# Patient Record
Sex: MALE | Race: BLACK OR AFRICAN AMERICAN | NOT HISPANIC OR LATINO | Employment: FULL TIME | URBAN - METROPOLITAN AREA
[De-identification: names, ages, dates, MRNs, and addresses within clinical notes are randomized per-mention and may not be internally consistent; named-entity substitution may affect disease eponyms.]

---

## 2017-03-25 ENCOUNTER — HOSPITAL ENCOUNTER (EMERGENCY)
Facility: HOSPITAL | Age: 47
Discharge: HOME | End: 2017-03-25
Attending: EMERGENCY MEDICINE
Payer: MEDICAID

## 2017-03-25 VITALS
SYSTOLIC BLOOD PRESSURE: 130 MMHG | RESPIRATION RATE: 20 BRPM | WEIGHT: 150 LBS | HEART RATE: 78 BPM | HEIGHT: 72 IN | OXYGEN SATURATION: 98 % | TEMPERATURE: 98 F | BODY MASS INDEX: 20.32 KG/M2 | DIASTOLIC BLOOD PRESSURE: 70 MMHG

## 2017-03-25 DIAGNOSIS — T14.8XXA MUSCLE STRAIN: Primary | ICD-10-CM

## 2017-03-25 DIAGNOSIS — M79.621 AXILLARY PAIN, RIGHT: ICD-10-CM

## 2017-03-25 LAB — TROPONIN I SERPL DL<=0.01 NG/ML-MCNC: <0.006 NG/ML

## 2017-03-25 PROCEDURE — 96372 THER/PROPH/DIAG INJ SC/IM: CPT

## 2017-03-25 PROCEDURE — 99284 EMERGENCY DEPT VISIT MOD MDM: CPT | Mod: 25

## 2017-03-25 PROCEDURE — 93005 ELECTROCARDIOGRAM TRACING: CPT

## 2017-03-25 PROCEDURE — 84484 ASSAY OF TROPONIN QUANT: CPT

## 2017-03-25 PROCEDURE — 63600175 PHARM REV CODE 636 W HCPCS: Performed by: EMERGENCY MEDICINE

## 2017-03-25 PROCEDURE — 94640 AIRWAY INHALATION TREATMENT: CPT

## 2017-03-25 PROCEDURE — 25000242 PHARM REV CODE 250 ALT 637 W/ HCPCS: Performed by: EMERGENCY MEDICINE

## 2017-03-25 RX ORDER — KETOROLAC TROMETHAMINE 30 MG/ML
60 INJECTION, SOLUTION INTRAMUSCULAR; INTRAVENOUS
Status: COMPLETED | OUTPATIENT
Start: 2017-03-25 | End: 2017-03-25

## 2017-03-25 RX ORDER — PREDNISONE 20 MG/1
60 TABLET ORAL
Status: COMPLETED | OUTPATIENT
Start: 2017-03-25 | End: 2017-03-25

## 2017-03-25 RX ORDER — CYCLOBENZAPRINE HCL 10 MG
10 TABLET ORAL 3 TIMES DAILY PRN
Qty: 15 TABLET | Refills: 0 | Status: SHIPPED | OUTPATIENT
Start: 2017-03-25 | End: 2017-03-30

## 2017-03-25 RX ORDER — IPRATROPIUM BROMIDE AND ALBUTEROL SULFATE 2.5; .5 MG/3ML; MG/3ML
3 SOLUTION RESPIRATORY (INHALATION)
Status: COMPLETED | OUTPATIENT
Start: 2017-03-25 | End: 2017-03-25

## 2017-03-25 RX ORDER — PREDNISONE 10 MG/1
10 TABLET ORAL DAILY
COMMUNITY
End: 2023-04-24

## 2017-03-25 RX ORDER — KETOROLAC TROMETHAMINE 10 MG/1
10 TABLET, FILM COATED ORAL 2 TIMES DAILY PRN
Qty: 15 TABLET | Refills: 1 | Status: SHIPPED | OUTPATIENT
Start: 2017-03-25 | End: 2023-04-24

## 2017-03-25 RX ADMIN — PREDNISONE 60 MG: 20 TABLET ORAL at 07:03

## 2017-03-25 RX ADMIN — IPRATROPIUM BROMIDE AND ALBUTEROL SULFATE 3 ML: .5; 3 SOLUTION RESPIRATORY (INHALATION) at 07:03

## 2017-03-25 RX ADMIN — KETOROLAC TROMETHAMINE 60 MG: 30 INJECTION, SOLUTION INTRAMUSCULAR at 08:03

## 2017-03-25 NOTE — ED TRIAGE NOTES
chest pain under right arm, started last night, hurts when move and cough, states possibly from picking up 30# sack of crawfish, pt has wheezing noted to TENZIN in posterior, pt is a current everyday smoker

## 2017-03-25 NOTE — DISCHARGE INSTRUCTIONS
Self-Care for Strains and Sprains  Most minor strains and sprains can be treated with self-care. Recovering from a strain or sprain may take 6 to 8 weeks. Your self-care goal is to reduce pain and immobilize the injury to speed healing.     A sprain injures ligaments (tissue that connects bones to bones).        A strain injures muscles or tendons (tissue that connects muscles to bones).   Support the injured area  Wrapping the injured area provides support for short, necessary activities. Be careful not to wrap the area too tightly. This could cut off the blood supply.  · Support a wrist, elbow, or shoulder with a sling.  · Wrap an ankle or knee with an elastic bandage.  · Tape a finger or toe to the one next to it.  Use cold and heat  Cold reduces swelling. Both cold and heat reduce pain. Heat should not be used in the initial treatment of the injury. When using cold or heat, always place a towel between the pack and your skin.  · Apply ice or a cold pack 10 to 15 minutes every hour youre awake for the first 2 days.  · After the swelling goes down, use cold or heat to control pain. Dont use heat late in the day, since it can cause swelling when youre not active.  Rest and elevate  Rest and elevation help your injury heal faster.  · Raise the injured area above your heart level.  · Keep the injured area from moving.  · Limit the use of the joint or limb.  Use medicine  · Aspirin reduces pain and swelling. (Note: Dont give aspirin to a child 18 or younger unless prescribed by the doctor.)  · Aspirin substitutes, such as ibuprofen, can reduce pain. Some substitutes reduce swelling, too. Ask your pharmacist which substitutes you can use.  Call your doctor if:  · The injured joint wont move, or bones make a grating sound when they move.  · You cant put weight on the injured area, even after 24 hours.  · The injured body part is cold, blue, or numb.  · The joint or limb appears bent or crooked.  · Pain  increases or doesnt improve in 4 days.  · When pressing along the injured area, you notice a spot that is especially painful.   Date Last Reviewed: 9/29/2015 © 2000-2016 [employer]. [address] [place] [address], [place] [address]. All rights reserved. This information is not intended as a substitute for professional medical care. Always follow your healthcare professional's instructions.          RICE     Rest an injury, elevate it, and use ice and compression as directed.   RICE stands for rest, ice, compression, and elevation. These can limit pain and swelling after an injury. RICE may be recommended to help treat fractures, sprains, strains, and bruises or bumps.   Home care  The following explain the details of RICE:  · Rest. Limit the use of the injured body part. This helps prevent further damage to the body part and gives it time to heal. In some cases, you may need a sling, brace, splint, or cast to help keep the body part still until it has healed.  · Ice. Applying ice right after an injury helps relieve pain and swelling. Wrap a bag of ice in a thin towel. Then, place it over the injured area. Do this for 10 to 15 minutes every 3 to 4 hours. Continue for the next 1 to 3 days or until your symptoms improve. Never put ice directly on your skin or ice an area longer than 15 minutes at a time.  · Compression. Putting pressure on an injury helps reduce swelling and provides support. Wrap the injured area firmly with an elastic bandage/wrap. Make sure not to wrap the bandage too tightly or you will cut off blood flow to the injured area. If your bandage loosens, rewrap it.  · Elevation. Keeping an injury raised above the level of your heart reduces swelling, pain, and throbbing. For instance, if you have a broken leg, it may help to rest your leg on several pillows when sitting or lying down. Try to keep the injured area elevated for at least 2 to 3 hours per day.  Follow-up care  Follow up with your  healthcare provider, or as advised.  When to seek medical advice  Call your healthcare provider right away if any of these occur:  · Fever of 100.4°F (38°C) or higher, or as directed by your healthcare provider  · Increased pain or swelling in the injured body part  · Injured body part becomes cold, blue, numb, or tingly  · Signs of infection. These include warmth in the skin, redness, drainage, or bad smell coming from the injured body part.  Date Last Reviewed: 1/18/2016 © 2000-2016 [employer]. [address] [place] [address]. All rights reserved. This information is not intended as a substitute for professional medical care. Always follow your healthcare professional's instructions.          Treating Strains and Sprains  Strains and sprains happen when muscles or other soft tissues near your bones stretch or tear. These injuries can cause bruising, swelling, and pain. To ease your discomfort and speed the healing of your strain or sprain, follow the tips below. Remember, a strain or sprain can take 6 to 8 weeks to heal.     Important Note: Do not give aspirin to children or teens without discussing it with your healthcare provider first.        Ice first, heat later  · Use ice for the first 24 to 48 hours after injury. Ice helps prevent swelling and reduce pain. Ice the injury for no more than 20 minutes at a time and allow at least  20 minutes between icing sessions.  · Apply heat after the first 72 hours, once the swelling has gone down. Heat relaxes muscles and increases blood flow. Soak the injured area in warm water or use a heating pad set on low for no more than 15 minutes at a time.  Wrap and elevate  · Wrap an injured limb firmly with an elastic bandage. This provides support and helps prevent swelling. Dont wear an elastic bandage overnight. Watch for tingling, numbness, or increased pain, and remove the bandage immediately if any of these occurs.  · Elevate the injured area  to help reduce swelling and throbbing. Its best to raise an injured limb above the level of your heart.     Medicines  · Over-the-counter medicines such as acetaminophen or ibuprofen can help reduce pain. Some also help reduce swelling.  · Take medicine only as directed.  · Rest the area even if medicines are controlling the pain.  Rest  · Rest the injured area by not using it for 24 hours.  · When youre ready, return slowly to your normal activities. Rest the injured area often.  · Dont use or walk on an injured limb if it hurts.  Date Last Reviewed: 9/3/2015  © 9960-4092 [employer]. [address] [place] [address], [place] [address]. All rights reserved. This information is not intended as a substitute for professional medical care. Always follow your healthcare professional's instructions.

## 2017-03-25 NOTE — ED PROVIDER NOTES
Encounter Date: 3/25/2017       History     Chief Complaint   Patient presents with    Chest Pain     chest pain under right arm, started last night, hurts when move and cough, states possibly from picking up 30# sack of crawfish     Review of patient's allergies indicates:  No Known Allergies  HPI Comments: This is NOT chest pain as described in triage.  Patient is a 46 year old male who presents with pain to lateral chest wall under axilla on right that began yesterday after lifting up a heavy crawfish sack.  No dyspnea, nausea, vomiting.  No anterior chest or chest wall pain.  No diaphoresis.  Exactly reproduced with twisting to the left and coughing.  Pain is positional.  No fever, chills.  He does have wheezing.  No rash to skin to suggest shingles.  Gradual onset at around 9pm.    The history is provided by the patient.     Past Medical History:   Diagnosis Date    Bronchitis     Bronchitis      Past Surgical History:   Procedure Laterality Date    NECK SURGERY       History reviewed. No pertinent family history.  Social History   Substance Use Topics    Smoking status: Current Every Day Smoker    Smokeless tobacco: None    Alcohol use None     Review of Systems   Constitutional: Negative for fever.   HENT: Negative for sore throat.    Eyes: Negative for pain.   Respiratory: Negative for shortness of breath.    Cardiovascular: Negative for chest pain and palpitations.   Gastrointestinal: Negative for abdominal pain, nausea and vomiting.   Genitourinary: Negative for dysuria.   Musculoskeletal: Negative for back pain.   Skin: Negative for rash.   Neurological: Negative for headaches.   Psychiatric/Behavioral: Negative for confusion.       Physical Exam   Initial Vitals   BP Pulse Resp Temp SpO2   03/25/17 0653 03/25/17 0653 03/25/17 0653 03/25/17 0653 03/25/17 0653   128/78 76 20 97.9 °F (36.6 °C) 97 %     Physical Exam    Nursing note and vitals reviewed.  Constitutional: He appears well-developed and  well-nourished. No distress.   HENT:   Head: Normocephalic and atraumatic.   Eyes: Conjunctivae are normal.   Neck: Normal range of motion. Neck supple.   Cardiovascular: Normal rate and regular rhythm.   Pulmonary/Chest: Breath sounds normal. No respiratory distress. He has no wheezes.   Abdominal: Soft. He exhibits no distension.   Musculoskeletal: Normal range of motion.   Neurological: He is alert and oriented to person, place, and time. He has normal strength. No cranial nerve deficit or sensory deficit.   Skin: Skin is warm and dry. No rash noted. No erythema.   Psychiatric: He has a normal mood and affect.         ED Course   Procedures  Labs Reviewed - No data to display  EKG Readings: (Independently Interpreted)   Initial Reading: No STEMI. Rhythm: Normal Sinus Rhythm. Heart Rate: 70. Ectopy: No Ectopy. ST Segments: Normal ST Segments. T Waves: Normal. Axis: Normal.          Medical Decision Making:   Differential Diagnosis:   DDx:  Muscle strain, rib fracture, pneumothorax, pericarditis, ACS  Clinical Tests:   Lab Tests: Ordered and Reviewed  The following lab test(s) were unremarkable: Troponin  Radiological Study: Ordered and Reviewed  Medical Tests: Ordered and Reviewed  ED Management:  Pain likely musculoskeletal as it was temporally related to picking up heavy objects.   Low suspicion for cardiac, but patient concerned, so we investigated.  Patient has no acute findings on EKG and a negative troponin after greater than 12 hours of constant pain to right lateral chest beneath axilla that is worse with certain positions and cough.  Likely musculoskeletal.  Will discharge with pain medications.                     ED Course     Clinical Impression:   The primary encounter diagnosis was Muscle strain. A diagnosis of Axillary pain, right was also pertinent to this visit.          Ben Siddiqui MD  03/26/17 7917

## 2017-03-25 NOTE — ED AVS SNAPSHOT
[facility]  [address] [place] [address]               Aaron Pruitt   3/25/2017  6:56 AM   ED    Description:  Male : 1970   Department:  [facility]           Your Care was Coordinated By:     Provider Role From To    Ben Siddiqui MD Attending Provider 17 0700 --      Reason for Visit     Chest Pain           Diagnoses this Visit        Comments    Muscle strain    -  Primary     Axillary pain, right           ED Disposition     ED Disposition Condition Comment    Discharge             To Do List           Follow-up Information     Follow up with [facility].    Specialty:  Family Medicine    Contact information:    [address], [address]  [facility] [address] [phone]       These Medications        Disp Refills Start End    ketorolac (TORADOL) 10 mg tablet 15 tablet 1 3/25/2017     Take 1 tablet (10 mg total) by mouth 2 (two) times daily as needed for Pain. - Oral    cyclobenzaprine (FLEXERIL) 10 MG tablet 15 tablet 0 3/25/2017 3/30/2017    Take 1 tablet (10 mg total) by mouth 3 (three) times daily as needed for Muscle spasms. - Oral      [facility]     [facility] -  Assistance  Registered nurses in the [facility] provide clinical advisement, health education, appointment booking, and other advisory services.  Call for this free service at [phone].             Medications           Message regarding Medications     Verify the changes and/or additions to your medication regime listed below are the same as discussed with your clinician today.  If any of these changes or additions are incorrect, please notify your healthcare provider.        START taking these NEW medications        Refills    ketorolac (TORADOL) 10 mg tablet 1    Sig: Take 1 tablet (10 mg total) by mouth 2 (two) times daily as needed for Pain.    Class: Print    Route:  Oral    cyclobenzaprine (FLEXERIL) 10 MG tablet 0    Sig: Take 1 tablet (10 mg total) by mouth 3 (three) times daily as needed for Muscle spasms.    Class: Print    Route: Oral      These medications were administered today        Dose Freq    albuterol-ipratropium 2.5mg-0.5mg/3mL nebulizer solution 3 mL 3 mL ED 1 Time    Sig: Take 3 mLs by nebulization ED 1 Time.    Class: Normal    Route: Nebulization    predniSONE tablet 60 mg 60 mg ED 1 Time    Sig: Take 3 tablets (60 mg total) by mouth ED 1 Time.    Class: Normal    Route: Oral    ketorolac injection 60 mg 60 mg ED 1 Time    Sig: Inject 60 mg into the muscle ED 1 Time.    Class: Normal    Route: Intramuscular           Verify that the below list of medications is an accurate representation of the medications you are currently taking.  If none reported, the list may be blank. If incorrect, please contact your healthcare provider. Carry this list with you in case of emergency.           Current Medications     predniSONE (DELTASONE) 10 MG tablet Take 10 mg by mouth once daily.    albuterol 90 mcg/actuation inhaler Inhale 1-2 puffs into the lungs every 6 (six) hours as needed for Wheezing.    cyclobenzaprine (FLEXERIL) 10 MG tablet Take 1 tablet (10 mg total) by mouth 3 (three) times daily as needed for Muscle spasms.    ketorolac (TORADOL) 10 mg tablet Take 1 tablet (10 mg total) by mouth 2 (two) times daily as needed for Pain.           Clinical Reference Information           Your Vitals Were     BP Pulse Temp Resp Height Weight    128/78 (BP Location: Right arm, Patient Position: Sitting) 79 97.9 °F (36.6 °C) (Oral) 18 6' (1.829 m) 68 kg (150 lb)    SpO2 BMI             98% 20.34 kg/m2         Allergies as of 3/25/2017     No Known Allergies      Immunizations Administered on Date of Encounter - 3/25/2017     None      ED Micro, Lab, POCT     Start Ordered       Status Ordering Provider    03/25/17 0728 03/25/17 0727  Troponin I  STAT      Final result        ED Imaging Orders     Start Ordered       Status Ordering Provider    03/25/17 0728 03/25/17 0727  X-Ray Ribs 2 View Right  1 time imaging      Final result     03/25/17 0727 03/25/17 0727  X-Ray Chest 1 View  1 time imaging      Final result         Discharge Instructions           Self-Care for Strains and Sprains  Most minor strains and sprains can be treated with self-care. Recovering from a strain or sprain may take 6 to 8 weeks. Your self-care goal is to reduce pain and immobilize the injury to speed healing.     A sprain injures ligaments (tissue that connects bones to bones).        A strain injures muscles or tendons (tissue that connects muscles to bones).   Support the injured area  Wrapping the injured area provides support for short, necessary activities. Be careful not to wrap the area too tightly. This could cut off the blood supply.  · Support a wrist, elbow, or shoulder with a sling.  · Wrap an ankle or knee with an elastic bandage.  · Tape a finger or toe to the one next to it.  Use cold and heat  Cold reduces swelling. Both cold and heat reduce pain. Heat should not be used in the initial treatment of the injury. When using cold or heat, always place a towel between the pack and your skin.  · Apply ice or a cold pack 10 to 15 minutes every hour youre awake for the first 2 days.  · After the swelling goes down, use cold or heat to control pain. Dont use heat late in the day, since it can cause swelling when youre not active.  Rest and elevate  Rest and elevation help your injury heal faster.  · Raise the injured area above your heart level.  · Keep the injured area from moving.  · Limit the use of the joint or limb.  Use medicine  · Aspirin reduces pain and swelling. (Note: Dont give aspirin to a child 18 or younger unless prescribed by the doctor.)  · Aspirin substitutes, such as ibuprofen, can reduce pain. Some substitutes reduce swelling, too. Ask your pharmacist which substitutes you can  use.  Call your doctor if:  · The injured joint wont move, or bones make a grating sound when they move.  · You cant put weight on the injured area, even after 24 hours.  · The injured body part is cold, blue, or numb.  · The joint or limb appears bent or crooked.  · Pain increases or doesnt improve in 4 days.  · When pressing along the injured area, you notice a spot that is especially painful.   Date Last Reviewed: 9/29/2015 © 2000-2016 [email or web address]. [address] [place] [address]. All rights reserved. This information is not intended as a substitute for professional medical care. Always follow your healthcare professional's instructions.          RICE     Rest an injury, elevate it, and use ice and compression as directed.   RICE stands for rest, ice, compression, and elevation. These can limit pain and swelling after an injury. RICE may be recommended to help treat fractures, sprains, strains, and bruises or bumps.   Home care  The following explain the details of RICE:  · Rest. Limit the use of the injured body part. This helps prevent further damage to the body part and gives it time to heal. In some cases, you may need a sling, brace, splint, or cast to help keep the body part still until it has healed.  · Ice. Applying ice right after an injury helps relieve pain and swelling. Wrap a bag of ice in a thin towel. Then, place it over the injured area. Do this for 10 to 15 minutes every 3 to 4 hours. Continue for the next 1 to 3 days or until your symptoms improve. Never put ice directly on your skin or ice an area longer than 15 minutes at a time.  · Compression. Putting pressure on an injury helps reduce swelling and provides support. Wrap the injured area firmly with an elastic bandage/wrap. Make sure not to wrap the bandage too tightly or you will cut off blood flow to the injured area. If your bandage loosens, rewrap it.  · Elevation. Keeping an injury raised above the level of  your heart reduces swelling, pain, and throbbing. For instance, if you have a broken leg, it may help to rest your leg on several pillows when sitting or lying down. Try to keep the injured area elevated for at least 2 to 3 hours per day.  Follow-up care  Follow up with your healthcare provider, or as advised.  When to seek medical advice  Call your healthcare provider right away if any of these occur:  · Fever of 100.4°F (38°C) or higher, or as directed by your healthcare provider  · Increased pain or swelling in the injured body part  · Injured body part becomes cold, blue, numb, or tingly  · Signs of infection. These include warmth in the skin, redness, drainage, or bad smell coming from the injured body part.  Date Last Reviewed: 1/18/2016 © 2000-2016 [employer]. [address] [place] [address]. All rights reserved. This information is not intended as a substitute for professional medical care. Always follow your healthcare professional's instructions.          Treating Strains and Sprains  Strains and sprains happen when muscles or other soft tissues near your bones stretch or tear. These injuries can cause bruising, swelling, and pain. To ease your discomfort and speed the healing of your strain or sprain, follow the tips below. Remember, a strain or sprain can take 6 to 8 weeks to heal.     Important Note: Do not give aspirin to children or teens without discussing it with your healthcare provider first.        Ice first, heat later  · Use ice for the first 24 to 48 hours after injury. Ice helps prevent swelling and reduce pain. Ice the injury for no more than 20 minutes at a time and allow at least  20 minutes between icing sessions.  · Apply heat after the first 72 hours, once the swelling has gone down. Heat relaxes muscles and increases blood flow. Soak the injured area in warm water or use a heating pad set on low for no more than 15 minutes at a time.  Wrap and elevate  · Wrap  an injured limb firmly with an elastic bandage. This provides support and helps prevent swelling. Dont wear an elastic bandage overnight. Watch for tingling, numbness, or increased pain, and remove the bandage immediately if any of these occurs.  · Elevate the injured area to help reduce swelling and throbbing. Its best to raise an injured limb above the level of your heart.     Medicines  · Over-the-counter medicines such as acetaminophen or ibuprofen can help reduce pain. Some also help reduce swelling.  · Take medicine only as directed.  · Rest the area even if medicines are controlling the pain.  Rest  · Rest the injured area by not using it for 24 hours.  · When youre ready, return slowly to your normal activities. Rest the injured area often.  · Dont use or walk on an injured limb if it hurts.  Date Last Reviewed: 9/3/2015  © 0867-6139 [employer]. [address] [place] [address]. All rights reserved. This information is not intended as a substitute for professional medical care. Always follow your healthcare professional's instructions.            [facility] Sign-Up     Activating your [facility] account is as easy as 1-2-3!     1) Visit [email or web address], select Sign Up Now, enter this activation code and your date of birth, then select Next.  [email or web address]  Expires: 5/9/2017  8:31 AM      2) Create a username and password to use when you visit [facility] in the future and select a security question in case you lose your password and select Next.    3) Enter your e-mail address and click Sign Up!    Additional Information  If you have questions, please e-mail [email or web address] or call [phone] to talk to our [facility] staff. Remember, [facility] is NOT to be used for urgent needs. For medical emergencies, dial 911.         Smoking Cessation     If you would like to quit smoking:   You may be eligible for free services if you are a [place] resident and started smoking  cigarettes before September 1, 1988.  Call the Smoking Cessation Trust (SCT) toll free at (420) 254-4250 or (947) 063-0789.   Call 1-800-QUIT-NOW if you do not meet the above criteria.             [facility] complies with applicable Federal civil rights laws and does not discriminate on the basis of race, color, national origin, age, disability, or sex.        Language Assistance Services     ATTENTION: Language assistance services are available, free of charge. Please call [phone].      ATENCIÓN: Si habla español, tiene a roberson disposición servicios gratuitos de asistencia lingüística. Llame al [phone].     CHÚ Ý: N?u b?n nói Ti?ng Vi?t, có các d?ch v? h? tr? ngôn ng? mi?n phí dành cho b?n. G?i s? [phone].

## 2018-01-02 ENCOUNTER — HOSPITAL ENCOUNTER (EMERGENCY)
Facility: HOSPITAL | Age: 48
Discharge: HOME | End: 2018-01-02
Attending: EMERGENCY MEDICINE
Payer: MEDICAID

## 2018-01-02 VITALS
TEMPERATURE: 98 F | DIASTOLIC BLOOD PRESSURE: 84 MMHG | HEIGHT: 72 IN | OXYGEN SATURATION: 98 % | HEART RATE: 68 BPM | SYSTOLIC BLOOD PRESSURE: 138 MMHG | BODY MASS INDEX: 20.32 KG/M2 | WEIGHT: 150 LBS | RESPIRATION RATE: 18 BRPM

## 2018-01-02 DIAGNOSIS — R53.83 FATIGUE, UNSPECIFIED TYPE: Primary | ICD-10-CM

## 2018-01-02 DIAGNOSIS — B34.9 VIRAL SYNDROME: ICD-10-CM

## 2018-01-02 DIAGNOSIS — R11.0 NAUSEA: ICD-10-CM

## 2018-01-02 LAB
FLUAV AG SPEC QL IA: NEGATIVE
FLUBV AG SPEC QL IA: NEGATIVE
SPECIMEN SOURCE: NORMAL

## 2018-01-02 PROCEDURE — 87400 INFLUENZA A/B EACH AG IA: CPT

## 2018-01-02 PROCEDURE — 99284 EMERGENCY DEPT VISIT MOD MDM: CPT

## 2018-01-02 RX ORDER — ONDANSETRON 4 MG/1
4 TABLET, ORALLY DISINTEGRATING ORAL EVERY 8 HOURS PRN
Qty: 8 TABLET | Refills: 0 | Status: SHIPPED | OUTPATIENT
Start: 2018-01-02 | End: 2018-01-05

## 2018-01-02 NOTE — ED TRIAGE NOTES
congestion and cough dx with bronchitis)  since last week, currently on antibiotics, still not feeling well

## 2018-01-02 NOTE — ED PROVIDER NOTES
Encounter Date: 1/2/2018    SCRIBE #1 NOTE: I, Thiago Thurston, am scribing for, and in the presence of, Dr. Gómez.       History     Chief Complaint   Patient presents with    Nasal Congestion     congestion and cough since last week, currently on antibiotics, still not feeling well     Time patient was seen by the provider: 12:34 PM    The patient is a 47 y.o. male with hx of: bronchitis who presents to the ED with a complaint of fatigue since yesterday. Patient notes associated nausea and nasal congestion. Patient reports seeing his PCP about a week ago for his cough. He was given medication for his cough, which provided some relief. Denies vomiting, abdominal pain, appetite change, fever, diarrhea, constipation, urinary symptoms. Patient has been taking albuterol, with last dose yesterday morning. He currently smokes ~one pack cigarettes per day. Denies any substance abuse, but did drink alcohol two nights ago. Patient reports drinking 3-4 cups of coffee this morning, but has not eaten.      The history is provided by the patient.     Review of patient's allergies indicates:  No Known Allergies  Past Medical History:   Diagnosis Date    Bronchitis     Bronchitis      Past Surgical History:   Procedure Laterality Date    NECK SURGERY       History reviewed. No pertinent family history.  Social History   Substance Use Topics    Smoking status: Current Every Day Smoker    Smokeless tobacco: Not on file    Alcohol use Not on file     Review of Systems   Constitutional: Negative for appetite change and fever.   HENT: Positive for congestion. Negative for sore throat.    Respiratory: Negative for cough.    Cardiovascular: Negative for chest pain.   Gastrointestinal: Positive for nausea. Negative for abdominal pain, constipation, diarrhea and vomiting.   Genitourinary: Negative for frequency.   Musculoskeletal: Negative for back pain.   Skin: Negative for rash.   Neurological: Positive for weakness.   Hematological:  Does not bruise/bleed easily.       Physical Exam     Initial Vitals [01/02/18 1130]   BP Pulse Resp Temp SpO2   138/84 68 18 97.6 °F (36.4 °C) 98 %      MAP       102         Physical Exam    Nursing note and vitals reviewed.  Constitutional: He appears well-developed and well-nourished. No distress.   HENT:   Head: Normocephalic and atraumatic.   Mouth/Throat: Oropharynx is clear and moist.   TMs clear  No OP exudates   Eyes: EOM are normal. Pupils are equal, round, and reactive to light.   Neck: No tracheal deviation present.   Cardiovascular: Normal rate, regular rhythm, normal heart sounds and intact distal pulses.   Pulmonary/Chest: Breath sounds normal. No stridor. No respiratory distress. He has no wheezes.   Abdominal: Soft. He exhibits no distension and no mass. There is no tenderness.   Musculoskeletal: Normal range of motion.   Neurological: He is alert and oriented to person, place, and time. He has normal strength. No cranial nerve deficit or sensory deficit.   Skin: Skin is warm and dry. No rash noted.   Psychiatric: He has a normal mood and affect. His behavior is normal. Thought content normal.         ED Course   Procedures  Labs Reviewed   INFLUENZA A AND B ANTIGEN             Medical Decision Making:   History:   Old Medical Records: I decided to obtain old medical records.  Initial Assessment:   Presents with fatigue and weakness last night, with nasal congestion and cough. Saw PCP about a week ago and was given medication for his cough. Presents today to make sure he doesn't have the flu. Patient is afebrile, vitals unremarkable, exam benign. No evidence of infection on exam. Will obtain flu swab.  Differential Diagnosis:   Differential Diagnosis includes, but is not limited to:  Sepsis, meningitis, cavernous sinus thrombosis, nasal foreign body, otitis media/external, nasal polyp, bacterial sinusitis, allergic rhinitis, influenza, bacterial/viral pharyngitis, peritonsillar abscess,  retropharyngeal abscess, bacterial/viral pneumonia.    Clinical Tests:   Lab Tests: Reviewed and Ordered  ED Management:  Rapid flu negative.   Recommend symptomatic and supportive care for viral syndrome. Will prescribe zofran for symptomatic treatment of nausea and decreased appetite.   Pt to follow-up with PCP.     After complete evaluation, including thorough history and physical exam, the patient's symptoms are most likely due to viral upper respiratory infection. There are no concerning features on physical exam to suggest bacterial otitis media/externa, sinusitis, pharyngitis, or peritonsillar abscess. Vital signs do not suggest sepsis. Lung sounds are clear and not consistent with pneumonia. There is no neck pain or limited ROM to suggest retropharyngeal abscess or meningitis. The patient will be treated with supportive care. Will provide RX for zofran upon D/C.    Upon re-evaluation, the patient's status has improved.  After complete ED evaluation, clinical impression is most consistent with viral syndrome.  At this time, I feel there is no emergent condition requiring further evaluation or admission. I believe the patient is stable for discharge from the ED. The patient and any additional family present were updated with test results, overall clinical impression, and recommended further plan of care. All questions were answered. The patient expressed understanding and agreed with current plan for discharge with PCP follow-up within 1 week. Strict return precautions were provided, including fever, inability to tolerate PO, return/worsening of current symptoms or any other concerns.                      ED Course      Clinical Impression:   The primary encounter diagnosis was Fatigue, unspecified type. A diagnosis of Nausea was also pertinent to this visit.    Disposition:   Disposition: Discharged  Condition: Stable       I, Dr. Larry Gómez, personally performed the services described in this documentation.  All medical record entries made by the scribe were at my direction and in my presence.  I have reviewed the chart and agree that the record reflects my personal performance and is accurate and complete.     Larry Gómez MD.                 Larry Gómez MD  01/31/18 0504

## 2023-04-21 DIAGNOSIS — M25.562 PAIN IN BOTH KNEES, UNSPECIFIED CHRONICITY: Primary | ICD-10-CM

## 2023-04-21 DIAGNOSIS — M25.561 PAIN IN BOTH KNEES, UNSPECIFIED CHRONICITY: Primary | ICD-10-CM
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
## 2023-04-24 ENCOUNTER — OFFICE VISIT (OUTPATIENT)
Dept: ORTHOPEDICS | Facility: CLINIC | Age: 53
End: 2023-04-24
Payer: MEDICAID

## 2023-04-24 VITALS — HEIGHT: 73 IN | BODY MASS INDEX: 20.24 KG/M2 | WEIGHT: 152.69 LBS

## 2023-04-24 DIAGNOSIS — M25.562 ACUTE PAIN OF LEFT KNEE: Primary | ICD-10-CM
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
## 2023-04-24 DIAGNOSIS — M25.362 INSTABILITY OF KNEE JOINT, LEFT: ICD-10-CM

## 2023-04-24 PROCEDURE — 99999 PR PBB SHADOW E&M-EST. PATIENT-LVL IV: CPT | Mod: PBBFAC,,, | Performed by: PHYSICIAN ASSISTANT

## 2023-04-24 PROCEDURE — 99214 OFFICE O/P EST MOD 30 MIN: CPT | Mod: PBBFAC,PN | Performed by: PHYSICIAN ASSISTANT

## 2023-04-24 PROCEDURE — 99203 OFFICE O/P NEW LOW 30 MIN: CPT | Mod: S$PBB,,, | Performed by: PHYSICIAN ASSISTANT

## 2023-04-24 PROCEDURE — 99203 PR OFFICE/OUTPT VISIT, NEW, LEVL III, 30-44 MIN: ICD-10-PCS | Mod: S$PBB,,, | Performed by: PHYSICIAN ASSISTANT

## 2023-04-24 PROCEDURE — 99999 PR PBB SHADOW E&M-EST. PATIENT-LVL IV: ICD-10-PCS | Mod: PBBFAC,,, | Performed by: PHYSICIAN ASSISTANT

## 2023-04-24 RX ORDER — VARENICLINE TARTRATE 1 MG/1
TABLET, FILM COATED ORAL
Status: ON HOLD | COMMUNITY
Start: 2023-04-14 | End: 2024-01-25 | Stop reason: HOSPADM

## 2023-04-24 RX ORDER — IBUPROFEN 200 MG
1 TABLET ORAL
Status: ON HOLD | COMMUNITY
Start: 2023-03-07 | End: 2024-01-25 | Stop reason: HOSPADM

## 2023-04-24 RX ORDER — NAPROXEN 500 MG/1
TABLET ORAL
Status: ON HOLD | COMMUNITY
End: 2024-01-12

## 2023-04-24 RX ORDER — MEPOLIZUMAB 100 MG/ML
INJECTION, SOLUTION SUBCUTANEOUS
Status: ON HOLD | COMMUNITY
Start: 2023-03-23 | End: 2024-01-25 | Stop reason: HOSPADM

## 2023-04-24 NOTE — PATIENT INSTRUCTIONS
It was nice to meet you today! I am sorry that you are hurting so much.     You have some wear and tear in your knees (arthritis). I am worried that you may have an ACL tear in the left knee.      Wear the knee brace as needed for prolonged walking. This should give you added support and help with pain.     Continue on naproxen to help with pain/inflammation. Take as directed with food.     I want to get an MRI of your left knee to see if you have an ACL tear. I will message you with results and my further recommendations.     Please stay in touch and call me if you need anything. You can also send me a message in [facility].     Veronique   [phone]

## 2023-05-04 ENCOUNTER — TELEPHONE (OUTPATIENT)
Dept: ORTHOPEDICS | Facility: CLINIC | Age: 53
End: 2023-05-04
Payer: MEDICAID

## 2023-05-04 NOTE — TELEPHONE ENCOUNTER
Spoke to patient he was given the number to medicaid hotline also he was informed that he need a bigger brace and that he can come in anytime to pick one up. Pt replied. OK Thnak You

## 2023-05-04 NOTE — TELEPHONE ENCOUNTER
----- Message from Veronique Peraza PA-C sent at 5/4/2023 12:57 PM CDT -----  I called him about his MRI results.     He needs to see sports medicine. Please give him medicaid escalation line and other medicaid numbers.     I will also put in referral and Traci should call him.     Also, he needs a bigger knee brace. He can come in at any time to get this. He does not need to see me.     Thanks.

## 2024-01-01 ENCOUNTER — HOSPITAL ENCOUNTER (INPATIENT)
Facility: HOSPITAL | Age: 54
LOS: 10 days | Discharge: HOSPICE | DRG: 207 | End: 2024-01-11
Attending: EMERGENCY MEDICINE | Admitting: FAMILY MEDICINE
Payer: MEDICAID

## 2024-01-01 DIAGNOSIS — J96.01 ACUTE RESPIRATORY FAILURE WITH HYPOXIA AND HYPERCARBIA: ICD-10-CM

## 2024-01-01 DIAGNOSIS — G40.901 SEIZURE DISORDER, NONCONVULSIVE, WITH STATUS EPILEPTICUS: ICD-10-CM

## 2024-01-01 DIAGNOSIS — J96.02 ACUTE RESPIRATORY FAILURE WITH HYPOXIA AND HYPERCARBIA: ICD-10-CM

## 2024-01-01 DIAGNOSIS — I46.9 CARDIORESPIRATORY ARREST: Primary | ICD-10-CM

## 2024-01-01 DIAGNOSIS — R79.89 TROPONIN LEVEL ELEVATED: ICD-10-CM

## 2024-01-01 DIAGNOSIS — G93.40 ACUTE ENCEPHALOPATHY: ICD-10-CM

## 2024-01-01 DIAGNOSIS — J44.1 COPD WITH ACUTE EXACERBATION: ICD-10-CM

## 2024-01-01 DIAGNOSIS — R74.8 ELEVATED CK: ICD-10-CM

## 2024-01-01 DIAGNOSIS — I46.9 CARDIAC ARREST: ICD-10-CM

## 2024-01-01 LAB
ALBUMIN SERPL BCP-MCNC: 3.6 G/DL (ref 3.5–5.2)
ALBUMIN SERPL BCP-MCNC: 4.2 G/DL (ref 3.5–5.2)
ALLENS TEST: YES
ALLENS TEST: YES
ALP SERPL-CCNC: 66 U/L (ref 55–135)
ALP SERPL-CCNC: 77 U/L (ref 55–135)
ALT SERPL W/O P-5'-P-CCNC: 39 U/L (ref 10–44)
ALT SERPL W/O P-5'-P-CCNC: 82 U/L (ref 10–44)
AMPHET+METHAMPHET UR QL: NEGATIVE
ANION GAP SERPL CALC-SCNC: 16 MMOL/L (ref 8–16)
ANION GAP SERPL CALC-SCNC: 19 MMOL/L (ref 8–16)
APTT PPP: 25.8 SEC (ref 21–32)
AST SERPL-CCNC: 144 U/L (ref 10–40)
AST SERPL-CCNC: 38 U/L (ref 10–40)
BACTERIA #/AREA URNS HPF: ABNORMAL /HPF
BARBITURATES UR QL SCN>200 NG/ML: NEGATIVE
BASOPHILS # BLD AUTO: 0.02 K/UL (ref 0–0.2)
BASOPHILS NFR BLD: 0.3 % (ref 0–1.9)
BENZODIAZ UR QL SCN>200 NG/ML: NEGATIVE
BILIRUB SERPL-MCNC: 0.9 MG/DL (ref 0.1–1)
BILIRUB SERPL-MCNC: 0.9 MG/DL (ref 0.1–1)
BILIRUB UR QL STRIP: NEGATIVE
BILIRUB UR QL STRIP: NEGATIVE
BNP SERPL-MCNC: 45 PG/ML (ref 0–99)
BUN SERPL-MCNC: 15 MG/DL (ref 6–20)
BUN SERPL-MCNC: 17 MG/DL (ref 6–20)
BZE UR QL SCN: NEGATIVE
CALCIUM SERPL-MCNC: 8.4 MG/DL (ref 8.7–10.5)
CALCIUM SERPL-MCNC: 8.7 MG/DL (ref 8.7–10.5)
CANNABINOIDS UR QL SCN: ABNORMAL
CHLORIDE SERPL-SCNC: 104 MMOL/L (ref 95–110)
CHLORIDE SERPL-SCNC: 106 MMOL/L (ref 95–110)
CK SERPL-CCNC: 3294 U/L (ref 20–200)
CLARITY UR: ABNORMAL
CLARITY UR: CLEAR
CO2 SERPL-SCNC: 14 MMOL/L (ref 23–29)
CO2 SERPL-SCNC: 18 MMOL/L (ref 23–29)
COLOR UR: YELLOW
COLOR UR: YELLOW
CREAT SERPL-MCNC: 1.1 MG/DL (ref 0.5–1.4)
CREAT SERPL-MCNC: 1.3 MG/DL (ref 0.5–1.4)
CREAT UR-MCNC: 140.5 MG/DL (ref 23–375)
CTP QC/QA: YES
CTP QC/QA: YES
DIFFERENTIAL METHOD BLD: ABNORMAL
EOSINOPHIL # BLD AUTO: 0 K/UL (ref 0–0.5)
EOSINOPHIL NFR BLD: 0.4 % (ref 0–8)
ERYTHROCYTE [DISTWIDTH] IN BLOOD BY AUTOMATED COUNT: 12.3 % (ref 11.5–14.5)
EST. GFR  (NO RACE VARIABLE): >60 ML/MIN/1.73 M^2
EST. GFR  (NO RACE VARIABLE): >60 ML/MIN/1.73 M^2
FIO2: 100 %
FIO2: 100 %
FIO2: 85 %
GLUCOSE SERPL-MCNC: 138 MG/DL (ref 70–110)
GLUCOSE SERPL-MCNC: 141 MG/DL (ref 70–110)
GLUCOSE SERPL-MCNC: 270 MG/DL (ref 70–110)
GLUCOSE UR QL STRIP: NEGATIVE
GLUCOSE UR QL STRIP: NEGATIVE
HCT VFR BLD AUTO: 37 % (ref 40–54)
HGB BLD-MCNC: 11.9 G/DL (ref 14–18)
HGB UR QL STRIP: ABNORMAL
HGB UR QL STRIP: NEGATIVE
IMM GRANULOCYTES # BLD AUTO: 0.13 K/UL (ref 0–0.04)
IMM GRANULOCYTES NFR BLD AUTO: 1.6 % (ref 0–0.5)
KETONES UR QL STRIP: NEGATIVE
KETONES UR QL STRIP: NEGATIVE
LACTATE SERPL-SCNC: 3.4 MMOL/L (ref 0.5–2.2)
LEUKOCYTE ESTERASE UR QL STRIP: NEGATIVE
LEUKOCYTE ESTERASE UR QL STRIP: NEGATIVE
LYMPHOCYTES # BLD AUTO: 4.1 K/UL (ref 1–4.8)
LYMPHOCYTES NFR BLD: 51.9 % (ref 18–48)
MAGNESIUM SERPL-MCNC: 2.2 MG/DL (ref 1.6–2.6)
MCH RBC QN AUTO: 30.7 PG (ref 27–31)
MCHC RBC AUTO-ENTMCNC: 32.2 G/DL (ref 32–36)
MCV RBC AUTO: 96 FL (ref 82–98)
METHADONE UR QL SCN>300 NG/ML: NEGATIVE
MICROSCOPIC COMMENT: ABNORMAL
MONOCYTES # BLD AUTO: 0.5 K/UL (ref 0.3–1)
MONOCYTES NFR BLD: 6 % (ref 4–15)
NEUTROPHILS # BLD AUTO: 3.2 K/UL (ref 1.8–7.7)
NEUTROPHILS NFR BLD: 39.8 % (ref 38–73)
NITRITE UR QL STRIP: NEGATIVE
NITRITE UR QL STRIP: NEGATIVE
NRBC BLD-RTO: 0 /100 WBC
OPIATES UR QL SCN: NEGATIVE
PCO2 BLDA: 47 MMHG (ref 35–45)
PCO2 BLDA: 76.8 MMHG (ref 35–45)
PCP UR QL SCN>25 NG/ML: NEGATIVE
PEEP: 5
PH SMN: 6.93 [PH] (ref 7.35–7.45)
PH SMN: 7.27 [PH] (ref 7.35–7.45)
PH UR STRIP: 6 [PH] (ref 5–8)
PH UR STRIP: 7 [PH] (ref 5–8)
PHOSPHATE SERPL-MCNC: 4.4 MG/DL (ref 2.7–4.5)
PLATELET # BLD AUTO: 170 K/UL (ref 150–450)
PMV BLD AUTO: 9.1 FL (ref 9.2–12.9)
PO2 BLDA: 216 MMHG (ref 80–100)
PO2 BLDA: >488 MMHG (ref 80–100)
POC BASE DEFICIT: -17 MMOL/L (ref -2–2)
POC BASE DEFICIT: -5.4 MMOL/L (ref -2–2)
POC COHB: 2.1 % (ref 0–9)
POC HCO3: 16 MMOL/L (ref 24–28)
POC HCO3: 21.6 MMOL/L (ref 24–28)
POC METHB: 1 % (ref 0–3)
POC MOLECULAR INFLUENZA A AGN: NEGATIVE
POC MOLECULAR INFLUENZA B AGN: NEGATIVE
POC O2HB: 96.3 %
POC PERFORMED BY: ABNORMAL
POC PERFORMED BY: ABNORMAL
POC PERFORMED BY: NORMAL
POC SATURATED O2: 98.9 % (ref 95–100)
POC SATURATED O2: 99.4 % (ref 95–100)
POC SATURATED O2: >100 % (ref 95–100)
POC SET RR: 20
POCT GLUCOSE: 138 MG/DL (ref 70–110)
POCT GLUCOSE: 159 MG/DL (ref 70–110)
POCT GLUCOSE: 262 MG/DL (ref 70–110)
POTASSIUM SERPL-SCNC: 4 MMOL/L (ref 3.5–5.1)
POTASSIUM SERPL-SCNC: 5.7 MMOL/L (ref 3.5–5.1)
PROT SERPL-MCNC: 6.3 G/DL (ref 6–8.4)
PROT SERPL-MCNC: 7.5 G/DL (ref 6–8.4)
PROT UR QL STRIP: ABNORMAL
PROT UR QL STRIP: ABNORMAL
RBC # BLD AUTO: 3.87 M/UL (ref 4.6–6.2)
RBC #/AREA URNS HPF: 42 /HPF (ref 0–4)
SARS-COV-2 RDRP RESP QL NAA+PROBE: NEGATIVE
SODIUM SERPL-SCNC: 137 MMOL/L (ref 136–145)
SODIUM SERPL-SCNC: 140 MMOL/L (ref 136–145)
SP GR UR STRIP: 1.01 (ref 1–1.03)
SP GR UR STRIP: 1.02 (ref 1–1.03)
SPECIMEN SOURCE: ABNORMAL
SPECIMEN SOURCE: ABNORMAL
SPECIMEN SOURCE: NORMAL
SQUAMOUS #/AREA URNS HPF: 1 /HPF
TOXICOLOGY INFORMATION: ABNORMAL
TROPONIN I SERPL DL<=0.01 NG/ML-MCNC: 0.01 NG/ML (ref 0–0.03)
TROPONIN I SERPL DL<=0.01 NG/ML-MCNC: 0.08 NG/ML (ref 0–0.03)
URN SPEC COLLECT METH UR: ABNORMAL
URN SPEC COLLECT METH UR: ABNORMAL
UROBILINOGEN UR STRIP-ACNC: NEGATIVE EU/DL
UROBILINOGEN UR STRIP-ACNC: NEGATIVE EU/DL
VT: 420
WBC # BLD AUTO: 7.98 K/UL (ref 3.9–12.7)
WBC #/AREA URNS HPF: 2 /HPF (ref 0–5)

## 2024-01-01 PROCEDURE — 93005 ELECTROCARDIOGRAM TRACING: CPT

## 2024-01-01 PROCEDURE — 36600 WITHDRAWAL OF ARTERIAL BLOOD: CPT

## 2024-01-01 PROCEDURE — 96374 THER/PROPH/DIAG INJ IV PUSH: CPT

## 2024-01-01 PROCEDURE — 80307 DRUG TEST PRSMV CHEM ANLYZR: CPT | Performed by: EMERGENCY MEDICINE

## 2024-01-01 PROCEDURE — 84100 ASSAY OF PHOSPHORUS: CPT | Performed by: FAMILY MEDICINE

## 2024-01-01 PROCEDURE — 25000242 PHARM REV CODE 250 ALT 637 W/ HCPCS: Performed by: STUDENT IN AN ORGANIZED HEALTH CARE EDUCATION/TRAINING PROGRAM

## 2024-01-01 PROCEDURE — 81000 URINALYSIS NONAUTO W/SCOPE: CPT | Mod: 59 | Performed by: STUDENT IN AN ORGANIZED HEALTH CARE EDUCATION/TRAINING PROGRAM

## 2024-01-01 PROCEDURE — 80053 COMPREHEN METABOLIC PANEL: CPT | Mod: 91 | Performed by: FAMILY MEDICINE

## 2024-01-01 PROCEDURE — 99900035 HC TECH TIME PER 15 MIN (STAT)

## 2024-01-01 PROCEDURE — 84484 ASSAY OF TROPONIN QUANT: CPT | Performed by: EMERGENCY MEDICINE

## 2024-01-01 PROCEDURE — 83605 ASSAY OF LACTIC ACID: CPT | Performed by: FAMILY MEDICINE

## 2024-01-01 PROCEDURE — 83735 ASSAY OF MAGNESIUM: CPT | Performed by: FAMILY MEDICINE

## 2024-01-01 PROCEDURE — 5A12012 PERFORMANCE OF CARDIAC OUTPUT, SINGLE, MANUAL: ICD-10-PCS | Performed by: FAMILY MEDICINE

## 2024-01-01 PROCEDURE — 81003 URINALYSIS AUTO W/O SCOPE: CPT | Mod: 59 | Performed by: EMERGENCY MEDICINE

## 2024-01-01 PROCEDURE — 27000221 HC OXYGEN, UP TO 24 HOURS

## 2024-01-01 PROCEDURE — 99900026 HC AIRWAY MAINTENANCE (STAT)

## 2024-01-01 PROCEDURE — 82803 BLOOD GASES ANY COMBINATION: CPT

## 2024-01-01 PROCEDURE — 82962 GLUCOSE BLOOD TEST: CPT

## 2024-01-01 PROCEDURE — 82947 ASSAY GLUCOSE BLOOD QUANT: CPT | Performed by: STUDENT IN AN ORGANIZED HEALTH CARE EDUCATION/TRAINING PROGRAM

## 2024-01-01 PROCEDURE — 99291 CRITICAL CARE FIRST HOUR: CPT

## 2024-01-01 PROCEDURE — 25000003 PHARM REV CODE 250

## 2024-01-01 PROCEDURE — 84484 ASSAY OF TROPONIN QUANT: CPT | Mod: 91 | Performed by: FAMILY MEDICINE

## 2024-01-01 PROCEDURE — 94002 VENT MGMT INPAT INIT DAY: CPT

## 2024-01-01 PROCEDURE — 94761 N-INVAS EAR/PLS OXIMETRY MLT: CPT | Mod: XB

## 2024-01-01 PROCEDURE — 85730 THROMBOPLASTIN TIME PARTIAL: CPT | Performed by: STUDENT IN AN ORGANIZED HEALTH CARE EDUCATION/TRAINING PROGRAM

## 2024-01-01 PROCEDURE — 0BH17EZ INSERTION OF ENDOTRACHEAL AIRWAY INTO TRACHEA, VIA NATURAL OR ARTIFICIAL OPENING: ICD-10-PCS | Performed by: FAMILY MEDICINE

## 2024-01-01 PROCEDURE — 20000000 HC ICU ROOM

## 2024-01-01 PROCEDURE — 96375 TX/PRO/DX INJ NEW DRUG ADDON: CPT

## 2024-01-01 PROCEDURE — 25000242 PHARM REV CODE 250 ALT 637 W/ HCPCS: Performed by: EMERGENCY MEDICINE

## 2024-01-01 PROCEDURE — 87502 INFLUENZA DNA AMP PROBE: CPT

## 2024-01-01 PROCEDURE — 85025 COMPLETE CBC W/AUTO DIFF WBC: CPT | Performed by: EMERGENCY MEDICINE

## 2024-01-01 PROCEDURE — 87635 SARS-COV-2 COVID-19 AMP PRB: CPT | Performed by: EMERGENCY MEDICINE

## 2024-01-01 PROCEDURE — 80053 COMPREHEN METABOLIC PANEL: CPT | Performed by: EMERGENCY MEDICINE

## 2024-01-01 PROCEDURE — 63600175 PHARM REV CODE 636 W HCPCS

## 2024-01-01 PROCEDURE — 83880 ASSAY OF NATRIURETIC PEPTIDE: CPT | Performed by: EMERGENCY MEDICINE

## 2024-01-01 PROCEDURE — 63600175 PHARM REV CODE 636 W HCPCS: Performed by: STUDENT IN AN ORGANIZED HEALTH CARE EDUCATION/TRAINING PROGRAM

## 2024-01-01 PROCEDURE — 94640 AIRWAY INHALATION TREATMENT: CPT

## 2024-01-01 PROCEDURE — 5A1955Z RESPIRATORY VENTILATION, GREATER THAN 96 CONSECUTIVE HOURS: ICD-10-PCS | Performed by: FAMILY MEDICINE

## 2024-01-01 PROCEDURE — 25000242 PHARM REV CODE 250 ALT 637 W/ HCPCS

## 2024-01-01 PROCEDURE — 83520 IMMUNOASSAY QUANT NOS NONAB: CPT | Performed by: STUDENT IN AN ORGANIZED HEALTH CARE EDUCATION/TRAINING PROGRAM

## 2024-01-01 PROCEDURE — 82550 ASSAY OF CK (CPK): CPT | Performed by: FAMILY MEDICINE

## 2024-01-01 PROCEDURE — 93010 ELECTROCARDIOGRAM REPORT: CPT | Mod: ,,, | Performed by: INTERNAL MEDICINE

## 2024-01-01 RX ORDER — BUDESONIDE 0.5 MG/2ML
1 INHALANT ORAL EVERY 12 HOURS
Status: DISCONTINUED | OUTPATIENT
Start: 2024-01-01 | End: 2024-01-03

## 2024-01-01 RX ORDER — SODIUM CHLORIDE 0.9 % (FLUSH) 0.9 %
10 SYRINGE (ML) INJECTION EVERY 12 HOURS PRN
Status: DISCONTINUED | OUTPATIENT
Start: 2024-01-01 | End: 2024-01-12 | Stop reason: HOSPADM

## 2024-01-01 RX ORDER — IBUPROFEN 200 MG
24 TABLET ORAL
Status: DISCONTINUED | OUTPATIENT
Start: 2024-01-01 | End: 2024-01-12 | Stop reason: HOSPADM

## 2024-01-01 RX ORDER — MONTELUKAST SODIUM 10 MG/1
10 TABLET ORAL DAILY
Status: DISCONTINUED | OUTPATIENT
Start: 2024-01-01 | End: 2024-01-12 | Stop reason: HOSPADM

## 2024-01-01 RX ORDER — CISATRACURIUM BESYLATE 2 MG/ML
0.2 INJECTION, SOLUTION INTRAVENOUS ONCE
Status: DISCONTINUED | OUTPATIENT
Start: 2024-01-01 | End: 2024-01-02

## 2024-01-01 RX ORDER — HYDRALAZINE HYDROCHLORIDE 20 MG/ML
10 INJECTION INTRAMUSCULAR; INTRAVENOUS ONCE
Status: COMPLETED | OUTPATIENT
Start: 2024-01-01 | End: 2024-01-01

## 2024-01-01 RX ORDER — NICARDIPINE HYDROCHLORIDE 0.2 MG/ML
0-15 INJECTION INTRAVENOUS CONTINUOUS
Status: DISCONTINUED | OUTPATIENT
Start: 2024-01-01 | End: 2024-01-02

## 2024-01-01 RX ORDER — GLUCAGON 1 MG
1 KIT INJECTION
Status: DISCONTINUED | OUTPATIENT
Start: 2024-01-01 | End: 2024-01-03

## 2024-01-01 RX ORDER — ALBUTEROL SULFATE 2.5 MG/.5ML
10 SOLUTION RESPIRATORY (INHALATION) EVERY 6 HOURS
Status: DISCONTINUED | OUTPATIENT
Start: 2024-01-01 | End: 2024-01-01

## 2024-01-01 RX ORDER — IPRATROPIUM BROMIDE 0.5 MG/2.5ML
0.5 SOLUTION RESPIRATORY (INHALATION)
Status: COMPLETED | OUTPATIENT
Start: 2024-01-01 | End: 2024-01-01

## 2024-01-01 RX ORDER — PANTOPRAZOLE SODIUM 40 MG/10ML
40 INJECTION, POWDER, LYOPHILIZED, FOR SOLUTION INTRAVENOUS DAILY
Status: DISCONTINUED | OUTPATIENT
Start: 2024-01-02 | End: 2024-01-12 | Stop reason: HOSPADM

## 2024-01-01 RX ORDER — LORAZEPAM 2 MG/ML
2 INJECTION INTRAMUSCULAR
Status: DISPENSED | OUTPATIENT
Start: 2024-01-01 | End: 2024-01-01

## 2024-01-01 RX ORDER — MUPIROCIN 20 MG/G
OINTMENT TOPICAL 2 TIMES DAILY
Status: DISPENSED | OUTPATIENT
Start: 2024-01-01 | End: 2024-01-06

## 2024-01-01 RX ORDER — FENTANYL CITRATE-0.9 % NACL/PF 10 MCG/ML
0-250 PLASTIC BAG, INJECTION (ML) INTRAVENOUS CONTINUOUS
Status: DISCONTINUED | OUTPATIENT
Start: 2024-01-01 | End: 2024-01-03

## 2024-01-01 RX ORDER — IBUPROFEN 200 MG
16 TABLET ORAL
Status: DISCONTINUED | OUTPATIENT
Start: 2024-01-01 | End: 2024-01-12 | Stop reason: HOSPADM

## 2024-01-01 RX ORDER — IPRATROPIUM BROMIDE 0.5 MG/2.5ML
0.5 SOLUTION RESPIRATORY (INHALATION) EVERY 6 HOURS
Status: DISCONTINUED | OUTPATIENT
Start: 2024-01-01 | End: 2024-01-02

## 2024-01-01 RX ORDER — GLUCAGON 1 MG
1 KIT INJECTION
Status: DISCONTINUED | OUTPATIENT
Start: 2024-01-02 | End: 2024-01-12 | Stop reason: HOSPADM

## 2024-01-01 RX ORDER — PROPOFOL 10 MG/ML
0-60 INJECTION, EMULSION INTRAVENOUS CONTINUOUS
Status: DISCONTINUED | OUTPATIENT
Start: 2024-01-01 | End: 2024-01-03

## 2024-01-01 RX ORDER — FENTANYL CITRATE-0.9 % NACL/PF 10 MCG/ML
0-250 PLASTIC BAG, INJECTION (ML) INTRAVENOUS CONTINUOUS
Status: DISCONTINUED | OUTPATIENT
Start: 2024-01-01 | End: 2024-01-01

## 2024-01-01 RX ORDER — ACETAMINOPHEN 325 MG/1
650 TABLET ORAL EVERY 4 HOURS PRN
Status: DISCONTINUED | OUTPATIENT
Start: 2024-01-01 | End: 2024-01-03

## 2024-01-01 RX ORDER — ALBUTEROL SULFATE 2.5 MG/.5ML
5 SOLUTION RESPIRATORY (INHALATION) EVERY 4 HOURS PRN
Status: DISCONTINUED | OUTPATIENT
Start: 2024-01-01 | End: 2024-01-02

## 2024-01-01 RX ORDER — METHYLPREDNISOLONE SOD SUCC 125 MG
125 VIAL (EA) INJECTION
Status: COMPLETED | OUTPATIENT
Start: 2024-01-01 | End: 2024-01-01

## 2024-01-01 RX ORDER — INSULIN ASPART 100 [IU]/ML
0-5 INJECTION, SOLUTION INTRAVENOUS; SUBCUTANEOUS EVERY 6 HOURS PRN
Status: DISCONTINUED | OUTPATIENT
Start: 2024-01-02 | End: 2024-01-12 | Stop reason: HOSPADM

## 2024-01-01 RX ORDER — NALOXONE HCL 0.4 MG/ML
0.02 VIAL (ML) INJECTION
Status: DISCONTINUED | OUTPATIENT
Start: 2024-01-01 | End: 2024-01-06

## 2024-01-01 RX ORDER — ENOXAPARIN SODIUM 100 MG/ML
40 INJECTION SUBCUTANEOUS EVERY 24 HOURS
Status: DISCONTINUED | OUTPATIENT
Start: 2024-01-02 | End: 2024-01-12 | Stop reason: HOSPADM

## 2024-01-01 RX ORDER — GLUCAGON 1 MG
1 KIT INJECTION
Status: DISCONTINUED | OUTPATIENT
Start: 2024-01-01 | End: 2024-01-02

## 2024-01-01 RX ORDER — MAGNESIUM SULFATE HEPTAHYDRATE 40 MG/ML
2 INJECTION, SOLUTION INTRAVENOUS ONCE
Status: COMPLETED | OUTPATIENT
Start: 2024-01-01 | End: 2024-01-01

## 2024-01-01 RX ORDER — ALBUTEROL SULFATE 2.5 MG/.5ML
10 SOLUTION RESPIRATORY (INHALATION)
Status: COMPLETED | OUTPATIENT
Start: 2024-01-01 | End: 2024-01-01

## 2024-01-01 RX ORDER — PROPOFOL 10 MG/ML
INJECTION, EMULSION INTRAVENOUS
Status: COMPLETED
Start: 2024-01-01 | End: 2024-01-01

## 2024-01-01 RX ADMIN — IPRATROPIUM BROMIDE 0.5 MG: 0.5 SOLUTION RESPIRATORY (INHALATION) at 07:01

## 2024-01-01 RX ADMIN — METHYLPREDNISOLONE SODIUM SUCCINATE 125 MG: 125 INJECTION, POWDER, FOR SOLUTION INTRAMUSCULAR; INTRAVENOUS at 06:01

## 2024-01-01 RX ADMIN — HYDRALAZINE HYDROCHLORIDE 10 MG: 20 INJECTION, SOLUTION INTRAMUSCULAR; INTRAVENOUS at 07:01

## 2024-01-01 RX ADMIN — PROPOFOL 5 MCG/KG/MIN: 10 INJECTION, EMULSION INTRAVENOUS at 05:01

## 2024-01-01 RX ADMIN — BUDESONIDE 1 MG: 0.5 INHALANT RESPIRATORY (INHALATION) at 07:01

## 2024-01-01 RX ADMIN — LORAZEPAM 2 MG: 2 INJECTION INTRAMUSCULAR; INTRAVENOUS at 07:01

## 2024-01-01 RX ADMIN — Medication 25 MCG/HR: at 06:01

## 2024-01-01 RX ADMIN — IPRATROPIUM BROMIDE 0.5 MG: 0.5 SOLUTION RESPIRATORY (INHALATION) at 06:01

## 2024-01-01 RX ADMIN — ALBUTEROL SULFATE 10 MG: 2.5 SOLUTION RESPIRATORY (INHALATION) at 06:01

## 2024-01-01 RX ADMIN — ALBUTEROL SULFATE 10 MG: 2.5 SOLUTION RESPIRATORY (INHALATION) at 07:01

## 2024-01-01 RX ADMIN — MAGNESIUM SULFATE HEPTAHYDRATE 2 G: 40 INJECTION, SOLUTION INTRAVENOUS at 08:01

## 2024-01-01 NOTE — ED NOTES
"Family arrived to ED and at patient bedside. States patient with hx of Asthma, COPD, and heavy tobacco use. Patient belongings smell of marijuana. Unknown further drug or alcohol use. Denies patient being ill recently or around anyone known to be ill. States "he was shoveling dirt today and I think that is what brought it on." JARON. Updated on POC. Respiratory paged to bedside for nebulizing treatment. Care ongoing.   "

## 2024-01-01 NOTE — ED NOTES
Patient arrived via EMS post respiratory arrest. EMS informed that friend called due to SOB. EMS explain patient had non rebreather in place, not hooked to anything. EMS informed that they attempted to give patient Duo Neb when he coded. 2mg Narcan administered d/t pinpoint pupils without response. 3 epi administered en route. Pt defibrillated 1 x per EMS and regained pulse. Pulse noted upon arrival to ED. ETT secured and patient hooked to ventilator. IV inserted. Bloodwork obtained. EDP at bedside. Safety intact. Care ongoing.

## 2024-01-01 NOTE — ED PROVIDER NOTES
Encounter Date: 1/1/2024       History     Chief Complaint   Patient presents with    Respiratory Arrest     Hx of Asthma  Coded with EMS during Duo Neb - 3 epi administered, 2mg Narcan, 250 cc Normal Saline     The patient is a 53-year-old male who was brought in by EMS as a cardiorespiratory arrest.  911 was called for shortness of breath, the patient has history of asthma.  When EMS arrived, the patient was slumped over face forward with a friend holding a non-rebreather mask over his face that was not hooked up to oxygen.  He had a Medrol Dosepak and an albuterol inhaler by his side.  EMS started giving the patient a nebulizer treatment and he became pulseless and apneic.  He was intubated with an ET tube and CPR was initiated. The patient regained pulses after CPR by the MANDI and sadaferine X3, shocked X1.      Review of patient's allergies indicates:   Allergen Reactions    Sulfa (sulfonamide antibiotics)      Past Medical History:   Diagnosis Date    Bronchitis     Bronchitis      Past Surgical History:   Procedure Laterality Date    NECK SURGERY       History reviewed. No pertinent family history.  Social History     Tobacco Use    Smoking status: Every Day     Review of Systems   Unable to perform ROS: Acuity of condition       Physical Exam     Initial Vitals [01/01/24 1722]   BP Pulse Resp Temp SpO2   (!) 160/115 84 (!) 24 96.9 °F (36.1 °C) 98 %      MAP       --         Physical Exam    Constitutional: He appears well-developed and well-nourished.   unresponsive   Eyes:   Pinpoint pupils bilateral   Neck: Neck supple. JVD present.   Normal range of motion.  Cardiovascular:  Normal rate, regular rhythm and normal heart sounds.           Pulmonary/Chest: He has wheezes (Expiratory wheezing in bilateral lung fields).   Abdominal: Abdomen is soft. He exhibits no distension. There is no abdominal tenderness.   Musculoskeletal:         General: No edema. Normal range of motion.      Cervical back: Normal  range of motion and neck supple.     Neurological:   Unresponsive   Skin: Skin is warm and dry.         ED Course   Critical Care    Date/Time: 1/1/2024 6:11 PM    Performed by: Annette Parnell MD  Authorized by: Annette Parnell MD  Direct patient critical care time: 10 minutes  Additional history critical care time: 10 minutes  Ordering / reviewing critical care time: 10 minutes  Documentation critical care time: 15 minutes  Consulting other physicians critical care time: 5 minutes  Total critical care time (exclusive of procedural time) : 50 minutes  Critical care time was exclusive of separately billable procedures and treating other patients.  Critical care was necessary to treat or prevent imminent or life-threatening deterioration of the following conditions: circulatory failure and respiratory failure.  Critical care was time spent personally by me on the following activities: development of treatment plan with patient or surrogate, discussions with consultants, evaluation of patient's response to treatment, examination of patient, obtaining history from patient or surrogate, ordering and performing treatments and interventions, ordering and review of laboratory studies, ordering and review of radiographic studies, pulse oximetry, re-evaluation of patient's condition, review of old charts and ventilator management.        Labs Reviewed   CBC W/ AUTO DIFFERENTIAL - Abnormal; Notable for the following components:       Result Value    RBC 3.87 (*)     Hemoglobin 11.9 (*)     Hematocrit 37.0 (*)     MPV 9.1 (*)     Immature Granulocytes 1.6 (*)     Immature Grans (Abs) 0.13 (*)     Lymph % 51.9 (*)     All other components within normal limits   COMPREHENSIVE METABOLIC PANEL - Abnormal; Notable for the following components:    CO2 14 (*)     Glucose 270 (*)     Anion Gap 19 (*)     All other components within normal limits   DRUG SCREEN PANEL, URINE EMERGENCY - Abnormal; Notable for the following components:     THC Presumptive Positive (*)     All other components within normal limits    Narrative:     Specimen Source->Urine   URINALYSIS, REFLEX TO URINE CULTURE - Abnormal; Notable for the following components:    Protein, UA Trace (*)     All other components within normal limits    Narrative:     Specimen Source->Urine   POCT GLUCOSE - Abnormal; Notable for the following components:    POCT Glucose 262 (*)     All other components within normal limits   TROPONIN I   B-TYPE NATRIURETIC PEPTIDE   SARS-COV-2 RDRP GENE   POCT INFLUENZA A/B MOLECULAR          Imaging Results              X-Ray Chest 1 View (Final result)  Result time 01/01/24 17:52:29      Final result by Joshua Mcclellan MD (01/01/24 17:52:29)                   Impression:      Endotracheal tube tip 3.8 cm from the michael.    Satisfactory position of enteric tube tip below the left hemidiaphragm.    Otherwise, no acute intrathoracic process.      Electronically signed by: Joshua Mcclellan MD  Date:    01/01/2024  Time:    17:52               Narrative:    EXAMINATION:  XR CHEST 1 VIEW    CLINICAL HISTORY:  Encounter for fitting and adjustment of other gastrointestinal appliance and device    TECHNIQUE:  Single frontal view of the chest was performed.    COMPARISON:  None    FINDINGS:  The endotracheal tube tip is 3.8 cm from the michael.  The enteric tube extends below the left hemidiaphragm.  Monitoring EKG leads are present.  There is a pacer pad overlying the left hemithorax.  There is intraosseous cannula in the right humeral head.    The cardiomediastinal silhouette is within normal limits.  There is no evidence of free air beneath the hemidiaphragms.  There are no pleural effusions.  There is no evidence of a pneumothorax.  There is no evidence of pneumomediastinum.  No airspace opacity is present.  The osseous structures are unremarkable.                                       Medications   propofol (DIPRIVAN) 10 mg/mL infusion (25 mcg/kg/min × 59 kg  "Intravenous Rate/Dose Change 1/1/24 1817)   methylPREDNISolone sodium succinate injection 125 mg (has no administration in time range)   albuterol sulfate nebulizer solution 10 mg (10 mg Nebulization Given 1/1/24 1807)   ipratropium 0.02 % nebulizer solution 0.5 mg (0.5 mg Nebulization Given 1/1/24 1807)     Medical Decision Making  Differential Diagnosis includes, but is not limited to:  Primary cardiac arrest, MI/ACS, arrhythmia, aortic dissection, cardiogenic shock, respiratory failure, airway obstruction, anaphylaxis, PE, sepsis, trauma, head injury, hemorrhagic shock, CVA, hyperkalemia, hypoglycemia, hypothermia, metabolic derangement, drug overdose, drug intoxication.     MDM:  The patient is a 53-year-old male who continues to smoke cigarettes and has history of asthma and COPD.  He was apparently shoveling dirt today and was around a lot of dust and became short of breath.  A friend called 911 and by the time EMS arrived, the patient was in respiratory distress, close to respiratory arrest.  He did have respiratory arrest in the ambulance and CPR was initiated.  Pulses were returned.  The patient was brought to the emergency department with some spontaneous respirations, being bagged with spontaneous pulses.  He has been stable since his arrival in the emergency department and will be admitted to the ICU.    Amount and/or Complexity of Data Reviewed  Independent Historian: caregiver     Details: The patient's mother and friends state he was shoveling dirt earlier today and was in a lot of dust.  The patient does continue to smoke and was recently told he had "a touch of COPD"  Labs: ordered. Decision-making details documented in ED Course.  Radiology: ordered and independent interpretation performed. Decision-making details documented in ED Course.     Details: ET tube is in good placement  ECG/medicine tests: ordered and independent interpretation performed. Decision-making details documented in ED " Course.     Details: 1724:  Rate 85 beats per minute, normal sinus rhythm, right bundle-branch block  Discussion of management or test interpretation with external provider(s): Case discussed with the [facility] hospitalist.  We discussed the need for an ICU bed for vent management and ongoing nebulizer treatments and evaluation of the patient's neurological status    Risk  Prescription drug management.               ED Course as of 01/01/24 1823 Mon Jan 01, 2024 1811 Urinalysis, Reflex to Urine Culture Urine, Clean Catch(!) [ST]   1811 CBC Auto Differential(!) [ST]   1811 Drug screen panel, emergency(!) [ST]   1811 POCT COVID-19 Rapid Screening [ST]   1811 POCT Influenza A/B Molecular [ST]   1811 Comprehensive Metabolic Panel(!) [ST]   1811 POCT ARTERIAL BLOOD GAS(!!) [ST]   1811 POCT glucose(!) [ST]      ED Course User Index  [ST] Annette Parnell MD                           Clinical Impression:  Final diagnoses:  [I46.9] Cardiorespiratory arrest (Primary)  [J44.1] COPD with acute exacerbation          ED Disposition Condition    Admit Stable                Annette Parnell MD  01/01/24 1823

## 2024-01-02 PROBLEM — I46.9 CARDIAC ARREST: Status: ACTIVE | Noted: 2024-01-02

## 2024-01-02 PROBLEM — J96.02 ACUTE RESPIRATORY FAILURE WITH HYPOXIA AND HYPERCARBIA: Status: ACTIVE | Noted: 2024-01-02

## 2024-01-02 PROBLEM — M62.82 NON-TRAUMATIC RHABDOMYOLYSIS: Status: ACTIVE | Noted: 2024-01-02

## 2024-01-02 PROBLEM — J96.01 ACUTE RESPIRATORY FAILURE WITH HYPOXIA AND HYPERCARBIA: Status: ACTIVE | Noted: 2024-01-02

## 2024-01-02 PROBLEM — E87.5 HYPERKALEMIA: Status: ACTIVE | Noted: 2024-01-02

## 2024-01-02 LAB
ALBUMIN SERPL BCP-MCNC: 3.7 G/DL (ref 3.5–5.2)
ALLENS TEST: NO
ALP SERPL-CCNC: 52 U/L (ref 55–135)
ALT SERPL W/O P-5'-P-CCNC: 76 U/L (ref 10–44)
ANION GAP SERPL CALC-SCNC: 10 MMOL/L (ref 8–16)
ANION GAP SERPL CALC-SCNC: 11 MMOL/L (ref 8–16)
ANION GAP SERPL CALC-SCNC: 11 MMOL/L (ref 8–16)
ANION GAP SERPL CALC-SCNC: 13 MMOL/L (ref 8–16)
ANION GAP SERPL CALC-SCNC: 16 MMOL/L (ref 8–16)
ASCENDING AORTA: 3.24 CM
AST SERPL-CCNC: 144 U/L (ref 10–40)
AV INDEX (PROSTH): 0.88
AV MEAN GRADIENT: 6 MMHG
AV PEAK GRADIENT: 10 MMHG
AV VALVE AREA BY VELOCITY RATIO: 3.29 CM²
AV VALVE AREA: 4.04 CM²
AV VELOCITY RATIO: 0.72
BASOPHILS # BLD AUTO: 0.01 K/UL (ref 0–0.2)
BASOPHILS NFR BLD: 0.1 % (ref 0–1.9)
BILIRUB SERPL-MCNC: 0.8 MG/DL (ref 0.1–1)
BSA FOR ECHO PROCEDURE: 1.88 M2
BUN SERPL-MCNC: 15 MG/DL (ref 6–20)
BUN SERPL-MCNC: 16 MG/DL (ref 6–20)
BUN SERPL-MCNC: 17 MG/DL (ref 6–20)
BUN SERPL-MCNC: 19 MG/DL (ref 6–20)
BUN SERPL-MCNC: 20 MG/DL (ref 6–20)
CALCIUM SERPL-MCNC: 8.2 MG/DL (ref 8.7–10.5)
CALCIUM SERPL-MCNC: 8.3 MG/DL (ref 8.7–10.5)
CALCIUM SERPL-MCNC: 8.4 MG/DL (ref 8.7–10.5)
CALCIUM SERPL-MCNC: 8.4 MG/DL (ref 8.7–10.5)
CALCIUM SERPL-MCNC: 8.6 MG/DL (ref 8.7–10.5)
CHLORIDE SERPL-SCNC: 102 MMOL/L (ref 95–110)
CHLORIDE SERPL-SCNC: 102 MMOL/L (ref 95–110)
CHLORIDE SERPL-SCNC: 103 MMOL/L (ref 95–110)
CHLORIDE SERPL-SCNC: 106 MMOL/L (ref 95–110)
CHLORIDE SERPL-SCNC: 107 MMOL/L (ref 95–110)
CK SERPL-CCNC: 4867 U/L (ref 20–200)
CK SERPL-CCNC: 5321 U/L (ref 20–200)
CK SERPL-CCNC: 5908 U/L (ref 20–200)
CO2 SERPL-SCNC: 17 MMOL/L (ref 23–29)
CO2 SERPL-SCNC: 18 MMOL/L (ref 23–29)
CO2 SERPL-SCNC: 19 MMOL/L (ref 23–29)
CO2 SERPL-SCNC: 24 MMOL/L (ref 23–29)
CO2 SERPL-SCNC: 25 MMOL/L (ref 23–29)
CREAT SERPL-MCNC: 0.8 MG/DL (ref 0.5–1.4)
CREAT SERPL-MCNC: 0.9 MG/DL (ref 0.5–1.4)
CREAT SERPL-MCNC: 1 MG/DL (ref 0.5–1.4)
CV ECHO LV RWT: 0.47 CM
DIFFERENTIAL METHOD BLD: ABNORMAL
DOP CALC AO PEAK VEL: 1.62 M/S
DOP CALC AO VTI: 24.8 CM
DOP CALC LVOT AREA: 4.6 CM2
DOP CALC LVOT DIAMETER: 2.42 CM
DOP CALC LVOT PEAK VEL: 1.16 M/S
DOP CALC LVOT STROKE VOLUME: 100.22 CM3
DOP CALCLVOT PEAK VEL VTI: 21.8 CM
E WAVE DECELERATION TIME: 219.39 MSEC
E/A RATIO: 1
E/E' RATIO: 6.61 M/S
ECHO LV POSTERIOR WALL: 1.13 CM (ref 0.6–1.1)
EOSINOPHIL # BLD AUTO: 0 K/UL (ref 0–0.5)
EOSINOPHIL NFR BLD: 0 % (ref 0–8)
ERYTHROCYTE [DISTWIDTH] IN BLOOD BY AUTOMATED COUNT: 12.4 % (ref 11.5–14.5)
EST. GFR  (NO RACE VARIABLE): >60 ML/MIN/1.73 M^2
FIO2: 30 %
FRACTIONAL SHORTENING: 28 % (ref 28–44)
GLUCOSE SERPL-MCNC: 118 MG/DL (ref 70–110)
GLUCOSE SERPL-MCNC: 120 MG/DL (ref 70–110)
GLUCOSE SERPL-MCNC: 121 MG/DL (ref 70–110)
GLUCOSE SERPL-MCNC: 125 MG/DL (ref 70–110)
GLUCOSE SERPL-MCNC: 71 MG/DL (ref 70–110)
HCT VFR BLD AUTO: 36.9 % (ref 40–54)
HGB BLD-MCNC: 12.4 G/DL (ref 14–18)
IMM GRANULOCYTES # BLD AUTO: 0.12 K/UL (ref 0–0.04)
IMM GRANULOCYTES NFR BLD AUTO: 0.9 % (ref 0–0.5)
INR PPP: 1 (ref 0.8–1.2)
INTERVENTRICULAR SEPTUM: 1.02 CM (ref 0.6–1.1)
IVRT: 79.92 MSEC
LA MAJOR: 5.08 CM
LA MINOR: 5.63 CM
LACTATE SERPL-SCNC: 2.8 MMOL/L (ref 0.5–2.2)
LEFT ATRIUM SIZE: 3.32 CM
LEFT ATRIUM VOLUME INDEX MOD: 29.4 ML/M2
LEFT ATRIUM VOLUME MOD: 57 CM3
LEFT INTERNAL DIMENSION IN SYSTOLE: 3.47 CM (ref 2.1–4)
LEFT VENTRICLE DIASTOLIC VOLUME INDEX: 55.04 ML/M2
LEFT VENTRICLE DIASTOLIC VOLUME: 106.78 ML
LEFT VENTRICLE MASS INDEX: 97 G/M2
LEFT VENTRICLE SYSTOLIC VOLUME INDEX: 25.7 ML/M2
LEFT VENTRICLE SYSTOLIC VOLUME: 49.9 ML
LEFT VENTRICULAR INTERNAL DIMENSION IN DIASTOLE: 4.79 CM (ref 3.5–6)
LEFT VENTRICULAR MASS: 187.26 G
LV LATERAL E/E' RATIO: 6.33 M/S
LV SEPTAL E/E' RATIO: 6.91 M/S
LVOT MG: 3.77 MMHG
LVOT MV: 0.94 CM/S
LYMPHOCYTES # BLD AUTO: 0.3 K/UL (ref 1–4.8)
LYMPHOCYTES NFR BLD: 2.5 % (ref 18–48)
MAGNESIUM SERPL-MCNC: 2.6 MG/DL (ref 1.6–2.6)
MAGNESIUM SERPL-MCNC: 2.7 MG/DL (ref 1.6–2.6)
MAGNESIUM SERPL-MCNC: 2.7 MG/DL (ref 1.6–2.6)
MCH RBC QN AUTO: 30.6 PG (ref 27–31)
MCHC RBC AUTO-ENTMCNC: 33.6 G/DL (ref 32–36)
MCV RBC AUTO: 91 FL (ref 82–98)
MONOCYTES # BLD AUTO: 0.4 K/UL (ref 0.3–1)
MONOCYTES NFR BLD: 3.1 % (ref 4–15)
MV PEAK A VEL: 0.76 M/S
MV PEAK E VEL: 0.76 M/S
MV STENOSIS PRESSURE HALF TIME: 63.62 MS
MV VALVE AREA P 1/2 METHOD: 3.46 CM2
NEUTROPHILS # BLD AUTO: 12.3 K/UL (ref 1.8–7.7)
NEUTROPHILS NFR BLD: 93.4 % (ref 38–73)
NRBC BLD-RTO: 0 /100 WBC
OHS LV EJECTION FRACTION SIMPSONS BIPLANE MOD: 65 %
PCO2 BLDA: 56.1 MMHG (ref 35–45)
PEEP: 5
PH SMN: 7.27 [PH] (ref 7.35–7.45)
PHOSPHATE SERPL-MCNC: 3.3 MG/DL (ref 2.7–4.5)
PHOSPHATE SERPL-MCNC: 3.7 MG/DL (ref 2.7–4.5)
PHOSPHATE SERPL-MCNC: 3.9 MG/DL (ref 2.7–4.5)
PHOSPHATE SERPL-MCNC: 4.7 MG/DL (ref 2.7–4.5)
PISA TR MAX VEL: 3.41 M/S
PLATELET # BLD AUTO: 165 K/UL (ref 150–450)
PLATELET BLD QL SMEAR: ABNORMAL
PMV BLD AUTO: 9.6 FL (ref 9.2–12.9)
PO2 BLDA: 92.9 MMHG (ref 80–100)
POC BASE DEFICIT: -1.7 MMOL/L (ref -2–2)
POC HCO3: 25.9 MMOL/L (ref 24–28)
POC PERFORMED BY: ABNORMAL
POC SATURATED O2: 96.5 % (ref 95–100)
POC SET RR: 20
POCT GLUCOSE: 106 MG/DL (ref 70–110)
POCT GLUCOSE: 124 MG/DL (ref 70–110)
POCT GLUCOSE: 137 MG/DL (ref 70–110)
POCT GLUCOSE: 200 MG/DL (ref 70–110)
POCT GLUCOSE: 77 MG/DL (ref 70–110)
POCT GLUCOSE: 94 MG/DL (ref 70–110)
POTASSIUM SERPL-SCNC: 4.3 MMOL/L (ref 3.5–5.1)
POTASSIUM SERPL-SCNC: 4.9 MMOL/L (ref 3.5–5.1)
POTASSIUM SERPL-SCNC: 5.7 MMOL/L (ref 3.5–5.1)
POTASSIUM SERPL-SCNC: 5.7 MMOL/L (ref 3.5–5.1)
POTASSIUM SERPL-SCNC: 5.9 MMOL/L (ref 3.5–5.1)
PROT SERPL-MCNC: 6.8 G/DL (ref 6–8.4)
PROTHROMBIN TIME: 10.5 SEC (ref 9–12.5)
RA MAJOR: 3.8 CM
RA WIDTH: 3.38 CM
RBC # BLD AUTO: 4.05 M/UL (ref 4.6–6.2)
RIGHT VENTRICULAR END-DIASTOLIC DIMENSION: 3.05 CM
RV TISSUE DOPPLER FREE WALL SYSTOLIC VELOCITY 1 (APICAL 4 CHAMBER VIEW): 20.19 CM/S
SINUS: 3.22 CM
SODIUM SERPL-SCNC: 135 MMOL/L (ref 136–145)
SODIUM SERPL-SCNC: 136 MMOL/L (ref 136–145)
SODIUM SERPL-SCNC: 137 MMOL/L (ref 136–145)
SODIUM SERPL-SCNC: 138 MMOL/L (ref 136–145)
SODIUM SERPL-SCNC: 138 MMOL/L (ref 136–145)
SPECIMEN SOURCE: ABNORMAL
STJ: 2.93 CM
TDI LATERAL: 0.12 M/S
TDI SEPTAL: 0.11 M/S
TDI: 0.12 M/S
TR MAX PG: 47 MMHG
TRICUSPID ANNULAR PLANE SYSTOLIC EXCURSION: 2.27 CM
TROPONIN I SERPL DL<=0.01 NG/ML-MCNC: 0.38 NG/ML (ref 0–0.03)
TROPONIN I SERPL DL<=0.01 NG/ML-MCNC: 0.41 NG/ML (ref 0–0.03)
TROPONIN I SERPL DL<=0.01 NG/ML-MCNC: 0.45 NG/ML (ref 0–0.03)
VT: 20
WBC # BLD AUTO: 13.12 K/UL (ref 3.9–12.7)
Z-SCORE OF LEFT VENTRICULAR DIMENSION IN END DIASTOLE: -1.4
Z-SCORE OF LEFT VENTRICULAR DIMENSION IN END SYSTOLE: 0.18

## 2024-01-02 PROCEDURE — 85610 PROTHROMBIN TIME: CPT | Performed by: FAMILY MEDICINE

## 2024-01-02 PROCEDURE — 93010 ELECTROCARDIOGRAM REPORT: CPT | Mod: ,,, | Performed by: INTERNAL MEDICINE

## 2024-01-02 PROCEDURE — 82550 ASSAY OF CK (CPK): CPT | Mod: 91

## 2024-01-02 PROCEDURE — 20000000 HC ICU ROOM

## 2024-01-02 PROCEDURE — 25000003 PHARM REV CODE 250: Performed by: FAMILY MEDICINE

## 2024-01-02 PROCEDURE — 99900035 HC TECH TIME PER 15 MIN (STAT)

## 2024-01-02 PROCEDURE — 84484 ASSAY OF TROPONIN QUANT: CPT | Mod: 91 | Performed by: NURSE PRACTITIONER

## 2024-01-02 PROCEDURE — 36415 COLL VENOUS BLD VENIPUNCTURE: CPT | Mod: XB | Performed by: INTERNAL MEDICINE

## 2024-01-02 PROCEDURE — 84100 ASSAY OF PHOSPHORUS: CPT | Mod: 91 | Performed by: STUDENT IN AN ORGANIZED HEALTH CARE EDUCATION/TRAINING PROGRAM

## 2024-01-02 PROCEDURE — 94003 VENT MGMT INPAT SUBQ DAY: CPT

## 2024-01-02 PROCEDURE — 84100 ASSAY OF PHOSPHORUS: CPT

## 2024-01-02 PROCEDURE — 36415 COLL VENOUS BLD VENIPUNCTURE: CPT | Performed by: FAMILY MEDICINE

## 2024-01-02 PROCEDURE — C9113 INJ PANTOPRAZOLE SODIUM, VIA: HCPCS

## 2024-01-02 PROCEDURE — 63600175 PHARM REV CODE 636 W HCPCS

## 2024-01-02 PROCEDURE — 85025 COMPLETE CBC W/AUTO DIFF WBC: CPT | Performed by: STUDENT IN AN ORGANIZED HEALTH CARE EDUCATION/TRAINING PROGRAM

## 2024-01-02 PROCEDURE — 84484 ASSAY OF TROPONIN QUANT: CPT | Mod: 91

## 2024-01-02 PROCEDURE — 63600175 PHARM REV CODE 636 W HCPCS: Performed by: FAMILY MEDICINE

## 2024-01-02 PROCEDURE — 94640 AIRWAY INHALATION TREATMENT: CPT

## 2024-01-02 PROCEDURE — 63600175 PHARM REV CODE 636 W HCPCS: Performed by: INTERNAL MEDICINE

## 2024-01-02 PROCEDURE — 63600175 PHARM REV CODE 636 W HCPCS: Performed by: EMERGENCY MEDICINE

## 2024-01-02 PROCEDURE — 63600175 PHARM REV CODE 636 W HCPCS: Performed by: STUDENT IN AN ORGANIZED HEALTH CARE EDUCATION/TRAINING PROGRAM

## 2024-01-02 PROCEDURE — 83735 ASSAY OF MAGNESIUM: CPT | Performed by: STUDENT IN AN ORGANIZED HEALTH CARE EDUCATION/TRAINING PROGRAM

## 2024-01-02 PROCEDURE — 36415 COLL VENOUS BLD VENIPUNCTURE: CPT | Mod: XB | Performed by: STUDENT IN AN ORGANIZED HEALTH CARE EDUCATION/TRAINING PROGRAM

## 2024-01-02 PROCEDURE — 93005 ELECTROCARDIOGRAM TRACING: CPT

## 2024-01-02 PROCEDURE — 80048 BASIC METABOLIC PNL TOTAL CA: CPT | Mod: XB | Performed by: STUDENT IN AN ORGANIZED HEALTH CARE EDUCATION/TRAINING PROGRAM

## 2024-01-02 PROCEDURE — 82550 ASSAY OF CK (CPK): CPT | Performed by: INTERNAL MEDICINE

## 2024-01-02 PROCEDURE — 25000003 PHARM REV CODE 250: Performed by: INTERNAL MEDICINE

## 2024-01-02 PROCEDURE — 25000242 PHARM REV CODE 250 ALT 637 W/ HCPCS: Performed by: STUDENT IN AN ORGANIZED HEALTH CARE EDUCATION/TRAINING PROGRAM

## 2024-01-02 PROCEDURE — 99291 CRITICAL CARE FIRST HOUR: CPT | Mod: ,,, | Performed by: NURSE PRACTITIONER

## 2024-01-02 PROCEDURE — 94761 N-INVAS EAR/PLS OXIMETRY MLT: CPT | Mod: XB

## 2024-01-02 PROCEDURE — 25000242 PHARM REV CODE 250 ALT 637 W/ HCPCS

## 2024-01-02 PROCEDURE — 83605 ASSAY OF LACTIC ACID: CPT

## 2024-01-02 PROCEDURE — 82803 BLOOD GASES ANY COMBINATION: CPT

## 2024-01-02 PROCEDURE — 36600 WITHDRAWAL OF ARTERIAL BLOOD: CPT

## 2024-01-02 PROCEDURE — 83735 ASSAY OF MAGNESIUM: CPT | Mod: 91 | Performed by: STUDENT IN AN ORGANIZED HEALTH CARE EDUCATION/TRAINING PROGRAM

## 2024-01-02 PROCEDURE — 80048 BASIC METABOLIC PNL TOTAL CA: CPT | Mod: 91,XB | Performed by: STUDENT IN AN ORGANIZED HEALTH CARE EDUCATION/TRAINING PROGRAM

## 2024-01-02 PROCEDURE — 27200966 HC CLOSED SUCTION SYSTEM

## 2024-01-02 PROCEDURE — 82550 ASSAY OF CK (CPK): CPT | Mod: 91 | Performed by: NURSE PRACTITIONER

## 2024-01-02 PROCEDURE — 36415 COLL VENOUS BLD VENIPUNCTURE: CPT | Mod: XB

## 2024-01-02 PROCEDURE — 27100171 HC OXYGEN HIGH FLOW UP TO 24 HOURS

## 2024-01-02 PROCEDURE — 80053 COMPREHEN METABOLIC PANEL: CPT | Performed by: FAMILY MEDICINE

## 2024-01-02 PROCEDURE — 25000003 PHARM REV CODE 250: Performed by: STUDENT IN AN ORGANIZED HEALTH CARE EDUCATION/TRAINING PROGRAM

## 2024-01-02 PROCEDURE — 84484 ASSAY OF TROPONIN QUANT: CPT | Performed by: INTERNAL MEDICINE

## 2024-01-02 PROCEDURE — 80048 BASIC METABOLIC PNL TOTAL CA: CPT | Mod: 91,XB | Performed by: INTERNAL MEDICINE

## 2024-01-02 RX ORDER — ALBUTEROL SULFATE 2.5 MG/.5ML
5 SOLUTION RESPIRATORY (INHALATION) EVERY 4 HOURS
Status: DISCONTINUED | OUTPATIENT
Start: 2024-01-02 | End: 2024-01-02

## 2024-01-02 RX ORDER — SODIUM CHLORIDE 9 MG/ML
INJECTION, SOLUTION INTRAVENOUS CONTINUOUS
Status: DISCONTINUED | OUTPATIENT
Start: 2024-01-02 | End: 2024-01-03

## 2024-01-02 RX ORDER — ALBUTEROL SULFATE 2.5 MG/.5ML
5 SOLUTION RESPIRATORY (INHALATION) EVERY 4 HOURS PRN
Status: DISCONTINUED | OUTPATIENT
Start: 2024-01-02 | End: 2024-01-12 | Stop reason: HOSPADM

## 2024-01-02 RX ORDER — IPRATROPIUM BROMIDE AND ALBUTEROL SULFATE 2.5; .5 MG/3ML; MG/3ML
3 SOLUTION RESPIRATORY (INHALATION) EVERY 4 HOURS
Status: DISCONTINUED | OUTPATIENT
Start: 2024-01-02 | End: 2024-01-02

## 2024-01-02 RX ADMIN — MINERAL OIL, WHITE PETROLATUM: .03; .94 OINTMENT OPHTHALMIC at 09:01

## 2024-01-02 RX ADMIN — BUDESONIDE 1 MG: 0.5 INHALANT RESPIRATORY (INHALATION) at 08:01

## 2024-01-02 RX ADMIN — MINERAL OIL, WHITE PETROLATUM: .03; .94 OINTMENT OPHTHALMIC at 05:01

## 2024-01-02 RX ADMIN — DEXTROSE MONOHYDRATE 500 ML: 100 INJECTION, SOLUTION INTRAVENOUS at 02:01

## 2024-01-02 RX ADMIN — METHYLPREDNISOLONE SODIUM SUCCINATE 60 MG: 40 INJECTION, POWDER, FOR SOLUTION INTRAMUSCULAR; INTRAVENOUS at 06:01

## 2024-01-02 RX ADMIN — MINERAL OIL, WHITE PETROLATUM: .03; .94 OINTMENT OPHTHALMIC at 02:01

## 2024-01-02 RX ADMIN — MUPIROCIN: 20 OINTMENT TOPICAL at 08:01

## 2024-01-02 RX ADMIN — ALBUTEROL SULFATE 5 MG: 2.5 SOLUTION RESPIRATORY (INHALATION) at 08:01

## 2024-01-02 RX ADMIN — METHYLPREDNISOLONE SODIUM SUCCINATE 60 MG: 40 INJECTION, POWDER, FOR SOLUTION INTRAMUSCULAR; INTRAVENOUS at 05:01

## 2024-01-02 RX ADMIN — IPRATROPIUM BROMIDE 0.5 MG: 0.5 SOLUTION RESPIRATORY (INHALATION) at 07:01

## 2024-01-02 RX ADMIN — IPRATROPIUM BROMIDE 0.5 MG: 0.5 SOLUTION RESPIRATORY (INHALATION) at 12:01

## 2024-01-02 RX ADMIN — INSULIN HUMAN 10 UNITS: 100 INJECTION, SOLUTION PARENTERAL at 02:01

## 2024-01-02 RX ADMIN — SODIUM ZIRCONIUM CYCLOSILICATE 5 G: 5 POWDER, FOR SUSPENSION ORAL at 10:01

## 2024-01-02 RX ADMIN — SODIUM CHLORIDE: 9 INJECTION, SOLUTION INTRAVENOUS at 10:01

## 2024-01-02 RX ADMIN — PROPOFOL 45 MCG/KG/MIN: 10 INJECTION, EMULSION INTRAVENOUS at 03:01

## 2024-01-02 RX ADMIN — ENOXAPARIN SODIUM 40 MG: 40 INJECTION SUBCUTANEOUS at 04:01

## 2024-01-02 RX ADMIN — AZITHROMYCIN MONOHYDRATE 500 MG: 500 INJECTION, POWDER, LYOPHILIZED, FOR SOLUTION INTRAVENOUS at 12:01

## 2024-01-02 RX ADMIN — PROPOFOL 40 MCG/KG/MIN: 10 INJECTION, EMULSION INTRAVENOUS at 11:01

## 2024-01-02 RX ADMIN — PROPOFOL 45 MCG/KG/MIN: 10 INJECTION, EMULSION INTRAVENOUS at 08:01

## 2024-01-02 RX ADMIN — MUPIROCIN: 20 OINTMENT TOPICAL at 09:01

## 2024-01-02 RX ADMIN — SODIUM CHLORIDE: 9 INJECTION, SOLUTION INTRAVENOUS at 09:01

## 2024-01-02 RX ADMIN — PANTOPRAZOLE SODIUM 40 MG: 40 INJECTION, POWDER, FOR SOLUTION INTRAVENOUS at 08:01

## 2024-01-02 RX ADMIN — CEFTRIAXONE SODIUM 2 G: 2 INJECTION, POWDER, FOR SOLUTION INTRAMUSCULAR; INTRAVENOUS at 11:01

## 2024-01-02 RX ADMIN — PROPOFOL 40 MCG/KG/MIN: 10 INJECTION, EMULSION INTRAVENOUS at 04:01

## 2024-01-02 RX ADMIN — FENTANYL CITRATE 175 MCG/HR: 50 INJECTION, SOLUTION INTRAMUSCULAR; INTRAVENOUS at 08:01

## 2024-01-02 RX ADMIN — MONTELUKAST 10 MG: 10 TABLET, FILM COATED ORAL at 08:01

## 2024-01-02 NOTE — HPI
HPI retrieved from chart. Patient is intubated and sedated. 54 YO M with PMHx significant for  COPD/Asthma presented to the ER intubated on the field for acute respiratory failure post cardiac arrest s/p resuscitation and shock x1. Per chart review and   family at bedside reports he was shoveling some dirt in his yard when he got severely short of breath and suddenly had difficulty breathing and neighbors called EMS. When EMS arrived, reportedly still had a pulse but was struggling to breath, became pulseless. He was given narcan x2 without improvement. CPR and Epi x3. ROSC after 3rd round. Also reportedly get shocked x1. He was transferred to the ER and sedated. Labs in the ER with elevated lactic acid, elevated troponin, , elevated CPK 3294, elevated AST//82, drug screen with presumptive THC. CT head no acute intracranial abnormality, CT chest no acute pulmonary process.   Troponin up to 0.4. CPK ~ 5K.  EKG without acute ischemic changes  TTE pending  Patient on TTM protocol

## 2024-01-02 NOTE — HPI
52 YO M with PMHx significant for  COPD/Asthma presented to the ER intubated on the field for acute respiratory failure post cardiac arrest s/p resuscitation and shock x1. Per chart review and   family at bedside reports he was shoveling some dirt in his yard when he got severely short of breath and suddenly had difficulty breathing and neighbors called EMS. When EMS arrived, reportedly still had a pulse but was struggling to breath, became pulseless. He was given narcan x2 without improvement. CPR and Epi x3. ROSC after 3rd round. Also reportedly get shocked x1. He was transferred to the ER and sedated. Labs in the ER with elevated lactic acid, elevated troponin, , elevated CPK 3294, elevated AST//82, drug screen with presumptive THC. CT head no acute intracranial abnormality, CT chest no acute pulmonary process.

## 2024-01-02 NOTE — ASSESSMENT & PLAN NOTE
Resolved or labs hemolysed . This patient has hyperkalemia which is controlled. We will monitor for arrhythmias with EKG or continuous telemetry. We will treat the hyperkalemia with Potassium Binders and Nebulized albuterol sulfate. The likely etiology of the hyperkalemia is  unknown .  The patients latest potassium has been reviewed and the results are listed below  Recent Labs   Lab 01/02/24  0026   K 4.3

## 2024-01-02 NOTE — PLAN OF CARE
Problem: Adult Inpatient Plan of Care  Goal: Plan of Care Review  Outcome: Ongoing, Progressing     Problem: Hemodynamic Instability (Targeted Temperature Management)  Goal: Effective Tissue Perfusion  Outcome: Ongoing, Progressing     Problem: Restraint, Nonbehavioral (Nonviolent)  Goal: Absence of Harm or Injury  Outcome: Ongoing, Progressing     No acute changes this shift. Pt remains intubated on TTM with goal temp set at 36C. Plans to rewarm to 37C tonight at 2330. Pt sedated with fentanyl and propofol. Pupils reactive and reflexes present. Restraints in place; safety maintained. K+ shifted once. BMP monitored q8hrs. POC reviewed with mother at bedside.

## 2024-01-02 NOTE — SUBJECTIVE & OBJECTIVE
Past Medical History:   Diagnosis Date    Bronchitis     Bronchitis        Past Surgical History:   Procedure Laterality Date    NECK SURGERY         Review of patient's allergies indicates:   Allergen Reactions    Sulfa (sulfonamide antibiotics)        No current facility-administered medications on file prior to encounter.     Current Outpatient Medications on File Prior to Encounter   Medication Sig    albuterol 90 mcg/actuation inhaler Inhale 1-2 puffs into the lungs every 6 (six) hours as needed for Wheezing.    fluticasone (FLOVENT HFA) 220 mcg/actuation inhaler Inhale 1 puff into the lungs 2 (two) times daily. Controller    MEPOLIZUMAB 100 mg/mL autoinjector Inject into the skin.    mometasone-formoterol (DULERA) 200-5 mcg/actuation inhaler Dulera 200 mcg-5 mcg/actuation HFA aerosol inhaler   INHALE 2 PUFFS BY MOUTH TWICE DAILY. RINSE MOUTH AND THROAT AFTER USE    naproxen (NAPROSYN) 500 MG tablet naproxen 500 mg tablet   TAKE 1 TABLET BY MOUTH TWICE DAILY AS NEEDED    nicotine (NICODERM CQ) 14 mg/24 hr 1 patch.    varenicline (CHANTIX) 1 mg Tab Take by mouth.     Family History    None       Tobacco Use    Smoking status: Every Day    Smokeless tobacco: Not on file   Substance and Sexual Activity    Alcohol use: Not on file    Drug use: Not on file    Sexual activity: Not on file     Review of Systems   Unable to perform ROS: Acuity of condition     Objective:     Vital Signs (Most Recent):  Temp: 97 °F (36.1 °C) (01/01/24 2345)  Pulse: 76 (01/02/24 0024)  Resp: 20 (01/02/24 0024)  BP: 113/68 (01/01/24 2142)  SpO2: 100 % (01/02/24 0024) Vital Signs (24h Range):  Temp:  [96.9 °F (36.1 °C)-99.5 °F (37.5 °C)] 97 °F (36.1 °C)  Pulse:  [] 76  Resp:  [10-42] 20  SpO2:  [91 %-100 %] 100 %  BP: ()/() 113/68     Weight: 68.5 kg (151 lb 0.2 oz)  Body mass index is 19.92 kg/m².     Physical Exam  Vitals and nursing note reviewed.   Constitutional:       General: He is in acute distress.       "Appearance: He is toxic-appearing.   HENT:      Head: Normocephalic and atraumatic.      Mouth/Throat:      Mouth: Mucous membranes are moist.      Pharynx: No oropharyngeal exudate.   Eyes:      Pupils: Pupils are equal, round, and reactive to light.   Cardiovascular:      Rate and Rhythm: Normal rate.      Heart sounds: No murmur heard.     No friction rub. No gallop.   Pulmonary:      Comments: Intubated, on mechanical ventilation, sedated  Abdominal:      General: Bowel sounds are normal. There is no distension.      Palpations: Abdomen is soft.      Tenderness: There is no abdominal tenderness. There is no guarding or rebound.   Musculoskeletal:      Cervical back: No rigidity or tenderness.      Right lower leg: No edema.      Left lower leg: No edema.              CRANIAL NERVES     CN III, IV, VI   Pupils are equal, round, and reactive to light.       Significant Labs: All pertinent labs within the past 24 hours have been reviewed.  A1C: No results for input(s): "HGBA1C" in the last 4320 hours.  ABGs:   Recent Labs   Lab 01/01/24 1734 01/01/24 1736 01/01/24 2027   PH 6.928*  --  7.271*   PCO2 76.8*  --  47.0*   HCO3 16.0*  --  21.6*   POCSATURATED 98.9 99.4 >100.0*   COHB  --  2.1  --    METHB  --  1.0  --    O2HB  --  96.3  --    PO2 216*  --  >488*     Bilirubin:   Recent Labs   Lab 01/01/24 1742 01/01/24 2030   BILITOT 0.9 0.9     Blood Culture: No results for input(s): "LABBLOO" in the last 48 hours.  BMP:   Recent Labs   Lab 01/02/24  0026   *      K 4.3      CO2 19*   BUN 19   CREATININE 1.0   CALCIUM 8.3*   MG 2.7*     CBC:   Recent Labs   Lab 01/01/24 1742   WBC 7.98   HGB 11.9*   HCT 37.0*        CMP:   Recent Labs   Lab 01/01/24 1742 01/01/24 2030 01/02/24 0026    140 138   K 4.0 5.7* 4.3    106 103   CO2 14* 18* 19*   * 141*  138* 121*   BUN 15 17 19   CREATININE 1.3 1.1 1.0   CALCIUM 8.7 8.4* 8.3*   PROT 6.3 7.5  --    ALBUMIN 3.6 4.2  --  " "  BILITOT 0.9 0.9  --    ALKPHOS 66 77  --    AST 38 144*  --    ALT 39 82*  --    ANIONGAP 19* 16 16     Cardiac Markers:   Recent Labs   Lab 01/01/24 1742   BNP 45     Coagulation:   Recent Labs   Lab 01/01/24 2030   APTT 25.8     Lactic Acid:   Recent Labs   Lab 01/01/24 2030 01/02/24  0026   LACTATE 3.4* 2.8*     Lipase: No results for input(s): "LIPASE" in the last 48 hours.  Lipid Panel: No results for input(s): "CHOL", "HDL", "LDLCALC", "TRIG", "CHOLHDL" in the last 48 hours.  Magnesium:   Recent Labs   Lab 01/01/24 2030 01/02/24  0026   MG 2.2 2.7*     Respiratory Culture: No results for input(s): "GSRESP", "RESPIRATORYC" in the last 48 hours.  Troponin:   Recent Labs   Lab 01/01/24 1742 01/01/24 2030   TROPONINI 0.011 0.079*     TSH: No results for input(s): "TSH" in the last 4320 hours.  Urine Culture: No results for input(s): "LABURIN" in the last 48 hours.  Recent Lab Results  (Last 5 results in the past 24 hours)        01/02/24 0026   01/01/24 2233 01/01/24 2050 01/01/24 2030 01/01/24 2027        Base Deficit         -5.4  Comment: Value below reference range       Performed By:         [id number]       Specimen source         Arterial       Albumin       4.2         ALP       77         Allens Test         YES       ALT       82         Anion Gap 16       16         Appearance, UA     Hazy           aPTT       25.8  Comment: Refer to local heparin nomogram for intensity/dose specific   therapeutic   range.  LOT^050^APTT FSL^[id number]           AST       144  Comment: Specimen moderately hemolyzed         Bacteria, UA     Rare           Bilirubin (UA)     Negative           BILIRUBIN TOTAL       0.9  Comment: For infants and newborns, interpretation of results should be based  on gestational age, weight and in agreement with clinical  observations.    Premature Infant recommended reference ranges:  Up to 24 hours.............<8.0 mg/dL  Up to 48 hours............<12.0 mg/dL  3-5 " days..................<15.0 mg/dL  6-29 days.................<15.0 mg/dL           BUN 19       17         Calcium 8.3       8.4         Chloride 103       106         CO2 19       18         Color, UA     Yellow           CPK       3294         Creatinine 1.0       1.1         eGFR >60       >60         FiO2         85.0       Glucose 121       141                138         Glucose, UA     Negative           Ketones, UA     Negative           Lactate, Bobby 2.8  Comment: Falsely low lactic acid results can be found in samples   containing >=13.0 mg/dL total bilirubin and/or >=3.5 mg/dL   direct bilirubin.         3.4  Comment: Falsely low lactic acid results can be found in samples   containing >=13.0 mg/dL total bilirubin and/or >=3.5 mg/dL   direct bilirubin.           Leukocytes, UA     Negative           Magnesium  2.7       2.2  Comment: Specimen moderately hemolyzed         Microscopic Comment     SEE COMMENT  Comment: Other formed elements not mentioned in the report are not   present in the microscopic examination.              NITRITE UA     Negative           Occult Blood UA     3+           PEEP         5.0       pH, UA     6.0           Phosphorus Level 3.3       4.4         POC HCO3         21.6  Comment: Value below reference range       POC PCO2         47.0  Comment: Value above reference range       POC PH         7.271  Comment:  notified  at    read back  Value below critical limit         POC PO2         >488  Comment:  notified  at    read back  Value above reportable range > 488         POC SATURATED O2         >100.0  Comment:  notified  at    read back  Value above reportable range > 100.0         POC Set RR         20.0       POCT Glucose   138             Potassium 4.3       5.7  Comment: Specimen moderately hemolyzed         PROTEIN TOTAL       7.5  Comment: Specimen moderately hemolyzed         Protein, UA     Trace  Comment: Recommend a 24 hour urine protein or a urine    protein/creatinine ratio if globulin induced proteinuria is  clinically suspected.             RBC, UA     42           Sodium 138       140         Specific [place], UA     1.010           Specimen UA     Urine, Catheterized           Squam Epithel, UA     1           Troponin I       0.079  Comment: The reference interval for Troponin I represents the 99th percentile   cutoff   for our facility and is consistent with 3rd generation assay   performance.           UROBILINOGEN UA     Negative           Vt         420.00       WBC, UA     2                                  Significant Imaging: I have reviewed all pertinent imaging results/findings within the past 24 hours.

## 2024-01-02 NOTE — ASSESSMENT & PLAN NOTE
Appears to be respiratory in etiology   Troponin up to 0.4- likely demand given arrest, hypertensive emergency, rhabdo    TTM in progress  TTE pending   Trend troponin   Will await for rewarming and neuroprognostication before considering ischemic evaluation   Unable to calculate [facility]- required information unavailable   Continue supportive care

## 2024-01-02 NOTE — PROGRESS NOTES
54 yo male active smoker w/ no signifucant PMHx was found in his yard after some work there in respiratory failure leading to cardiac arrest by his neighbours calling for EMS.   S/p CPR and 1 shock, ROSC was attained.   Now in the ICU intubated and on mechanical ventilation w/ TTM in progress.     CXR:  Endotracheal tube tip 3.8 cm from the michael.   Satisfactory position of enteric tube tip below the left hemidiaphragm.   Otherwise, no acute intrathoracic process.    Pt is being managed by the resident team.   Glu 262 in ED    U tox negative  COVID and flu negative  LA and trop are low positive likely post arrest.     Pt is now sedated and paralysed.   Nebs optimized.   IV solumedrol q12  Sugar management suggested.     IVF as needed  F/up UOP  ECHO in am.     SBT and SAT in am after TTM is completed.   B/L UE soft wrist restraints order placed for 24 hrs as per bedside RN request.     Lovemox and Nash for prophylaxis.     Please call us for further assistance.

## 2024-01-02 NOTE — CONSULTS
Progress Note  [facility]    Attending: Tyler Valentine MD  Admit Date: 1/1/2024  Today's Date: 01/01/2024    HPI  Per ER:   53-year-old M w/pmhx: asthma, was brought in by EMS as a cardiorespiratory arrest.  911 was called for shortness of breath, as patient was working in garden. When EMS arrived, the patient was slumped over face forward with a friend holding a non-rebreather mask over his face that was not hooked up to oxygen.  He had a Medrol Dosepak and an albuterol inhaler by his side.  EMS started giving the patient a nebulizer treatment and he became pulseless and apneic.  He was intubated with an ET tube and CPR was initiated. The patient regained pulses after CPR by the MANDI and epinepherine X3, shocked X1, unclear rhythm.        SUBJECTIVE:   OVERNIGHT  No acute events. ABG improving. Still wheezing on exam, compliant to vent w/prop, fent.  Echo this AM shows 65% EF, normal noble function.    ROS  Unable to obtain  OBJECTIVE:     Vital Signs Trends/Hx Reviewed  Vitals:    01/01/24 1835 01/01/24 1838 01/01/24 1842 01/01/24 1848   BP: (!) 145/85 114/65 (!) 177/78 (!) 94/55   BP Location:       Patient Position:       Pulse: 103 102 (!) 113 102   Resp: (!) 32 (!) 35 (!) 38 (!) 31   Temp:       TempSrc:       SpO2: 100% 100% 100% 100%   Weight:       Height:           Oxygen Concentration (%):  [85] 85        Physical Exam  Constitutional:       Comments: RASS -5   HENT:      Head: Normocephalic and atraumatic.   Cardiovascular:      Rate and Rhythm: Normal rate and regular rhythm.      Pulses: Normal pulses.      Heart sounds: Normal heart sounds. No murmur heard.  Pulmonary:      Breath sounds: Wheezing present.      Comments: Intubated  Abdominal:      General: Abdomen is flat. There is no distension.      Palpations: Abdomen is soft.   Musculoskeletal:      Right lower leg: No edema.      Left lower leg: No edema.   Neurological:      Comments: RASS -5        Laboratory:  Recent Labs   Lab 01/01/24  1742   WBC 7.98   RBC 3.87*   HGB 11.9*   HCT 37.0*      MCV 96   MCH 30.7   MCHC 32.2     Recent Labs   Lab 01/01/24  1742      K 4.0      CO2 14*   BUN 15   CREATININE 1.3   CALCIUM 8.7     Recent Labs   Lab 01/01/24  1734 01/01/24  1736   PH 6.928*  --    PCO2 76.8*  --    PO2 216*  --    HCO3 16.0*  --    POCSATURATED 98.9 99.4         Chest Imaging:   Endotracheal tube tip 3.8 cm from the michael.  Satisfactory position of enteric tube tip below the left hemidiaphragm.  No acute intrathoracic process    ASSESSMENT & RECOMMENDATIONS     Neuro/Psych  Intubated  Sedated w/propofol, fentanyl  RASS -5  Goal compliant w/vent  No known issues prior to admission    PLAN:  Wean sedation when able     CV  #Post-arrest  Reportedly epi x3, shock x1 (unk rhythm)  CPK 4800  Trop 0.446  Tachycardic EKG w/depressions in inferior leads  TTM    PLAN:  Trend trop, ck, ekg    #Labile bp  Not on pressors, anti-htn  Required Cardene yesterday PM  POCUS good squeeze, no clear pathology  Formal echo shows EF 65% normal noble function    PLAN:  Monitor  Cards following    Pulm  #Respiratory Failure  #Asthma exacerbation  Vent settings as above  Most recent ABG 6.92/76/216/16 BD -17 last night...   this AM: 7.27/56/92/26 BD -1.7  CXR unremarkable  Still wheezing on exam    PLAN:  Wean vent settings as tolerated and SBT once appropriate  Budesonide neb 1mg bid, Ipratropium 0.5mg neb q6  Solumedrol 60mg bid  Montelukast 10mg qd  Azithro 500mg qd, Rocephin 2g qd    FEN/GI  F euvolemic  E Na+ 137, K+ 4.9, Mg++ 2.7, Phos 3.7  N tube feeds    #Hyperkalemia   5.9 insulin shifted    PLAN:  Recheck in PM     GI  Prophylaxis: protonix     RENAL  UOP: 300cc , In: 500cc, net 250cc in last 24 hrs... 1400 UOP in PM  Down 0.5L on admit    BUN/Cr: 16/0., baseline wnl  Continue to monitor renal status and urine output     Heme  H/H 12.4; stable  WBC 13  DVT prophylaxis: Lovenox    Endo  Glu  118, stable     PLAN:  SSI    ID  No known issues at this time.    Dispo  52 y/o M w/hx of asthma/COPD (no PFT on file) s/p cardiac arrest, ROSC after epi 3x, shock 1x, likely from hypercapneic respiratory failure. Initially CV unstable with labile bp and tachycardia. Now stable, not requiring pressor or anti-htn. Gases improving. Good liver function. Producing urine, no Cr elevation, but hyperkalemic. Shifted, monitoring. Attempt to wean vent as able. Continue ICU care    F NPO  A fent  S prop  T lovenox  H 30  U protonix  G 120  S attempt  B monitor  I ETT, OG, Alona, PIV  D n/a    Ariel Heller M.D.  [facility], PGY-2   01/01/2024 6:54 PM     Pt seen and examined with Pulmonary/Critical Care team and this note was reviewed and validated with the following additional comments: Because this pt's GCS was <10 after his resuscitation, he was started on TTM. He was deeply sedated to improve his tolerance with TTM and to improve his ventilator mechanics.  He still has significant airflow liitation and his intrinsic PEEP has been measured as high as 19, so we will not pause sedation until tomorrow after passive rewarming. We are unable to neuroprognosticate but he does move all of his extremities spontaneously. He is receiving IV steroids, bronchodilators, and inhaled steroids. Hemodynamics stable.    Critical Care time was spent validating the history and physical exam, reviewing the lab and imaging results, and discussing the care of the patient with the bedside nurse and the patient and/or surrogates. This critical care time did not overlap with that of any other provider or involve time for any procedures.  This patient has a high probability of sudden clinically significant deterioration which requires the highest level of physician preparedness to intervene urgently. I managed/supervised life or organ supporting interventions that required frequent physician assessments. I devoted my full  attention in the ICU to the direct care of this patient for this period of time. Organ systems which are failing and require intensive, critical care support are: respiratory, neurologic  Critical Care time: 40 minutes    Gopal Hall MD  Phone [phone]

## 2024-01-02 NOTE — ASSESSMENT & PLAN NOTE
Patient with Hypoxic Respiratory failure which is Acute.  he is not on home oxygen. Supplemental oxygen was provided and noted- Vent Mode: A/CMV-VC  Oxygen Concentration (%):  [40-85] 40  Resp Rate Total:  [20 br/min-41 br/min] 20 br/min  Vt Set:  [420 mL] 420 mL  PEEP/CPAP:  [5 cmH20] 5 cmH20  Mean Airway Pressure:  [5.2 svI11-61.7 cmH20] 9.9 cmH20    .   Signs/symptoms of respiratory failure include- tachypnea, increased work of breathing, and respiratory distress. Contributing diagnoses includes -  unkonwn  Labs and images were reviewed. Patient Has recent ABG, which has been reviewed. Will treat underlying causes and adjust management of respiratory failure as follows-

## 2024-01-02 NOTE — SUBJECTIVE & OBJECTIVE
Past Medical History:   Diagnosis Date    Bronchitis     Bronchitis        Past Surgical History:   Procedure Laterality Date    NECK SURGERY         Review of patient's allergies indicates:   Allergen Reactions    Sulfa (sulfonamide antibiotics)        No current facility-administered medications on file prior to encounter.     Current Outpatient Medications on File Prior to Encounter   Medication Sig    albuterol 90 mcg/actuation inhaler Inhale 1-2 puffs into the lungs every 6 (six) hours as needed for Wheezing.    fluticasone (FLOVENT HFA) 220 mcg/actuation inhaler Inhale 1 puff into the lungs 2 (two) times daily. Controller    MEPOLIZUMAB 100 mg/mL autoinjector Inject into the skin.    mometasone-formoterol (DULERA) 200-5 mcg/actuation inhaler Dulera 200 mcg-5 mcg/actuation HFA aerosol inhaler   INHALE 2 PUFFS BY MOUTH TWICE DAILY. RINSE MOUTH AND THROAT AFTER USE    naproxen (NAPROSYN) 500 MG tablet naproxen 500 mg tablet   TAKE 1 TABLET BY MOUTH TWICE DAILY AS NEEDED    nicotine (NICODERM CQ) 14 mg/24 hr 1 patch.    varenicline (CHANTIX) 1 mg Tab Take by mouth.     Family History    None       Tobacco Use    Smoking status: Every Day    Smokeless tobacco: Not on file   Substance and Sexual Activity    Alcohol use: Not on file    Drug use: Not on file    Sexual activity: Not on file     Review of Systems   Unable to perform ROS: Acuity of condition   Objective:     Vital Signs (Most Recent):  Temp: 97.5 °F (36.4 °C) (01/02/24 1200)  Pulse: 88 (01/02/24 1200)  Resp: (!) 24 (01/02/24 1200)  BP: 138/86 (01/02/24 1200)  SpO2: 99 % (01/02/24 1200) Vital Signs (24h Range):  Temp:  [96.1 °F (35.6 °C)-99.5 °F (37.5 °C)] 97.5 °F (36.4 °C)  Pulse:  [] 88  Resp:  [10-42] 24  SpO2:  [91 %-100 %] 99 %  BP: ()/() 138/86     Weight: 71 kg (156 lb 8.4 oz)  Body mass index is 20.65 kg/m².    SpO2: 99 %         Intake/Output Summary (Last 24 hours) at 1/2/2024 1217  Last data filed at 1/2/2024 1200  Gross per  24 hour   Intake 993.17 ml   Output 1425 ml   Net -431.83 ml       Lines/Drains/Airways       Drain  Duration                  NG/OG Tube 01/01/24 1722 [place] sump 16 Fr. Left nostril <1 day         Urethral Catheter 01/01/24 1830 Double-lumen;Temperature probe 14 Fr. <1 day              Airway  Duration                  Airway - Non-Surgical Endotracheal Tube -- days              Intraosseous Line  Duration                  Intraosseous Line -- days              Peripheral Intravenous Line  Duration                  Peripheral IV - Single Lumen 01/01/24 1721 18 G Anterior;Right Wrist <1 day         Peripheral IV - Single Lumen 01/01/24 1744 18 G Anterior;Left Forearm <1 day                     Physical Exam  HENT:      Head: Atraumatic.   Cardiovascular:      Rate and Rhythm: Normal rate and regular rhythm.   Pulmonary:      Breath sounds: Wheezing present. No rales.   Abdominal:      General: Bowel sounds are normal.      Palpations: Abdomen is soft.   Musculoskeletal:      Right lower leg: No edema.      Left lower leg: No edema.   Skin:     General: Skin is warm and dry.      Significant Labs: ABG:   Recent Labs   Lab 01/01/24 1734 01/01/24 1736 01/01/24 2027 01/02/24  0610   PH 6.928*  --  7.271* 7.274*   PCO2 76.8*  --  47.0* 56.1*   HCO3 16.0*  --  21.6* 25.9   POCSATURATED 98.9 99.4 >100.0* 96.5   , BMP:   Recent Labs   Lab 01/01/24 2030 01/02/24 0026 01/02/24 0416 01/02/24  0807   *  138* 121* 120* 125*    138 137 135*   K 5.7* 4.3 4.9 5.7*    103 107 106   CO2 18* 19* 17* 18*   BUN 17 19 20 17   CREATININE 1.1 1.0 0.9 0.8   CALCIUM 8.4* 8.3* 8.2* 8.6*   MG 2.2 2.7*  --  2.6   , CMP   Recent Labs   Lab 01/01/24 1742 01/01/24 2030 01/02/24 0026 01/02/24 0416 01/02/24  0807    140 138 137 135*   K 4.0 5.7* 4.3 4.9 5.7*    106 103 107 106   CO2 14* 18* 19* 17* 18*   * 141*  138* 121* 120* 125*   BUN 15 17 19 20 17   CREATININE 1.3 1.1 1.0 0.9 0.8   CALCIUM 8.7  "8.4* 8.3* 8.2* 8.6*   PROT 6.3 7.5  --  6.8  --    ALBUMIN 3.6 4.2  --  3.7  --    BILITOT 0.9 0.9  --  0.8  --    ALKPHOS 66 77  --  52*  --    AST 38 144*  --  144*  --    ALT 39 82*  --  76*  --    ANIONGAP 19* 16 16 13 11   , CBC   Recent Labs   Lab 01/01/24  1742 01/02/24  0416   WBC 7.98 13.12*   HGB 11.9* 12.4*   HCT 37.0* 36.9*    165   , INR   Recent Labs   Lab 01/02/24  0516   INR 1.0   , Lipid Panel No results for input(s): "CHOL", "HDL", "LDLCALC", "TRIG", "CHOLHDL" in the last 48 hours., Troponin   Recent Labs   Lab 01/01/24 1742 01/01/24  2030 01/02/24  0807   TROPONINI 0.011 0.079* 0.446*   , and All pertinent lab results from the last 24 hours have been reviewed.    Significant Imaging: Echocardiogram: Transthoracic echo (TTE) complete (Cupid Only):   Results for orders placed or performed during the hospital encounter of 01/01/24   Echo   Result Value Ref Range    BSA 1.88 m2    High's Biplane MOD Ejection Fraction 65 %    LVOT stroke volume 100.22 cm3    LVIDd 4.79 3.5 - 6.0 cm    LV Systolic Volume 49.90 mL    LV Systolic Volume Index 25.7 mL/m2    LVIDs 3.47 2.1 - 4.0 cm    LV Diastolic Volume 106.78 mL    LV Diastolic Volume Index 55.04 mL/m2    IVS 1.02 0.6 - 1.1 cm    LVOT diameter 2.42 cm    LVOT area 4.6 cm2    FS 28 28 - 44 %    Left Ventricle Relative Wall Thickness 0.47 cm    Posterior Wall 1.13 (A) 0.6 - 1.1 cm    LV mass 187.26 g    LV Mass Index 97 g/m2    MV Peak E Lewis 0.76 m/s    TDI LATERAL 0.12 m/s    TDI SEPTAL 0.11 m/s    E/E' ratio 6.61 m/s    MV Peak A Lewis 0.76 m/s    TR Max Lewis 3.41 m/s    E/A ratio 1.00     IVRT 79.92 msec    E wave deceleration time 219.39 msec    LV SEPTAL E/E' RATIO 6.91 m/s    LV LATERAL E/E' RATIO 6.33 m/s    LVOT peak lewis 1.16 m/s    Left Ventricular Outflow Tract Mean Velocity 0.94 cm/s    Left Ventricular Outflow Tract Mean Gradient 3.77 mmHg    RVDD 3.05 cm    RV S' 20.19 cm/s    TAPSE 2.27 cm    LA size 3.32 cm    Left Atrium Minor Axis " 5.63 cm    Left Atrium Major Axis 5.08 cm    LA volume (mod) 57.00 cm3    LA Volume Index (Mod) 29.4 mL/m2    RA Major Axis 3.80 cm    RA Width 3.38 cm    AV mean gradient 6 mmHg    AV peak gradient 10 mmHg    Ao peak anastacio 1.62 m/s    Ao VTI 24.80 cm    LVOT peak VTI 21.80 cm    AV valve area 4.04 cm²    AV Velocity Ratio 0.72     AV index (prosthetic) 0.88     KOLBY by Velocity Ratio 3.29 cm²    MV stenosis pressure 1/2 time 63.62 ms    MV valve area p 1/2 method 3.46 cm2    Triscuspid Valve Regurgitation Peak Gradient 47 mmHg    Sinus 3.22 cm    STJ 2.93 cm    Ascending aorta 3.24 cm    Mean e' 0.12 m/s    ZLVIDS 0.18     ZLVIDD -1.40

## 2024-01-02 NOTE — PLAN OF CARE
Critical Care Plan    Patient is a 53yoM with hx of COPD/Asthma per family at bedside reports he was shoveling some dirt in his yard when he got severely short of breath and asked for help from neighbors who called EMS. When EMS arrived, reportedly still had a pulse but was struggling a lot to breath. Lost pulses shortly after. Received some narcan x2 without improvement. CPR and Epi x3. ROSC after 3rd round. Also reportedly get shocked x1 although unclear what the rhythm was. He was intubated emergently and arrived to the ER off of sedation, but was making non-purposeful movements with his limbs and biting the ETT. Was started on propofol for sedation. He is a current cigarette and marijuana smoker.  CXR from admission negative. ABG with pH of 6.9 and PCO2 75. Significant wheezes on exam and high peak pressures on the Vent.     A&P    Asthma/COPD, ?Status asthmaticus  Post-Cardiac Arrest outside the hospital, unclear rhythm, suspect in the setting of hypoxia  Acute hypoxemic hypercarbic respiratory failure  Admit to ICU in light of MV status  - Given status asthmaticus, goal is for low RR, ideally <20 given concern for dynamic hyperinflation.   - Allow for permissive hypercapnia (pH 7.15-7.3)  - Goal Vt 6-8 mg/kg IBW  - Ordered IV Mg 2g; Methylpred 60mg q12h, Montelukast 10mg daily, Q2h albuterol 5 mg (higher dose in the setting of asthma exacerbation)  - Plan to paralyze to take over his ventilation (Cisatracurium gtt, TOF)  - Sedation with Fentanyl and Propofol  - Watch for hemodynamically instability, currently hypertensive, will start cardene drip with goal sBP 160-180  - TTM for post-arrest status with GCS<10. Goal normothermia at 36-37.5C  - Monitor labs  - Check Echo  - Repeat Abg after vent changes.  - Stress ulcer prophy with protonix iv daily  - VTE prophy with lovenox daily.     Discussed with family at bedside (parents and cousin) about severity of patient's current condition and introduced the concept  of neuroprognostication.     David Saldaña MD  [employer] [facility] Critical Care Medicine Fellow

## 2024-01-02 NOTE — ED NOTES
Messaged attending to give update.  Pt was biting et tube and bite block was placed, pt is now biting on tongue.  Bp is increasing.

## 2024-01-02 NOTE — H&P
[facility] Medicine  History & Physical    Patient Name: Aaron Pruitt  MRN: [id number]  Patient Class: IP- Inpatient  Admission Date: 1/1/2024  Attending Physician: Anderson Leiva MD   Primary Care Provider: Gogo Vivar NP         Patient information was obtained from ER records.     Subjective:     Principal Problem:Acute respiratory failure with hypoxia and hypercarbia    Chief Complaint:   Chief Complaint   Patient presents with    Respiratory Arrest     Hx of Asthma  Coded with EMS during Duo Neb - 3 epi administered, 2mg Narcan, 250 cc Normal Saline        HPI: 52 YO M with PMHx significant for  COPD/Asthma presented to the ER intubated on the field for acute respiratory failure post cardiac arrest s/p resuscitation and shock x1. Per chart review and   family at bedside reports he was shoveling some dirt in his yard when he got severely short of breath and suddenly had difficulty breathing and neighbors called EMS. When EMS arrived, reportedly still had a pulse but was struggling to breath, became pulseless. He was given narcan x2 without improvement. CPR and Epi x3. ROSC after 3rd round. Also reportedly get shocked x1. He was transferred to the ER and sedated. Labs in the ER with elevated lactic acid, elevated troponin, , elevated CPK 3294, elevated AST//82, drug screen with presumptive THC. CT head no acute intracranial abnormality, CT chest no acute pulmonary process.    Past Medical History:   Diagnosis Date    Bronchitis     Bronchitis        Past Surgical History:   Procedure Laterality Date    NECK SURGERY         Review of patient's allergies indicates:   Allergen Reactions    Sulfa (sulfonamide antibiotics)        No current facility-administered medications on file prior to encounter.     Current Outpatient Medications on File Prior to Encounter   Medication Sig    albuterol 90 mcg/actuation inhaler Inhale 1-2 puffs into the lungs every 6 (six) hours as  needed for Wheezing.    fluticasone (FLOVENT HFA) 220 mcg/actuation inhaler Inhale 1 puff into the lungs 2 (two) times daily. Controller    MEPOLIZUMAB 100 mg/mL autoinjector Inject into the skin.    mometasone-formoterol (DULERA) 200-5 mcg/actuation inhaler Dulera 200 mcg-5 mcg/actuation HFA aerosol inhaler   INHALE 2 PUFFS BY MOUTH TWICE DAILY. RINSE MOUTH AND THROAT AFTER USE    naproxen (NAPROSYN) 500 MG tablet naproxen 500 mg tablet   TAKE 1 TABLET BY MOUTH TWICE DAILY AS NEEDED    nicotine (NICODERM CQ) 14 mg/24 hr 1 patch.    varenicline (CHANTIX) 1 mg Tab Take by mouth.     Family History    None       Tobacco Use    Smoking status: Every Day    Smokeless tobacco: Not on file   Substance and Sexual Activity    Alcohol use: Not on file    Drug use: Not on file    Sexual activity: Not on file     Review of Systems   Unable to perform ROS: Acuity of condition     Objective:     Vital Signs (Most Recent):  Temp: 97 °F (36.1 °C) (01/01/24 2345)  Pulse: 76 (01/02/24 0024)  Resp: 20 (01/02/24 0024)  BP: 113/68 (01/01/24 2142)  SpO2: 100 % (01/02/24 0024) Vital Signs (24h Range):  Temp:  [96.9 °F (36.1 °C)-99.5 °F (37.5 °C)] 97 °F (36.1 °C)  Pulse:  [] 76  Resp:  [10-42] 20  SpO2:  [91 %-100 %] 100 %  BP: ()/() 113/68     Weight: 68.5 kg (151 lb 0.2 oz)  Body mass index is 19.92 kg/m².     Physical Exam  Vitals and nursing note reviewed.   Constitutional:       General: He is in acute distress.      Appearance: He is toxic-appearing.   HENT:      Head: Normocephalic and atraumatic.      Mouth/Throat:      Mouth: Mucous membranes are moist.      Pharynx: No oropharyngeal exudate.   Eyes:      Pupils: Pupils are equal, round, and reactive to light.   Cardiovascular:      Rate and Rhythm: Normal rate.      Heart sounds: No murmur heard.     No friction rub. No gallop.   Pulmonary:      Comments: Intubated, on mechanical ventilation, sedated  Abdominal:      General: Bowel sounds are normal.  "There is no distension.      Palpations: Abdomen is soft.      Tenderness: There is no abdominal tenderness. There is no guarding or rebound.   Musculoskeletal:      Cervical back: No rigidity or tenderness.      Right lower leg: No edema.      Left lower leg: No edema.              CRANIAL NERVES     CN III, IV, VI   Pupils are equal, round, and reactive to light.       Significant Labs: All pertinent labs within the past 24 hours have been reviewed.  A1C: No results for input(s): "HGBA1C" in the last 4320 hours.  ABGs:   Recent Labs   Lab 01/01/24 1734 01/01/24 1736 01/01/24 2027   PH 6.928*  --  7.271*   PCO2 76.8*  --  47.0*   HCO3 16.0*  --  21.6*   POCSATURATED 98.9 99.4 >100.0*   COHB  --  2.1  --    METHB  --  1.0  --    O2HB  --  96.3  --    PO2 216*  --  >488*     Bilirubin:   Recent Labs   Lab 01/01/24 1742 01/01/24 2030   BILITOT 0.9 0.9     Blood Culture: No results for input(s): "LABBLOO" in the last 48 hours.  BMP:   Recent Labs   Lab 01/02/24  0026   *      K 4.3      CO2 19*   BUN 19   CREATININE 1.0   CALCIUM 8.3*   MG 2.7*     CBC:   Recent Labs   Lab 01/01/24 1742   WBC 7.98   HGB 11.9*   HCT 37.0*        CMP:   Recent Labs   Lab 01/01/24 1742 01/01/24 2030 01/02/24  0026    140 138   K 4.0 5.7* 4.3    106 103   CO2 14* 18* 19*   * 141*  138* 121*   BUN 15 17 19   CREATININE 1.3 1.1 1.0   CALCIUM 8.7 8.4* 8.3*   PROT 6.3 7.5  --    ALBUMIN 3.6 4.2  --    BILITOT 0.9 0.9  --    ALKPHOS 66 77  --    AST 38 144*  --    ALT 39 82*  --    ANIONGAP 19* 16 16     Cardiac Markers:   Recent Labs   Lab 01/01/24 1742   BNP 45     Coagulation:   Recent Labs   Lab 01/01/24 2030   APTT 25.8     Lactic Acid:   Recent Labs   Lab 01/01/24 2030 01/02/24  0026   LACTATE 3.4* 2.8*     Lipase: No results for input(s): "LIPASE" in the last 48 hours.  Lipid Panel: No results for input(s): "CHOL", "HDL", "LDLCALC", "TRIG", "CHOLHDL" in the last 48 " "hours.  Magnesium:   Recent Labs   Lab 01/01/24 2030 01/02/24  0026   MG 2.2 2.7*     Respiratory Culture: No results for input(s): "GSRESP", "RESPIRATORYC" in the last 48 hours.  Troponin:   Recent Labs   Lab 01/01/24  1742 01/01/24 2030   TROPONINI 0.011 0.079*     TSH: No results for input(s): "TSH" in the last 4320 hours.  Urine Culture: No results for input(s): "LABURIN" in the last 48 hours.  Recent Lab Results  (Last 5 results in the past 24 hours)        01/02/24  0026   01/01/24 2233 01/01/24 2050 01/01/24 2030 01/01/24 2027        Base Deficit         -5.4  Comment: Value below reference range       Performed By:         [id number]       Specimen source         Arterial       Albumin       4.2         ALP       77         Allens Test         YES       ALT       82         Anion Gap 16       16         Appearance, UA     Hazy           aPTT       25.8  Comment: Refer to local heparin nomogram for intensity/dose specific   therapeutic   range.  LOT^050^APTT FSL^[id number]           AST       144  Comment: Specimen moderately hemolyzed         Bacteria, UA     Rare           Bilirubin (UA)     Negative           BILIRUBIN TOTAL       0.9  Comment: For infants and newborns, interpretation of results should be based  on gestational age, weight and in agreement with clinical  observations.    Premature Infant recommended reference ranges:  Up to 24 hours.............<8.0 mg/dL  Up to 48 hours............<12.0 mg/dL  3-5 days..................<15.0 mg/dL  6-29 days.................<15.0 mg/dL           BUN 19       17         Calcium 8.3       8.4         Chloride 103       106         CO2 19       18         Color, UA     Yellow           CPK       3294         Creatinine 1.0       1.1         eGFR >60       >60         FiO2         85.0       Glucose 121       141                138         Glucose, UA     Negative           Ketones, UA     Negative           Lactate, Bobby 2.8  Comment: Falsely low lactic " acid results can be found in samples   containing >=13.0 mg/dL total bilirubin and/or >=3.5 mg/dL   direct bilirubin.         3.4  Comment: Falsely low lactic acid results can be found in samples   containing >=13.0 mg/dL total bilirubin and/or >=3.5 mg/dL   direct bilirubin.           Leukocytes, UA     Negative           Magnesium  2.7       2.2  Comment: Specimen moderately hemolyzed         Microscopic Comment     SEE COMMENT  Comment: Other formed elements not mentioned in the report are not   present in the microscopic examination.              NITRITE UA     Negative           Occult Blood UA     3+           PEEP         5.0       pH, UA     6.0           Phosphorus Level 3.3       4.4         POC HCO3         21.6  Comment: Value below reference range       POC PCO2         47.0  Comment: Value above reference range       POC PH         7.271  Comment:  notified  at    read back  Value below critical limit         POC PO2         >488  Comment:  notified  at    read back  Value above reportable range > 488         POC SATURATED O2         >100.0  Comment:  notified  at    read back  Value above reportable range > 100.0         POC Set RR         20.0       POCT Glucose   138             Potassium 4.3       5.7  Comment: Specimen moderately hemolyzed         PROTEIN TOTAL       7.5  Comment: Specimen moderately hemolyzed         Protein, UA     Trace  Comment: Recommend a 24 hour urine protein or a urine   protein/creatinine ratio if globulin induced proteinuria is  clinically suspected.             RBC, UA     42           Sodium 138       140         Specific Jackson, UA     1.010           Specimen UA     Urine, Catheterized           Squam Epithel, UA     1           Troponin I       0.079  Comment: The reference interval for Troponin I represents the 99th percentile   cutoff   for our facility and is consistent with 3rd generation assay   performance.           UROBILINOGEN UA     Negative           Vt          420.00       WBC, UA     2                                  Significant Imaging: I have reviewed all pertinent imaging results/findings within the past 24 hours.  Assessment/Plan:     * Acute respiratory failure with hypoxia and hypercarbia  Patient with Hypoxic Respiratory failure which is Acute.  he is not on home oxygen. Supplemental oxygen was provided and noted- Vent Mode: A/CMV-VC  Oxygen Concentration (%):  [40-85] 40  Resp Rate Total:  [20 br/min-41 br/min] 20 br/min  Vt Set:  [420 mL] 420 mL  PEEP/CPAP:  [5 cmH20] 5 cmH20  Mean Airway Pressure:  [5.2 acA47-96.7 cmH20] 9.9 cmH20    .   Signs/symptoms of respiratory failure include- tachypnea, increased work of breathing, and respiratory distress. Contributing diagnoses includes -  unkonwn  Labs and images were reviewed. Patient Has recent ABG, which has been reviewed. Will treat underlying causes and adjust management of respiratory failure as follows-     Hyperkalemia  Resolved or labs hemolysed . This patient has hyperkalemia which is controlled. We will monitor for arrhythmias with EKG or continuous telemetry. We will treat the hyperkalemia with Potassium Binders and Nebulized albuterol sulfate. The likely etiology of the hyperkalemia is  unknown .  The patients latest potassium has been reviewed and the results are listed below  Recent Labs   Lab 01/02/24  0026   K 4.3             Non-traumatic rhabdomyolysis  Vs traumatic post cardiac resuscitation  Monitor CPK and cont hydration      Cardiac arrest  Post resuscitation   Will consult cardiology  Trending troponin, serial EKG on telemetry          VTE Risk Mitigation (From admission, onward)           Ordered     enoxaparin injection 40 mg  Every 24 hours         01/01/24 1855     Apply sequential compression device to lower extremities (if no contraindications). Medical venous thromboembolus prophylaxis is preferred.  Until discontinued         01/01/24 1928                  Critical care time  spent on the evaluation and treatment of severe organ dysfunction, review of pertinent labs and imaging studies, discussions with consulting providers and discussions with patient/family: >45 minutes.             54 yo male active smoker w/ no signifucant PMHx was found in his yard after some work there in respiratory failure leading to cardiac arrest by his neighbours calling for EMS.   S/p CPR and 1 shock, ROSC was attained.   Now in the ICU intubated and on mechanical ventilation w/ TTM in progress.     CXR:  Endotracheal tube tip 3.8 cm from the michael.   Satisfactory position of enteric tube tip below the left hemidiaphragm.   Otherwise, no acute intrathoracic process.    Pt is being managed by the resident team.   Glu 262 in ED    U tox negative  COVID and flu negative  LA and trop are low positive likely post arrest.     Pt is now sedated and paralysed.   Nebs optimized.   IV solumedrol q12  Sugar management suggested.     IVF as needed  F/up UOP  ECHO in am.     SBT and SAT in am after TTM is completed.   B/L UE soft wrist restraints order placed for 24 hrs as per bedside RN request.     Lovemox and Nash for prophylaxis.     Please call us for further assistance.     Anderson Leiva MD  Department of Hospital Medicine  [facility] - Intensive Care

## 2024-01-02 NOTE — PLAN OF CARE
[facility] - Intensive Care  Initial Discharge Assessment       Primary Care Provider: Gogo Vivar NP    Admission Diagnosis: Cardiac arrest [I46.9]  Cardiorespiratory arrest [I46.9]  COPD with acute exacerbation [J44.1]    Admission Date: 1/1/2024  Expected Discharge Date:     Consult: beba    Payor: MEDICAID / Plan: [place] [employer] / Product Type: Managed Medicaid /     Extended Emergency Contact Information  Primary Emergency Contact: Amanda Tobias   [place]  Mobile Phone: [phone]  Relation: Mother  Secondary Emergency Contact: Raya Tobias  Mobile Phone: [phone]  Relation: Sister    Discharge Plan A: (P) Home  Discharge Plan B: (P) [facility]      [id number] - [facility] [address] [facility] [address]  [address] [facility] [address]  Phone: [phone] Fax: [phone]      Initial Assessment (most recent)       Adult Discharge Assessment - 01/02/24 1105          Discharge Assessment    Assessment Type Discharge Planning Assessment (P)      Confirmed/corrected address, phone number and insurance Yes (P)      Source of Information family (P)    Amanda martinez ([phone])    Communicated MARQUISE with patient/caregiver Date not available/Unable to determine (P)      People in Home alone (P)      Do you expect to return to your current living situation? Yes (P)      Do you have help at home or someone to help you manage your care at home? Yes (P)      Prior to hospitilization cognitive status: Alert/Oriented (P)      Current cognitive status: Coma/Sedated/Intubated (P)      Equipment Currently Used at Home nebulizer (P)      Readmission within 30 days? No (P)      Patient currently being followed by outpatient case management? No (P)      Do you currently have service(s) that help you manage your care at home? No (P)      Do you take prescription medications? Yes (P)      Do you have prescription coverage? Yes  (P)      Do you have any problems affording any of your prescribed medications? No (P)      Is the patient taking medications as prescribed? yes (P)      How do you get to doctors appointments? car, drives self (P)      Are you on dialysis? No (P)      Do you take coumadin? No (P)      Discharge Plan A Home (P)      Discharge Plan B Home Health (P)      DME Needed Upon Discharge  other (see comments) (P)    tbd    Discharge Plan discussed with: Parent(s) (P)    motherAmanda ([phone])       Physical Activity    On average, how many days per week do you engage in moderate to strenuous exercise (like a brisk walk)? Patient unable to answer (P)      On average, how many minutes do you engage in exercise at this level? Patient unable to answer (P)         Financial Resource Strain    How hard is it for you to pay for the very basics like food, housing, medical care, and heating? Hard (P)         Housing Stability    In the last 12 months, was there a time when you were not able to pay the mortgage or rent on time? Patient unable to answer (P)      In the last 12 months, was there a time when you did not have a steady place to sleep or slept in a shelter (including now)? Patient unable to answer (P)         Transportation Needs    In the past 12 months, has lack of transportation kept you from medical appointments or from getting medications? No (P)      In the past 12 months, has lack of transportation kept you from meetings, work, or from getting things needed for daily living? No (P)         Food Insecurity    Within the past 12 months, you worried that your food would run out before you got the money to buy more. Patient unable to answer (P)      Within the past 12 months, the food you bought just didn't last and you didn't have money to get more. Patient unable to answer (P)         Stress    Do you feel stress - tense, restless, nervous, or anxious, or unable to sleep at night because your mind is  troubled all the time - these days? To some extent (P)         Social Connections    In a typical week, how many times do you talk on the phone with family, friends, or neighbors? More than three times a week (P)      How often do you get together with friends or relatives? More than three times a week (P)      How often do you attend Latter day or Cheondoism services? More than 4 times per year (P)      Do you belong to any clubs or organizations such as Latter day groups, unions, fraternal or athletic groups, or school groups? No (P)      How often do you attend meetings of the clubs or organizations you belong to? Never (P)      Are you , , , , never , or living with a partner?  (P)         Alcohol Use    Q1: How often do you have a drink containing alcohol? Patient unable to answer (P)      Q2: How many drinks containing alcohol do you have on a typical day when you are drinking? Patient unable to answer (P)      Q3: How often do you have six or more drinks on one occasion? Patient unable to answer (P)                       1105  Patient resting quietly in bed when CM rounded. No family present. Pt currently intubated & sedated. All discharge planning assessment information was obtained from the pt's mother, Oksana Jefferson ([phone]), via phone. Patient was admitted with acute respiratory failure following cardiac arrest & is being followed by [facility].    Patient lives alone, is independent of all ADLs, & denied the need for assistance with transportation at time of discharge.     CM updated patient's whiteboard with CM name & contact information.       Will continue to follow.

## 2024-01-02 NOTE — CONSULTS
[facility] - Intensive Care  Cardiology  Consult Note    Patient Name: Aaron Pruitt  MRN: [id number]  Admission Date: 1/1/2024  Hospital Length of Stay: 1 days  Code Status: Full Code   Attending Provider: Yamila Cazares MD   Consulting Provider: Hitesh Hernández NP  Primary Care Physician: Gogo Vivar NP  Principal Problem:Acute respiratory failure with hypoxia and hypercarbia    Patient information was obtained from ER records.     Inpatient consult to [facility]  Consult performed by: Hitesh Hernández NP  Consult ordered by: Anderson Leiva MD        Subjective:     Chief Complaint:  Cardiac arrest     HPI:   HPI retrieved from chart. Patient is intubated and sedated. 52 YO M with PMHx significant for  COPD/Asthma presented to the ER intubated on the field for acute respiratory failure post cardiac arrest s/p resuscitation and shock x1. Per chart review and   family at bedside reports he was shoveling some dirt in his yard when he got severely short of breath and suddenly had difficulty breathing and neighbors called EMS. When EMS arrived, reportedly still had a pulse but was struggling to breath, became pulseless. He was given narcan x2 without improvement. CPR and Epi x3. ROSC after 3rd round. Also reportedly get shocked x1. He was transferred to the ER and sedated. Labs in the ER with elevated lactic acid, elevated troponin, , elevated CPK 3294, elevated AST//82, drug screen with presumptive THC. CT head no acute intracranial abnormality, CT chest no acute pulmonary process.   Troponin up to 0.4. CPK ~ 5K.  EKG without acute ischemic changes  TTE pending  Patient on TTM protocol      Past Medical History:   Diagnosis Date    Bronchitis     Bronchitis        Past Surgical History:   Procedure Laterality Date    NECK SURGERY         Review of patient's allergies indicates:   Allergen Reactions    Sulfa (sulfonamide antibiotics)        No current facility-administered medications on  file prior to encounter.     Current Outpatient Medications on File Prior to Encounter   Medication Sig    albuterol 90 mcg/actuation inhaler Inhale 1-2 puffs into the lungs every 6 (six) hours as needed for Wheezing.    fluticasone (FLOVENT HFA) 220 mcg/actuation inhaler Inhale 1 puff into the lungs 2 (two) times daily. Controller    MEPOLIZUMAB 100 mg/mL autoinjector Inject into the skin.    mometasone-formoterol (DULERA) 200-5 mcg/actuation inhaler Dulera 200 mcg-5 mcg/actuation HFA aerosol inhaler   INHALE 2 PUFFS BY MOUTH TWICE DAILY. RINSE MOUTH AND THROAT AFTER USE    naproxen (NAPROSYN) 500 MG tablet naproxen 500 mg tablet   TAKE 1 TABLET BY MOUTH TWICE DAILY AS NEEDED    nicotine (NICODERM CQ) 14 mg/24 hr 1 patch.    varenicline (CHANTIX) 1 mg Tab Take by mouth.     Family History    None       Tobacco Use    Smoking status: Every Day    Smokeless tobacco: Not on file   Substance and Sexual Activity    Alcohol use: Not on file    Drug use: Not on file    Sexual activity: Not on file     Review of Systems   Unable to perform ROS: Acuity of condition   Objective:     Vital Signs (Most Recent):  Temp: 97.5 °F (36.4 °C) (01/02/24 1200)  Pulse: 88 (01/02/24 1200)  Resp: (!) 24 (01/02/24 1200)  BP: 138/86 (01/02/24 1200)  SpO2: 99 % (01/02/24 1200) Vital Signs (24h Range):  Temp:  [96.1 °F (35.6 °C)-99.5 °F (37.5 °C)] 97.5 °F (36.4 °C)  Pulse:  [] 88  Resp:  [10-42] 24  SpO2:  [91 %-100 %] 99 %  BP: ()/() 138/86     Weight: 71 kg (156 lb 8.4 oz)  Body mass index is 20.65 kg/m².    SpO2: 99 %         Intake/Output Summary (Last 24 hours) at 1/2/2024 1217  Last data filed at 1/2/2024 1200  Gross per 24 hour   Intake 993.17 ml   Output 1425 ml   Net -431.83 ml       Lines/Drains/Airways       Drain  Duration                  NG/OG Tube 01/01/24 1722 McKenney sump 16 Fr. Left nostril <1 day         Urethral Catheter 01/01/24 1830 Double-lumen;Temperature probe 14 Fr. <1 day              Airway   Duration                  Airway - Non-Surgical Endotracheal Tube -- days              Intraosseous Line  Duration                  Intraosseous Line -- days              Peripheral Intravenous Line  Duration                  Peripheral IV - Single Lumen 01/01/24 1721 18 G Anterior;Right Wrist <1 day         Peripheral IV - Single Lumen 01/01/24 1744 18 G Anterior;Left Forearm <1 day                     Physical Exam  HENT:      Head: Atraumatic.   Cardiovascular:      Rate and Rhythm: Normal rate and regular rhythm.   Pulmonary:      Breath sounds: Wheezing present. No rales.   Abdominal:      General: Bowel sounds are normal.      Palpations: Abdomen is soft.   Musculoskeletal:      Right lower leg: No edema.      Left lower leg: No edema.   Skin:     General: Skin is warm and dry.      Significant Labs: ABG:   Recent Labs   Lab 01/01/24 1734 01/01/24 1736 01/01/24 2027 01/02/24  0610   PH 6.928*  --  7.271* 7.274*   PCO2 76.8*  --  47.0* 56.1*   HCO3 16.0*  --  21.6* 25.9   POCSATURATED 98.9 99.4 >100.0* 96.5   , BMP:   Recent Labs   Lab 01/01/24 2030 01/02/24 0026 01/02/24 0416 01/02/24  0807   *  138* 121* 120* 125*    138 137 135*   K 5.7* 4.3 4.9 5.7*    103 107 106   CO2 18* 19* 17* 18*   BUN 17 19 20 17   CREATININE 1.1 1.0 0.9 0.8   CALCIUM 8.4* 8.3* 8.2* 8.6*   MG 2.2 2.7*  --  2.6   , CMP   Recent Labs   Lab 01/01/24 1742 01/01/24 2030 01/02/24  0026 01/02/24 0416 01/02/24  0807    140 138 137 135*   K 4.0 5.7* 4.3 4.9 5.7*    106 103 107 106   CO2 14* 18* 19* 17* 18*   * 141*  138* 121* 120* 125*   BUN 15 17 19 20 17   CREATININE 1.3 1.1 1.0 0.9 0.8   CALCIUM 8.7 8.4* 8.3* 8.2* 8.6*   PROT 6.3 7.5  --  6.8  --    ALBUMIN 3.6 4.2  --  3.7  --    BILITOT 0.9 0.9  --  0.8  --    ALKPHOS 66 77  --  52*  --    AST 38 144*  --  144*  --    ALT 39 82*  --  76*  --    ANIONGAP 19* 16 16 13 11   , CBC   Recent Labs   Lab 01/01/24  1742 01/02/24  0416   WBC 7.98  "13.12*   HGB 11.9* 12.4*   HCT 37.0* 36.9*    165   , INR   Recent Labs   Lab 01/02/24  0516   INR 1.0   , Lipid Panel No results for input(s): "CHOL", "HDL", "LDLCALC", "TRIG", "CHOLHDL" in the last 48 hours., Troponin   Recent Labs   Lab 01/01/24  1742 01/01/24  2030 01/02/24  0807   TROPONINI 0.011 0.079* 0.446*   , and All pertinent lab results from the last 24 hours have been reviewed.    Significant Imaging: Echocardiogram: Transthoracic echo (TTE) complete (Cupid Only):   Results for orders placed or performed during the hospital encounter of 01/01/24   Echo   Result Value Ref Range    BSA 1.88 m2    High's Biplane MOD Ejection Fraction 65 %    LVOT stroke volume 100.22 cm3    LVIDd 4.79 3.5 - 6.0 cm    LV Systolic Volume 49.90 mL    LV Systolic Volume Index 25.7 mL/m2    LVIDs 3.47 2.1 - 4.0 cm    LV Diastolic Volume 106.78 mL    LV Diastolic Volume Index 55.04 mL/m2    IVS 1.02 0.6 - 1.1 cm    LVOT diameter 2.42 cm    LVOT area 4.6 cm2    FS 28 28 - 44 %    Left Ventricle Relative Wall Thickness 0.47 cm    Posterior Wall 1.13 (A) 0.6 - 1.1 cm    LV mass 187.26 g    LV Mass Index 97 g/m2    MV Peak E Lewis 0.76 m/s    TDI LATERAL 0.12 m/s    TDI SEPTAL 0.11 m/s    E/E' ratio 6.61 m/s    MV Peak A Lewis 0.76 m/s    TR Max Lewis 3.41 m/s    E/A ratio 1.00     IVRT 79.92 msec    E wave deceleration time 219.39 msec    LV SEPTAL E/E' RATIO 6.91 m/s    LV LATERAL E/E' RATIO 6.33 m/s    LVOT peak lewis 1.16 m/s    Left Ventricular Outflow Tract Mean Velocity 0.94 cm/s    Left Ventricular Outflow Tract Mean Gradient 3.77 mmHg    RVDD 3.05 cm    RV S' 20.19 cm/s    TAPSE 2.27 cm    LA size 3.32 cm    Left Atrium Minor Axis 5.63 cm    Left Atrium Major Axis 5.08 cm    LA volume (mod) 57.00 cm3    LA Volume Index (Mod) 29.4 mL/m2    RA Major Axis 3.80 cm    RA Width 3.38 cm    AV mean gradient 6 mmHg    AV peak gradient 10 mmHg    Ao peak lewis 1.62 m/s    Ao VTI 24.80 cm    LVOT peak VTI 21.80 cm    AV valve area " 4.04 cm²    AV Velocity Ratio 0.72     AV index (prosthetic) 0.88     KOLBY by Velocity Ratio 3.29 cm²    MV stenosis pressure 1/2 time 63.62 ms    MV valve area p 1/2 method 3.46 cm2    Triscuspid Valve Regurgitation Peak Gradient 47 mmHg    Sinus 3.22 cm    STJ 2.93 cm    Ascending aorta 3.24 cm    Mean e' 0.12 m/s    ZLVIDS 0.18     ZLVIDD -1.40      Assessment and Plan:     Hyperkalemia  K+ 5.7- management per primary      Non-traumatic rhabdomyolysis  CPK ~5K and up trending - continue to trend  IVF     Cardiac arrest  Appears to be respiratory in etiology   Troponin up to 0.4- likely demand given arrest, hypertensive emergency, rhabdo    TTM in progress  TTE pending   Trend troponin   Will await for rewarming and neuroprognostication before considering ischemic evaluation   Unable to calculate CAHP- required information unavailable   Continue supportive care           VTE Risk Mitigation (From admission, onward)           Ordered     enoxaparin injection 40 mg  Every 24 hours         01/01/24 1855     Apply sequential compression device to lower extremities (if no contraindications). Medical venous thromboembolus prophylaxis is preferred.  Until discontinued         01/01/24 1928                    Thank you for your consult. I will follow-up with patient. Please contact us if you have any additional questions.    Hitesh Hernández NP  Cardiology   [facility] - Intensive Care

## 2024-01-03 LAB
ALBUMIN SERPL BCP-MCNC: 3.1 G/DL (ref 3.5–5.2)
ALLENS TEST: ABNORMAL
ALP SERPL-CCNC: 45 U/L (ref 55–135)
ALT SERPL W/O P-5'-P-CCNC: 65 U/L (ref 10–44)
ANION GAP SERPL CALC-SCNC: 11 MMOL/L (ref 8–16)
ANION GAP SERPL CALC-SCNC: 11 MMOL/L (ref 8–16)
ANION GAP SERPL CALC-SCNC: 8 MMOL/L (ref 8–16)
ANION GAP SERPL CALC-SCNC: 9 MMOL/L (ref 8–16)
AST SERPL-CCNC: 130 U/L (ref 10–40)
BASOPHILS # BLD AUTO: 0.02 K/UL (ref 0–0.2)
BASOPHILS NFR BLD: 0.1 % (ref 0–1.9)
BILIRUB SERPL-MCNC: 1.1 MG/DL (ref 0.1–1)
BUN SERPL-MCNC: 18 MG/DL (ref 6–20)
BUN SERPL-MCNC: 19 MG/DL (ref 6–20)
BUN SERPL-MCNC: 21 MG/DL (ref 6–20)
BUN SERPL-MCNC: 21 MG/DL (ref 6–20)
CALCIUM SERPL-MCNC: 8.2 MG/DL (ref 8.7–10.5)
CALCIUM SERPL-MCNC: 8.3 MG/DL (ref 8.7–10.5)
CALCIUM SERPL-MCNC: 8.7 MG/DL (ref 8.7–10.5)
CALCIUM SERPL-MCNC: 9 MG/DL (ref 8.7–10.5)
CHLORIDE SERPL-SCNC: 100 MMOL/L (ref 95–110)
CHLORIDE SERPL-SCNC: 102 MMOL/L (ref 95–110)
CHLORIDE SERPL-SCNC: 103 MMOL/L (ref 95–110)
CHLORIDE SERPL-SCNC: 103 MMOL/L (ref 95–110)
CO2 SERPL-SCNC: 23 MMOL/L (ref 23–29)
CO2 SERPL-SCNC: 24 MMOL/L (ref 23–29)
CO2 SERPL-SCNC: 25 MMOL/L (ref 23–29)
CO2 SERPL-SCNC: 26 MMOL/L (ref 23–29)
CREAT SERPL-MCNC: 0.7 MG/DL (ref 0.5–1.4)
CREAT SERPL-MCNC: 0.7 MG/DL (ref 0.5–1.4)
CREAT SERPL-MCNC: 0.8 MG/DL (ref 0.5–1.4)
CREAT SERPL-MCNC: 0.8 MG/DL (ref 0.5–1.4)
DIFFERENTIAL METHOD BLD: ABNORMAL
EOSINOPHIL # BLD AUTO: 0 K/UL (ref 0–0.5)
EOSINOPHIL NFR BLD: 0 % (ref 0–8)
ERYTHROCYTE [DISTWIDTH] IN BLOOD BY AUTOMATED COUNT: 12.6 % (ref 11.5–14.5)
EST. GFR  (NO RACE VARIABLE): >60 ML/MIN/1.73 M^2
FIO2: 25 %
GLUCOSE SERPL-MCNC: 100 MG/DL (ref 70–110)
GLUCOSE SERPL-MCNC: 112 MG/DL (ref 70–110)
GLUCOSE SERPL-MCNC: 91 MG/DL (ref 70–110)
GLUCOSE SERPL-MCNC: 94 MG/DL (ref 70–110)
HCT VFR BLD AUTO: 32.9 % (ref 40–54)
HGB BLD-MCNC: 11.4 G/DL (ref 14–18)
IMM GRANULOCYTES # BLD AUTO: 0.18 K/UL (ref 0–0.04)
IMM GRANULOCYTES NFR BLD AUTO: 1.2 % (ref 0–0.5)
INR PPP: 1 (ref 0.8–1.2)
LYMPHOCYTES # BLD AUTO: 0.6 K/UL (ref 1–4.8)
LYMPHOCYTES NFR BLD: 3.8 % (ref 18–48)
MAGNESIUM SERPL-MCNC: 2.2 MG/DL (ref 1.6–2.6)
MAGNESIUM SERPL-MCNC: 2.3 MG/DL (ref 1.6–2.6)
MAGNESIUM SERPL-MCNC: 2.4 MG/DL (ref 1.6–2.6)
MCH RBC QN AUTO: 31.8 PG (ref 27–31)
MCHC RBC AUTO-ENTMCNC: 34.7 G/DL (ref 32–36)
MCV RBC AUTO: 92 FL (ref 82–98)
MONOCYTES # BLD AUTO: 0.8 K/UL (ref 0.3–1)
MONOCYTES NFR BLD: 5.1 % (ref 4–15)
NEUTROPHILS # BLD AUTO: 13.9 K/UL (ref 1.8–7.7)
NEUTROPHILS NFR BLD: 89.8 % (ref 38–73)
NRBC BLD-RTO: 0 /100 WBC
NSE SERPL-MCNC: NORMAL UG/L
PCO2 BLDA: 45 MMHG (ref 35–45)
PEEP: 5
PH SMN: 7.38 [PH] (ref 7.35–7.45)
PHOSPHATE SERPL-MCNC: 2.6 MG/DL (ref 2.7–4.5)
PHOSPHATE SERPL-MCNC: 3.1 MG/DL (ref 2.7–4.5)
PHOSPHATE SERPL-MCNC: 3.2 MG/DL (ref 2.7–4.5)
PLATELET # BLD AUTO: 173 K/UL (ref 150–450)
PMV BLD AUTO: 9.8 FL (ref 9.2–12.9)
PO2 BLDA: 67.2 MMHG (ref 80–100)
POC BASE DEFICIT: 1 MMOL/L (ref -2–2)
POC HCO3: 26.5 MMOL/L (ref 24–28)
POC PERFORMED BY: ABNORMAL
POC SATURATED O2: 94.1 % (ref 95–100)
POC SET RR: 26
POCT GLUCOSE: 107 MG/DL (ref 70–110)
POCT GLUCOSE: 113 MG/DL (ref 70–110)
POCT GLUCOSE: 125 MG/DL (ref 70–110)
POCT GLUCOSE: 97 MG/DL (ref 70–110)
POTASSIUM SERPL-SCNC: 4.5 MMOL/L (ref 3.5–5.1)
POTASSIUM SERPL-SCNC: 4.8 MMOL/L (ref 3.5–5.1)
POTASSIUM SERPL-SCNC: 4.9 MMOL/L (ref 3.5–5.1)
POTASSIUM SERPL-SCNC: 4.9 MMOL/L (ref 3.5–5.1)
PROT SERPL-MCNC: 5.9 G/DL (ref 6–8.4)
PROTHROMBIN TIME: 10.5 SEC (ref 9–12.5)
RBC # BLD AUTO: 3.59 M/UL (ref 4.6–6.2)
SODIUM SERPL-SCNC: 135 MMOL/L (ref 136–145)
SODIUM SERPL-SCNC: 136 MMOL/L (ref 136–145)
SODIUM SERPL-SCNC: 137 MMOL/L (ref 136–145)
SODIUM SERPL-SCNC: 137 MMOL/L (ref 136–145)
SPECIMEN SOURCE: ABNORMAL
VT: 420
WBC # BLD AUTO: 15.43 K/UL (ref 3.9–12.7)

## 2024-01-03 PROCEDURE — 25000003 PHARM REV CODE 250: Performed by: FAMILY MEDICINE

## 2024-01-03 PROCEDURE — 84100 ASSAY OF PHOSPHORUS: CPT | Mod: 91 | Performed by: STUDENT IN AN ORGANIZED HEALTH CARE EDUCATION/TRAINING PROGRAM

## 2024-01-03 PROCEDURE — 63600175 PHARM REV CODE 636 W HCPCS

## 2024-01-03 PROCEDURE — 63600175 PHARM REV CODE 636 W HCPCS: Performed by: INTERNAL MEDICINE

## 2024-01-03 PROCEDURE — 63600175 PHARM REV CODE 636 W HCPCS: Performed by: STUDENT IN AN ORGANIZED HEALTH CARE EDUCATION/TRAINING PROGRAM

## 2024-01-03 PROCEDURE — 94003 VENT MGMT INPAT SUBQ DAY: CPT

## 2024-01-03 PROCEDURE — 94761 N-INVAS EAR/PLS OXIMETRY MLT: CPT | Mod: XB

## 2024-01-03 PROCEDURE — 02HV33Z INSERTION OF INFUSION DEVICE INTO SUPERIOR VENA CAVA, PERCUTANEOUS APPROACH: ICD-10-PCS | Performed by: FAMILY MEDICINE

## 2024-01-03 PROCEDURE — 63600175 PHARM REV CODE 636 W HCPCS: Performed by: EMERGENCY MEDICINE

## 2024-01-03 PROCEDURE — C1751 CATH, INF, PER/CENT/MIDLINE: HCPCS

## 2024-01-03 PROCEDURE — 36415 COLL VENOUS BLD VENIPUNCTURE: CPT | Mod: XB | Performed by: STUDENT IN AN ORGANIZED HEALTH CARE EDUCATION/TRAINING PROGRAM

## 2024-01-03 PROCEDURE — 99900035 HC TECH TIME PER 15 MIN (STAT)

## 2024-01-03 PROCEDURE — 36415 COLL VENOUS BLD VENIPUNCTURE: CPT | Mod: XB

## 2024-01-03 PROCEDURE — 87070 CULTURE OTHR SPECIMN AEROBIC: CPT | Performed by: STUDENT IN AN ORGANIZED HEALTH CARE EDUCATION/TRAINING PROGRAM

## 2024-01-03 PROCEDURE — 20000000 HC ICU ROOM

## 2024-01-03 PROCEDURE — 87205 SMEAR GRAM STAIN: CPT | Performed by: STUDENT IN AN ORGANIZED HEALTH CARE EDUCATION/TRAINING PROGRAM

## 2024-01-03 PROCEDURE — 83735 ASSAY OF MAGNESIUM: CPT | Performed by: STUDENT IN AN ORGANIZED HEALTH CARE EDUCATION/TRAINING PROGRAM

## 2024-01-03 PROCEDURE — 25000003 PHARM REV CODE 250

## 2024-01-03 PROCEDURE — 27100171 HC OXYGEN HIGH FLOW UP TO 24 HOURS

## 2024-01-03 PROCEDURE — 25000242 PHARM REV CODE 250 ALT 637 W/ HCPCS: Performed by: STUDENT IN AN ORGANIZED HEALTH CARE EDUCATION/TRAINING PROGRAM

## 2024-01-03 PROCEDURE — C9113 INJ PANTOPRAZOLE SODIUM, VIA: HCPCS

## 2024-01-03 PROCEDURE — 87040 BLOOD CULTURE FOR BACTERIA: CPT | Mod: 59

## 2024-01-03 PROCEDURE — 82803 BLOOD GASES ANY COMBINATION: CPT

## 2024-01-03 PROCEDURE — 36569 INSJ PICC 5 YR+ W/O IMAGING: CPT

## 2024-01-03 PROCEDURE — 80048 BASIC METABOLIC PNL TOTAL CA: CPT | Mod: XB | Performed by: STUDENT IN AN ORGANIZED HEALTH CARE EDUCATION/TRAINING PROGRAM

## 2024-01-03 PROCEDURE — 80053 COMPREHEN METABOLIC PANEL: CPT | Performed by: FAMILY MEDICINE

## 2024-01-03 PROCEDURE — 36600 WITHDRAWAL OF ARTERIAL BLOOD: CPT

## 2024-01-03 PROCEDURE — 63600175 PHARM REV CODE 636 W HCPCS: Performed by: FAMILY MEDICINE

## 2024-01-03 PROCEDURE — 25000003 PHARM REV CODE 250: Performed by: INTERNAL MEDICINE

## 2024-01-03 PROCEDURE — 25000003 PHARM REV CODE 250: Performed by: STUDENT IN AN ORGANIZED HEALTH CARE EDUCATION/TRAINING PROGRAM

## 2024-01-03 PROCEDURE — 85025 COMPLETE CBC W/AUTO DIFF WBC: CPT

## 2024-01-03 PROCEDURE — 94640 AIRWAY INHALATION TREATMENT: CPT

## 2024-01-03 PROCEDURE — 85610 PROTHROMBIN TIME: CPT | Performed by: STUDENT IN AN ORGANIZED HEALTH CARE EDUCATION/TRAINING PROGRAM

## 2024-01-03 PROCEDURE — 87106 FUNGI IDENTIFICATION YEAST: CPT | Performed by: STUDENT IN AN ORGANIZED HEALTH CARE EDUCATION/TRAINING PROGRAM

## 2024-01-03 RX ORDER — HYDRALAZINE HYDROCHLORIDE 20 MG/ML
10 INJECTION INTRAMUSCULAR; INTRAVENOUS EVERY 6 HOURS PRN
Status: DISCONTINUED | OUTPATIENT
Start: 2024-01-03 | End: 2024-01-12 | Stop reason: HOSPADM

## 2024-01-03 RX ORDER — SODIUM CHLORIDE, SODIUM LACTATE, POTASSIUM CHLORIDE, CALCIUM CHLORIDE 600; 310; 30; 20 MG/100ML; MG/100ML; MG/100ML; MG/100ML
INJECTION, SOLUTION INTRAVENOUS CONTINUOUS
Status: DISCONTINUED | OUTPATIENT
Start: 2024-01-03 | End: 2024-01-05

## 2024-01-03 RX ORDER — IPRATROPIUM BROMIDE AND ALBUTEROL SULFATE 2.5; .5 MG/3ML; MG/3ML
3 SOLUTION RESPIRATORY (INHALATION) EVERY 6 HOURS
Status: DISCONTINUED | OUTPATIENT
Start: 2024-01-03 | End: 2024-01-12 | Stop reason: HOSPADM

## 2024-01-03 RX ORDER — SODIUM CHLORIDE 0.9 % (FLUSH) 0.9 %
10 SYRINGE (ML) INJECTION
Status: DISCONTINUED | OUTPATIENT
Start: 2024-01-03 | End: 2024-01-12 | Stop reason: HOSPADM

## 2024-01-03 RX ORDER — SODIUM CHLORIDE 0.9 % (FLUSH) 0.9 %
10 SYRINGE (ML) INJECTION EVERY 6 HOURS
Status: DISCONTINUED | OUTPATIENT
Start: 2024-01-04 | End: 2024-01-12 | Stop reason: HOSPADM

## 2024-01-03 RX ORDER — FENTANYL CITRATE 50 UG/ML
50 INJECTION, SOLUTION INTRAMUSCULAR; INTRAVENOUS ONCE
Status: COMPLETED | OUTPATIENT
Start: 2024-01-03 | End: 2024-01-03

## 2024-01-03 RX ORDER — FENTANYL CITRATE 50 UG/ML
50 INJECTION, SOLUTION INTRAMUSCULAR; INTRAVENOUS
Status: DISCONTINUED | OUTPATIENT
Start: 2024-01-03 | End: 2024-01-03

## 2024-01-03 RX ORDER — FENTANYL CITRATE-0.9 % NACL/PF 10 MCG/ML
0-250 PLASTIC BAG, INJECTION (ML) INTRAVENOUS CONTINUOUS
Status: DISCONTINUED | OUTPATIENT
Start: 2024-01-03 | End: 2024-01-03

## 2024-01-03 RX ORDER — FENTANYL CITRATE 50 UG/ML
50 INJECTION, SOLUTION INTRAMUSCULAR; INTRAVENOUS
Status: ACTIVE | OUTPATIENT
Start: 2024-01-03 | End: 2024-01-04

## 2024-01-03 RX ORDER — PROPOFOL 10 MG/ML
0-80 INJECTION, EMULSION INTRAVENOUS CONTINUOUS
Status: DISCONTINUED | OUTPATIENT
Start: 2024-01-03 | End: 2024-01-04

## 2024-01-03 RX ORDER — ACETAMINOPHEN 500 MG
1000 TABLET ORAL EVERY 6 HOURS
Status: DISCONTINUED | OUTPATIENT
Start: 2024-01-03 | End: 2024-01-05

## 2024-01-03 RX ORDER — FENTANYL CITRATE-0.9 % NACL/PF 10 MCG/ML
0-250 PLASTIC BAG, INJECTION (ML) INTRAVENOUS CONTINUOUS
Status: DISCONTINUED | OUTPATIENT
Start: 2024-01-03 | End: 2024-01-04

## 2024-01-03 RX ORDER — LABETALOL HYDROCHLORIDE 5 MG/ML
5 INJECTION, SOLUTION INTRAVENOUS EVERY 5 MIN PRN
Status: DISCONTINUED | OUTPATIENT
Start: 2024-01-03 | End: 2024-01-12 | Stop reason: HOSPADM

## 2024-01-03 RX ADMIN — ACETAMINOPHEN 1000 MG: 500 TABLET ORAL at 03:01

## 2024-01-03 RX ADMIN — SODIUM CHLORIDE, POTASSIUM CHLORIDE, SODIUM LACTATE AND CALCIUM CHLORIDE: 600; 310; 30; 20 INJECTION, SOLUTION INTRAVENOUS at 11:01

## 2024-01-03 RX ADMIN — VANCOMYCIN HYDROCHLORIDE 1750 MG: 10 INJECTION, POWDER, LYOPHILIZED, FOR SOLUTION INTRAVENOUS at 03:01

## 2024-01-03 RX ADMIN — IPRATROPIUM BROMIDE AND ALBUTEROL SULFATE 3 ML: 2.5; .5 SOLUTION RESPIRATORY (INHALATION) at 08:01

## 2024-01-03 RX ADMIN — FENTANYL CITRATE 50 MCG: 50 INJECTION INTRAMUSCULAR; INTRAVENOUS at 07:01

## 2024-01-03 RX ADMIN — AZITHROMYCIN MONOHYDRATE 500 MG: 500 INJECTION, POWDER, LYOPHILIZED, FOR SOLUTION INTRAVENOUS at 12:01

## 2024-01-03 RX ADMIN — ALBUTEROL SULFATE 5 MG: 2.5 SOLUTION RESPIRATORY (INHALATION) at 07:01

## 2024-01-03 RX ADMIN — MUPIROCIN: 20 OINTMENT TOPICAL at 08:01

## 2024-01-03 RX ADMIN — MINERAL OIL, WHITE PETROLATUM: .03; .94 OINTMENT OPHTHALMIC at 01:01

## 2024-01-03 RX ADMIN — METHYLPREDNISOLONE SODIUM SUCCINATE 60 MG: 40 INJECTION, POWDER, FOR SOLUTION INTRAMUSCULAR; INTRAVENOUS at 05:01

## 2024-01-03 RX ADMIN — ENOXAPARIN SODIUM 40 MG: 40 INJECTION SUBCUTANEOUS at 05:01

## 2024-01-03 RX ADMIN — BUDESONIDE 1 MG: 0.5 INHALANT RESPIRATORY (INHALATION) at 07:01

## 2024-01-03 RX ADMIN — PROPOFOL 55 MCG/KG/MIN: 10 INJECTION, EMULSION INTRAVENOUS at 07:01

## 2024-01-03 RX ADMIN — FENTANYL CITRATE 50 MCG: 50 INJECTION INTRAMUSCULAR; INTRAVENOUS at 05:01

## 2024-01-03 RX ADMIN — FENTANYL CITRATE 150 MCG/HR: 50 INJECTION, SOLUTION INTRAMUSCULAR; INTRAVENOUS at 12:01

## 2024-01-03 RX ADMIN — FENTANYL CITRATE 50 MCG: 50 INJECTION INTRAMUSCULAR; INTRAVENOUS at 06:01

## 2024-01-03 RX ADMIN — FENTANYL CITRATE 50 MCG: 50 INJECTION INTRAMUSCULAR; INTRAVENOUS at 03:01

## 2024-01-03 RX ADMIN — PANTOPRAZOLE SODIUM 40 MG: 40 INJECTION, POWDER, FOR SOLUTION INTRAVENOUS at 08:01

## 2024-01-03 RX ADMIN — PIPERACILLIN AND TAZOBACTAM 4.5 G: 4; .5 INJECTION, POWDER, LYOPHILIZED, FOR SOLUTION INTRAVENOUS; PARENTERAL at 05:01

## 2024-01-03 RX ADMIN — PROPOFOL 40 MCG/KG/MIN: 10 INJECTION, EMULSION INTRAVENOUS at 04:01

## 2024-01-03 RX ADMIN — CEFTRIAXONE SODIUM 2 G: 2 INJECTION, POWDER, FOR SOLUTION INTRAMUSCULAR; INTRAVENOUS at 11:01

## 2024-01-03 RX ADMIN — SODIUM CHLORIDE: 9 INJECTION, SOLUTION INTRAVENOUS at 07:01

## 2024-01-03 RX ADMIN — MINERAL OIL, WHITE PETROLATUM: .03; .94 OINTMENT OPHTHALMIC at 05:01

## 2024-01-03 RX ADMIN — MINERAL OIL, WHITE PETROLATUM: .03; .94 OINTMENT OPHTHALMIC at 09:01

## 2024-01-03 RX ADMIN — ACETAMINOPHEN 1000 MG: 500 TABLET ORAL at 11:01

## 2024-01-03 RX ADMIN — IPRATROPIUM BROMIDE AND ALBUTEROL SULFATE 3 ML: 2.5; .5 SOLUTION RESPIRATORY (INHALATION) at 01:01

## 2024-01-03 RX ADMIN — MONTELUKAST 10 MG: 10 TABLET, FILM COATED ORAL at 08:01

## 2024-01-03 RX ADMIN — PROPOFOL 50 MCG/KG/MIN: 10 INJECTION, EMULSION INTRAVENOUS at 02:01

## 2024-01-03 RX ADMIN — SODIUM CHLORIDE, PRESERVATIVE FREE 10 ML: 5 INJECTION INTRAVENOUS at 11:01

## 2024-01-03 NOTE — PLAN OF CARE
Patient on ventilator with documented settings.  Alarms are set and functioning with adequate volumes.  AMBU bag and mask at bedside. The proper method of use, as well as anticipated side effects, of this aerosol treatment are discussed and demonstrated to the patient.

## 2024-01-03 NOTE — PROGRESS NOTES
[facility] Medicine  Progress Note    Patient Name: Aaron Pruitt  MRN: [id number]  Patient Class: IP- Inpatient   Admission Date: 1/1/2024  Length of Stay: 1 days  Attending Physician: Yamila Cazares MD  Primary Care Provider: Gogo Vivar NP        Subjective:     Principal Problem:Acute respiratory failure with hypoxia and hypercarbia        HPI:  52 YO M with PMHx significant for  COPD/Asthma presented to the ER intubated on the field for acute respiratory failure post cardiac arrest s/p resuscitation and shock x1. Per chart review and   family at bedside reports he was shoveling some dirt in his yard when he got severely short of breath and suddenly had difficulty breathing and neighbors called EMS. When EMS arrived, reportedly still had a pulse but was struggling to breath, became pulseless. He was given narcan x2 without improvement. CPR and Epi x3. ROSC after 3rd round. Also reportedly get shocked x1. He was transferred to the ER and sedated. Labs in the ER with elevated lactic acid, elevated troponin, , elevated CPK 3294, elevated AST//82, drug screen with presumptive THC. CT head no acute intracranial abnormality, CT chest no acute pulmonary process.    Overview/Hospital Course:  1/2/2024 on TTM on fentanyl and propofol. K elevated to 5.7-->5.9, Lokelma 5g x 1, insulin/dextrose given    Interval History: Intubated and sedated    Review of Systems   Unable to perform ROS: Acuity of condition     Objective:     Vital Signs (Most Recent):  Temp: 97.3 °F (36.3 °C) (01/02/24 1815)  Pulse: 101 (01/02/24 1815)  Resp: (!) 25 (01/02/24 1815)  BP: 133/63 (01/02/24 1815)  SpO2: 95 % (01/02/24 1815) Vital Signs (24h Range):  Temp:  [96.1 °F (35.6 °C)-99.5 °F (37.5 °C)] 97.3 °F (36.3 °C)  Pulse:  [] 101  Resp:  [10-42] 25  SpO2:  [93 %-100 %] 95 %  BP: ()/() 133/63     Weight: 71 kg (156 lb 8.4 oz)  Body mass index is 20.65 kg/m².    Intake/Output Summary (Last  24 hours) at 1/2/2024 1824  Last data filed at 1/2/2024 1800  Gross per 24 hour   Intake 2499.81 ml   Output 2595 ml   Net -95.19 ml         Physical Exam  Vitals and nursing note reviewed.   Constitutional:       General: He is not in acute distress.     Appearance: He is not toxic-appearing.      Comments: Intubated and sedated   HENT:      Head: Normocephalic and atraumatic.      Mouth/Throat:      Mouth: Mucous membranes are moist.      Pharynx: No oropharyngeal exudate.   Eyes:      Pupils: Pupils are equal, round, and reactive to light.   Cardiovascular:      Rate and Rhythm: Normal rate.      Heart sounds: No murmur heard.     No friction rub. No gallop.   Pulmonary:      Comments: Intubated, on mechanical ventilation, sedated  Abdominal:      General: Bowel sounds are normal. There is no distension.      Palpations: Abdomen is soft.      Tenderness: There is no abdominal tenderness. There is no guarding or rebound.   Musculoskeletal:      Cervical back: No rigidity or tenderness.      Right lower leg: No edema.      Left lower leg: No edema.             Significant Labs: All pertinent labs within the past 24 hours have been reviewed.  CBC:   Recent Labs   Lab 01/01/24  1742 01/02/24  0416   WBC 7.98 13.12*   HGB 11.9* 12.4*   HCT 37.0* 36.9*    165     CMP:   Recent Labs   Lab 01/01/24  1742 01/01/24  2030 01/02/24  0026 01/02/24  0416 01/02/24  0807 01/02/24  1245 01/02/24  1600    140   < > 137 135* 136 138   K 4.0 5.7*   < > 4.9 5.7* 5.9* 5.7*    106   < > 107 106 102 102   CO2 14* 18*   < > 17* 18* 24 25   * 141*  138*   < > 120* 125* 118* 71   BUN 15 17   < > 20 17 16 15   CREATININE 1.3 1.1   < > 0.9 0.8 0.8 0.8   CALCIUM 8.7 8.4*   < > 8.2* 8.6* 8.4* 8.4*   PROT 6.3 7.5  --  6.8  --   --   --    ALBUMIN 3.6 4.2  --  3.7  --   --   --    BILITOT 0.9 0.9  --  0.8  --   --   --    ALKPHOS 66 77  --  52*  --   --   --    AST 38 144*  --  144*  --   --   --    ALT 39 82*  --  76*   --   --   --    ANIONGAP 19* 16   < > 13 11 10 11    < > = values in this interval not displayed.       Significant Imaging: I have reviewed all pertinent imaging results/findings within the past 24 hours.    Assessment/Plan:      * Acute respiratory failure with hypoxia and hypercarbia  Patient with Hypoxic Respiratory failure which is Acute.  he is not on home oxygen. Supplemental oxygen was provided and noted- Vent Mode: A/C  Oxygen Concentration (%):  [21-85] 25  Resp Rate Total:  [20 br/min-41 br/min] 27 br/min  Vt Set:  [420 mL] 420 mL  PEEP/CPAP:  [5 cmH20] 5 cmH20  Mean Airway Pressure:  [5.2 tuX91-99.7 cmH20] 13.4 cmH20    .   Signs/symptoms of respiratory failure include- tachypnea, increased work of breathing, and respiratory distress. Contributing diagnoses includes -  unkonwn  Labs and images were reviewed. Patient Has recent ABG, which has been reviewed. Will treat underlying causes and adjust management of respiratory failure as follows-     H/o severe asthma and COPD  Steroids, nebs, added ceftriaxone and azithromycin (CXR clear)    Hyperkalemia  Resolved or labs hemolysed . This patient has hyperkalemia which is controlled. We will monitor for arrhythmias with EKG or continuous telemetry. We will treat the hyperkalemia with Potassium Binders and Nebulized albuterol sulfate. The likely etiology of the hyperkalemia is  unknown .  The patients latest potassium has been reviewed and the results are listed below  Recent Labs   Lab 01/02/24  1600   K 5.7*       K elevated to 5.7-->5.9, Lokelma 5g x 1, insulin/dextrose given, repeat in evening        Non-traumatic rhabdomyolysis  Vs traumatic post cardiac resuscitation  Monitor CPK and cont hydration with NS at 100/hr      Cardiac arrest  S/p CPR with ROSC now on TTM  Will consult cardiology  Trending troponin, serial EKG on telemetry          VTE Risk Mitigation (From admission, onward)           Ordered     enoxaparin injection 40 mg  Every 24 hours          01/01/24 1855     Apply sequential compression device to lower extremities (if no contraindications). Medical venous thromboembolus prophylaxis is preferred.  Until discontinued         01/01/24 1928                    Discharge Planning   MARQUISE:      Code Status: Full Code   Is the patient medically ready for discharge?:     Reason for patient still in hospital (select all that apply): Patient unstable and Patient trending condition  Discharge Plan A: Home            Critical care time spent on the evaluation and treatment of severe organ dysfunction, review of pertinent labs and imaging studies, discussions with consulting providers and discussions with patient/family: 35 minutes.      Yamila Cazares MD  Department of Hospital Medicine   [facility] - Intensive Care

## 2024-01-03 NOTE — EICU
Intervention Initiated From:  Bedside    Adriane intervened regarding:  Other    Nurse Notified:  Yes    Doctor Notified:  Yes    Comments: Bedside nurse called requesting order for wrist restraints be renewed. Dr Miranda messaged

## 2024-01-03 NOTE — PLAN OF CARE
Recommendation:  1. Increase TF of Impact Peptide as tolerated to goal rate of 50ml/hr which would provide 1980 kcal, 124 g protein, & 1016ml free water.     2. Monitor weight/labs.     3. RD to continue to follow to monitor nutrition status    Goals:  TF to meet at least 85% EEN/EPN by RD follow up  Nutrition Goal Status: new

## 2024-01-03 NOTE — CARE UPDATE
Pt. Received on vent with settings on the flow sheet. Pt. Sleeping in no apparent distress.  Vent check ok, alarms working and are audible. Vent volumes ok. Pt on oxygen in no apparent distress.  Breathing tx. Given with ok pt. Effort.  Will cont. To monitor.

## 2024-01-03 NOTE — EICU
Intervention Initiated From:  Bedside    Adriane intervened regarding:  Other    Nurse Notified:  Yes    Doctor Notified:  Yes    Comments: bedside nurse called to report the rewarming process has begun for TTM. However, she garcía snot have the order for rewarming pt. She would like EICU md to order. Informed .

## 2024-01-03 NOTE — PLAN OF CARE
Pt remained in bed, plan of care reviewed with mother and other family, verbalized understanding. NSR on telemetry, BP WDL. On ventilator, settings as charted by RT. Sedated with Propofol and Fentanyl. NS maintained at 100ml/hr. Trickle feeds maintained at 10ml/hr. Turned in bed Q2

## 2024-01-03 NOTE — ASSESSMENT & PLAN NOTE
S/p CPR with ROSC now on TTM  Will consult cardiology  Trending troponin, serial EKG on telemetry

## 2024-01-03 NOTE — HOSPITAL COURSE
1/3/2024 on TTM on fentanyl and propofol. K elevated to 5.7-->5.9, Lokelma 5g x 1, insulin/dextrose given  1/4/2024 Rewarming initiated. Sedation held, myoclonus noted. Restarted on propofol. EEG shows possible NCSE activity, started on AEDs  1/5/2024 Unchanged, Vimpat added, on keppra, vimpat and propofol.   1/6/2024 No clinical improvement, loaded with depacon  1/7/24 No improvement EEG result: The patient is a 53-year-old male with a history of anoxic brain injury who is currently maintained on intravenous infusions of propofol and on Keppra and Vimpat.  This is an abnormal EEG during comatose state.  The overall degree of disorganization and slowing for given age is suggestive of a severe encephalopathy. The presence of epileptiform discharges is suggestive of cortical irritability with increased risk of focal seizures from either hemisphere.  There was a decrease in the overall burden of epileptiform discharges in this study compared to the prior study.  No seizures recorded during this study.   1/8/24 EEG without seizures since yesterday.   MRI brain The diffusion sequence is normal without evidence of acute ischemia or infarct.  There is abnormal increased signal of the caudate heads and basal ganglia bilaterally.   remaining hemispheres are unremarkable.  Posterior fossa structures are normal.  Flow voids are present where expected.  Pituitary, craniocervical junction and midline structures are unremarkable.

## 2024-01-03 NOTE — PROGRESS NOTES
Pharmacokinetic Initial Assessment: IV Vancomycin    Assessment/Plan:    Initiate intravenous vancomycin with loading dose of 1750 mg once followed by a maintenance dose of vancomycin 1250 mg IV every 12 hours  Desired empiric serum trough concentration is 15 to 20 mcg/mL  Draw vancomycin trough level 60 min prior to third dose on 1/4/24 at approximately 1500  Pharmacy will continue to follow and monitor vancomycin.      Please contact pharmacy at extension [phone] with any questions regarding this assessment.     Thank you for the consult,   Marcela Melvin       Patient brief summary:  Aaron Pruitt is a 53 y.o. male initiated on antimicrobial therapy with IV Vancomycin for treatment of suspected  fever    Drug Allergies:   Review of patient's allergies indicates:   Allergen Reactions    Sulfa (sulfonamide antibiotics)        Actual Body Weight:   72 kg     Renal Function:   Estimated Creatinine Clearance: 111.3 mL/min (based on SCr of 0.8 mg/dL).,     CBC (last 72 hours):  Recent Labs   Lab Result Units 01/01/24 1742 01/02/24 0416 01/03/24  0419   WBC K/uL 7.98 13.12* 15.43*   Hemoglobin g/dL 11.9* 12.4* 11.4*   Hematocrit % 37.0* 36.9* 32.9*   Platelets K/uL 170 165 173   Gran % % 39.8 93.4* 89.8*   Lymph % % 51.9* 2.5* 3.8*   Mono % % 6.0 3.1* 5.1   Eosinophil % % 0.4 0.0 0.0   Basophil % % 0.3 0.1 0.1   Differential Method  Automated Automated Automated       Metabolic Panel (last 72 hours):  Recent Labs   Lab Result Units 01/01/24  1742 01/01/24  1743 01/01/24  1744 01/01/24  2030 01/01/24  2050 01/02/24  0026 01/02/24  0416 01/02/24  0807 01/02/24  1245 01/02/24  1600 01/02/24  2358 01/03/24  0419 01/03/24  0749   Sodium mmol/L 137  --   --  140  --  138 137 135* 136 138 136 135* 137   Potassium mmol/L 4.0  --   --  5.7*  --  4.3 4.9 5.7* 5.9* 5.7* 4.8 4.5 4.9   Chloride mmol/L 104  --   --  106  --  103 107 106 102 102 102 103 103   CO2 mmol/L 14*  --   --  18*  --  19* 17* 18* 24 25 23 24 25   Glucose mg/dL  "270*  --   --  141*  138*  --  121* 120* 125* 118* 71 94 100 112*   Glucose, UA   --   --  Negative  --  Negative  --   --   --   --   --   --   --   --    BUN mg/dL 15  --   --  17  --  19 20 17 16 15 19 18 21*   Creatinine mg/dL 1.3  --   --  1.1  --  1.0 0.9 0.8 0.8 0.8 0.7 0.7 0.8   Creatinine, Urine mg/dL  --  140.5  --   --   --   --   --   --   --   --   --   --   --    Albumin g/dL 3.6  --   --  4.2  --   --  3.7  --   --   --   --  3.1*  --    Total Bilirubin mg/dL 0.9  --   --  0.9  --   --  0.8  --   --   --   --  1.1*  --    Alkaline Phosphatase U/L 66  --   --  77  --   --  52*  --   --   --   --  45*  --    AST U/L 38  --   --  144*  --   --  144*  --   --   --   --  130*  --    ALT U/L 39  --   --  82*  --   --  76*  --   --   --   --  65*  --    Magnesium mg/dL  --   --   --  2.2  --  2.7*  --  2.6  --  2.7* 2.4  --  2.3   Phosphorus mg/dL  --   --   --  4.4  --  3.3 3.7 3.9  --  4.7* 3.1  --  3.2       Drug levels (last 3 results):  No results for input(s): "VANCOMYCINRA", "VANCORANDOM", "VANCOMYCINPE", "VANCOPEAK", "VANCOMYCINTR", "VANCOTROUGH" in the last 72 hours.    Microbiologic Results:  Microbiology Results (last 7 days)       Procedure Component Value Units Date/Time    Blood culture [[id number]]     Order Status: Sent Specimen: Blood     Blood culture [[id number]]     Order Status: Sent Specimen: Blood             "

## 2024-01-03 NOTE — NURSING
1/2/24 Asked for rewarming orders from eICU. Proceed with current standard of 0.3C/hr. Pt Currently 35.4    Pt not at goal temp of 37C with the slow rewarming from 35.4. Currently 36.7C.  Artic Sun automatic conversion to maintenance mode.   Spoke with DENISE Miranda, want to keep goal of 37C. Will reprogram to warm to goal of 37C.     0430 Pt's temp reached 36.9 before Artic Sun converted to maintenance mode again. Followed up with DENISE Miranda, ok to call rewarming complete.

## 2024-01-03 NOTE — SUBJECTIVE & OBJECTIVE
Interval History: Intubated and sedated    Review of Systems   Unable to perform ROS: Acuity of condition     Objective:     Vital Signs (Most Recent):  Temp: 97.3 °F (36.3 °C) (01/02/24 1815)  Pulse: 101 (01/02/24 1815)  Resp: (!) 25 (01/02/24 1815)  BP: 133/63 (01/02/24 1815)  SpO2: 95 % (01/02/24 1815) Vital Signs (24h Range):  Temp:  [96.1 °F (35.6 °C)-99.5 °F (37.5 °C)] 97.3 °F (36.3 °C)  Pulse:  [] 101  Resp:  [10-42] 25  SpO2:  [93 %-100 %] 95 %  BP: ()/() 133/63     Weight: 71 kg (156 lb 8.4 oz)  Body mass index is 20.65 kg/m².    Intake/Output Summary (Last 24 hours) at 1/2/2024 1824  Last data filed at 1/2/2024 1800  Gross per 24 hour   Intake 2499.81 ml   Output 2595 ml   Net -95.19 ml         Physical Exam  Vitals and nursing note reviewed.   Constitutional:       General: He is not in acute distress.     Appearance: He is not toxic-appearing.      Comments: Intubated and sedated   HENT:      Head: Normocephalic and atraumatic.      Mouth/Throat:      Mouth: Mucous membranes are moist.      Pharynx: No oropharyngeal exudate.   Eyes:      Pupils: Pupils are equal, round, and reactive to light.   Cardiovascular:      Rate and Rhythm: Normal rate.      Heart sounds: No murmur heard.     No friction rub. No gallop.   Pulmonary:      Comments: Intubated, on mechanical ventilation, sedated  Abdominal:      General: Bowel sounds are normal. There is no distension.      Palpations: Abdomen is soft.      Tenderness: There is no abdominal tenderness. There is no guarding or rebound.   Musculoskeletal:      Cervical back: No rigidity or tenderness.      Right lower leg: No edema.      Left lower leg: No edema.             Significant Labs: All pertinent labs within the past 24 hours have been reviewed.  CBC:   Recent Labs   Lab 01/01/24  1742 01/02/24  0416   WBC 7.98 13.12*   HGB 11.9* 12.4*   HCT 37.0* 36.9*    165     CMP:   Recent Labs   Lab 01/01/24  1742 01/01/24  2030 01/02/24  0026  01/02/24  0416 01/02/24  0807 01/02/24  1245 01/02/24  1600    140   < > 137 135* 136 138   K 4.0 5.7*   < > 4.9 5.7* 5.9* 5.7*    106   < > 107 106 102 102   CO2 14* 18*   < > 17* 18* 24 25   * 141*  138*   < > 120* 125* 118* 71   BUN 15 17   < > 20 17 16 15   CREATININE 1.3 1.1   < > 0.9 0.8 0.8 0.8   CALCIUM 8.7 8.4*   < > 8.2* 8.6* 8.4* 8.4*   PROT 6.3 7.5  --  6.8  --   --   --    ALBUMIN 3.6 4.2  --  3.7  --   --   --    BILITOT 0.9 0.9  --  0.8  --   --   --    ALKPHOS 66 77  --  52*  --   --   --    AST 38 144*  --  144*  --   --   --    ALT 39 82*  --  76*  --   --   --    ANIONGAP 19* 16   < > 13 11 10 11    < > = values in this interval not displayed.       Significant Imaging: I have reviewed all pertinent imaging results/findings within the past 24 hours.   No

## 2024-01-03 NOTE — ASSESSMENT & PLAN NOTE
Resolved or labs hemolysed . This patient has hyperkalemia which is controlled. We will monitor for arrhythmias with EKG or continuous telemetry. We will treat the hyperkalemia with Potassium Binders and Nebulized albuterol sulfate. The likely etiology of the hyperkalemia is  unknown .  The patients latest potassium has been reviewed and the results are listed below  Recent Labs   Lab 01/02/24  1600   K 5.7*       K elevated to 5.7-->5.9, Lokelma 5g x 1, insulin/dextrose given, repeat in evening

## 2024-01-03 NOTE — ASSESSMENT & PLAN NOTE
Patient with Hypoxic Respiratory failure which is Acute.  he is not on home oxygen. Supplemental oxygen was provided and noted- Vent Mode: A/C  Oxygen Concentration (%):  [21-85] 25  Resp Rate Total:  [20 br/min-41 br/min] 27 br/min  Vt Set:  [420 mL] 420 mL  PEEP/CPAP:  [5 cmH20] 5 cmH20  Mean Airway Pressure:  [5.2 auO90-86.7 cmH20] 13.4 cmH20    .   Signs/symptoms of respiratory failure include- tachypnea, increased work of breathing, and respiratory distress. Contributing diagnoses includes -  unkonwn  Labs and images were reviewed. Patient Has recent ABG, which has been reviewed. Will treat underlying causes and adjust management of respiratory failure as follows-     H/o severe asthma and COPD  Steroids, nebs, added ceftriaxone and azithromycin (CXR clear)

## 2024-01-03 NOTE — PLAN OF CARE
Problem: Adult Inpatient Plan of Care  Goal: Plan of Care Review  Outcome: Ongoing, Progressing  Goal: Patient-Specific Goal (Individualized)  Outcome: Ongoing, Progressing  Goal: Absence of Hospital-Acquired Illness or Injury  Outcome: Ongoing, Progressing  Goal: Optimal Comfort and Wellbeing  Outcome: Ongoing, Progressing  Goal: Readiness for Transition of Care  Outcome: Ongoing, Progressing     Problem: Adjustment to Illness (Targeted Temperature Management)  Goal: Optimal Response to Life-Threatening Event  Outcome: Ongoing, Progressing     Problem: Body Temperature Regulation (Targeted Temperature Management)  Goal: Target Body Temperature Maintained  Outcome: Ongoing, Progressing     Problem: Dysrhythmia (Targeted Temperature Management)  Goal: Stable Cardiac Rate and Rhythm  Outcome: Ongoing, Progressing     Problem: Hemodynamic Instability (Targeted Temperature Management)  Goal: Effective Tissue Perfusion  Outcome: Ongoing, Progressing     Problem: Infection (Targeted Temperature Management)  Goal: Absence of Infection Signs and Symptoms  Outcome: Ongoing, Progressing

## 2024-01-03 NOTE — EICU
Alerted by bedside regarding need to renew restraints for the patient. At risk of pulling out lines. Renewed

## 2024-01-03 NOTE — NURSING
Pt's aunt, Deisy, called requesting update, does not have password. Pt's mother at bedside stated ok to give update but not give out information about pt being on ventilator. Caller had hung up before able to give and update.   Discussed with pt's mother, over night will only update Deisy with information oked or callers with passcodes.

## 2024-01-03 NOTE — PROGRESS NOTES
Progress Note  [facility]    Attending: Any Shore MD   Admit Date: 1/1/2024  Today's Date: 01/03/2024    HPI  Per ER:   53-year-old M w/pmhx: asthma, was brought in by EMS as a cardiorespiratory arrest.  911 was called for shortness of breath, as patient was working in garden. When EMS arrived, the patient was slumped over face forward with a friend holding a non-rebreather mask over his face that was not hooked up to oxygen.  He had a Medrol Dosepak and an albuterol inhaler by his side.  EMS started giving the patient a nebulizer treatment and he became pulseless and apneic.  He was intubated with an ET tube and CPR was initiated. The patient regained pulses after CPR by the MANDI and parviz X3, shocked X1, unclear rhythm.    SUBJECTIVE:   OVERNIGHT    Patient was seen at bedside this morning. His ABG continued to improve. He was placed on an awaking trial this morning. He became agitated with blood pressures in the 230's/100s with HR in the 150's. He developed what appeared to be myoclonic jerking per nursing staff and other members of the pulmonary team. He was given a push of fentanyl and placed back on propofol drip.     Review of Systems   Unable to perform ROS: Intubated     OBJECTIVE:     Vital Signs Trends/Hx Reviewed  Vitals:    01/03/24 1245 01/03/24 1300 01/03/24 1307 01/03/24 1315   BP: 139/82  (!) 178/82 (!) 143/82   BP Location:       Patient Position:       Pulse: 105 (!) 114 (!) 111 108   Resp: 15 17 (!) 24 14   Temp: 99.7 °F (37.6 °C) 100 °F (37.8 °C) 100 °F (37.8 °C) 100 °F (37.8 °C)   TempSrc:  Core Bladder     SpO2: 98% 97% 98% 97%   Weight:       Height:           Vent Mode: A/CMV-PC  Oxygen Concentration (%):  [21-25] 21  Resp Rate Total:  [23 br/min-35 br/min] 24 br/min  Vt Set:  [420 mL] 420 mL  PEEP/CPAP:  [5 cmH20] 5 cmH20  Mean Airway Pressure:  [6 cmH20-15 cmH20] 6.9 cmH20        Physical Exam  Constitutional:       Comments: RASS -5   HENT:       Head: Normocephalic and atraumatic.   Cardiovascular:      Rate and Rhythm: Normal rate and regular rhythm.      Pulses: Normal pulses.      Heart sounds: Normal heart sounds. No murmur heard.  Pulmonary:      Breath sounds: Wheezing present.      Comments: Intubated  Abdominal:      General: Abdomen is flat. There is no distension.      Palpations: Abdomen is soft.   Musculoskeletal:      Right lower leg: No edema.      Left lower leg: No edema.   Neurological:      Comments: RASS -5         Laboratory:  Recent Labs   Lab 01/01/24  1742 01/01/24  2030 01/02/24  0026 01/02/24  0416 01/02/24  0807 01/02/24  2358 01/03/24  0419 01/03/24  0749   WBC 7.98  --   --  13.12*  --   --  15.43*  --    HGB 11.9*  --   --  12.4*  --   --  11.4*  --    HCT 37.0*  --   --  36.9*  --   --  32.9*  --      --   --  165  --   --  173  --     140   < > 137   < > 136 135* 137   K 4.0 5.7*   < > 4.9   < > 4.8 4.5 4.9    106   < > 107   < > 102 103 103   CREATININE 1.3 1.1   < > 0.9   < > 0.7 0.7 0.8   BUN 15 17   < > 20   < > 19 18 21*   CO2 14* 18*   < > 17*   < > 23 24 25   ALT 39 82*  --  76*  --   --  65*  --    AST 38 144*  --  144*  --   --  130*  --     < > = values in this interval not displayed.       Recent Labs   Lab 01/03/24  0452   PH 7.378   PCO2 45.0*   PO2 67.2*   HCO3 26.5   POCSATURATED 94.1*     Chest Imaging:   Endotracheal tube tip 3.8 cm from the michael.  Satisfactory position of enteric tube tip below the left hemidiaphragm.  No acute intrathoracic process    ASSESSMENT & RECOMMENDATIONS     Neuro/Psych  Intubated  Sedated w/propofol. Off of fentanyl for now.   RASS -5  Goal compliant w/vent  No known issues prior to admission    PLAN:  Wean sedation when able     CV  #Post-arrest  Reportedly epi x3, shock x1 (unk rhythm)  CPK 4800  Trop 0.446  Tachycardic EKG w/depressions in inferior leads  TTM    PLAN:  Trend trop, ck, ekg    #Labile bp  Not on pressors, anti-htn  Required Cardene yesterday  PM  POCUS good squeeze, no clear pathology  Formal echo shows EF 65% normal noble function    PLAN:  Monitor  Cards following  - PRN Hydralazine placed for SBP > 180 mmHg.     Pulm  #Respiratory Failure  #Asthma exacerbation  Vent settings as above  Most recent ABG 6.92/76/216/16 BD -17 last night...   this AM: 7.27/56/92/26 BD -1.7  CXR unremarkable  Still wheezing on exam    PLAN:  Wean vent settings as tolerated and SBT once appropriate  Budesonide neb 1mg bid, Ipratropium 0.5mg neb q6  Solumedrol 60mg bid  Montelukast 10mg qd  Azithro 500mg qd, Rocephin 2g qd    FEN/GI  F euvolemic  E Na+ 137, K+ 4.9, Mg++ 2.7, Phos 3.7  N tube feeds    #Hyperkalemia   Orginally 5.9, shifted with Insulin  4.5 today.    PLAN:  CTM    GI  Prophylaxis: protonix     RENAL  UOP: 1,135mL , In: 1620 cc, net +485cc in last 24 hrs  Up 738.7 since admit    BUN/Cr: 16/0., baseline wnl  Continue to monitor renal status and urine output     Heme  H/H 12.4; stable  WBC 13  DVT prophylaxis: Lovenox    Endo  Glu 112, stable     PLAN:  SSI    ID  Patient had a fever 1/3/24 and was placed on Vancomycin and Zosyn.  Blood cultures were ordered.    Dispo  54 y/o M w/hx of asthma/COPD (no PFT on file) s/p cardiac arrest, ROSC after epi 3x, shock 1x, likely from hypercapneic respiratory failure. Initially CV unstable with labile bp and tachycardia. Now stable, not requiring pressor or anti-htn. Gases improving. Good liver function. Producing urine, no Cr elevation, but hyperkalemic. Shifted, monitoring. Attempt to wean vent as able. Continue ICU care    F NPO  A fent  S prop  T lovenox  H 30  U protonix  G 120  S attempt  B monitor  I ETT, OG, Sage, PIV  D Started on vancomycin and zosyn 1/3/24    Rolo Segovia M.D.  [facility] Internal Medicine, PGY-1   01/03/2024.

## 2024-01-03 NOTE — PROGRESS NOTES
"[facility]  Intensive Care  Adult Nutrition  Progress Note    SUMMARY       Recommendations    Recommendation:  1. Increase TF of Impact Peptide as tolerated to goal rate of 50ml/hr which would provide 1980 kcal, 124 g protein, & 1016ml free water.     2. Monitor weight/labs.     3. RD to continue to follow to monitor nutrition status    Goals:  TF to meet at least 85% EEN/EPN by RD follow up  Nutrition Goal Status: new  Communication of RD Recs: reviewed with RN (Halina)    Assessment and Plan  Nutrition Problem  Inadequate energy intake    Related to (etiology):   intubation    Signs and Symptoms (as evidenced by):   NPO, trickle TF     Interventions:  Collaboration with other providers  Modify rate of enteral nutrition    Nutrition Diagnosis Status:   New      Malnutrition Assessment  Unable to assess NFPE at this time     Reason for Assessment  Reason For Assessment: new tube feeding  Diagnosis:  (acute resp failure)  Relevant Medical History: bronchitis, neck surgery  General Information Comments: Pt intubated on vent. NG tube. Receiving TF of Impact Peptide 1.5 at 10ml/hr. Isai 11-skin intact. NO recent weight loss noted. Unable to assess NFPE at this time.   Nutrition Discharge Planning: d/c needs to be determined    Nutrition Risk Screen  Nutrition Risk Screen: tube feeding or parenteral nutrition    Nutrition/Diet History  Food Preferences: unable to assess  Factors Affecting Nutritional Intake: NPO, on mechanical ventilation    Anthropometrics  Temp: 99.7 °F (37.6 °C)  Height Method: Estimated  Height: 6' 1" (185.4 cm)  Height (inches): 73 in  Weight Method: Bed Scale  Weight: 72 kg (158 lb 11.7 oz)  Weight (lb): 158.73 lb  Ideal Body Weight (IBW), Male: 184 lb  % Ideal Body Weight, Male (lb): 86.27 %  BMI (Calculated): 20.9  BMI Grade: 18.5-24.9 - normal  Usual Body Weight (UBW), k.3 kg ()  % Usual Body Weight: 104.11  % Weight Change From Usual Weight: 3.9 %     Lab/Procedures/Meds  Pertinent " Labs Reviewed: reviewed  Pertinent Labs Comments: Na 135L, Ca 8.2L, Alb 3.1L  Pertinent Medications Reviewed: reviewed  Pertinent Medications Comments: azithromycin, rocephin, pantoprazole, fentanyl    Estimated/Assessed Needs  Weight Used For Calorie Calculations: 72 kg (158 lb 11.7 oz)  Energy Calorie Requirements (kcal): 1955  Energy Need Method: [facility]  Protein Requirements: 108g (1.5g/kg)  Weight Used For Protein Calculations: 72 kg (158 lb 11.7 oz)  Estimated Fluid Requirement Method: RDA Method  RDA Method (mL): 1955     Nutrition Prescription Ordered  Current Diet Order: NPO  Current Nutrition Support Formula Ordered: Impact Peptide 1.5  Current Nutrition Support Rate Ordered: 10 (ml)  Current Nutrition Support Frequency Ordered: ml/hr    Evaluation of Received Nutrient/Fluid Intake  Enteral Calories (kcal): 360  Enteral Protein (gm): 22  Enteral (Free Water) Fluid (mL): 184  % Kcal Needs: 18  % Protein Needs: 20  I/O: 3868/3767  Energy Calories Required: not meeting needs  Protein Required: not meeting needs  Fluid Required: not meeting needs  Comments: LBM 1/1  % Intake of Estimated Energy Needs: 0 - 25 %  % Meal Intake: NPO    Nutrition Risk  Level of Risk/Frequency of Follow-up:  (2xweekly)     Monitor and Evaluation  Food and Nutrient Intake: energy intake  Food and Nutrient Adminstration: diet order, enteral and parenteral nutrition administration  Physical Activity and Function: nutrition-related ADLs and IADLs  Anthropometric Measurements: weight  Biochemical Data, Medical Tests and Procedures: electrolyte and renal panel  Nutrition-Focused Physical Findings: overall appearance     Nutrition Follow-Up  RD Follow-up?: Yes

## 2024-01-03 NOTE — NURSING
Sedation paused for SAT. BP and HR elevated patient thrashing. Critical care MD at the bedside sedation restarted per MD order.

## 2024-01-04 PROBLEM — G40.901 SEIZURE DISORDER, NONCONVULSIVE, WITH STATUS EPILEPTICUS: Status: ACTIVE | Noted: 2024-01-04

## 2024-01-04 LAB
ALBUMIN SERPL BCP-MCNC: 3.1 G/DL (ref 3.5–5.2)
ALLENS TEST: ABNORMAL
ALP SERPL-CCNC: 52 U/L (ref 55–135)
ALT SERPL W/O P-5'-P-CCNC: 75 U/L (ref 10–44)
ANION GAP SERPL CALC-SCNC: 11 MMOL/L (ref 8–16)
AST SERPL-CCNC: 146 U/L (ref 10–40)
BASOPHILS # BLD AUTO: 0.02 K/UL (ref 0–0.2)
BASOPHILS NFR BLD: 0.1 % (ref 0–1.9)
BILIRUB SERPL-MCNC: 1.3 MG/DL (ref 0.1–1)
BUN SERPL-MCNC: 24 MG/DL (ref 6–20)
CALCIUM SERPL-MCNC: 8.8 MG/DL (ref 8.7–10.5)
CHLORIDE SERPL-SCNC: 101 MMOL/L (ref 95–110)
CK SERPL-CCNC: 2899 U/L (ref 20–200)
CO2 SERPL-SCNC: 25 MMOL/L (ref 23–29)
CREAT SERPL-MCNC: 0.9 MG/DL (ref 0.5–1.4)
DELSYS: ABNORMAL
DIFFERENTIAL METHOD BLD: ABNORMAL
EOSINOPHIL # BLD AUTO: 0 K/UL (ref 0–0.5)
EOSINOPHIL NFR BLD: 0 % (ref 0–8)
ERYTHROCYTE [DISTWIDTH] IN BLOOD BY AUTOMATED COUNT: 12.5 % (ref 11.5–14.5)
ERYTHROCYTE [SEDIMENTATION RATE] IN BLOOD BY WESTERGREN METHOD: 18 MM/H
EST. GFR  (NO RACE VARIABLE): >60 ML/MIN/1.73 M^2
FIO2: 21
GLUCOSE SERPL-MCNC: 118 MG/DL (ref 70–110)
HCO3 UR-SCNC: 27.1 MMOL/L (ref 24–28)
HCT VFR BLD AUTO: 33.4 % (ref 40–54)
HGB BLD-MCNC: 11.4 G/DL (ref 14–18)
IMM GRANULOCYTES # BLD AUTO: 0.14 K/UL (ref 0–0.04)
IMM GRANULOCYTES NFR BLD AUTO: 0.7 % (ref 0–0.5)
IP: 5
IT: 1
LYMPHOCYTES # BLD AUTO: 0.5 K/UL (ref 1–4.8)
LYMPHOCYTES NFR BLD: 2.8 % (ref 18–48)
MAGNESIUM SERPL-MCNC: 2.3 MG/DL (ref 1.6–2.6)
MCH RBC QN AUTO: 30.4 PG (ref 27–31)
MCHC RBC AUTO-ENTMCNC: 34.1 G/DL (ref 32–36)
MCV RBC AUTO: 89 FL (ref 82–98)
MODE: ABNORMAL
MONOCYTES # BLD AUTO: 1 K/UL (ref 0.3–1)
MONOCYTES NFR BLD: 5.1 % (ref 4–15)
NEUTROPHILS # BLD AUTO: 17.6 K/UL (ref 1.8–7.7)
NEUTROPHILS NFR BLD: 91.3 % (ref 38–73)
NRBC BLD-RTO: 0 /100 WBC
PCO2 BLDA: 39.3 MMHG (ref 35–45)
PEEP: 5
PH SMN: 7.45 [PH] (ref 7.35–7.45)
PHOSPHATE SERPL-MCNC: 3.5 MG/DL (ref 2.7–4.5)
PLATELET # BLD AUTO: 181 K/UL (ref 150–450)
PMV BLD AUTO: 9.1 FL (ref 9.2–12.9)
PO2 BLDA: 53 MMHG (ref 80–100)
POC BE: 3 MMOL/L
POC SATURATED O2: 89 % (ref 95–100)
POC TCO2: 28 MMOL/L (ref 23–27)
POCT GLUCOSE: 116 MG/DL (ref 70–110)
POCT GLUCOSE: 116 MG/DL (ref 70–110)
POCT GLUCOSE: 226 MG/DL (ref 70–110)
POTASSIUM SERPL-SCNC: 4.6 MMOL/L (ref 3.5–5.1)
PROT SERPL-MCNC: 6.1 G/DL (ref 6–8.4)
RBC # BLD AUTO: 3.75 M/UL (ref 4.6–6.2)
SAMPLE: ABNORMAL
SITE: ABNORMAL
SODIUM SERPL-SCNC: 137 MMOL/L (ref 136–145)
WBC # BLD AUTO: 19.22 K/UL (ref 3.9–12.7)

## 2024-01-04 PROCEDURE — 63600175 PHARM REV CODE 636 W HCPCS

## 2024-01-04 PROCEDURE — 27200966 HC CLOSED SUCTION SYSTEM

## 2024-01-04 PROCEDURE — 63600175 PHARM REV CODE 636 W HCPCS: Performed by: STUDENT IN AN ORGANIZED HEALTH CARE EDUCATION/TRAINING PROGRAM

## 2024-01-04 PROCEDURE — 94003 VENT MGMT INPAT SUBQ DAY: CPT

## 2024-01-04 PROCEDURE — 82550 ASSAY OF CK (CPK): CPT | Performed by: STUDENT IN AN ORGANIZED HEALTH CARE EDUCATION/TRAINING PROGRAM

## 2024-01-04 PROCEDURE — 85025 COMPLETE CBC W/AUTO DIFF WBC: CPT

## 2024-01-04 PROCEDURE — 25000003 PHARM REV CODE 250: Performed by: STUDENT IN AN ORGANIZED HEALTH CARE EDUCATION/TRAINING PROGRAM

## 2024-01-04 PROCEDURE — 95700 EEG CONT REC W/VID EEG TECH: CPT

## 2024-01-04 PROCEDURE — 83735 ASSAY OF MAGNESIUM: CPT | Performed by: STUDENT IN AN ORGANIZED HEALTH CARE EDUCATION/TRAINING PROGRAM

## 2024-01-04 PROCEDURE — A4216 STERILE WATER/SALINE, 10 ML: HCPCS | Performed by: INTERNAL MEDICINE

## 2024-01-04 PROCEDURE — 25000003 PHARM REV CODE 250

## 2024-01-04 PROCEDURE — 63600175 PHARM REV CODE 636 W HCPCS: Performed by: INTERNAL MEDICINE

## 2024-01-04 PROCEDURE — 95720 EEG PHY/QHP EA INCR W/VEEG: CPT | Mod: ,,, | Performed by: STUDENT IN AN ORGANIZED HEALTH CARE EDUCATION/TRAINING PROGRAM

## 2024-01-04 PROCEDURE — 27100171 HC OXYGEN HIGH FLOW UP TO 24 HOURS

## 2024-01-04 PROCEDURE — 84100 ASSAY OF PHOSPHORUS: CPT | Performed by: STUDENT IN AN ORGANIZED HEALTH CARE EDUCATION/TRAINING PROGRAM

## 2024-01-04 PROCEDURE — 99900035 HC TECH TIME PER 15 MIN (STAT)

## 2024-01-04 PROCEDURE — 80053 COMPREHEN METABOLIC PANEL: CPT | Performed by: FAMILY MEDICINE

## 2024-01-04 PROCEDURE — 82803 BLOOD GASES ANY COMBINATION: CPT

## 2024-01-04 PROCEDURE — 36600 WITHDRAWAL OF ARTERIAL BLOOD: CPT

## 2024-01-04 PROCEDURE — C9113 INJ PANTOPRAZOLE SODIUM, VIA: HCPCS

## 2024-01-04 PROCEDURE — 95714 VEEG EA 12-26 HR UNMNTR: CPT

## 2024-01-04 PROCEDURE — 94761 N-INVAS EAR/PLS OXIMETRY MLT: CPT | Mod: XB

## 2024-01-04 PROCEDURE — 20000000 HC ICU ROOM

## 2024-01-04 PROCEDURE — 25000242 PHARM REV CODE 250 ALT 637 W/ HCPCS: Performed by: STUDENT IN AN ORGANIZED HEALTH CARE EDUCATION/TRAINING PROGRAM

## 2024-01-04 PROCEDURE — 94640 AIRWAY INHALATION TREATMENT: CPT

## 2024-01-04 PROCEDURE — 95720 EEG PHY/QHP EA INCR W/VEEG: CPT | Mod: ,,, | Performed by: PSYCHIATRY & NEUROLOGY

## 2024-01-04 PROCEDURE — 25000003 PHARM REV CODE 250: Performed by: INTERNAL MEDICINE

## 2024-01-04 RX ORDER — LORAZEPAM 2 MG/ML
4 INJECTION INTRAMUSCULAR ONCE
Status: DISCONTINUED | OUTPATIENT
Start: 2024-01-04 | End: 2024-01-04

## 2024-01-04 RX ORDER — LEVETIRACETAM 500 MG/5ML
1500 INJECTION, SOLUTION, CONCENTRATE INTRAVENOUS EVERY 12 HOURS
Status: DISCONTINUED | OUTPATIENT
Start: 2024-01-05 | End: 2024-01-12 | Stop reason: HOSPADM

## 2024-01-04 RX ORDER — DEXMEDETOMIDINE HYDROCHLORIDE 4 UG/ML
0-1.4 INJECTION, SOLUTION INTRAVENOUS CONTINUOUS
Status: DISCONTINUED | OUTPATIENT
Start: 2024-01-04 | End: 2024-01-04

## 2024-01-04 RX ORDER — LORAZEPAM 2 MG/ML
2 INJECTION INTRAMUSCULAR
Status: DISCONTINUED | OUTPATIENT
Start: 2024-01-04 | End: 2024-01-05

## 2024-01-04 RX ORDER — PREDNISONE 20 MG/1
40 TABLET ORAL DAILY
Status: DISCONTINUED | OUTPATIENT
Start: 2024-01-05 | End: 2024-01-06

## 2024-01-04 RX ORDER — LORAZEPAM 2 MG/ML
INJECTION INTRAMUSCULAR
Status: COMPLETED
Start: 2024-01-04 | End: 2024-01-04

## 2024-01-04 RX ORDER — PROPOFOL 10 MG/ML
0-80 INJECTION, EMULSION INTRAVENOUS CONTINUOUS
Status: DISCONTINUED | OUTPATIENT
Start: 2024-01-04 | End: 2024-01-05

## 2024-01-04 RX ORDER — FUROSEMIDE 10 MG/ML
40 INJECTION INTRAMUSCULAR; INTRAVENOUS ONCE
Status: DISCONTINUED | OUTPATIENT
Start: 2024-01-04 | End: 2024-01-04

## 2024-01-04 RX ORDER — LEVETIRACETAM 500 MG/5ML
2000 INJECTION, SOLUTION, CONCENTRATE INTRAVENOUS EVERY 12 HOURS
Status: DISCONTINUED | OUTPATIENT
Start: 2024-01-05 | End: 2024-01-04

## 2024-01-04 RX ADMIN — INSULIN ASPART 2 UNITS: 100 INJECTION, SOLUTION INTRAVENOUS; SUBCUTANEOUS at 07:01

## 2024-01-04 RX ADMIN — LORAZEPAM 2 MG: 2 INJECTION INTRAMUSCULAR; INTRAVENOUS at 04:01

## 2024-01-04 RX ADMIN — MINERAL OIL, WHITE PETROLATUM: .03; .94 OINTMENT OPHTHALMIC at 09:01

## 2024-01-04 RX ADMIN — SODIUM CHLORIDE, PRESERVATIVE FREE 10 ML: 5 INJECTION INTRAVENOUS at 05:01

## 2024-01-04 RX ADMIN — SODIUM CHLORIDE, POTASSIUM CHLORIDE, SODIUM LACTATE AND CALCIUM CHLORIDE: 600; 310; 30; 20 INJECTION, SOLUTION INTRAVENOUS at 08:01

## 2024-01-04 RX ADMIN — LABETALOL HYDROCHLORIDE 5 MG: 5 INJECTION INTRAVENOUS at 12:01

## 2024-01-04 RX ADMIN — PIPERACILLIN AND TAZOBACTAM 4.5 G: 4; .5 INJECTION, POWDER, LYOPHILIZED, FOR SOLUTION INTRAVENOUS; PARENTERAL at 01:01

## 2024-01-04 RX ADMIN — HYDRALAZINE HYDROCHLORIDE 10 MG: 20 INJECTION, SOLUTION INTRAMUSCULAR; INTRAVENOUS at 02:01

## 2024-01-04 RX ADMIN — METHYLPREDNISOLONE SODIUM SUCCINATE 60 MG: 40 INJECTION, POWDER, FOR SOLUTION INTRAMUSCULAR; INTRAVENOUS at 06:01

## 2024-01-04 RX ADMIN — PROPOFOL 60 MCG/KG/MIN: 10 INJECTION, EMULSION INTRAVENOUS at 09:01

## 2024-01-04 RX ADMIN — ACETAMINOPHEN 1000 MG: 500 TABLET ORAL at 05:01

## 2024-01-04 RX ADMIN — SODIUM CHLORIDE, POTASSIUM CHLORIDE, SODIUM LACTATE AND CALCIUM CHLORIDE: 600; 310; 30; 20 INJECTION, SOLUTION INTRAVENOUS at 06:01

## 2024-01-04 RX ADMIN — LORAZEPAM 2 MG: 2 INJECTION INTRAMUSCULAR; INTRAVENOUS at 08:01

## 2024-01-04 RX ADMIN — VANCOMYCIN HYDROCHLORIDE 1250 MG: 1.25 INJECTION, POWDER, LYOPHILIZED, FOR SOLUTION INTRAVENOUS at 05:01

## 2024-01-04 RX ADMIN — MINERAL OIL, WHITE PETROLATUM: .03; .94 OINTMENT OPHTHALMIC at 02:01

## 2024-01-04 RX ADMIN — FENTANYL CITRATE 25 MCG/HR: 50 INJECTION, SOLUTION INTRAMUSCULAR; INTRAVENOUS at 03:01

## 2024-01-04 RX ADMIN — IPRATROPIUM BROMIDE AND ALBUTEROL SULFATE 3 ML: 2.5; .5 SOLUTION RESPIRATORY (INHALATION) at 01:01

## 2024-01-04 RX ADMIN — ENOXAPARIN SODIUM 40 MG: 40 INJECTION SUBCUTANEOUS at 04:01

## 2024-01-04 RX ADMIN — IPRATROPIUM BROMIDE AND ALBUTEROL SULFATE 3 ML: 2.5; .5 SOLUTION RESPIRATORY (INHALATION) at 07:01

## 2024-01-04 RX ADMIN — PIPERACILLIN AND TAZOBACTAM 4.5 G: 4; .5 INJECTION, POWDER, LYOPHILIZED, FOR SOLUTION INTRAVENOUS; PARENTERAL at 05:01

## 2024-01-04 RX ADMIN — LEVETIRACETAM 4000 MG: 500 INJECTION, SOLUTION INTRAVENOUS at 06:01

## 2024-01-04 RX ADMIN — PROPOFOL 60 MCG/KG/MIN: 10 INJECTION, EMULSION INTRAVENOUS at 12:01

## 2024-01-04 RX ADMIN — DEXMEDETOMIDINE HYDROCHLORIDE 0.5 MCG/KG/HR: 4 INJECTION, SOLUTION INTRAVENOUS at 12:01

## 2024-01-04 RX ADMIN — MUPIROCIN: 20 OINTMENT TOPICAL at 09:01

## 2024-01-04 RX ADMIN — MONTELUKAST 10 MG: 10 TABLET, FILM COATED ORAL at 08:01

## 2024-01-04 RX ADMIN — PIPERACILLIN AND TAZOBACTAM 4.5 G: 4; .5 INJECTION, POWDER, LYOPHILIZED, FOR SOLUTION INTRAVENOUS; PARENTERAL at 08:01

## 2024-01-04 RX ADMIN — SODIUM CHLORIDE, PRESERVATIVE FREE 10 ML: 5 INJECTION INTRAVENOUS at 11:01

## 2024-01-04 RX ADMIN — MUPIROCIN: 20 OINTMENT TOPICAL at 08:01

## 2024-01-04 RX ADMIN — PANTOPRAZOLE SODIUM 40 MG: 40 INJECTION, POWDER, FOR SOLUTION INTRAVENOUS at 08:01

## 2024-01-04 RX ADMIN — PROPOFOL 60 MCG/KG/MIN: 10 INJECTION, EMULSION INTRAVENOUS at 04:01

## 2024-01-04 RX ADMIN — PROPOFOL 75 MCG/KG/MIN: 10 INJECTION, EMULSION INTRAVENOUS at 09:01

## 2024-01-04 RX ADMIN — MINERAL OIL, WHITE PETROLATUM: .03; .94 OINTMENT OPHTHALMIC at 05:01

## 2024-01-04 RX ADMIN — ACETAMINOPHEN 1000 MG: 500 TABLET ORAL at 11:01

## 2024-01-04 NOTE — PROGRESS NOTES
Progress Note  [facility]    Attending: Any Shore MD   Admit Date: 1/1/2024  Today's Date: 01/04/2024    HPI  Per ER:   53-year-old M w/pmhx: asthma, was brought in by EMS as a cardiorespiratory arrest.  911 was called for shortness of breath, as patient was working in [employer]. When EMS arrived, the patient was slumped over face forward with a friend holding a non-rebreather mask over his face that was not hooked up to oxygen.  He had a Medrol Dosepak and an albuterol inhaler by his side.  EMS started giving the patient a nebulizer treatment and he became pulseless and apneic.  He was intubated with an ET tube and CPR was initiated. The patient regained pulses after CPR by the MANDI and parviz X3, shocked X1, unclear rhythm.    SUBJECTIVE:   OVERNIGHT    Patient was seen at bedside this morning. His ABG continued to improve. He was off of the fentanyl overnight until around 3:00 when he had another event of agitation with jerking with blood pressure and heart rate. He was placed back on fentanyl and his agitation improved.      Review of Systems   Unable to perform ROS: Intubated     OBJECTIVE:     Vital Signs Trends/Hx Reviewed  Vitals:    01/04/24 1245 01/04/24 1300 01/04/24 1304 01/04/24 1321   BP: (!) 190/101 (!) 208/98 (!) 167/93 (!) 168/94   BP Location:       Patient Position:       Pulse: 109 102 91 96   Resp: (!) 28 (!) 25 19 (!) 30   Temp: 99.7 °F (37.6 °C) 99.7 °F (37.6 °C) 99.7 °F (37.6 °C) 99.5 °F (37.5 °C)   TempSrc:  Core Bladder     SpO2: 98% 95% 98% 100%   Weight:       Height:           Vent Mode: SPONT-PS  Oxygen Concentration (%):  [] 21  Resp Rate Total:  [17 br/min-33 br/min] 33 br/min  PEEP/CPAP:  [4.7 cmH20-7.5 cmH20] 5 cmH20  Pressure Support:  [5 cmH20] 5 cmH20  Mean Airway Pressure:  [6.1 cmH20-8 cmH20] 8 cmH20      Physical Exam  Constitutional:       Comments: RASS -5   HENT:      Head: Normocephalic and atraumatic.   Cardiovascular:       Rate and Rhythm: Normal rate and regular rhythm.      Pulses: Normal pulses.      Heart sounds: Normal heart sounds. No murmur heard.  Pulmonary:      Comments: Intubated. Clear mechanical breath sounds.  Abdominal:      General: Abdomen is flat. There is no distension.      Palpations: Abdomen is soft.   Musculoskeletal:      Right lower leg: No edema.      Left lower leg: No edema.   Neurological:      Comments: RASS -5         Laboratory:  Recent Labs   Lab 01/02/24  0416 01/02/24  0807 01/03/24  0419 01/03/24  0749 01/03/24  1729 01/04/24  0329   WBC 13.12*  --  15.43*  --   --  19.22*   HGB 12.4*  --  11.4*  --   --  11.4*   HCT 36.9*  --  32.9*  --   --  33.4*     --  173  --   --  181      < > 135* 137 137 137   K 4.9   < > 4.5 4.9 4.9 4.6      < > 103 103 100 101   CREATININE 0.9   < > 0.7 0.8 0.8 0.9   BUN 20   < > 18 21* 21* 24*   CO2 17*   < > 24 25 26 25   ALT 76*  --  65*  --   --  75*   *  --  130*  --   --  146*    < > = values in this interval not displayed.       Recent Labs   Lab 01/04/24  0446   PH 7.446   PCO2 39.3   PO2 53*   HCO3 27.1   POCSATURATED 89   BE 3*     Chest Imaging:   Endotracheal tube tip 3.8 cm from the michael.  Satisfactory position of enteric tube tip below the left hemidiaphragm.  No acute intrathoracic process    ASSESSMENT & RECOMMENDATIONS     Neuro/Psych  Intubated  Sedated w/Fentanyl and placed with Precedex.   RASS -5.  Goal compliant w/vent.  No known issues prior to admission.  Continues to get agitated requiring re-initiation of fentanyl.   PLAN:  Wean sedation when able  Tomorrow is 72 hours of TTM and will have MRI likely.  EEG ordered    CV  #Post-arrest  Reportedly epi x3, shock x1 (unk rhythm)  CPK 4800  Trop 0.446  Tachycardic EKG w/depressions in inferior leads  TTM    PLAN:  Trend trop, ck, ekg    #Labile bp  Not on pressors  Required Cardene yesterday PM  POCUS good squeeze, no clear pathology  Formal echo shows EF 65% normal noble  function  Had one dose of labetalol this afternoon due to SBP of 190.     PLAN:  Monitor  Cards following  - PRN Hydralazine placed for SBP > 180 mmHg.   - PRN Labetalol placed for SBP > 180 mmHg.    Pulm  #Respiratory Failure  #Asthma exacerbation  Vent settings as above  Most recent ABG 6.92/76/216/16 BD -17 last night...   this AM: 7.27/56/92/26 BD -1.7  CXR unremarkable  Still wheezing on exam    PLAN:  Wean vent settings as tolerated and SBT once appropriate   Duo-neb q6  Prednisone 50 mg  Montelukast 10mg qd    FEN/GI  F euvolemic  E Na+ 137, K+ 4.6, Mg++ 2.7, Phos 3.5  N tube feeds    #Hyperkalemia   Orginally 5.9, shifted with Insulin  4.6 today.    PLAN:  CTM    GI  Prophylaxis: protonix     RENAL  UOP: 2415mL , In: 879 cc, net -1535 cc in last 24 hrs  Down -95 ml since admission.    #rhabdomyolysis  Down trending CK with most recent CK of 2800, will continue 100 ml/hr LR until CK under 1000.    BUN/Cr: 24/0.9., baseline wnl  Continue to monitor renal status and urine output     Heme  H/H 11.4; stable  WBC 19.2, on steroids and being treated with broad spectrum antibiotics a this time.  DVT prophylaxis: Lovenox    Endo  Glu 118, stable     PLAN:  SSI    ID  Patient had a fever 1/3/24 and was placed on Vancomycin and Zosyn.  Tylenol 1000 mg Q6H for fevers  Blood cultures were ordered.    Dispo  52 y/o M w/hx of asthma/COPD (no PFT on file) s/p cardiac arrest, ROSC after epi 3x, shock 1x, likely from hypercapneic respiratory failure. Initially CV unstable with labile bp and tachycardia. Now stable, not requiring pressor or anti-htn. Gases improving. Good liver function. Producing urine, no Cr elevation, but hyperkalemic. Shifted, monitoring. Attempt to wean vent as able. Neuroprognostication tomorrow with MRI, EEG today. Patient continues to be agitated. Continue ICU care    F Tube Feeds  A Fent  S precedex  T lovenox  H 30  U protonix  G 120  S attempt  B monitor  I ETT, OG, Sage, PIV - Sgae out tomorrow  with q6 bladder scans and condom cath  D Started on vancomycin and zosyn 1/3/24    Rolo Segovia M.D.  [facility] Internal Medicine, PGY-1   01/04/2024.

## 2024-01-04 NOTE — ASSESSMENT & PLAN NOTE
Patient with Hypoxic Respiratory failure which is Acute.  he is not on home oxygen. Supplemental oxygen was provided and noted- Vent Mode: A/CMV-PC  Oxygen Concentration (%):  [21-25] 21  Resp Rate Total:  [20 br/min-35 br/min] 24 br/min  Vt Set:  [420 mL] 420 mL  PEEP/CPAP:  [4.7 cmH20-5 cmH20] 5 cmH20  Mean Airway Pressure:  [6 cmH20-15 cmH20] 6.1 cmH20    .   Signs/symptoms of respiratory failure include- tachypnea, increased work of breathing, and respiratory distress. Contributing diagnoses includes -  unkonwn  Labs and images were reviewed. Patient Has recent ABG, which has been reviewed. Will treat underlying causes and adjust management of respiratory failure as follows-     H/o severe asthma and COPD  Steroids, nebs, added ceftriaxone and azithromycin (CXR clear)

## 2024-01-04 NOTE — PLAN OF CARE
Pt remained in bed, plan of care reviewed with mother and other family, verbalized understanding. NSR/ST on telemetry, BP WDL. On ventilator, settings as charted by RT. Sedated with Propofol. LRmaintained at 100ml/hr. Trickle feeds maintained at 10ml/hr. Turned in bed Q2 . Temperature maintained at 37C.

## 2024-01-04 NOTE — PROCEDURES
EEG prelim  16:31 p.m.-16:55 p.m.    Background:  Continuous rhythmic spike wave discharges at approximately 2.5-3 hz.     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  Initial findings and recommendations including lorazepam and levetiracetam discussed with the primary team.    Please hit the EEG button with any clinical activity concerning for seizures and describe what you see.    Full report after the completion of the study.    Shante Trejo MD PhD [facility]  Neurology-Epilepsy  [facility].

## 2024-01-04 NOTE — NURSING
Confirm with DENISE Amaya X-ray wanted to NG tube confirmation. Asked to change order. Modified per Rad Tech.

## 2024-01-04 NOTE — ASSESSMENT & PLAN NOTE
S/p CPR with ROSC now on TTM, now rewarming  Plan for repeat imaging on Friday for neuroprognostication

## 2024-01-04 NOTE — SUBJECTIVE & OBJECTIVE
Interval History: Rewarming, still not purposeful movement. EEG performed  This pattern meets criteria for nonconvulsive status epilepticus.  Initial findings and recommendations including lorazepam and levetiracetam discussed with the primary team.     Review of Systems   Unable to perform ROS: Acuity of condition     Objective:     Vital Signs (Most Recent):  Temp: 99.1 °F (37.3 °C) (01/04/24 1745)  Pulse: 86 (01/04/24 1745)  Resp: (!) 21 (01/04/24 1745)  BP: 116/67 (01/04/24 1745)  SpO2: 99 % (01/04/24 1745) Vital Signs (24h Range):  Temp:  [97.7 °F (36.5 °C)-100.2 °F (37.9 °C)] 99.1 °F (37.3 °C)  Pulse:  [] 86  Resp:  [11-45] 21  SpO2:  [93 %-100 %] 99 %  BP: (103-208)/() 116/67     Weight: 72.1 kg (158 lb 15.2 oz)  Body mass index is 20.97 kg/m².    Intake/Output Summary (Last 24 hours) at 1/4/2024 1757  Last data filed at 1/4/2024 1700  Gross per 24 hour   Intake 3660.18 ml   Output 4390 ml   Net -729.82 ml         Physical Exam  Vitals and nursing note reviewed.   Constitutional:       General: He is not in acute distress.     Appearance: He is not toxic-appearing.      Comments: Intubated and sedated   HENT:      Head: Normocephalic and atraumatic.      Mouth/Throat:      Mouth: Mucous membranes are moist.      Pharynx: No oropharyngeal exudate.   Eyes:      Pupils: Pupils are equal, round, and reactive to light.   Cardiovascular:      Rate and Rhythm: Normal rate.      Heart sounds: No murmur heard.     No friction rub. No gallop.   Pulmonary:      Comments: Intubated, on mechanical ventilation, sedated  Abdominal:      General: Bowel sounds are normal. There is no distension.      Palpations: Abdomen is soft.      Tenderness: There is no abdominal tenderness. There is no guarding or rebound.   Musculoskeletal:      Cervical back: No rigidity or tenderness.      Right lower leg: No edema.      Left lower leg: No edema.             Significant Labs: All pertinent labs within the past 24 hours  have been reviewed.  BMP:   Recent Labs   Lab 01/04/24  0329   *      K 4.6      CO2 25   BUN 24*   CREATININE 0.9   CALCIUM 8.8   MG 2.3     CBC:   Recent Labs   Lab 01/03/24  0419 01/04/24  0329   WBC 15.43* 19.22*   HGB 11.4* 11.4*   HCT 32.9* 33.4*    181       Significant Imaging: I have reviewed all pertinent imaging results/findings within the past 24 hours.

## 2024-01-04 NOTE — EICU
eICU Intevention    Received request for restraint renewal, confirmation of PICC line placement as well as review of feeding tube placement as this is reportedly coiled as per head CT report. RN reports of being able to aspirate gastric contents and previous CXR shows tip on feeding tube below the diaphragm.    Patient seen on camera and is at risk of self-harm due to pulling at lines and tubes while intubated.  Patient remains critical ill. Restraints renewed. Bedside team to reassess when restraints can be safely removed.  Tip pf PICC line seen in the SVC and confirmed OK to use  Ordered abdominal xray to verify feeding tube placement  Discussed with bedside RN

## 2024-01-04 NOTE — EICU
eICU Intevention    Notified of changes in neuro exam  Head CT done approximately 7 hours prior without significant changes  On propofol 60 due to shivering    Will continue to monitor for now given recent imaging without concerning findings  Titrate down propofol as tolerated  Discussed with bedside RN

## 2024-01-04 NOTE — EICU
eICU Intevention    ABG 7.45/39/53  On pressure AC iP 5, rate of 18, FiO2 21% and 5 PEEP    Increased FiO2 to 35%, discussed with RT  CXR ordered for the morning

## 2024-01-04 NOTE — SUBJECTIVE & OBJECTIVE
Interval History: Niece at bedside. Sedation was held, no purposeful movement noted.    Review of Systems   Unable to perform ROS: Acuity of condition     Objective:     Vital Signs (Most Recent):  Temp: 98.6 °F (37 °C) (01/03/24 2130)  Pulse: 100 (01/03/24 2130)  Resp: 14 (01/03/24 2130)  BP: (!) 107/57 (01/03/24 2130)  SpO2: 97 % (01/03/24 2130) Vital Signs (24h Range):  Temp:  [95.5 °F (35.3 °C)-100.4 °F (38 °C)] 98.6 °F (37 °C)  Pulse:  [] 100  Resp:  [11-61] 14  SpO2:  [93 %-100 %] 97 %  BP: ()/() 107/57     Weight: 72 kg (158 lb 11.7 oz)  Body mass index is 20.94 kg/m².    Intake/Output Summary (Last 24 hours) at 1/3/2024 2144  Last data filed at 1/3/2024 2100  Gross per 24 hour   Intake 4291.24 ml   Output 3457 ml   Net 834.24 ml         Physical Exam  Vitals and nursing note reviewed.   Constitutional:       General: He is not in acute distress.     Appearance: He is not toxic-appearing.      Comments: Intubated and sedated   HENT:      Head: Normocephalic and atraumatic.      Mouth/Throat:      Mouth: Mucous membranes are moist.      Pharynx: No oropharyngeal exudate.   Eyes:      Pupils: Pupils are equal, round, and reactive to light.   Cardiovascular:      Rate and Rhythm: Normal rate.      Heart sounds: No murmur heard.     No friction rub. No gallop.   Pulmonary:      Comments: Intubated, on mechanical ventilation, sedated  Abdominal:      General: Bowel sounds are normal. There is no distension.      Palpations: Abdomen is soft.      Tenderness: There is no abdominal tenderness. There is no guarding or rebound.   Musculoskeletal:      Cervical back: No rigidity or tenderness.      Right lower leg: No edema.      Left lower leg: No edema.             Significant Labs: All pertinent labs within the past 24 hours have been reviewed.  CBC:   Recent Labs   Lab 01/02/24  0416 01/03/24  0419   WBC 13.12* 15.43*   HGB 12.4* 11.4*   HCT 36.9* 32.9*    173     CMP:   Recent Labs   Lab  01/02/24  0416 01/02/24  0807 01/03/24  0419 01/03/24  0749 01/03/24  1729      < > 135* 137 137   K 4.9   < > 4.5 4.9 4.9      < > 103 103 100   CO2 17*   < > 24 25 26   *   < > 100 112* 91   BUN 20   < > 18 21* 21*   CREATININE 0.9   < > 0.7 0.8 0.8   CALCIUM 8.2*   < > 8.2* 8.7 9.0   PROT 6.8  --  5.9*  --   --    ALBUMIN 3.7  --  3.1*  --   --    BILITOT 0.8  --  1.1*  --   --    ALKPHOS 52*  --  45*  --   --    *  --  130*  --   --    ALT 76*  --  65*  --   --    ANIONGAP 13   < > 8 9 11    < > = values in this interval not displayed.       Significant Imaging: I have reviewed all pertinent imaging results/findings within the past 24 hours.

## 2024-01-04 NOTE — PROGRESS NOTES
[facility] Medicine  Progress Note    Patient Name: Aaron Pruitt  MRN: [id number]  Patient Class: IP- Inpatient   Admission Date: 1/1/2024  Length of Stay: 2 days  Attending Physician: Yamila Cazares MD  Primary Care Provider: Gogo Vivar NP        Subjective:     Principal Problem:Acute respiratory failure with hypoxia and hypercarbia        HPI:  52 YO M with PMHx significant for  COPD/Asthma presented to the ER intubated on the field for acute respiratory failure post cardiac arrest s/p resuscitation and shock x1. Per chart review and   family at bedside reports he was shoveling some dirt in his yard when he got severely short of breath and suddenly had difficulty breathing and neighbors called EMS. When EMS arrived, reportedly still had a pulse but was struggling to breath, became pulseless. He was given narcan x2 without improvement. CPR and Epi x3. ROSC after 3rd round. Also reportedly get shocked x1. He was transferred to the ER and sedated. Labs in the ER with elevated lactic acid, elevated troponin, , elevated CPK 3294, elevated AST//82, drug screen with presumptive THC. CT head no acute intracranial abnormality, CT chest no acute pulmonary process.    Overview/Hospital Course:  1/3/2024 on TTM on fentanyl and propofol. K elevated to 5.7-->5.9, Lokelma 5g x 1, insulin/dextrose given  1/4/2024 Rewarming initiated. Sedation held, myoclonus noted. Restarted on propofol.       Interval History: Niece at bedside. Sedation was held, no purposeful movement noted.    Review of Systems   Unable to perform ROS: Acuity of condition     Objective:     Vital Signs (Most Recent):  Temp: 98.6 °F (37 °C) (01/03/24 2130)  Pulse: 100 (01/03/24 2130)  Resp: 14 (01/03/24 2130)  BP: (!) 107/57 (01/03/24 2130)  SpO2: 97 % (01/03/24 2130) Vital Signs (24h Range):  Temp:  [95.5 °F (35.3 °C)-100.4 °F (38 °C)] 98.6 °F (37 °C)  Pulse:  [] 100  Resp:  [11-61] 14  SpO2:  [93 %-100 %] 97  %  BP: ()/() 107/57     Weight: 72 kg (158 lb 11.7 oz)  Body mass index is 20.94 kg/m².    Intake/Output Summary (Last 24 hours) at 1/3/2024 2144  Last data filed at 1/3/2024 2100  Gross per 24 hour   Intake 4291.24 ml   Output 3457 ml   Net 834.24 ml         Physical Exam  Vitals and nursing note reviewed.   Constitutional:       General: He is not in acute distress.     Appearance: He is not toxic-appearing.      Comments: Intubated and sedated   HENT:      Head: Normocephalic and atraumatic.      Mouth/Throat:      Mouth: Mucous membranes are moist.      Pharynx: No oropharyngeal exudate.   Eyes:      Pupils: Pupils are equal, round, and reactive to light.   Cardiovascular:      Rate and Rhythm: Normal rate.      Heart sounds: No murmur heard.     No friction rub. No gallop.   Pulmonary:      Comments: Intubated, on mechanical ventilation, sedated  Abdominal:      General: Bowel sounds are normal. There is no distension.      Palpations: Abdomen is soft.      Tenderness: There is no abdominal tenderness. There is no guarding or rebound.   Musculoskeletal:      Cervical back: No rigidity or tenderness.      Right lower leg: No edema.      Left lower leg: No edema.             Significant Labs: All pertinent labs within the past 24 hours have been reviewed.  CBC:   Recent Labs   Lab 01/02/24  0416 01/03/24  0419   WBC 13.12* 15.43*   HGB 12.4* 11.4*   HCT 36.9* 32.9*    173     CMP:   Recent Labs   Lab 01/02/24  0416 01/02/24  0807 01/03/24  0419 01/03/24  0749 01/03/24  1729      < > 135* 137 137   K 4.9   < > 4.5 4.9 4.9      < > 103 103 100   CO2 17*   < > 24 25 26   *   < > 100 112* 91   BUN 20   < > 18 21* 21*   CREATININE 0.9   < > 0.7 0.8 0.8   CALCIUM 8.2*   < > 8.2* 8.7 9.0   PROT 6.8  --  5.9*  --   --    ALBUMIN 3.7  --  3.1*  --   --    BILITOT 0.8  --  1.1*  --   --    ALKPHOS 52*  --  45*  --   --    *  --  130*  --   --    ALT 76*  --  65*  --   --     ANIONGAP 13   < > 8 9 11    < > = values in this interval not displayed.       Significant Imaging: I have reviewed all pertinent imaging results/findings within the past 24 hours.    Assessment/Plan:      * Acute respiratory failure with hypoxia and hypercarbia  Patient with Hypoxic Respiratory failure which is Acute.  he is not on home oxygen. Supplemental oxygen was provided and noted- Vent Mode: A/CMV-PC  Oxygen Concentration (%):  [21-25] 21  Resp Rate Total:  [20 br/min-35 br/min] 24 br/min  Vt Set:  [420 mL] 420 mL  PEEP/CPAP:  [4.7 cmH20-5 cmH20] 5 cmH20  Mean Airway Pressure:  [6 cmH20-15 cmH20] 6.1 cmH20    .   Signs/symptoms of respiratory failure include- tachypnea, increased work of breathing, and respiratory distress. Contributing diagnoses includes -  unkonwn  Labs and images were reviewed. Patient Has recent ABG, which has been reviewed. Will treat underlying causes and adjust management of respiratory failure as follows-     H/o severe asthma and COPD  Steroids, nebs, added ceftriaxone and azithromycin (CXR clear)    Hyperkalemia  Resolved or labs hemolysed . This patient has hyperkalemia which is controlled. We will monitor for arrhythmias with EKG or continuous telemetry. We will treat the hyperkalemia with Potassium Binders and Nebulized albuterol sulfate. The likely etiology of the hyperkalemia is  unknown .  The patients latest potassium has been reviewed and the results are listed below  Recent Labs   Lab 01/02/24  1600   K 5.7*       K elevated to 5.7-->5.9, Lokelma 5g x 1, insulin/dextrose given, repeat in evening        Non-traumatic rhabdomyolysis  Vs traumatic post cardiac resuscitation  Monitor CPK and cont hydration with NS at 100/hr      Cardiac arrest  S/p CPR with ROSC now on TTM, now rewarming  Plan for repeat imaging on Friday for neuroprognostication          VTE Risk Mitigation (From admission, onward)           Ordered     enoxaparin injection 40 mg  Every 24 hours          01/01/24 1855     Apply sequential compression device to lower extremities (if no contraindications). Medical venous thromboembolus prophylaxis is preferred.  Until discontinued         01/01/24 1928                    Discharge Planning   MARQUISE:      Code Status: Full Code   Is the patient medically ready for discharge?:     Reason for patient still in hospital (select all that apply): Patient trending condition, Laboratory test, and Treatment  Discharge Plan A: Home            Critical care time spent on the evaluation and treatment of severe organ dysfunction, review of pertinent labs and imaging studies, discussions with consulting providers and discussions with patient/family: 32 minutes.      Yamila Cazares MD  Department of Hospital Medicine   [place] - Intensive Care

## 2024-01-04 NOTE — PROCEDURES
"Aaron Pruitt is a 53 y.o. male patient.    Temp: 99.1 °F (37.3 °C) (01/03/24 1830)  Pulse: 103 (01/03/24 1830)  Resp: 11 (01/03/24 1830)  BP: (!) 157/85 (01/03/24 1830)  SpO2: (!) 93 % (01/03/24 1830)  Weight: 72 kg (158 lb 11.7 oz) (01/03/24 0915)  Height: 6' 1" (185.4 cm) (01/03/24 0915)    PICC  Date/Time: 1/3/2024 6:45 PM  Performed by: Guanako Carrasquillo RN  Consent Done: Yes  Time out: Immediately prior to procedure a time out was called to verify the correct patient, procedure, equipment, support staff and site/side marked as required  Indications: med administration  Anesthesia: local infiltration  Local anesthetic: lidocaine 1% without epinephrine  Anesthetic Total (mL): 1  Preparation: skin prepped with ChloraPrep  Skin prep agent dried: skin prep agent completely dried prior to procedure  Sterile barriers: all five maximum sterile barriers used - cap, mask, sterile gown, sterile gloves, and large sterile sheet  Hand hygiene: hand hygiene performed prior to central venous catheter insertion  Location details: right basilic  Catheter type: triple lumen  Catheter size: 5 Fr  Catheter Length: 36cm    Ultrasound guidance: yes  Vessel Caliber: large and patent, compressibility normal  Needle advanced into vessel with real time Ultrasound guidance.  Guidewire confirmed in vessel.  Sterile sheath used.  Number of attempts: 1  Post-procedure: blood return through all ports, chlorhexidine patch and sterile dressing applied  Estimated blood loss (mL): 0            Name Guanako Carrasquillo   1/3/2024    "

## 2024-01-04 NOTE — NURSING
Pt with change in pupillometry at 0300. Started to shake and temperature rise. eICU contacted. See note from DENISE Amaya.

## 2024-01-04 NOTE — EICU
Intervention Initiated From:  Bedside    Adriane intervened regarding:  Time-Out    Comments:   Time out performed for R UA PICC placed by Guanako Carrasquillo PICC RN. Sterility maintained throughout procedure and pt ruby well w/ no adverse events noted. Chest xray ordered post procedure for placement verification.

## 2024-01-05 LAB
ALBUMIN SERPL BCP-MCNC: 2.7 G/DL (ref 3.5–5.2)
ALLENS TEST: ABNORMAL
ALP SERPL-CCNC: 44 U/L (ref 55–135)
ALT SERPL W/O P-5'-P-CCNC: 60 U/L (ref 10–44)
ANION GAP SERPL CALC-SCNC: 6 MMOL/L (ref 8–16)
AST SERPL-CCNC: 96 U/L (ref 10–40)
BASOPHILS # BLD AUTO: 0.01 K/UL (ref 0–0.2)
BASOPHILS NFR BLD: 0.1 % (ref 0–1.9)
BILIRUB SERPL-MCNC: 1 MG/DL (ref 0.1–1)
BUN SERPL-MCNC: 22 MG/DL (ref 6–20)
CALCIUM SERPL-MCNC: 8.5 MG/DL (ref 8.7–10.5)
CHLORIDE SERPL-SCNC: 106 MMOL/L (ref 95–110)
CK SERPL-CCNC: 1430 U/L (ref 20–200)
CO2 SERPL-SCNC: 28 MMOL/L (ref 23–29)
CREAT SERPL-MCNC: 0.9 MG/DL (ref 0.5–1.4)
DIFFERENTIAL METHOD BLD: ABNORMAL
EOSINOPHIL # BLD AUTO: 0 K/UL (ref 0–0.5)
EOSINOPHIL NFR BLD: 0 % (ref 0–8)
ERYTHROCYTE [DISTWIDTH] IN BLOOD BY AUTOMATED COUNT: 12.7 % (ref 11.5–14.5)
EST. GFR  (NO RACE VARIABLE): >60 ML/MIN/1.73 M^2
FIO2: 21 %
GLUCOSE SERPL-MCNC: 102 MG/DL (ref 70–110)
HCT VFR BLD AUTO: 29.9 % (ref 40–54)
HGB BLD-MCNC: 10.3 G/DL (ref 14–18)
IMM GRANULOCYTES # BLD AUTO: 0.04 K/UL (ref 0–0.04)
IMM GRANULOCYTES NFR BLD AUTO: 0.3 % (ref 0–0.5)
LYMPHOCYTES # BLD AUTO: 1.2 K/UL (ref 1–4.8)
LYMPHOCYTES NFR BLD: 10.2 % (ref 18–48)
MAGNESIUM SERPL-MCNC: 2.1 MG/DL (ref 1.6–2.6)
MCH RBC QN AUTO: 30.5 PG (ref 27–31)
MCHC RBC AUTO-ENTMCNC: 34.4 G/DL (ref 32–36)
MCV RBC AUTO: 89 FL (ref 82–98)
MONOCYTES # BLD AUTO: 0.8 K/UL (ref 0.3–1)
MONOCYTES NFR BLD: 7 % (ref 4–15)
NEUTROPHILS # BLD AUTO: 9.6 K/UL (ref 1.8–7.7)
NEUTROPHILS NFR BLD: 82.4 % (ref 38–73)
NRBC BLD-RTO: 0 /100 WBC
PCO2 BLDA: 41.3 MMHG (ref 35–45)
PH SMN: 7.46 [PH] (ref 7.35–7.45)
PLATELET # BLD AUTO: 158 K/UL (ref 150–450)
PMV BLD AUTO: 9.1 FL (ref 9.2–12.9)
PO2 BLDA: 69.5 MMHG (ref 80–100)
POC BASE DEFICIT: 5.2 MMOL/L (ref -2–2)
POC HCO3: 29.5 MMOL/L (ref 24–28)
POC PERFORMED BY: ABNORMAL
POC SATURATED O2: 94.7 % (ref 95–100)
POCT GLUCOSE: 108 MG/DL (ref 70–110)
POCT GLUCOSE: 98 MG/DL (ref 70–110)
POTASSIUM SERPL-SCNC: 3.6 MMOL/L (ref 3.5–5.1)
PROT SERPL-MCNC: 5.5 G/DL (ref 6–8.4)
RBC # BLD AUTO: 3.38 M/UL (ref 4.6–6.2)
SODIUM SERPL-SCNC: 140 MMOL/L (ref 136–145)
SPECIMEN SOURCE: ABNORMAL
TRIGL SERPL-MCNC: 168 MG/DL (ref 30–150)
VANCOMYCIN TROUGH SERPL-MCNC: 12.9 UG/ML (ref 10–22)
WBC # BLD AUTO: 11.6 K/UL (ref 3.9–12.7)

## 2024-01-05 PROCEDURE — 36600 WITHDRAWAL OF ARTERIAL BLOOD: CPT

## 2024-01-05 PROCEDURE — 25000003 PHARM REV CODE 250

## 2024-01-05 PROCEDURE — 80053 COMPREHEN METABOLIC PANEL: CPT | Performed by: INTERNAL MEDICINE

## 2024-01-05 PROCEDURE — 95720 EEG PHY/QHP EA INCR W/VEEG: CPT | Mod: ,,, | Performed by: PSYCHIATRY & NEUROLOGY

## 2024-01-05 PROCEDURE — 84478 ASSAY OF TRIGLYCERIDES: CPT

## 2024-01-05 PROCEDURE — 85025 COMPLETE CBC W/AUTO DIFF WBC: CPT

## 2024-01-05 PROCEDURE — 95714 VEEG EA 12-26 HR UNMNTR: CPT

## 2024-01-05 PROCEDURE — 63600175 PHARM REV CODE 636 W HCPCS: Performed by: STUDENT IN AN ORGANIZED HEALTH CARE EDUCATION/TRAINING PROGRAM

## 2024-01-05 PROCEDURE — C9113 INJ PANTOPRAZOLE SODIUM, VIA: HCPCS

## 2024-01-05 PROCEDURE — 25000003 PHARM REV CODE 250: Performed by: STUDENT IN AN ORGANIZED HEALTH CARE EDUCATION/TRAINING PROGRAM

## 2024-01-05 PROCEDURE — 63600175 PHARM REV CODE 636 W HCPCS

## 2024-01-05 PROCEDURE — 94003 VENT MGMT INPAT SUBQ DAY: CPT

## 2024-01-05 PROCEDURE — C9254 INJECTION, LACOSAMIDE: HCPCS

## 2024-01-05 PROCEDURE — 83735 ASSAY OF MAGNESIUM: CPT | Performed by: INTERNAL MEDICINE

## 2024-01-05 PROCEDURE — 25000242 PHARM REV CODE 250 ALT 637 W/ HCPCS: Performed by: STUDENT IN AN ORGANIZED HEALTH CARE EDUCATION/TRAINING PROGRAM

## 2024-01-05 PROCEDURE — 94640 AIRWAY INHALATION TREATMENT: CPT

## 2024-01-05 PROCEDURE — 82550 ASSAY OF CK (CPK): CPT

## 2024-01-05 PROCEDURE — 94761 N-INVAS EAR/PLS OXIMETRY MLT: CPT

## 2024-01-05 PROCEDURE — 82803 BLOOD GASES ANY COMBINATION: CPT

## 2024-01-05 PROCEDURE — 27100171 HC OXYGEN HIGH FLOW UP TO 24 HOURS

## 2024-01-05 PROCEDURE — 27200966 HC CLOSED SUCTION SYSTEM

## 2024-01-05 PROCEDURE — 80202 ASSAY OF VANCOMYCIN: CPT | Performed by: INTERNAL MEDICINE

## 2024-01-05 PROCEDURE — 25000003 PHARM REV CODE 250: Performed by: FAMILY MEDICINE

## 2024-01-05 PROCEDURE — 25000003 PHARM REV CODE 250: Performed by: INTERNAL MEDICINE

## 2024-01-05 PROCEDURE — 99900035 HC TECH TIME PER 15 MIN (STAT)

## 2024-01-05 PROCEDURE — 63600175 PHARM REV CODE 636 W HCPCS: Performed by: INTERNAL MEDICINE

## 2024-01-05 PROCEDURE — 20000000 HC ICU ROOM

## 2024-01-05 PROCEDURE — A4216 STERILE WATER/SALINE, 10 ML: HCPCS | Performed by: INTERNAL MEDICINE

## 2024-01-05 RX ORDER — ACETAMINOPHEN 500 MG
1000 TABLET ORAL EVERY 6 HOURS
Status: DISCONTINUED | OUTPATIENT
Start: 2024-01-05 | End: 2024-01-06

## 2024-01-05 RX ORDER — POTASSIUM CHLORIDE 20 MEQ/1
20 TABLET, EXTENDED RELEASE ORAL
Status: DISCONTINUED | OUTPATIENT
Start: 2024-01-05 | End: 2024-01-05

## 2024-01-05 RX ORDER — PROPOFOL 10 MG/ML
0-80 INJECTION, EMULSION INTRAVENOUS CONTINUOUS
Status: DISCONTINUED | OUTPATIENT
Start: 2024-01-05 | End: 2024-01-05

## 2024-01-05 RX ORDER — LORAZEPAM 2 MG/ML
INJECTION INTRAMUSCULAR
Status: COMPLETED
Start: 2024-01-05 | End: 2024-01-05

## 2024-01-05 RX ORDER — LORAZEPAM 2 MG/ML
4 INJECTION INTRAMUSCULAR
Status: DISCONTINUED | OUTPATIENT
Start: 2024-01-05 | End: 2024-01-06

## 2024-01-05 RX ADMIN — SODIUM CHLORIDE, POTASSIUM CHLORIDE, SODIUM LACTATE AND CALCIUM CHLORIDE: 600; 310; 30; 20 INJECTION, SOLUTION INTRAVENOUS at 04:01

## 2024-01-05 RX ADMIN — LORAZEPAM 2 MG: 2 INJECTION INTRAMUSCULAR; INTRAVENOUS at 08:01

## 2024-01-05 RX ADMIN — LEVETIRACETAM 1500 MG: 500 INJECTION, SOLUTION INTRAVENOUS at 08:01

## 2024-01-05 RX ADMIN — VANCOMYCIN HYDROCHLORIDE 1250 MG: 1.25 INJECTION, POWDER, LYOPHILIZED, FOR SOLUTION INTRAVENOUS at 05:01

## 2024-01-05 RX ADMIN — ACETAMINOPHEN 1000 MG: 500 TABLET ORAL at 05:01

## 2024-01-05 RX ADMIN — LORAZEPAM 2 MG: 2 INJECTION INTRAMUSCULAR at 08:01

## 2024-01-05 RX ADMIN — PIPERACILLIN AND TAZOBACTAM 4.5 G: 4; .5 INJECTION, POWDER, LYOPHILIZED, FOR SOLUTION INTRAVENOUS; PARENTERAL at 05:01

## 2024-01-05 RX ADMIN — PROPOFOL 100 MCG/KG/MIN: 10 INJECTION, EMULSION INTRAVENOUS at 10:01

## 2024-01-05 RX ADMIN — MINERAL OIL, WHITE PETROLATUM: .03; .94 OINTMENT OPHTHALMIC at 05:01

## 2024-01-05 RX ADMIN — VANCOMYCIN HYDROCHLORIDE 1250 MG: 1.25 INJECTION, POWDER, LYOPHILIZED, FOR SOLUTION INTRAVENOUS at 04:01

## 2024-01-05 RX ADMIN — LACOSAMIDE 800 MG: 10 INJECTION, SOLUTION INTRAVENOUS at 10:01

## 2024-01-05 RX ADMIN — LORAZEPAM 4 MG: 2 INJECTION INTRAMUSCULAR at 09:01

## 2024-01-05 RX ADMIN — POTASSIUM BICARBONATE 20 MEQ: 391 TABLET, EFFERVESCENT ORAL at 02:01

## 2024-01-05 RX ADMIN — IPRATROPIUM BROMIDE AND ALBUTEROL SULFATE 3 ML: 2.5; .5 SOLUTION RESPIRATORY (INHALATION) at 07:01

## 2024-01-05 RX ADMIN — MUPIROCIN: 20 OINTMENT TOPICAL at 09:01

## 2024-01-05 RX ADMIN — PANTOPRAZOLE SODIUM 40 MG: 40 INJECTION, POWDER, FOR SOLUTION INTRAVENOUS at 08:01

## 2024-01-05 RX ADMIN — POTASSIUM BICARBONATE 20 MEQ: 391 TABLET, EFFERVESCENT ORAL at 11:01

## 2024-01-05 RX ADMIN — LORAZEPAM 4 MG: 2 INJECTION INTRAMUSCULAR; INTRAVENOUS at 08:01

## 2024-01-05 RX ADMIN — PROPOFOL 75 MCG/KG/MIN: 10 INJECTION, EMULSION INTRAVENOUS at 08:01

## 2024-01-05 RX ADMIN — MONTELUKAST 10 MG: 10 TABLET, FILM COATED ORAL at 08:01

## 2024-01-05 RX ADMIN — MUPIROCIN: 20 OINTMENT TOPICAL at 08:01

## 2024-01-05 RX ADMIN — PROPOFOL 100 MCG/KG/MIN: 10 INJECTION, EMULSION INTRAVENOUS at 08:01

## 2024-01-05 RX ADMIN — PREDNISONE 40 MG: 20 TABLET ORAL at 08:01

## 2024-01-05 RX ADMIN — MINERAL OIL, WHITE PETROLATUM: .03; .94 OINTMENT OPHTHALMIC at 09:01

## 2024-01-05 RX ADMIN — IPRATROPIUM BROMIDE AND ALBUTEROL SULFATE 3 ML: 2.5; .5 SOLUTION RESPIRATORY (INHALATION) at 12:01

## 2024-01-05 RX ADMIN — PROPOFOL 100 MCG/KG/MIN: 10 INJECTION, EMULSION INTRAVENOUS at 11:01

## 2024-01-05 RX ADMIN — ENOXAPARIN SODIUM 40 MG: 40 INJECTION SUBCUTANEOUS at 05:01

## 2024-01-05 RX ADMIN — LACOSAMIDE 200 MG: 10 INJECTION, SOLUTION INTRAVENOUS at 10:01

## 2024-01-05 RX ADMIN — PIPERACILLIN AND TAZOBACTAM 4.5 G: 4; .5 INJECTION, POWDER, LYOPHILIZED, FOR SOLUTION INTRAVENOUS; PARENTERAL at 08:01

## 2024-01-05 RX ADMIN — SODIUM CHLORIDE, PRESERVATIVE FREE 10 ML: 5 INJECTION INTRAVENOUS at 06:01

## 2024-01-05 RX ADMIN — PROPOFOL 75 MCG/KG/MIN: 10 INJECTION, EMULSION INTRAVENOUS at 01:01

## 2024-01-05 RX ADMIN — PROPOFOL 75 MCG/KG/MIN: 10 INJECTION, EMULSION INTRAVENOUS at 04:01

## 2024-01-05 RX ADMIN — ACETAMINOPHEN 1000 MG: 500 TABLET ORAL at 02:01

## 2024-01-05 RX ADMIN — LORAZEPAM 4 MG: 2 INJECTION INTRAMUSCULAR at 08:01

## 2024-01-05 RX ADMIN — LEVETIRACETAM 1500 MG: 500 INJECTION, SOLUTION INTRAVENOUS at 09:01

## 2024-01-05 RX ADMIN — PIPERACILLIN AND TAZOBACTAM 4.5 G: 4; .5 INJECTION, POWDER, LYOPHILIZED, FOR SOLUTION INTRAVENOUS; PARENTERAL at 12:01

## 2024-01-05 RX ADMIN — SODIUM CHLORIDE, PRESERVATIVE FREE 10 ML: 5 INJECTION INTRAVENOUS at 12:01

## 2024-01-05 RX ADMIN — PROPOFOL 100 MCG/KG/MIN: 10 INJECTION, EMULSION INTRAVENOUS at 02:01

## 2024-01-05 RX ADMIN — PROPOFOL 100 MCG/KG/MIN: 10 INJECTION, EMULSION INTRAVENOUS at 05:01

## 2024-01-05 NOTE — NURSING
Notified Dr. Field that patient has been putting out 10 ml/hr of urine the past few hrs. No new orders at this time. Wants to review morning labs.

## 2024-01-05 NOTE — PROGRESS NOTES
EEG results discussed with critical care team. Verbal orders given for Ativan 2mg IVP and to restart propofol at 50mcg/kg/min.

## 2024-01-05 NOTE — NURSING
MD at bedside. Verbal order to titrate propofol up to 100mcg/kg/min. Titration made on pump. With continue to monitor.

## 2024-01-05 NOTE — PROGRESS NOTES
[facility] Medicine  Progress Note    Patient Name: Aaron Pruitt  MRN: [id number]  Patient Class: IP- Inpatient   Admission Date: 1/1/2024  Length of Stay: 3 days  Attending Physician: Yamila Cazares MD  Primary Care Provider: Gogo Vivar NP        Subjective:     Principal Problem:Acute respiratory failure with hypoxia and hypercarbia        HPI:  54 YO M with PMHx significant for  COPD/Asthma presented to the ER intubated on the field for acute respiratory failure post cardiac arrest s/p resuscitation and shock x1. Per chart review and   family at bedside reports he was shoveling some dirt in his yard when he got severely short of breath and suddenly had difficulty breathing and neighbors called EMS. When EMS arrived, reportedly still had a pulse but was struggling to breath, became pulseless. He was given narcan x2 without improvement. CPR and Epi x3. ROSC after 3rd round. Also reportedly get shocked x1. He was transferred to the ER and sedated. Labs in the ER with elevated lactic acid, elevated troponin, , elevated CPK 3294, elevated AST//82, drug screen with presumptive THC. CT head no acute intracranial abnormality, CT chest no acute pulmonary process.    Overview/Hospital Course:  1/3/2024 on TTM on fentanyl and propofol. K elevated to 5.7-->5.9, Lokelma 5g x 1, insulin/dextrose given  1/4/2024 Rewarming initiated. Sedation held, myoclonus noted. Restarted on propofol. EEG shows possible NCSE activity, started on AEDs      Interval History: Rewarming, still not purposeful movement. EEG performed  This pattern meets criteria for nonconvulsive status epilepticus.  Initial findings and recommendations including lorazepam and levetiracetam discussed with the primary team.     Review of Systems   Unable to perform ROS: Acuity of condition     Objective:     Vital Signs (Most Recent):  Temp: 99.1 °F (37.3 °C) (01/04/24 1745)  Pulse: 86 (01/04/24 1745)  Resp: (!) 21 (01/04/24  1745)  BP: 116/67 (01/04/24 1745)  SpO2: 99 % (01/04/24 1745) Vital Signs (24h Range):  Temp:  [97.7 °F (36.5 °C)-100.2 °F (37.9 °C)] 99.1 °F (37.3 °C)  Pulse:  [] 86  Resp:  [11-45] 21  SpO2:  [93 %-100 %] 99 %  BP: (103-208)/() 116/67     Weight: 72.1 kg (158 lb 15.2 oz)  Body mass index is 20.97 kg/m².    Intake/Output Summary (Last 24 hours) at 1/4/2024 1757  Last data filed at 1/4/2024 1700  Gross per 24 hour   Intake 3660.18 ml   Output 4390 ml   Net -729.82 ml         Physical Exam  Vitals and nursing note reviewed.   Constitutional:       General: He is not in acute distress.     Appearance: He is not toxic-appearing.      Comments: Intubated and sedated   HENT:      Head: Normocephalic and atraumatic.      Mouth/Throat:      Mouth: Mucous membranes are moist.      Pharynx: No oropharyngeal exudate.   Eyes:      Pupils: Pupils are equal, round, and reactive to light.   Cardiovascular:      Rate and Rhythm: Normal rate.      Heart sounds: No murmur heard.     No friction rub. No gallop.   Pulmonary:      Comments: Intubated, on mechanical ventilation, sedated  Abdominal:      General: Bowel sounds are normal. There is no distension.      Palpations: Abdomen is soft.      Tenderness: There is no abdominal tenderness. There is no guarding or rebound.   Musculoskeletal:      Cervical back: No rigidity or tenderness.      Right lower leg: No edema.      Left lower leg: No edema.             Significant Labs: All pertinent labs within the past 24 hours have been reviewed.  BMP:   Recent Labs   Lab 01/04/24  0329   *      K 4.6      CO2 25   BUN 24*   CREATININE 0.9   CALCIUM 8.8   MG 2.3     CBC:   Recent Labs   Lab 01/03/24  0419 01/04/24  0329   WBC 15.43* 19.22*   HGB 11.4* 11.4*   HCT 32.9* 33.4*    181       Significant Imaging: I have reviewed all pertinent imaging results/findings within the past 24 hours.    Assessment/Plan:      * Acute respiratory failure with  hypoxia and hypercarbia  Patient with Hypoxic Respiratory failure which is Acute.  he is not on home oxygen. Supplemental oxygen was provided and noted- Vent Mode: SPONT-PS  Oxygen Concentration (%):  [] 21  Resp Rate Total:  [14 br/min-33 br/min] 21 br/min  PEEP/CPAP:  [4.7 cmH20-7.5 cmH20] 5 cmH20  Pressure Support:  [5 cmH20] 5 cmH20  Mean Airway Pressure:  [8 cmH20] 8 cmH20    .   Signs/symptoms of respiratory failure include- tachypnea, increased work of breathing, and respiratory distress. Contributing diagnoses includes -  unkonwn  Labs and images were reviewed. Patient Has recent ABG, which has been reviewed. Will treat underlying causes and adjust management of respiratory failure as follows-     H/o severe asthma and COPD  Steroids, nebs, added ceftriaxone and azithromycin (CXR clear)    Seizure disorder, nonconvulsive, with status epilepticus  EEG  This pattern meets criteria for nonconvulsive status epilepticus.  Initial findings and recommendations including lorazepam and levetiracetam discussed with the primary team.     Follow up Neurology recommendations    Hyperkalemia  Resolved or labs hemolysed . This patient has hyperkalemia which is controlled. We will monitor for arrhythmias with EKG or continuous telemetry. We will treat the hyperkalemia with Potassium Binders and Nebulized albuterol sulfate. The likely etiology of the hyperkalemia is  unknown .  The patients latest potassium has been reviewed and the results are listed below  Recent Labs   Lab 01/02/24  1600   K 5.7*       K elevated to 5.7-->5.9, Lokelma 5g x 1, insulin/dextrose given, repeat in evening        Non-traumatic rhabdomyolysis  Vs traumatic post cardiac resuscitation  Monitor CPK and cont hydration with NS at 100/hr      Cardiac arrest  S/p CPR with ROSC now on TTM, now rewarming  Plan for repeat imaging on Friday for neuroprognostication          VTE Risk Mitigation (From admission, onward)           Ordered     enoxaparin  injection 40 mg  Every 24 hours         01/01/24 1855     Apply sequential compression device to lower extremities (if no contraindications). Medical venous thromboembolus prophylaxis is preferred.  Until discontinued         01/01/24 1928                    Discharge Planning   MARQUISE:      Code Status: Full Code   Is the patient medically ready for discharge?:     Reason for patient still in hospital (select all that apply): Patient unstable, Patient new problem, Patient trending condition, and Consult recommendations  Discharge Plan A: Home            Critical care time spent on the evaluation and treatment of severe organ dysfunction, review of pertinent labs and imaging studies, discussions with consulting providers and discussions with patient/family: 31 minutes.      Yamila Cazares MD  Department of Hospital Medicine   [place] - Intensive Care

## 2024-01-05 NOTE — ASSESSMENT & PLAN NOTE
EEG  This pattern meets criteria for nonconvulsive status epilepticus.  Initial findings and recommendations including lorazepam and levetiracetam discussed with the primary team.     Follow up Neurology recommendations

## 2024-01-05 NOTE — ASSESSMENT & PLAN NOTE
Patient with Hypoxic Respiratory failure which is Acute.  he is not on home oxygen. Supplemental oxygen was provided and noted- Vent Mode: SPONT-PS  Oxygen Concentration (%):  [] 21  Resp Rate Total:  [14 br/min-33 br/min] 21 br/min  PEEP/CPAP:  [4.7 cmH20-7.5 cmH20] 5 cmH20  Pressure Support:  [5 cmH20] 5 cmH20  Mean Airway Pressure:  [8 cmH20] 8 cmH20    .   Signs/symptoms of respiratory failure include- tachypnea, increased work of breathing, and respiratory distress. Contributing diagnoses includes -  unkonwn  Labs and images were reviewed. Patient Has recent ABG, which has been reviewed. Will treat underlying causes and adjust management of respiratory failure as follows-     H/o severe asthma and COPD  Steroids, nebs, added ceftriaxone and azithromycin (CXR clear)

## 2024-01-05 NOTE — PROGRESS NOTES
Progress Note  [facility]    Attending: Any Shore MD   Admit Date: 1/1/2024  Today's Date: 01/05/2024    HPI  Per ER:   53-year-old M w/pmhx: asthma, was brought in by EMS as a cardiorespiratory arrest.  911 was called for shortness of breath, as patient was working in garden. When EMS arrived, the patient was slumped over face forward with a friend holding a non-rebreather mask over his face that was not hooked up to oxygen.  He had a Medrol Dosepak and an albuterol inhaler by his side.  EMS started giving the patient a nebulizer treatment and he became pulseless and apneic.  He was intubated with an ET tube and CPR was initiated. The patient regained pulses after CPR by the MANDI and parviz X3, shocked X1, unclear rhythm.    SUBJECTIVE:   OVERNIGHT    Patient was seen at bedside this morning. His ABG continued to improve. He was off of the fentanyl overnight until around 3:00 when he had another event of agitation with jerking with blood pressure and heart rate. He was placed back on fentanyl and his agitation improved.      Patient seen st bedside this morning. EEG ordered showing concerns for status epilepticus keppra loaded with 4000 mg with 4 mg of ativan with increase of profolol to 80 mg/kg/hr yesterday. He received a total of 10 mg of atvian. Loaded with 800 mg of vimpat and 200 mg BID after. Increased of propofolol to 100 mg/kg/hr today.    Review of Systems   Unable to perform ROS: Intubated     OBJECTIVE:     Vital Signs Trends/Hx Reviewed  Vitals:    01/05/24 1200 01/05/24 1215 01/05/24 1230 01/05/24 1242   BP: (!) 104/59 (!) 104/57     Pulse: 82 82 79 79   Resp: 20 19 18 18   Temp: 99 °F (37.2 °C) 99 °F (37.2 °C) 99 °F (37.2 °C) 99 °F (37.2 °C)   TempSrc:       SpO2: 100% 100% 100% 100%   Weight:       Height:           Vent Mode: A/CMV-VC  Oxygen Concentration (%):  [] 25  Resp Rate Total:  [14 br/min-34 br/min] 19 br/min  Vt Set:  [420 mL] 420  mL  PEEP/CPAP:  [5 cmH20-6.6 cmH20] 6.6 cmH20  Pressure Support:  [5 cmH20] 5 cmH20  Mean Airway Pressure:  [9 cmH20] 9 cmH20      Physical Exam  Constitutional:       Comments: RASS -5   HENT:      Head: Normocephalic and atraumatic.   Cardiovascular:      Rate and Rhythm: Normal rate and regular rhythm.      Pulses: Normal pulses.      Heart sounds: Normal heart sounds. No murmur heard.  Pulmonary:      Comments: Intubated. Clear mechanical breath sounds.  Abdominal:      General: Abdomen is flat. There is no distension.      Palpations: Abdomen is soft.   Musculoskeletal:      Right lower leg: No edema.      Left lower leg: No edema.   Neurological:      Comments: RASS -5  Patient has slight dyssymmetry of pupils, however reactive. He does have a cough, gag, corneal. No withdrawal to pain.          Laboratory:  Recent Labs   Lab 01/03/24  0419 01/03/24  0749 01/03/24  1729 01/04/24  0329 01/05/24  0429   WBC 15.43*  --   --  19.22* 11.60   HGB 11.4*  --   --  11.4* 10.3*   HCT 32.9*  --   --  33.4* 29.9*     --   --  181 158   *   < > 137 137 140   K 4.5   < > 4.9 4.6 3.6      < > 100 101 106   CREATININE 0.7   < > 0.8 0.9 0.9   BUN 18   < > 21* 24* 22*   CO2 24   < > 26 25 28   ALT 65*  --   --  75* 60*   *  --   --  146* 96*    < > = values in this interval not displayed.       Recent Labs   Lab 01/05/24  1235   PH 7.462*   PCO2 41.3   PO2 69.5*   HCO3 29.5*   POCSATURATED 94.7*       Chest Imaging:   Endotracheal tube tip 3.8 cm from the michael.  Satisfactory position of enteric tube tip below the left hemidiaphragm.  No acute intrathoracic process    ASSESSMENT & RECOMMENDATIONS     Neuro/Psych  #Non-Conclusive Status Epilepticus   Intubated  Sedated w/ non-titratable propofol 100 mcg/kg/hr   Keppra loaded 1/4/24 with 4,000 mg. Keppra 1500 mg BID  Vimpat 800 mg Loaded 1/5/24. Vimpat 200 mg BID  RASS -5.  Goal compliant w/vent.  No known issues prior to admission.     PLAN:  Continue  sedation in the setting of Non-conclusive status epilepticus to obtain burst-suppression.  1/5/24 is 72 hours of TTM.   Due to status epilepticus and will have MRI likely Monday.  EEG concerning for status epilepticus.     CV  #Post-arrest  Reportedly epi x3, shock x1 (unk rhythm)  CPK 4800 - down trended to 1400's 1/5/24. Stopping continuous LR.    Trop 0.446  Tachycardic EKG w/depressions in inferior leads.    #Labile bp  Not on pressors  Required Cardene shortly after admission.   POCUS good squeeze, no clear pathology  Formal echo shows EF 65% normal noble function    PLAN:  Monitor  Cards following  - PRN Hydralazine placed for SBP > 180 mmHg.   - PRN Labetalol placed for SBP > 180 mmHg.    Pulm  #Respiratory Failure  #Asthma exacerbation  Vent settings as above  Most recent ABG 6.92/76/216/16 BD -17 last night...   this AM: 7.462/41.3/69.5/29.5/94.7  CXR unremarkable    PLAN:  Continue current vent settings.  Duo-neb q6  Prednisone 50 mg  Montelukast 10mg qd    FEN/GI  F: euvolemic  E: Na+ 140, K+ 3.6, Mg++ 2.1  N: tube feeds    #Hyperkalemia   Orginally 5.9, shifted with Insulin  3.6 today, repleted with 40 meq Potassium.    PLAN:  CTM    GI  Prophylaxis: protonix     RENAL  UOP: 4975 cc , In: 3717 cc, net -1257 cc in last 24 hrs  Down 901 ml since admission.    #rhabdomyolysis  Down trending CK with most recent CK of 1400, stopping 100 ml/hr LR until CK.    BUN/Cr: 22/0.9, baseline wnl  Continue to monitor renal status and urine output     Heme  H/H 11.4; stable  WBC 19.2, on steroids and being treated with broad spectrum antibiotics a this time.  DVT prophylaxis: Lovenox    Endo  Glu 102, stable     PLAN:  SSI    ID  Patient had a fever 1/3/24 and was placed on Vancomycin and Zosyn, if no growth tomorrow will be stopping antibiotics..  Tylenol 1000 mg Q6H for fevers - likely neurogenic fever.    Blood cultures negative on day two of growth.    Dispo  54 y/o M w/hx of asthma/COPD (no PFT on file) s/p cardiac  arrest, ROSC after epi 3x, shock 1x, likely from hypercapneic respiratory failure. Initially CV unstable with labile bp and tachycardia. Now stable, not requiring pressor or anti-htn. Gases improving. Good liver function. Producing urine, no Cr elevation, but hyperkalemic. Shifted, monitoring. On 1/4/24: EEG showed that patient was in status epilepticus, currently on 100 100 mcg/kg/hr profolol, Keppra loaded with 4g, Vimpat loaded with 800 mg. Continuing ICU care for airway protection and status epilepticus.    F Tube Feeds  A N/a  S precedex, propofol   T lovenox  H 30  U protonix  G 120  S attempt  B monitor  I ETT, OG, Sage, PIV - Sage out today with q6 bladder scans and condom cath  D Started on vancomycin and zosyn 1/3/24    Rolo Segovia M.D.  \[facility]\"" Internal Medicine, PGY-1   01/05/2024.

## 2024-01-05 NOTE — NURSING
Patient started getting agitated @1934. Opening eyes, biting ETT, and asynchronous with vent. Temp increasing to 37.4 and U/O increasing to 1.1L over past hour. Increased propofol to 75 mcg/kg/min. PRN ativan given.     2050: patient starting to appear more calm.

## 2024-01-05 NOTE — PLAN OF CARE
Problem: Adult Inpatient Plan of Care  Goal: Plan of Care Review  Outcome: Ongoing, Progressing  Goal: Patient-Specific Goal (Individualized)  Outcome: Ongoing, Progressing  Goal: Absence of Hospital-Acquired Illness or Injury  Outcome: Ongoing, Progressing  Goal: Optimal Comfort and Wellbeing  Outcome: Ongoing, Progressing  Goal: Readiness for Transition of Care  Outcome: Ongoing, Progressing     Problem: Adjustment to Illness (Targeted Temperature Management)  Goal: Optimal Response to Life-Threatening Event  Outcome: Ongoing, Progressing     Problem: Infection (Targeted Temperature Management)  Goal: Absence of Infection Signs and Symptoms  Outcome: Ongoing, Progressing     Problem: Fall Injury Risk  Goal: Absence of Fall and Fall-Related Injury  Outcome: Ongoing, Progressing

## 2024-01-05 NOTE — PROGRESS NOTES
"[facility]  Intensive Care  Adult Nutrition  Progress Note    SUMMARY       Recommendations    Recommendation:  1. While on propofol, consider changing TF formula to Peptamen Intense VHP. Initiate At 10ml/hr and advance as tolerated to goal rate of 40ml/hr which would provide 960 kcal, 88g protein, & 806ml free water.   2. Monitor weight/labs.   3. RD to continue to follow to monitor TF tolerance    Goals:  TF to meet at least 85% EEN/EPN by RD follow up  Nutrition Goal Status: progressing towards goal  Communication of RD Recs: reviewed with RN (Halina)    Assessment and Plan  Nutrition Problem  Inadequate energy intake     Related to (etiology):   intubation     Signs and Symptoms (as evidenced by):   NPO, trickle TF      Interventions:  Collaboration with other providers  Modify rate of enteral nutrition     Nutrition Diagnosis Status:   Continues     Malnutrition Assessment  Unable to assess NFPE at this time      Reason for Assessment  Reason For Assessment: RD follow-up  Diagnosis:  (acute resp failure)  Relevant Medical History: bronchitis, neck surgery  General Information Comments: Pt remains intubated on vent. NG tube. Receiving TF of Impact Peptide 1.5 at 10ml/hr. PICC line. On propofol, Isai 10-skin intact. No recent weight loss noted. Unable to assess NFPE at this time  Nutrition Discharge Planning: d/c needs to be determined    Nutrition Risk Screen  Nutrition Risk Screen: tube feeding or parenteral nutrition    Nutrition/Diet History  Food Preferences: unable to assess  Factors Affecting Nutritional Intake: NPO, on mechanical ventilation    Anthropometrics  Temp: 98.8 °F (37.1 °C)  Height Method: Estimated  Height: 6' 1" (185.4 cm)  Height (inches): 73 in  Weight Method: Bed Scale  Weight: 72.1 kg (158 lb 15.2 oz)  Weight (lb): 158.95 lb  Ideal Body Weight (IBW), Male: 184 lb  % Ideal Body Weight, Male (lb): 86.39 %  BMI (Calculated): 21  BMI Grade: 18.5-24.9 - normal  Usual Body Weight (UBW), kg: " 69.3 kg (4/24)  % Usual Body Weight: 104.26  % Weight Change From Usual Weight: 4.04 %     Lab/Procedures/Meds  Pertinent Labs Reviewed: reviewed  Pertinent Labs Comments: BUN 22H, Ca 8.5L, Alb 2.7L, Trig 168H  Pertinent Medications Reviewed: reviewed  Pertinent Medications Comments: tylenol, pantoprazole, prednisone, vancomycin, propofol    Estimated/Assessed Needs  Weight Used For Calorie Calculations: 72.1 kg (158 lb 15.2 oz)  Energy Calorie Requirements (kcal): 1894  Energy Need Method: [facility]  Protein Requirements: 108g (1.5g/kg)  Weight Used For Protein Calculations: 72 kg (158 lb 11.7 oz)  Estimated Fluid Requirement Method: RDA Method  RDA Method (mL): 1894     Nutrition Prescription Ordered  Current Diet Order: NPO  Current Nutrition Support Formula Ordered: Impact Peptide 1.5  Current Nutrition Support Rate Ordered: 10 (ml)  Current Nutrition Support Frequency Ordered: ml/hr    Evaluation of Received Nutrient/Fluid Intake  Enteral Calories (kcal): 360  Enteral Protein (gm): 22  Enteral (Free Water) Fluid (mL): 184  Other Calories (kcal): 934 (propofol)  Total Calories (kcal): 1294  % Kcal Needs: 68  % Protein Needs: 20  I/O: 3727/4975  Energy Calories Required: not meeting needs  Protein Required: not meeting needs  Fluid Required: not meeting needs  Comments: LBM 1/3  % Intake of Estimated Energy Needs: 50 - 75 %  % Meal Intake: NPO    Nutrition Risk  Level of Risk/Frequency of Follow-up:  (2xweekly)     Monitor and Evaluation  Food and Nutrient Intake: energy intake  Food and Nutrient Adminstration: diet order, enteral and parenteral nutrition administration  Physical Activity and Function: nutrition-related ADLs and IADLs  Anthropometric Measurements: weight  Biochemical Data, Medical Tests and Procedures: electrolyte and renal panel  Nutrition-Focused Physical Findings: overall appearance     Nutrition Follow-Up  RD Follow-up?: Yes

## 2024-01-05 NOTE — PLAN OF CARE
Problem: Adult Inpatient Plan of Care  Goal: Plan of Care Review  Outcome: Ongoing, Progressing   Patient remains intubated and sedated. Propofol titrated to maintain RASS of -1 and for seizure control. Vital signs stable. Tube feeds continued at goal rate. Repositioned frequently. Skin protection interventions in place. Safety maintained. MRI planned for today. Plan of care reviewed with patient and family.

## 2024-01-05 NOTE — PROGRESS NOTES
Pharmacokinetic Assessment Follow Up: IV Vancomycin    Vancomycin serum concentration assessment(s):    The trough level was drawn correctly and can be used to guide therapy at this time. The measurement is within the desired definitive target range of 10 to 20 mcg/mL.    Vancomycin Regimen Plan:    Continue regimen to Vancomycin 1250 mg IV every 12 hours with next serum trough concentration measured at 1/6 prior to 4 dose on 1600    Drug levels (last 3 results):  Recent Labs   Lab Result Units 01/05/24  0429   Vancomycin-Trough ug/mL 12.9       Pharmacy will continue to follow and monitor vancomycin.    Please contact pharmacy at extension [phone] for questions regarding this assessment.    Thank you for the consult,   Ray Mackey       Patient brief summary:  Aaron Pruitt is a 53 y.o. male initiated on antimicrobial therapy with IV Vancomycin for treatment of  fever    The patient's current regimen is vanco 1250mg q12    Drug Allergies:   Review of patient's allergies indicates:   Allergen Reactions    Sulfa (sulfonamide antibiotics)        Actual Body Weight:   72kg    Renal Function:   Estimated Creatinine Clearance: 99 mL/min (based on SCr of 0.9 mg/dL).,     Dialysis Method (if applicable):  N/A    CBC (last 72 hours):  Recent Labs   Lab Result Units 01/03/24  0419 01/04/24  0329 01/05/24  0429   WBC K/uL 15.43* 19.22* 11.60   Hemoglobin g/dL 11.4* 11.4* 10.3*   Hematocrit % 32.9* 33.4* 29.9*   Platelets K/uL 173 181 158   Gran % % 89.8* 91.3* 82.4*   Lymph % % 3.8* 2.8* 10.2*   Mono % % 5.1 5.1 7.0   Eosinophil % % 0.0 0.0 0.0   Basophil % % 0.1 0.1 0.1   Differential Method  Automated Automated Automated       Metabolic Panel (last 72 hours):  Recent Labs   Lab Result Units 01/02/24  0807 01/02/24  1245 01/02/24  1600 01/02/24  2358 01/03/24  0419 01/03/24  0749 01/03/24  1729 01/04/24  0329 01/05/24  0429   Sodium mmol/L 135* 136 138 136 135* 137 137 137 140   Potassium mmol/L 5.7* 5.9* 5.7* 4.8 4.5 4.9 4.9  4.6 3.6   Chloride mmol/L 106 102 102 102 103 103 100 101 106   CO2 mmol/L 18* 24 25 23 24 25 26 25 28   Glucose mg/dL 125* 118* 71 94 100 112* 91 118* 102   BUN mg/dL 17 16 15 19 18 21* 21* 24* 22*   Creatinine mg/dL 0.8 0.8 0.8 0.7 0.7 0.8 0.8 0.9 0.9   Albumin g/dL  --   --   --   --  3.1*  --   --  3.1* 2.7*   Total Bilirubin mg/dL  --   --   --   --  1.1*  --   --  1.3* 1.0   Alkaline Phosphatase U/L  --   --   --   --  45*  --   --  52* 44*   AST U/L  --   --   --   --  130*  --   --  146* 96*   ALT U/L  --   --   --   --  65*  --   --  75* 60*   Magnesium mg/dL 2.6  --  2.7* 2.4  --  2.3 2.2 2.3  --    Phosphorus mg/dL 3.9  --  4.7* 3.1  --  3.2 2.6* 3.5  --        Vancomycin Administrations:  vancomycin given in the last 96 hours                     vancomycin 1,250 mg in dextrose 5 % (D5W) 250 mL IVPB (Vial-Mate) ()  Restarted 01/05/24 0427     1,250 mg New Bag  0424     1,250 mg New Bag 01/04/24 1715     1,250 mg New Bag  0508    vancomycin (VANCOCIN) 1,750 mg in dextrose 5 % (D5W) 500 mL IVPB (mg) 1,750 mg New Bag 01/03/24 1507                    Microbiologic Results:  Microbiology Results (last 7 days)       Procedure Component Value Units Date/Time    Blood culture [[id number]] Collected: 01/03/24 1729    Order Status: Completed Specimen: Blood Updated: 01/04/24 2312     Blood Culture, Routine No Growth to date      No Growth to date    Blood culture [[id number]] Collected: 01/03/24 1729    Order Status: Completed Specimen: Blood Updated: 01/04/24 2312     Blood Culture, Routine No Growth to date      No Growth to date    Culture, Respiratory with Gram Stain [[id number]] Collected: 01/03/24 5033    Order Status: Completed Specimen: Endotracheal Aspirate Updated: 01/04/24 0307     Gram Stain (Respiratory) Rare WBC's     Gram Stain (Respiratory) Rare Gram positive cocci     Gram Stain (Respiratory) Few yeast

## 2024-01-05 NOTE — PLAN OF CARE
Recommendation:  1. While on propofol, consider changing TF formula to Peptamen Intense VHP. Initiate At 10ml/hr and advance as tolerated to goal rate of 40ml/hr which would provide 960 kcal, 88g protein, & 806ml free water.   2. Monitor weight/labs.   3. RD to continue to follow to monitor TF tolerance    Goals:  TF to meet at least 85% EEN/EPN by RD follow up  Nutrition Goal Status: progressing towards goal

## 2024-01-06 PROBLEM — G93.40 ACUTE ENCEPHALOPATHY: Status: ACTIVE | Noted: 2024-01-06

## 2024-01-06 LAB
ALBUMIN SERPL BCP-MCNC: 2.7 G/DL (ref 3.5–5.2)
ALLENS TEST: YES
ALP SERPL-CCNC: 38 U/L (ref 55–135)
ALT SERPL W/O P-5'-P-CCNC: 51 U/L (ref 10–44)
ANION GAP SERPL CALC-SCNC: 9 MMOL/L (ref 8–16)
AST SERPL-CCNC: 70 U/L (ref 10–40)
BACTERIA SPEC AEROBE CULT: ABNORMAL
BACTERIA SPEC AEROBE CULT: ABNORMAL
BASOPHILS # BLD AUTO: 0.01 K/UL (ref 0–0.2)
BASOPHILS NFR BLD: 0.1 % (ref 0–1.9)
BILIRUB SERPL-MCNC: 0.7 MG/DL (ref 0.1–1)
BUN SERPL-MCNC: 19 MG/DL (ref 6–20)
CALCIUM SERPL-MCNC: 8.7 MG/DL (ref 8.7–10.5)
CHLORIDE SERPL-SCNC: 105 MMOL/L (ref 95–110)
CK SERPL-CCNC: 754 U/L (ref 20–200)
CO2 SERPL-SCNC: 26 MMOL/L (ref 23–29)
CREAT SERPL-MCNC: 0.8 MG/DL (ref 0.5–1.4)
DIFFERENTIAL METHOD BLD: ABNORMAL
EOSINOPHIL # BLD AUTO: 0 K/UL (ref 0–0.5)
EOSINOPHIL NFR BLD: 0.2 % (ref 0–8)
ERYTHROCYTE [DISTWIDTH] IN BLOOD BY AUTOMATED COUNT: 12.8 % (ref 11.5–14.5)
EST. GFR  (NO RACE VARIABLE): >60 ML/MIN/1.73 M^2
FIO2: 25 %
GLUCOSE SERPL-MCNC: 99 MG/DL (ref 70–110)
GRAM STN SPEC: ABNORMAL
HCT VFR BLD AUTO: 29.7 % (ref 40–54)
HGB BLD-MCNC: 10.4 G/DL (ref 14–18)
IMM GRANULOCYTES # BLD AUTO: 0.04 K/UL (ref 0–0.04)
IMM GRANULOCYTES NFR BLD AUTO: 0.5 % (ref 0–0.5)
LYMPHOCYTES # BLD AUTO: 1.5 K/UL (ref 1–4.8)
LYMPHOCYTES NFR BLD: 18 % (ref 18–48)
MAGNESIUM SERPL-MCNC: 2.1 MG/DL (ref 1.6–2.6)
MCH RBC QN AUTO: 31 PG (ref 27–31)
MCHC RBC AUTO-ENTMCNC: 35 G/DL (ref 32–36)
MCV RBC AUTO: 89 FL (ref 82–98)
MONOCYTES # BLD AUTO: 0.6 K/UL (ref 0.3–1)
MONOCYTES NFR BLD: 7.5 % (ref 4–15)
NEUTROPHILS # BLD AUTO: 6.3 K/UL (ref 1.8–7.7)
NEUTROPHILS NFR BLD: 73.7 % (ref 38–73)
NRBC BLD-RTO: 0 /100 WBC
PCO2 BLDA: 45.3 MMHG (ref 35–45)
PEEP: 5
PH SMN: 7.45 [PH] (ref 7.35–7.45)
PHOSPHATE SERPL-MCNC: 3.5 MG/DL (ref 2.7–4.5)
PLATELET # BLD AUTO: 151 K/UL (ref 150–450)
PMV BLD AUTO: 8.8 FL (ref 9.2–12.9)
PO2 BLDA: 66.7 MMHG (ref 80–100)
POC BASE DEFICIT: 6.3 MMOL/L (ref -2–2)
POC HCO3: 31.2 MMOL/L (ref 24–28)
POC PERFORMED BY: ABNORMAL
POC SATURATED O2: 93.6 % (ref 95–100)
POC SET RR: 16
POCT GLUCOSE: 100 MG/DL (ref 70–110)
POCT GLUCOSE: 142 MG/DL (ref 70–110)
POCT GLUCOSE: 83 MG/DL (ref 70–110)
POCT GLUCOSE: 86 MG/DL (ref 70–110)
POTASSIUM SERPL-SCNC: 3.7 MMOL/L (ref 3.5–5.1)
PROT SERPL-MCNC: 5.7 G/DL (ref 6–8.4)
RBC # BLD AUTO: 3.35 M/UL (ref 4.6–6.2)
SODIUM SERPL-SCNC: 140 MMOL/L (ref 136–145)
SPECIMEN SOURCE: ABNORMAL
VT: 420
WBC # BLD AUTO: 8.57 K/UL (ref 3.9–12.7)

## 2024-01-06 PROCEDURE — 63600175 PHARM REV CODE 636 W HCPCS

## 2024-01-06 PROCEDURE — 27200966 HC CLOSED SUCTION SYSTEM

## 2024-01-06 PROCEDURE — 20000000 HC ICU ROOM

## 2024-01-06 PROCEDURE — 63600175 PHARM REV CODE 636 W HCPCS: Performed by: STUDENT IN AN ORGANIZED HEALTH CARE EDUCATION/TRAINING PROGRAM

## 2024-01-06 PROCEDURE — 80053 COMPREHEN METABOLIC PANEL: CPT | Performed by: INTERNAL MEDICINE

## 2024-01-06 PROCEDURE — 25000003 PHARM REV CODE 250: Performed by: STUDENT IN AN ORGANIZED HEALTH CARE EDUCATION/TRAINING PROGRAM

## 2024-01-06 PROCEDURE — 25000003 PHARM REV CODE 250

## 2024-01-06 PROCEDURE — 25000003 PHARM REV CODE 250: Performed by: INTERNAL MEDICINE

## 2024-01-06 PROCEDURE — C9113 INJ PANTOPRAZOLE SODIUM, VIA: HCPCS

## 2024-01-06 PROCEDURE — 83735 ASSAY OF MAGNESIUM: CPT | Performed by: STUDENT IN AN ORGANIZED HEALTH CARE EDUCATION/TRAINING PROGRAM

## 2024-01-06 PROCEDURE — 27100171 HC OXYGEN HIGH FLOW UP TO 24 HOURS

## 2024-01-06 PROCEDURE — 63600175 PHARM REV CODE 636 W HCPCS: Performed by: INTERNAL MEDICINE

## 2024-01-06 PROCEDURE — 85025 COMPLETE CBC W/AUTO DIFF WBC: CPT

## 2024-01-06 PROCEDURE — A4216 STERILE WATER/SALINE, 10 ML: HCPCS | Performed by: INTERNAL MEDICINE

## 2024-01-06 PROCEDURE — 99900035 HC TECH TIME PER 15 MIN (STAT)

## 2024-01-06 PROCEDURE — 94761 N-INVAS EAR/PLS OXIMETRY MLT: CPT

## 2024-01-06 PROCEDURE — 82550 ASSAY OF CK (CPK): CPT | Performed by: INTERNAL MEDICINE

## 2024-01-06 PROCEDURE — 25000242 PHARM REV CODE 250 ALT 637 W/ HCPCS: Performed by: STUDENT IN AN ORGANIZED HEALTH CARE EDUCATION/TRAINING PROGRAM

## 2024-01-06 PROCEDURE — 36600 WITHDRAWAL OF ARTERIAL BLOOD: CPT

## 2024-01-06 PROCEDURE — 94003 VENT MGMT INPAT SUBQ DAY: CPT

## 2024-01-06 PROCEDURE — 84100 ASSAY OF PHOSPHORUS: CPT | Performed by: STUDENT IN AN ORGANIZED HEALTH CARE EDUCATION/TRAINING PROGRAM

## 2024-01-06 PROCEDURE — C9254 INJECTION, LACOSAMIDE: HCPCS

## 2024-01-06 PROCEDURE — 95714 VEEG EA 12-26 HR UNMNTR: CPT

## 2024-01-06 PROCEDURE — 94640 AIRWAY INHALATION TREATMENT: CPT

## 2024-01-06 PROCEDURE — 51798 US URINE CAPACITY MEASURE: CPT

## 2024-01-06 PROCEDURE — 95720 EEG PHY/QHP EA INCR W/VEEG: CPT | Mod: ,,, | Performed by: PSYCHIATRY & NEUROLOGY

## 2024-01-06 PROCEDURE — 82803 BLOOD GASES ANY COMBINATION: CPT

## 2024-01-06 RX ORDER — BUDESONIDE 0.5 MG/2ML
0.25 INHALANT ORAL EVERY 12 HOURS
Status: DISCONTINUED | OUTPATIENT
Start: 2024-01-07 | End: 2024-01-12 | Stop reason: HOSPADM

## 2024-01-06 RX ORDER — ACETAMINOPHEN 325 MG/1
650 TABLET ORAL EVERY 6 HOURS PRN
Status: DISCONTINUED | OUTPATIENT
Start: 2024-01-06 | End: 2024-01-06

## 2024-01-06 RX ORDER — ACETAMINOPHEN 500 MG
1000 TABLET ORAL EVERY 6 HOURS
Status: DISCONTINUED | OUTPATIENT
Start: 2024-01-06 | End: 2024-01-09

## 2024-01-06 RX ADMIN — PROPOFOL 100 MCG/KG/MIN: 10 INJECTION, EMULSION INTRAVENOUS at 11:01

## 2024-01-06 RX ADMIN — PROPOFOL 100 MCG/KG/MIN: 10 INJECTION, EMULSION INTRAVENOUS at 03:01

## 2024-01-06 RX ADMIN — POTASSIUM BICARBONATE 50 MEQ: 978 TABLET, EFFERVESCENT ORAL at 11:01

## 2024-01-06 RX ADMIN — MINERAL OIL, WHITE PETROLATUM: .03; .94 OINTMENT OPHTHALMIC at 01:01

## 2024-01-06 RX ADMIN — LEVETIRACETAM 1500 MG: 500 INJECTION, SOLUTION INTRAVENOUS at 08:01

## 2024-01-06 RX ADMIN — DEXTROSE MONOHYDRATE 2880 MG: 50 INJECTION, SOLUTION INTRAVENOUS at 12:01

## 2024-01-06 RX ADMIN — MINERAL OIL, WHITE PETROLATUM: .03; .94 OINTMENT OPHTHALMIC at 05:01

## 2024-01-06 RX ADMIN — PROPOFOL 100 MCG/KG/MIN: 10 INJECTION, EMULSION INTRAVENOUS at 08:01

## 2024-01-06 RX ADMIN — PIPERACILLIN AND TAZOBACTAM 4.5 G: 4; .5 INJECTION, POWDER, LYOPHILIZED, FOR SOLUTION INTRAVENOUS; PARENTERAL at 01:01

## 2024-01-06 RX ADMIN — IPRATROPIUM BROMIDE AND ALBUTEROL SULFATE 3 ML: 2.5; .5 SOLUTION RESPIRATORY (INHALATION) at 07:01

## 2024-01-06 RX ADMIN — PROPOFOL 100 MCG/KG/MIN: 10 INJECTION, EMULSION INTRAVENOUS at 06:01

## 2024-01-06 RX ADMIN — ACETAMINOPHEN 1000 MG: 500 TABLET ORAL at 06:01

## 2024-01-06 RX ADMIN — IPRATROPIUM BROMIDE AND ALBUTEROL SULFATE 3 ML: 2.5; .5 SOLUTION RESPIRATORY (INHALATION) at 01:01

## 2024-01-06 RX ADMIN — MONTELUKAST 10 MG: 10 TABLET, FILM COATED ORAL at 08:01

## 2024-01-06 RX ADMIN — SODIUM CHLORIDE, PRESERVATIVE FREE 10 ML: 5 INJECTION INTRAVENOUS at 06:01

## 2024-01-06 RX ADMIN — LACOSAMIDE 200 MG: 10 INJECTION, SOLUTION INTRAVENOUS at 09:01

## 2024-01-06 RX ADMIN — PIPERACILLIN AND TAZOBACTAM 4.5 G: 4; .5 INJECTION, POWDER, LYOPHILIZED, FOR SOLUTION INTRAVENOUS; PARENTERAL at 09:01

## 2024-01-06 RX ADMIN — PROPOFOL 100 MCG/KG/MIN: 10 INJECTION, EMULSION INTRAVENOUS at 12:01

## 2024-01-06 RX ADMIN — PROPOFOL 100 MCG/KG/MIN: 10 INJECTION, EMULSION INTRAVENOUS at 01:01

## 2024-01-06 RX ADMIN — MUPIROCIN: 20 OINTMENT TOPICAL at 08:01

## 2024-01-06 RX ADMIN — IPRATROPIUM BROMIDE AND ALBUTEROL SULFATE 3 ML: 2.5; .5 SOLUTION RESPIRATORY (INHALATION) at 08:01

## 2024-01-06 RX ADMIN — ACETAMINOPHEN 650 MG: 325 TABLET ORAL at 01:01

## 2024-01-06 RX ADMIN — DEXTROSE MONOHYDRATE 360 MG: 50 INJECTION, SOLUTION INTRAVENOUS at 08:01

## 2024-01-06 RX ADMIN — ENOXAPARIN SODIUM 40 MG: 40 INJECTION SUBCUTANEOUS at 06:01

## 2024-01-06 RX ADMIN — VANCOMYCIN HYDROCHLORIDE 1250 MG: 1.25 INJECTION, POWDER, LYOPHILIZED, FOR SOLUTION INTRAVENOUS at 05:01

## 2024-01-06 RX ADMIN — PANTOPRAZOLE SODIUM 40 MG: 40 INJECTION, POWDER, FOR SOLUTION INTRAVENOUS at 08:01

## 2024-01-06 RX ADMIN — LACOSAMIDE 200 MG: 10 INJECTION, SOLUTION INTRAVENOUS at 10:01

## 2024-01-06 RX ADMIN — SODIUM CHLORIDE, PRESERVATIVE FREE 10 ML: 5 INJECTION INTRAVENOUS at 12:01

## 2024-01-06 RX ADMIN — ACETAMINOPHEN 1000 MG: 500 TABLET ORAL at 12:01

## 2024-01-06 RX ADMIN — MINERAL OIL, WHITE PETROLATUM: .03; .94 OINTMENT OPHTHALMIC at 10:01

## 2024-01-06 RX ADMIN — PREDNISONE 40 MG: 20 TABLET ORAL at 08:01

## 2024-01-06 NOTE — PROGRESS NOTES
Progress Note  [facility]    Attending: Any Shore MD   Admit Date: 1/1/2024  Today's Date: 01/06/2024    HPI  Per ER:   53-year-old M w/pmhx: asthma, was brought in by EMS as a cardiorespiratory arrest.  911 was called for shortness of breath, as patient was working in [place]. When EMS arrived, the patient was slumped over face forward with a friend holding a non-rebreather mask over his face that was not hooked up to oxygen.  He had a Medrol Dosepak and an albuterol inhaler by his side.  EMS started giving the patient a nebulizer treatment and he became pulseless and apneic.  He was intubated with an ET tube and CPR was initiated. The patient regained pulses after CPR by the MANDI and parviz X3, shocked X1, unclear rhythm.    SUBJECTIVE:   OVERNIGHT    Patient was seen at bedside this morning. Patient is no longer in burst suppression. He was started on Depakote this morning. He is still being sedated with 100 mcg/kg/hr of propofol.     Review of Systems   Unable to perform ROS: Intubated     OBJECTIVE:     Vital Signs Trends/Hx Reviewed  Vitals:    01/06/24 1400 01/06/24 1415 01/06/24 1430 01/06/24 1445   BP: 133/83 (!) 150/82 (!) 170/65 (!) 159/82   Pulse: 103 99 103 99   Resp: (!) 34 (!) 29 (!) 35 (!) 31   Temp: 100 °F (37.8 °C) 100 °F (37.8 °C) 100 °F (37.8 °C) 100 °F (37.8 °C)   TempSrc:       SpO2: 100% 100% 100% 100%   Weight:       Height:           Vent Mode: A/CMV-VC  Oxygen Concentration (%):  [21-25] 21  Resp Rate Total:  [16 br/min-44 br/min] 34 br/min  Vt Set:  [420 mL] 420 mL  PEEP/CPAP:  [4.8 cmH20-5.1 cmH20] 5 cmH20  Mean Airway Pressure:  [7.7 peN04-14.8 cmH20] 10.3 cmH20      Physical Exam  Constitutional:       Appearance: He is diaphoretic.      Comments: RASS -5  Tachypnea  Neural breathing    HENT:      Head: Normocephalic and atraumatic.   Cardiovascular:      Rate and Rhythm: Normal rate and regular rhythm.      Pulses: Normal pulses.      Heart  sounds: Normal heart sounds. No murmur heard.  Pulmonary:      Breath sounds: No wheezing.      Comments: Intubated. Clear mechanical breath sounds. Tachypnea to the 40s.  Abdominal:      General: Abdomen is flat. There is no distension.      Palpations: Abdomen is soft.   Musculoskeletal:      Right lower leg: No edema.      Left lower leg: No edema.   Neurological:      Comments: RASS -5  Patient has slight dyssymmetry of pupils, however reactive. He does have a cough, gag, corneal. No withdrawal to pain.          Laboratory:  Recent Labs   Lab 01/04/24  0329 01/05/24  0429 01/06/24  0539   WBC 19.22* 11.60 8.57   HGB 11.4* 10.3* 10.4*   HCT 33.4* 29.9* 29.7*    158 151    140 140   K 4.6 3.6 3.7    106 105   CREATININE 0.9 0.9 0.8   BUN 24* 22* 19   CO2 25 28 26   ALT 75* 60* 51*   * 96* 70*       Recent Labs   Lab 01/06/24  0929   PH 7.447   PCO2 45.3*   PO2 66.7*   HCO3 31.2*   POCSATURATED 93.6*       Chest Imaging:   Endotracheal tube tip 3.8 cm from the michael.  Satisfactory position of enteric tube tip below the left hemidiaphragm.  No acute intrathoracic process    ASSESSMENT & RECOMMENDATIONS     Neuro/Psych  #Non-Conclusive Status Epilepticus   Intubated  Sedated w/ non-titratable propofol 100 mcg/kg/hr   Keppra loaded 1/4/24 with 4,000 mg. Keppra 1500 mg BID  Vimpat 800 mg Loaded 1/5/24. Vimpat 200 mg BID  Depakote 3g loaded 1/6/2024. Depakote 360 mg TID  RASS -5.  Goal compliant w/vent.  No known issues prior to admission.     PLAN:  Continue sedation in the setting of Non-conclusive status epilepticus to obtain burst-suppression.  1/5/24 is 72 hours of TTM.   Due to status epilepticus and will have MRI likely Monday.  EEG concerning for status epilepticus.     CV  #Post-arrest  Reportedly epi x3, shock x1 (unk rhythm)  CPK 4800 - down trended to 1400's 1/5/24. Stopping continuous LR.    Trop 0.446  Tachycardic EKG w/depressions in inferior leads.    #Labile bp  Not on  pressors  Required Cardene shortly after admission.   POCUS good squeeze, no clear pathology  Formal echo shows EF 65% normal noble function    PLAN:  Monitor  Cards following  - PRN Hydralazine placed for SBP > 180 mmHg.   - PRN Labetalol placed for SBP > 180 mmHg.    Pulm  #Respiratory Failure  #Asthma exacerbation  Vent settings as above  7.45/45/66 on 420/16/5/21%     PLAN:  RR set to 15 bpm  Duo-neb q6  Stop Prednisone 50 mg today, 5/5 days  Start budesonide nebs tomorrow  Montelukast 10mg qd    FEN/GI  F: euvolemic  E: Na+ 140, K+ 3.7, Mg++ 2.1  N: tube feeds    #Hyperkalemia   Orginally 5.9, shifted with Insulin  3.7 today, repleted with 50 meq Potassium.    PLAN:  CTM    GI  Prophylaxis: protonix     RENAL  UOP: 4975 cc , In: 3717 cc, net -1257 cc in last 24 hrs  Down 901 ml since admission.    #rhabdomyolysis - resolving  Down trending CK with most recent CK of 1400, stopping 100 ml/hr LR until CK.    BUN/Cr: 19/0.9, baseline wnl  Continue to monitor renal status and urine output - holding diuretics, re-evaluate tomorrow     Heme  H/H 10.4; stable  WBC 8.57  DVT prophylaxis: Lovenox    Endo  Glu 99, stable     PLAN:  SSI    ID  Stopping Antibiotics  Tylenol 1000 mg Q6H for fevers - likely neurogenic fever.    Blood cultures negative on day 3 of growth.    Dispo  52 y/o M w/hx of asthma/COPD (no PFT on file) s/p cardiac arrest, ROSC after epi 3x, shock 1x, likely from hypercapneic respiratory failure. Initially CV unstable with labile bp and tachycardia. Now stable, not requiring pressor or anti-htn. Gases improving. Good liver function. Producing urine, no Cr elevation, but hyperkalemic. Shifted, monitoring. On 1/4/24: EEG showed that patient was in status epilepticus, currently on 100 100 mcg/kg/hr profolol, Keppra loaded with 4g, Vimpat loaded with 800 mg. Continuing ICU care for airway protection and status epilepticus.     F Tube Feeds  A N/a  S propofol   T lovenox  H 30  U protonix  G 120  S holding off  due  B monitor  I ETT, OG, PIV   D Stopped vancomycin and zosyn 1/3/24 - 1/7/24    Rolo Segovia M.D.  [facility] Internal Medicine, PGY-1   01/06/2024.

## 2024-01-06 NOTE — ASSESSMENT & PLAN NOTE
Patient with Hypoxic Respiratory failure which is Acute.  he is not on home oxygen. Supplemental oxygen was provided and noted- Vent Mode: A/CMV-VC  Oxygen Concentration (%):  [] 25  Resp Rate Total:  [18 br/min-41 br/min] 40 br/min  Vt Set:  [420 mL] 420 mL  PEEP/CPAP:  [4.9 cmH20-6.6 cmH20] 5 cmH20  Mean Airway Pressure:  [9 jwO56-17.1 cmH20] 10.3 cmH20    .   Signs/symptoms of respiratory failure include- tachypnea, increased work of breathing, and respiratory distress. Contributing diagnoses includes -  unkonwn  Labs and images were reviewed. Patient Has recent ABG, which has been reviewed. Will treat underlying causes and adjust management of respiratory failure as follows-     H/o severe asthma and COPD  Steroids, nebs, added ceftriaxone and azithromycin (CXR clear)

## 2024-01-06 NOTE — ASSESSMENT & PLAN NOTE
S/p CPR with ROSC now on TTM, now rewarming  Per Pulm/Crit  Cardiac arrest: Due to hypoxemia. TTM completed. Maintaining normothermia. Reflexes intact. MRI brain on Monday.

## 2024-01-06 NOTE — PROGRESS NOTES
[facility] Medicine  Progress Note    Patient Name: Aaron Pruitt  MRN: [id number]  Patient Class: IP- Inpatient   Admission Date: 1/1/2024  Length of Stay: 4 days  Attending Physician: Yamila Cazares MD  Primary Care Provider: Gogo Vivar NP        Subjective:     Principal Problem:Acute respiratory failure with hypoxia and hypercarbia        HPI:  52 YO M with PMHx significant for  COPD/Asthma presented to the ER intubated on the field for acute respiratory failure post cardiac arrest s/p resuscitation and shock x1. Per chart review and   family at bedside reports he was shoveling some dirt in his yard when he got severely short of breath and suddenly had difficulty breathing and neighbors called EMS. When EMS arrived, reportedly still had a pulse but was struggling to breath, became pulseless. He was given narcan x2 without improvement. CPR and Epi x3. ROSC after 3rd round. Also reportedly get shocked x1. He was transferred to the ER and sedated. Labs in the ER with elevated lactic acid, elevated troponin, , elevated CPK 3294, elevated AST//82, drug screen with presumptive THC. CT head no acute intracranial abnormality, CT chest no acute pulmonary process.    Overview/Hospital Course:  1/3/2024 on TTM on fentanyl and propofol. K elevated to 5.7-->5.9, Lokelma 5g x 1, insulin/dextrose given  1/4/2024 Rewarming initiated. Sedation held, myoclonus noted. Restarted on propofol. EEG shows possible NCSE activity, started on AEDs  1/5/2024 Unchanged, Vimpat added, on keppra, vimpat and propofol.     Interval History: No changes on AEDs, reflexes intact, per discussion with Pulm/Crit team NCSE indicates a poor prognosis    Review of Systems   Unable to perform ROS: Acuity of condition     Objective:     Vital Signs (Most Recent):  Temp: 99.5 °F (37.5 °C) (01/05/24 2145)  Pulse: 99 (01/05/24 2145)  Resp: (!) 40 (01/05/24 2145)  BP: (!) 149/82 (01/05/24 2145)  SpO2: 100 % (01/05/24  2145) Vital Signs (24h Range):  Temp:  [97.7 °F (36.5 °C)-99.5 °F (37.5 °C)] 99.5 °F (37.5 °C)  Pulse:  [75-99] 99  Resp:  [18-41] 40  SpO2:  [98 %-100 %] 100 %  BP: ()/(57-90) 149/82     Weight: 72.1 kg (158 lb 15.2 oz)  Body mass index is 20.97 kg/m².    Intake/Output Summary (Last 24 hours) at 1/5/2024 2215  Last data filed at 1/5/2024 1826  Gross per 24 hour   Intake 4151.12 ml   Output 1555 ml   Net 2596.12 ml         Physical Exam  Vitals and nursing note reviewed.   Constitutional:       General: He is not in acute distress.     Appearance: He is not toxic-appearing.      Comments: Intubated and sedated   HENT:      Head: Normocephalic and atraumatic.      Mouth/Throat:      Mouth: Mucous membranes are moist.      Pharynx: No oropharyngeal exudate.   Eyes:      Pupils: Pupils are equal, round, and reactive to light.   Cardiovascular:      Rate and Rhythm: Normal rate.      Heart sounds: No murmur heard.     No friction rub. No gallop.   Pulmonary:      Comments: Intubated, on mechanical ventilation, sedated  Abdominal:      General: Bowel sounds are normal. There is no distension.      Palpations: Abdomen is soft.      Tenderness: There is no abdominal tenderness. There is no guarding or rebound.   Musculoskeletal:      Cervical back: No rigidity or tenderness.      Right lower leg: No edema.      Left lower leg: No edema.             Significant Labs: All pertinent labs within the past 24 hours have been reviewed.  CBC:   Recent Labs   Lab 01/04/24 0329 01/05/24 0429   WBC 19.22* 11.60   HGB 11.4* 10.3*   HCT 33.4* 29.9*    158     CMP:   Recent Labs   Lab 01/04/24 0329 01/05/24 0429    140   K 4.6 3.6    106   CO2 25 28   * 102   BUN 24* 22*   CREATININE 0.9 0.9   CALCIUM 8.8 8.5*   PROT 6.1 5.5*   ALBUMIN 3.1* 2.7*   BILITOT 1.3* 1.0   ALKPHOS 52* 44*   * 96*   ALT 75* 60*   ANIONGAP 11 6*       Significant Imaging: I have reviewed all pertinent imaging  results/findings within the past 24 hours.    Assessment/Plan:      * Acute respiratory failure with hypoxia and hypercarbia  Patient with Hypoxic Respiratory failure which is Acute.  he is not on home oxygen. Supplemental oxygen was provided and noted- Vent Mode: A/CMV-VC  Oxygen Concentration (%):  [] 25  Resp Rate Total:  [18 br/min-41 br/min] 40 br/min  Vt Set:  [420 mL] 420 mL  PEEP/CPAP:  [4.9 cmH20-6.6 cmH20] 5 cmH20  Mean Airway Pressure:  [9 orQ46-29.1 cmH20] 10.3 cmH20    .   Signs/symptoms of respiratory failure include- tachypnea, increased work of breathing, and respiratory distress. Contributing diagnoses includes -  unkonwn  Labs and images were reviewed. Patient Has recent ABG, which has been reviewed. Will treat underlying causes and adjust management of respiratory failure as follows-     H/o severe asthma and COPD  Steroids, nebs, added ceftriaxone and azithromycin (CXR clear)    Seizure disorder, nonconvulsive, with status epilepticus  EEG  This pattern meets criteria for nonconvulsive status epilepticus.  Initial findings and recommendations including lorazepam and levetiracetam discussed with the primary team.     Follow up Neurology recommendations    Hyperkalemia  Resolved or labs hemolysed . This patient has hyperkalemia which is controlled. We will monitor for arrhythmias with EKG or continuous telemetry. We will treat the hyperkalemia with Potassium Binders and Nebulized albuterol sulfate. The likely etiology of the hyperkalemia is  unknown .  The patients latest potassium has been reviewed and the results are listed below  Recent Labs   Lab 01/05/24  0429   K 3.6       Resolved        Non-traumatic rhabdomyolysis  Vs traumatic post cardiac resuscitation  Monitor CPK - improved  Now off IVFs      Cardiac arrest  S/p CPR with ROSC now on TTM, now rewarming  Per Pulm/Crit  Cardiac arrest: Due to hypoxemia. TTM completed. Maintaining normothermia. Reflexes intact. MRI brain on Monday.              VTE Risk Mitigation (From admission, onward)           Ordered     enoxaparin injection 40 mg  Every 24 hours         01/01/24 1855     Apply sequential compression device to lower extremities (if no contraindications). Medical venous thromboembolus prophylaxis is preferred.  Until discontinued         01/01/24 1928                    Discharge Planning   MARQUISE:      Code Status: Full Code   Is the patient medically ready for discharge?:     Reason for patient still in hospital (select all that apply): Patient unstable, Patient trending condition, Imaging, and Consult recommendations  Discharge Plan A: Home            Critical care time spent on the evaluation and treatment of severe organ dysfunction, review of pertinent labs and imaging studies, discussions with consulting providers and discussions with patient/family: 31 minutes.      Yamila Cazares MD  Department of [facility] Medicine   [place] - Intensive Care

## 2024-01-06 NOTE — PROCEDURES
DATE: 1/5/24    EEG NUMBER: OK -2    REFERRING PHYSICIAN:  Dr. Cazares     This EEG was performed to assess for subclinical seizures      ELECTROENCEPHALOGRAM REPORT     METHODOLOGY:  Electroencephalographic (EEG) recording is with electrodes placed according to the International 10-20 placement system.  Thirty two (32) channels of digital signal are simultaneously recorded from the scalp and may include EKG, EMG, and/or eye monitors.   Recording band pass was 0.1 to 512 hz.  Digital video recording of the patient is simultaneously recorded with the EEG.  The nursing staff report clinical symptoms and may press an event button when the patient has symptoms of clinical interest to the treating physicians.  EEG and video recording is stored and archived in digital format.  The entire recording is visually reviewed, and the times identified by computer analysis as being spikes or seizures are reviewed again.  Activation procedures which include photic stimulation, hyperventilation and instructing patients to perform simple task are done in selected patients.   Compresses spectral analysis (CSA) is also performed on the activity recorded from each individual channel.  This is displayed as a power display of frequencies from 0 to 30 Hz over time.   The CSA analysis is done and displayed continuously.  This is reviewed for asymmetries in power between homologous areas of the scalp and for presence of changes in power which can be seen when seizures occur.  Sections of suspected abnormalities on the CSA is then compared with the original EEG recording.                [employer] software was also utilized in the review of this study.  This software suite analyzes the EEG recording in multiple domains.  Coherence and rhythmicity is computed to identify EEG sections which may contain organized seizures.  Each channel undergoes analysis to detect presence of spike and sharp waves which have special and morphological  characteristic of epileptic activity.  The routine EEG recording is converted from spacial into frequency domain.  This is then displayed comparing homologous areas to identify areas of significant asymmetry.  Algorithm to identify non-cortically generated artifact is used to separate eye movement, EMG and other artifact from the EEG.     Recording times   Start on January 5, 2024 at hours 7 minute 0 seconds 48   End on January 5, 2024 at hours 7 minute 0 seconds 6   The total time of EEG recording for the study was 23 hours and 59 minutes    EEG FINDINGS:  The recording was obtained with a number of standard bipolar and referential montages during comatose state.  Diffuse disorganized low amplitude mixed delta and occasional theta range slowing was noted which was symmetric.  The background was punctuated by low-amplitude pseudo periodic generalized as well as independent left and right lateralized epileptiform discharges.  The burden of discharges was variable with up to a maximum of 2 hertz.  The background was intermixed with symmetric spindles.   Variability and reactivity were noted.  During this study 2 brief electrographic seizures characterized by buildup evolving rhythmical alpha activity in the right frontotemporal and frontal central region were noted at hour 8 minute for an hour 8 minute 9.    The EKG channel revealed a sinus rhythm.     IMPRESSION:  This is an abnormal EEG during comatose state.  Diffuse disorganized low-amplitude slowing of the background was noted.  Generalized as well as independent left and right lateralized epileptiform discharges were noted which were pseudo periodic at < 2 Hz.  Two brief electrographic seizures emanating from the right frontal region were noted    CLINICAL CORRELATION:  The patient is a 53-year-old male with a history of anoxic brain injury who is currently maintained on intravenous infusions of propofol and on Keppra and Vimpat.  This is an abnormal EEG during  comatose state.  The overall degree of disorganization and slowing for given age is suggestive of a severe encephalopathy.  To refill electrographic seizures emanating from the right frontal region were noted.  The presence of pseudo periodic epileptiform discharges is suggestive of cortical irritability with increased risk of focal seizures from either hemisphere.  This study is improved compared to the prior study suggesting adequate management of nonconvulsive status epilepticus.

## 2024-01-06 NOTE — ASSESSMENT & PLAN NOTE
Resolved or labs hemolysed . This patient has hyperkalemia which is controlled. We will monitor for arrhythmias with EKG or continuous telemetry. We will treat the hyperkalemia with Potassium Binders and Nebulized albuterol sulfate. The likely etiology of the hyperkalemia is  unknown .  The patients latest potassium has been reviewed and the results are listed below  Recent Labs   Lab 01/05/24  0429   K 3.6       Resolved

## 2024-01-06 NOTE — NURSING
Unable to restart tube feeds due to tube feeds not being stocked; will pass on to day shift RN to start feeds when dietary can restock.

## 2024-01-06 NOTE — NURSING
Patient starting to have muscle and eye twitching, yawning, RR increased to 40 BPM, and temp spiking to 37.7 celsius starting around 2100/2115. Giving 4 mg ativan and the ordered 1,500 mg of keppra, and vimpat to be given. Propofol at 100 mcg/kg/min. Patient event recorded in EEG monitor.    2345: Patient appears more comfortable and no longer twitching; RR down to 19 BPM. Temp trending back down.

## 2024-01-06 NOTE — SUBJECTIVE & OBJECTIVE
Interval History: No changes on AEDs, reflexes intact, per discussion with Pulm/Crit team NCSE indicates a poor prognosis    Review of Systems   Unable to perform ROS: Acuity of condition     Objective:     Vital Signs (Most Recent):  Temp: 99.5 °F (37.5 °C) (01/05/24 2145)  Pulse: 99 (01/05/24 2145)  Resp: (!) 40 (01/05/24 2145)  BP: (!) 149/82 (01/05/24 2145)  SpO2: 100 % (01/05/24 2145) Vital Signs (24h Range):  Temp:  [97.7 °F (36.5 °C)-99.5 °F (37.5 °C)] 99.5 °F (37.5 °C)  Pulse:  [75-99] 99  Resp:  [18-41] 40  SpO2:  [98 %-100 %] 100 %  BP: ()/(57-90) 149/82     Weight: 72.1 kg (158 lb 15.2 oz)  Body mass index is 20.97 kg/m².    Intake/Output Summary (Last 24 hours) at 1/5/2024 2215  Last data filed at 1/5/2024 1826  Gross per 24 hour   Intake 4151.12 ml   Output 1555 ml   Net 2596.12 ml         Physical Exam  Vitals and nursing note reviewed.   Constitutional:       General: He is not in acute distress.     Appearance: He is not toxic-appearing.      Comments: Intubated and sedated   HENT:      Head: Normocephalic and atraumatic.      Mouth/Throat:      Mouth: Mucous membranes are moist.      Pharynx: No oropharyngeal exudate.   Eyes:      Pupils: Pupils are equal, round, and reactive to light.   Cardiovascular:      Rate and Rhythm: Normal rate.      Heart sounds: No murmur heard.     No friction rub. No gallop.   Pulmonary:      Comments: Intubated, on mechanical ventilation, sedated  Abdominal:      General: Bowel sounds are normal. There is no distension.      Palpations: Abdomen is soft.      Tenderness: There is no abdominal tenderness. There is no guarding or rebound.   Musculoskeletal:      Cervical back: No rigidity or tenderness.      Right lower leg: No edema.      Left lower leg: No edema.             Significant Labs: All pertinent labs within the past 24 hours have been reviewed.  CBC:   Recent Labs   Lab 01/04/24  0329 01/05/24  0429   WBC 19.22* 11.60   HGB 11.4* 10.3*   HCT 33.4* 29.9*     158     CMP:   Recent Labs   Lab 01/04/24  0329 01/05/24  0429    140   K 4.6 3.6    106   CO2 25 28   * 102   BUN 24* 22*   CREATININE 0.9 0.9   CALCIUM 8.8 8.5*   PROT 6.1 5.5*   ALBUMIN 3.1* 2.7*   BILITOT 1.3* 1.0   ALKPHOS 52* 44*   * 96*   ALT 75* 60*   ANIONGAP 11 6*       Significant Imaging: I have reviewed all pertinent imaging results/findings within the past 24 hours.

## 2024-01-06 NOTE — ACP (ADVANCE CARE PLANNING)
[facility] [place]    Interval 1/5/24:  Discussion about prognosis was held with the family. Patient's family was informed that patient was found to be in status epilepticus after initiation of EEG due after sustaining cardiopulmonary arrest following asthma exacerbation on 1/2/24. Patient's family informed that Mr. Pruitt's continued seizure activity on EEG despite high dose propofol, Vimpat, and Keppra likely means that his brain suffered significant damage due to initial hypoxemia leading to cardiopulmonary arrest.     Interval 1/6/24:  Discussion was had with patient's parents that likelihood of possibility of recovery if patient had another episode of cardiac arrest is minuscule. Patient's family decided that if his heart stopped again that Mr. Pruitt would not want to be connected to machine for the rest of his life and that further resuscitation would not be what he would have wanted. DNR orders were placed.      Rolo Segovia M.D.  [facility] PGY-1

## 2024-01-06 NOTE — PLAN OF CARE
Patient on vent with documented settings.  Alarms are set and functioning with adequate volumes.  AMBU bag and mask at bedside. Plan of care on going,

## 2024-01-07 PROBLEM — E87.5 HYPERKALEMIA: Status: RESOLVED | Noted: 2024-01-02 | Resolved: 2024-01-07

## 2024-01-07 LAB
ALBUMIN SERPL BCP-MCNC: 3 G/DL (ref 3.5–5.2)
ALP SERPL-CCNC: 58 U/L (ref 55–135)
ALT SERPL W/O P-5'-P-CCNC: 54 U/L (ref 10–44)
ANION GAP SERPL CALC-SCNC: 14 MMOL/L (ref 8–16)
AST SERPL-CCNC: 67 U/L (ref 10–40)
BASOPHILS # BLD AUTO: 0.01 K/UL (ref 0–0.2)
BASOPHILS NFR BLD: 0.1 % (ref 0–1.9)
BILIRUB SERPL-MCNC: 0.7 MG/DL (ref 0.1–1)
BILIRUB UR QL STRIP: NEGATIVE
BUN SERPL-MCNC: 24 MG/DL (ref 6–20)
CALCIUM SERPL-MCNC: 9.5 MG/DL (ref 8.7–10.5)
CHLORIDE SERPL-SCNC: 105 MMOL/L (ref 95–110)
CLARITY UR: CLEAR
CO2 SERPL-SCNC: 24 MMOL/L (ref 23–29)
COLOR UR: COLORLESS
CREAT SERPL-MCNC: 0.8 MG/DL (ref 0.5–1.4)
DIFFERENTIAL METHOD BLD: ABNORMAL
EOSINOPHIL # BLD AUTO: 0 K/UL (ref 0–0.5)
EOSINOPHIL NFR BLD: 0.2 % (ref 0–8)
ERYTHROCYTE [DISTWIDTH] IN BLOOD BY AUTOMATED COUNT: 12.7 % (ref 11.5–14.5)
EST. GFR  (NO RACE VARIABLE): >60 ML/MIN/1.73 M^2
GLUCOSE SERPL-MCNC: 100 MG/DL (ref 70–110)
GLUCOSE UR QL STRIP: NEGATIVE
HCT VFR BLD AUTO: 36.4 % (ref 40–54)
HGB BLD-MCNC: 12.6 G/DL (ref 14–18)
HGB UR QL STRIP: NEGATIVE
IMM GRANULOCYTES # BLD AUTO: 0.07 K/UL (ref 0–0.04)
IMM GRANULOCYTES NFR BLD AUTO: 0.5 % (ref 0–0.5)
KETONES UR QL STRIP: ABNORMAL
LEUKOCYTE ESTERASE UR QL STRIP: NEGATIVE
LYMPHOCYTES # BLD AUTO: 1.9 K/UL (ref 1–4.8)
LYMPHOCYTES NFR BLD: 15.1 % (ref 18–48)
MCH RBC QN AUTO: 31 PG (ref 27–31)
MCHC RBC AUTO-ENTMCNC: 34.6 G/DL (ref 32–36)
MCV RBC AUTO: 90 FL (ref 82–98)
MONOCYTES # BLD AUTO: 1.1 K/UL (ref 0.3–1)
MONOCYTES NFR BLD: 8.3 % (ref 4–15)
NEUTROPHILS # BLD AUTO: 9.7 K/UL (ref 1.8–7.7)
NEUTROPHILS NFR BLD: 75.8 % (ref 38–73)
NITRITE UR QL STRIP: NEGATIVE
NRBC BLD-RTO: 0 /100 WBC
PH UR STRIP: 7 [PH] (ref 5–8)
PLATELET # BLD AUTO: 183 K/UL (ref 150–450)
PMV BLD AUTO: 9.1 FL (ref 9.2–12.9)
POCT GLUCOSE: 90 MG/DL (ref 70–110)
POCT GLUCOSE: 97 MG/DL (ref 70–110)
POTASSIUM SERPL-SCNC: 4.4 MMOL/L (ref 3.5–5.1)
PROT SERPL-MCNC: 6.9 G/DL (ref 6–8.4)
PROT UR QL STRIP: NEGATIVE
RBC # BLD AUTO: 4.06 M/UL (ref 4.6–6.2)
SODIUM SERPL-SCNC: 143 MMOL/L (ref 136–145)
SP GR UR STRIP: 1.02 (ref 1–1.03)
URN SPEC COLLECT METH UR: ABNORMAL
UROBILINOGEN UR STRIP-ACNC: NEGATIVE EU/DL
WBC # BLD AUTO: 12.85 K/UL (ref 3.9–12.7)

## 2024-01-07 PROCEDURE — 27200966 HC CLOSED SUCTION SYSTEM

## 2024-01-07 PROCEDURE — 94003 VENT MGMT INPAT SUBQ DAY: CPT

## 2024-01-07 PROCEDURE — 63600175 PHARM REV CODE 636 W HCPCS

## 2024-01-07 PROCEDURE — 25000242 PHARM REV CODE 250 ALT 637 W/ HCPCS: Performed by: STUDENT IN AN ORGANIZED HEALTH CARE EDUCATION/TRAINING PROGRAM

## 2024-01-07 PROCEDURE — 94640 AIRWAY INHALATION TREATMENT: CPT

## 2024-01-07 PROCEDURE — 27100171 HC OXYGEN HIGH FLOW UP TO 24 HOURS

## 2024-01-07 PROCEDURE — 25000003 PHARM REV CODE 250: Performed by: STUDENT IN AN ORGANIZED HEALTH CARE EDUCATION/TRAINING PROGRAM

## 2024-01-07 PROCEDURE — 63600175 PHARM REV CODE 636 W HCPCS: Performed by: STUDENT IN AN ORGANIZED HEALTH CARE EDUCATION/TRAINING PROGRAM

## 2024-01-07 PROCEDURE — 87070 CULTURE OTHR SPECIMN AEROBIC: CPT

## 2024-01-07 PROCEDURE — 87106 FUNGI IDENTIFICATION YEAST: CPT

## 2024-01-07 PROCEDURE — 95714 VEEG EA 12-26 HR UNMNTR: CPT

## 2024-01-07 PROCEDURE — 20000000 HC ICU ROOM

## 2024-01-07 PROCEDURE — 80053 COMPREHEN METABOLIC PANEL: CPT | Performed by: INTERNAL MEDICINE

## 2024-01-07 PROCEDURE — 95720 EEG PHY/QHP EA INCR W/VEEG: CPT | Mod: ,,, | Performed by: PSYCHIATRY & NEUROLOGY

## 2024-01-07 PROCEDURE — 99900026 HC AIRWAY MAINTENANCE (STAT)

## 2024-01-07 PROCEDURE — 94761 N-INVAS EAR/PLS OXIMETRY MLT: CPT

## 2024-01-07 PROCEDURE — A4216 STERILE WATER/SALINE, 10 ML: HCPCS | Performed by: INTERNAL MEDICINE

## 2024-01-07 PROCEDURE — 87205 SMEAR GRAM STAIN: CPT

## 2024-01-07 PROCEDURE — 99900035 HC TECH TIME PER 15 MIN (STAT)

## 2024-01-07 PROCEDURE — C9113 INJ PANTOPRAZOLE SODIUM, VIA: HCPCS

## 2024-01-07 PROCEDURE — C9254 INJECTION, LACOSAMIDE: HCPCS

## 2024-01-07 PROCEDURE — 87040 BLOOD CULTURE FOR BACTERIA: CPT

## 2024-01-07 PROCEDURE — 25000003 PHARM REV CODE 250: Performed by: INTERNAL MEDICINE

## 2024-01-07 PROCEDURE — 25000003 PHARM REV CODE 250

## 2024-01-07 PROCEDURE — 81003 URINALYSIS AUTO W/O SCOPE: CPT

## 2024-01-07 PROCEDURE — 85025 COMPLETE CBC W/AUTO DIFF WBC: CPT

## 2024-01-07 RX ORDER — SODIUM CHLORIDE FOR INHALATION 3 %
4 VIAL, NEBULIZER (ML) INHALATION
Status: DISCONTINUED | OUTPATIENT
Start: 2024-01-07 | End: 2024-01-12 | Stop reason: HOSPADM

## 2024-01-07 RX ADMIN — PROPOFOL 100 MCG/KG/MIN: 10 INJECTION, EMULSION INTRAVENOUS at 03:01

## 2024-01-07 RX ADMIN — DEXTROSE MONOHYDRATE 360 MG: 50 INJECTION, SOLUTION INTRAVENOUS at 04:01

## 2024-01-07 RX ADMIN — IPRATROPIUM BROMIDE AND ALBUTEROL SULFATE 3 ML: 2.5; .5 SOLUTION RESPIRATORY (INHALATION) at 06:01

## 2024-01-07 RX ADMIN — MINERAL OIL, WHITE PETROLATUM: .03; .94 OINTMENT OPHTHALMIC at 06:01

## 2024-01-07 RX ADMIN — MINERAL OIL, WHITE PETROLATUM: .03; .94 OINTMENT OPHTHALMIC at 10:01

## 2024-01-07 RX ADMIN — BUDESONIDE 0.25 MG: 0.5 INHALANT RESPIRATORY (INHALATION) at 08:01

## 2024-01-07 RX ADMIN — SODIUM CHLORIDE, PRESERVATIVE FREE 10 ML: 5 INJECTION INTRAVENOUS at 11:01

## 2024-01-07 RX ADMIN — ACETAMINOPHEN 1000 MG: 500 TABLET ORAL at 06:01

## 2024-01-07 RX ADMIN — ACETAMINOPHEN 1000 MG: 500 TABLET ORAL at 11:01

## 2024-01-07 RX ADMIN — PANTOPRAZOLE SODIUM 40 MG: 40 INJECTION, POWDER, FOR SOLUTION INTRAVENOUS at 08:01

## 2024-01-07 RX ADMIN — ACETAMINOPHEN 1000 MG: 500 TABLET ORAL at 05:01

## 2024-01-07 RX ADMIN — PROPOFOL 100 MCG/KG/MIN: 10 INJECTION, EMULSION INTRAVENOUS at 11:01

## 2024-01-07 RX ADMIN — PROPOFOL 100 MCG/KG/MIN: 10 INJECTION, EMULSION INTRAVENOUS at 08:01

## 2024-01-07 RX ADMIN — LEVETIRACETAM 1500 MG: 500 INJECTION, SOLUTION INTRAVENOUS at 08:01

## 2024-01-07 RX ADMIN — DEXTROSE MONOHYDRATE 360 MG: 50 INJECTION, SOLUTION INTRAVENOUS at 07:01

## 2024-01-07 RX ADMIN — MINERAL OIL, WHITE PETROLATUM: .03; .94 OINTMENT OPHTHALMIC at 02:01

## 2024-01-07 RX ADMIN — SODIUM CHLORIDE, PRESERVATIVE FREE 10 ML: 5 INJECTION INTRAVENOUS at 12:01

## 2024-01-07 RX ADMIN — BUDESONIDE 0.25 MG: 0.5 INHALANT RESPIRATORY (INHALATION) at 06:01

## 2024-01-07 RX ADMIN — DEXTROSE MONOHYDRATE 360 MG: 50 INJECTION, SOLUTION INTRAVENOUS at 11:01

## 2024-01-07 RX ADMIN — MONTELUKAST 10 MG: 10 TABLET, FILM COATED ORAL at 08:01

## 2024-01-07 RX ADMIN — IPRATROPIUM BROMIDE AND ALBUTEROL SULFATE 3 ML: 2.5; .5 SOLUTION RESPIRATORY (INHALATION) at 12:01

## 2024-01-07 RX ADMIN — SODIUM CHLORIDE, PRESERVATIVE FREE 10 ML: 5 INJECTION INTRAVENOUS at 06:01

## 2024-01-07 RX ADMIN — LEVETIRACETAM 1500 MG: 500 INJECTION, SOLUTION INTRAVENOUS at 09:01

## 2024-01-07 RX ADMIN — PROPOFOL 100 MCG/KG/MIN: 10 INJECTION, EMULSION INTRAVENOUS at 01:01

## 2024-01-07 RX ADMIN — PROPOFOL 100 MCG/KG/MIN: 10 INJECTION, EMULSION INTRAVENOUS at 06:01

## 2024-01-07 RX ADMIN — LACOSAMIDE 200 MG: 10 INJECTION, SOLUTION INTRAVENOUS at 10:01

## 2024-01-07 RX ADMIN — HYDRALAZINE HYDROCHLORIDE 10 MG: 20 INJECTION, SOLUTION INTRAMUSCULAR; INTRAVENOUS at 06:01

## 2024-01-07 RX ADMIN — LACOSAMIDE 200 MG: 10 INJECTION, SOLUTION INTRAVENOUS at 09:01

## 2024-01-07 RX ADMIN — IPRATROPIUM BROMIDE AND ALBUTEROL SULFATE 3 ML: 2.5; .5 SOLUTION RESPIRATORY (INHALATION) at 08:01

## 2024-01-07 RX ADMIN — PROPOFOL 100 MCG/KG/MIN: 10 INJECTION, EMULSION INTRAVENOUS at 09:01

## 2024-01-07 RX ADMIN — PROPOFOL 100 MCG/KG/MIN: 10 INJECTION, EMULSION INTRAVENOUS at 04:01

## 2024-01-07 RX ADMIN — LABETALOL HYDROCHLORIDE 5 MG: 5 INJECTION INTRAVENOUS at 08:01

## 2024-01-07 RX ADMIN — ENOXAPARIN SODIUM 40 MG: 40 INJECTION SUBCUTANEOUS at 05:01

## 2024-01-07 NOTE — PROCEDURES
DATE: 1/6/24    EEG NUMBER: OK -3    REFERRING PHYSICIAN:  Dr. Cazares     This EEG was performed to assess for subclinical seizures      ELECTROENCEPHALOGRAM REPORT     METHODOLOGY:  Electroencephalographic (EEG) recording is with electrodes placed according to the International 10-20 placement system.  Thirty two (32) channels of digital signal are simultaneously recorded from the scalp and may include EKG, EMG, and/or eye monitors.   Recording band pass was 0.1 to 512 hz.  Digital video recording of the patient is simultaneously recorded with the EEG.  The nursing staff report clinical symptoms and may press an event button when the patient has symptoms of clinical interest to the treating physicians.  EEG and video recording is stored and archived in digital format.  The entire recording is visually reviewed, and the times identified by computer analysis as being spikes or seizures are reviewed again.  Activation procedures which include photic stimulation, hyperventilation and instructing patients to perform simple task are done in selected patients.   Compresses spectral analysis (CSA) is also performed on the activity recorded from each individual channel.  This is displayed as a power display of frequencies from 0 to 30 Hz over time.   The CSA analysis is done and displayed continuously.  This is reviewed for asymmetries in power between homologous areas of the scalp and for presence of changes in power which can be seen when seizures occur.  Sections of suspected abnormalities on the CSA is then compared with the original EEG recording.                [employer] software was also utilized in the review of this study.  This software suite analyzes the EEG recording in multiple domains.  Coherence and rhythmicity is computed to identify EEG sections which may contain organized seizures.  Each channel undergoes analysis to detect presence of spike and sharp waves which have special and morphological  characteristic of epileptic activity.  The routine EEG recording is converted from spacial into frequency domain.  This is then displayed comparing homologous areas to identify areas of significant asymmetry.  Algorithm to identify non-cortically generated artifact is used to separate eye movement, EMG and other artifact from the EEG.     Recording times   Start on January 6, 2024 at hours 7 minute 0 seconds 43  End on January 7, 2024 at hours 7 minute 0 seconds 7   The total time of EEG recording for the study was 23 hours and 59 minutes    EEG FINDINGS:  The recording was obtained with a number of standard bipolar and referential montages during comatose state.  Diffuse disorganized low amplitude mixed delta and occasional theta range slowing was noted which was symmetric.  Short periods of suppression lasting up to 1 seconds were noted.  The background was punctuated by  rare low-amplitude generalized as well as independent left and right lateralized epileptiform discharges. The background was intermixed with symmetric spindles.   Variability and reactivity were noted.  No seizures were recorded.    The EKG channel revealed a sinus rhythm.     IMPRESSION:  This is an abnormal EEG during comatose state.  Diffuse disorganized low-amplitude slowing of the background was noted.  Generalized as well as independent left and right lateralized epileptiform discharges were noted which were pseudo periodic at < 2 Hz.  Two brief electrographic seizures emanating from the right frontal region were noted    CLINICAL CORRELATION:  The patient is a 53-year-old male with a history of anoxic brain injury who is currently maintained on intravenous infusions of propofol and on Keppra and Vimpat.  This is an abnormal EEG during comatose state.  The overall degree of disorganization and slowing for given age is suggestive of a severe encephalopathy. The presence of epileptiform discharges is suggestive of cortical irritability with  increased risk of focal seizures from either hemisphere.  There was a decrease in the overall burden of epileptiform discharges in this study compared to the prior study.  No seizures recorded during this study.

## 2024-01-07 NOTE — NURSING
Patient dyssynchronous with vent, left upward eye gaze, gagging on ETT, posturing noted. Propofol @ 100mcg. Suctioned x2 with little improvement. Notified critical care, no new orders at this time. Continuous EEG remains in place.

## 2024-01-07 NOTE — ASSESSMENT & PLAN NOTE
S/p CPR with ROSC now on TTM, now rewarming  Per Pulm/Crit  Cardiac arrest: Due to hypoxemia. TTM completed. Maintaining normothermia. Reflexes intact. MRI brain on Monday.     Unchanged, MRI on MOnday

## 2024-01-07 NOTE — PROGRESS NOTES
[facility]  Progress Note    Patient Name: Aaron Pruitt  MRN: [id number]  Patient Class: IP- Inpatient   Admission Date: 1/1/2024  Length of Stay: 6 days  Attending Physician: Yamila Cazares MD  Primary Care Provider: Gogo Vivar NP        Subjective:     Principal Problem:Acute respiratory failure with hypoxia and hypercarbia        HPI:  52 YO M with PMHx significant for  COPD/Asthma presented to the ER intubated on the field for acute respiratory failure post cardiac arrest s/p resuscitation and shock x1. Per chart review and   family at bedside reports he was shoveling some dirt in his yard when he got severely short of breath and suddenly had difficulty breathing and neighbors called EMS. When EMS arrived, reportedly still had a pulse but was struggling to breath, became pulseless. He was given narcan x2 without improvement. CPR and Epi x3. ROSC after 3rd round. Also reportedly get shocked x1. He was transferred to the ER and sedated. Labs in the ER with elevated lactic acid, elevated troponin, , elevated CPK 3294, elevated AST//82, drug screen with presumptive THC. CT head no acute intracranial abnormality, CT chest no acute pulmonary process.    Overview/Hospital Course:  1/3/2024 on TTM on fentanyl and propofol. K elevated to 5.7-->5.9, Lokelma 5g x 1, insulin/dextrose given  1/4/2024 Rewarming initiated. Sedation held, myoclonus noted. Restarted on propofol. EEG shows possible NCSE activity, started on AEDs  1/5/2024 Unchanged, Vimpat added, on keppra, vimpat and propofol.   1/6/2024 No clinical improvement, loaded with depacon    Interval History: Unchanged. Planned for MRI on MOnday    Review of Systems   Unable to perform ROS: Acuity of condition     Objective:     Vital Signs (Most Recent):  Temp: 99.1 °F (37.3 °C) (01/07/24 1330)  Pulse: 86 (01/07/24 1330)  Resp: (!) 21 (01/07/24 1330)  BP: (!) 142/90 (01/07/24 1300)  SpO2: 100 % (01/07/24 1330) Vital  Signs (24h Range):  Temp:  [97.7 °F (36.5 °C)-100 °F (37.8 °C)] 99.1 °F (37.3 °C)  Pulse:  [] 86  Resp:  [14-47] 21  SpO2:  [98 %-100 %] 100 %  BP: ()/() 142/90     Weight: 72.1 kg (158 lb 15.2 oz)  Body mass index is 20.97 kg/m².    Intake/Output Summary (Last 24 hours) at 1/7/2024 1339  Last data filed at 1/7/2024 1300  Gross per 24 hour   Intake 1979.1 ml   Output 4375 ml   Net -2395.9 ml         Physical Exam  Vitals and nursing note reviewed.   Constitutional:       General: He is not in acute distress.     Appearance: He is not toxic-appearing.      Comments: Intubated and sedated   HENT:      Head: Normocephalic and atraumatic.      Mouth/Throat:      Mouth: Mucous membranes are moist.      Pharynx: No oropharyngeal exudate.   Eyes:      Pupils: Pupils are equal, round, and reactive to light.   Cardiovascular:      Rate and Rhythm: Normal rate.      Heart sounds: No murmur heard.     No friction rub. No gallop.   Pulmonary:      Comments: Intubated, on mechanical ventilation, sedated  Abdominal:      General: Bowel sounds are normal. There is no distension.      Palpations: Abdomen is soft.      Tenderness: There is no abdominal tenderness. There is no guarding or rebound.   Musculoskeletal:      Cervical back: No rigidity or tenderness.      Right lower leg: No edema.      Left lower leg: No edema.             Significant Labs: All pertinent labs within the past 24 hours have been reviewed.  CBC:   Recent Labs   Lab 01/06/24  0539 01/07/24  0431   WBC 8.57 12.85*   HGB 10.4* 12.6*   HCT 29.7* 36.4*    183     CMP:   Recent Labs   Lab 01/06/24  0539 01/07/24  0431    143   K 3.7 4.4    105   CO2 26 24   GLU 99 100   BUN 19 24*   CREATININE 0.8 0.8   CALCIUM 8.7 9.5   PROT 5.7* 6.9   ALBUMIN 2.7* 3.0*   BILITOT 0.7 0.7   ALKPHOS 38* 58   AST 70* 67*   ALT 51* 54*   ANIONGAP 9 14       Significant Imaging: I have reviewed all pertinent imaging results/findings within the  past 24 hours.    Assessment/Plan:      * Acute respiratory failure with hypoxia and hypercarbia  Patient with Hypoxic Respiratory failure which is Acute.  he is not on home oxygen. Supplemental oxygen was provided and noted- Vent Mode: A/CMV-PC  Oxygen Concentration (%):  [] 21  Resp Rate Total:  [17 br/min-49 br/min] 24 br/min  Vt Set:  [420 mL] 420 mL  PEEP/CPAP:  [5 cmH20-8.5 cmH20] 5 cmH20  Mean Airway Pressure:  [7.8 qhT67-83.7 cmH20] 8 cmH20    .   Signs/symptoms of respiratory failure include- tachypnea, increased work of breathing, and respiratory distress. Contributing diagnoses includes -  unkonwn  Labs and images were reviewed. Patient Has recent ABG, which has been reviewed. Will treat underlying causes and adjust management of respiratory failure as follows-     H/o severe asthma and COPD  Steroids, nebs, added ceftriaxone and azithromycin (CXR clear)    Acute encephalopathy        Seizure disorder, nonconvulsive, with status epilepticus  EEG  This pattern meets criteria for nonconvulsive status epilepticus.  Initial findings and recommendations including lorazepam and levetiracetam discussed with the primary team.     Follow up Neurology recommendations    Hyperkalemia  Resolved or labs hemolysed . This patient has hyperkalemia which is controlled. We will monitor for arrhythmias with EKG or continuous telemetry. We will treat the hyperkalemia with Potassium Binders and Nebulized albuterol sulfate. The likely etiology of the hyperkalemia is  unknown .  The patients latest potassium has been reviewed and the results are listed below  Recent Labs   Lab 01/05/24  0429   K 3.6       Resolved        Non-traumatic rhabdomyolysis  Vs traumatic post cardiac resuscitation  Monitor CPK - improved  Now off IVFs      Cardiac arrest  S/p CPR with ROSC now on TTM, now rewarming  Per Pulm/Crit  Cardiac arrest: Due to hypoxemia. TTM completed. Maintaining normothermia. Reflexes intact. MRI brain on Monday.      Unchanged, MRI on MOnday        VTE Risk Mitigation (From admission, onward)           Ordered     enoxaparin injection 40 mg  Every 24 hours         01/01/24 1855     Apply sequential compression device to lower extremities (if no contraindications). Medical venous thromboembolus prophylaxis is preferred.  Until discontinued         01/01/24 1928                    Discharge Planning   MARQUISE:      Code Status: DNR   Is the patient medically ready for discharge?:     Reason for patient still in hospital (select all that apply): Patient unstable and Patient trending condition  Discharge Plan A: Home            Critical care time spent on the evaluation and treatment of severe organ dysfunction, review of pertinent labs and imaging studies, discussions with consulting providers and discussions with patient/family: 33 minutes.      Yamila Cazares MD  Department of [facility] Medicine   [place] - Intensive Care

## 2024-01-07 NOTE — ASSESSMENT & PLAN NOTE
Patient with Hypoxic Respiratory failure which is Acute.  he is not on home oxygen. Supplemental oxygen was provided and noted- Vent Mode: A/CMV-PC  Oxygen Concentration (%):  [] 21  Resp Rate Total:  [17 br/min-49 br/min] 24 br/min  Vt Set:  [420 mL] 420 mL  PEEP/CPAP:  [5 cmH20-8.5 cmH20] 5 cmH20  Mean Airway Pressure:  [7.8 irU68-04.7 cmH20] 8 cmH20    .   Signs/symptoms of respiratory failure include- tachypnea, increased work of breathing, and respiratory distress. Contributing diagnoses includes -  unkonwn  Labs and images were reviewed. Patient Has recent ABG, which has been reviewed. Will treat underlying causes and adjust management of respiratory failure as follows-     H/o severe asthma and COPD  Steroids, nebs, added ceftriaxone and azithromycin (CXR clear)

## 2024-01-07 NOTE — SUBJECTIVE & OBJECTIVE
Interval History: Unchanged. Planned for MRI on MOnday    Review of Systems   Unable to perform ROS: Acuity of condition     Objective:     Vital Signs (Most Recent):  Temp: 99.1 °F (37.3 °C) (01/07/24 1330)  Pulse: 86 (01/07/24 1330)  Resp: (!) 21 (01/07/24 1330)  BP: (!) 142/90 (01/07/24 1300)  SpO2: 100 % (01/07/24 1330) Vital Signs (24h Range):  Temp:  [97.7 °F (36.5 °C)-100 °F (37.8 °C)] 99.1 °F (37.3 °C)  Pulse:  [] 86  Resp:  [14-47] 21  SpO2:  [98 %-100 %] 100 %  BP: ()/() 142/90     Weight: 72.1 kg (158 lb 15.2 oz)  Body mass index is 20.97 kg/m².    Intake/Output Summary (Last 24 hours) at 1/7/2024 1339  Last data filed at 1/7/2024 1300  Gross per 24 hour   Intake 1979.1 ml   Output 4375 ml   Net -2395.9 ml         Physical Exam  Vitals and nursing note reviewed.   Constitutional:       General: He is not in acute distress.     Appearance: He is not toxic-appearing.      Comments: Intubated and sedated   HENT:      Head: Normocephalic and atraumatic.      Mouth/Throat:      Mouth: Mucous membranes are moist.      Pharynx: No oropharyngeal exudate.   Eyes:      Pupils: Pupils are equal, round, and reactive to light.   Cardiovascular:      Rate and Rhythm: Normal rate.      Heart sounds: No murmur heard.     No friction rub. No gallop.   Pulmonary:      Comments: Intubated, on mechanical ventilation, sedated  Abdominal:      General: Bowel sounds are normal. There is no distension.      Palpations: Abdomen is soft.      Tenderness: There is no abdominal tenderness. There is no guarding or rebound.   Musculoskeletal:      Cervical back: No rigidity or tenderness.      Right lower leg: No edema.      Left lower leg: No edema.             Significant Labs: All pertinent labs within the past 24 hours have been reviewed.  CBC:   Recent Labs   Lab 01/06/24  0539 01/07/24  0431   WBC 8.57 12.85*   HGB 10.4* 12.6*   HCT 29.7* 36.4*    183     CMP:   Recent Labs   Lab 01/06/24  0539  01/07/24  0431    143   K 3.7 4.4    105   CO2 26 24   GLU 99 100   BUN 19 24*   CREATININE 0.8 0.8   CALCIUM 8.7 9.5   PROT 5.7* 6.9   ALBUMIN 2.7* 3.0*   BILITOT 0.7 0.7   ALKPHOS 38* 58   AST 70* 67*   ALT 51* 54*   ANIONGAP 9 14       Significant Imaging: I have reviewed all pertinent imaging results/findings within the past 24 hours.

## 2024-01-07 NOTE — PROGRESS NOTES
Progress Note  [facility]    Attending: Any Shore MD   Admit Date: 1/1/2024  Today's Date: 01/07/2024    HPI  Per ER:   53-year-old M w/pmhx: asthma, was brought in by EMS as a cardiorespiratory arrest.  911 was called for shortness of breath, as patient was working in garden. When EMS arrived, the patient was slumped over face forward with a friend holding a non-rebreather mask over his face that was not hooked up to oxygen.  He had a Medrol Dosepak and an albuterol inhaler by his side.  EMS started giving the patient a nebulizer treatment and he became pulseless and apneic.  He was intubated with an ET tube and CPR was initiated. The patient regained pulses after CPR by the MANDI and parviz X3, shocked X1, unclear rhythm.    SUBJECTIVE:   OVERNIGHT    Patient seen at bedside this morning. Still not in burst suppressions. Patient continues to appear agitated this morning.     Review of Systems   Unable to perform ROS: Intubated     OBJECTIVE:     Vital Signs Trends/Hx Reviewed  Vitals:    01/07/24 1100 01/07/24 1115 01/07/24 1130 01/07/24 1145   BP: 134/82  135/89    BP Location:       Patient Position:       Pulse: 79 77 88 79   Resp: 16 18 (!) 22 18   Temp: 99.1 °F (37.3 °C) 99 °F (37.2 °C) 99 °F (37.2 °C) 99 °F (37.2 °C)   TempSrc:       SpO2: 100% 100% 100% 100%   Weight:       Height:           Vent Mode: A/CMV-PC  Oxygen Concentration (%):  [] 21  Resp Rate Total:  [17 br/min-49 br/min] 26 br/min  Vt Set:  [420 mL] 420 mL  PEEP/CPAP:  [5 cmH20-8.5 cmH20] 5 cmH20  Mean Airway Pressure:  [7.8 ijL71-62.7 cmH20] 8 cmH20      Physical Exam  Constitutional:       Comments: RASS -5   HENT:      Head: Normocephalic and atraumatic.   Cardiovascular:      Rate and Rhythm: Normal rate and regular rhythm.      Pulses: Normal pulses.      Heart sounds: Normal heart sounds. No murmur heard.  Pulmonary:      Breath sounds: No wheezing.      Comments: Intubated. Coarse  "mechanical breath sounds. Tachypnea to the 40s.  Abdominal:      General: Abdomen is flat. There is no distension.      Palpations: Abdomen is soft.   Musculoskeletal:      Right lower leg: No edema.      Left lower leg: No edema.   Neurological:      Comments: RASS -5  Patient has slight dyssymmetry of pupils, however reactive. He does have a cough, gag, corneal. No withdrawal to pain.        Laboratory:  Recent Labs   Lab 01/05/24  0429 01/06/24  0539 01/07/24  0431   WBC 11.60 8.57 12.85*   HGB 10.3* 10.4* 12.6*   HCT 29.9* 29.7* 36.4*    151 183    140 143   K 3.6 3.7 4.4    105 105   CREATININE 0.9 0.8 0.8   BUN 22* 19 24*   CO2 28 26 24   ALT 60* 51* 54*   AST 96* 70* 67*     No results for input(s): "PH", "PCO2", "PO2", "HCO3", "POCSATURATED", "BE" in the last 24 hours.      Chest Imaging:   Endotracheal tube tip 3.8 cm from the michael.  Satisfactory position of enteric tube tip below the left hemidiaphragm.  No acute intrathoracic process on in admission chest x-ray.  Chest xray ordered 1/7/23, pending read.    ASSESSMENT & RECOMMENDATIONS     Neuro/Psych  #Non-Conclusive Status Epilepticus   Intubated  Sedated w/ non-titratable propofol 100 mcg/kg/hr   Keppra loaded 1/4/24 with 4,000 mg. Keppra 1500 mg BID  Vimpat 800 mg Loaded 1/5/24. Vimpat 200 mg BID  Depakote 3g loaded 1/6/2024. Depakote 360 mg TID  RASS -5.  Goal compliant w/vent.  No known issues prior to admission.     PLAN:  Continue sedation in the setting of Non-conclusive status epilepticus to obtain burst-suppression.  MRI Monday 1/8/23. Remove EEG electrodes as early as possible.   EEG concerning for status epilepticus. S/p 3 full dose AEDs.    CV  #Post-arrest  Reportedly epi x3, shock x1 (unk rhythm)  CPK 4800 - Below 1000 at this time.  Trop 0.446 down trended.  Original EKG: Tachycardic w/depressions in inferior leads.  Had episodes of hypertension requiring Hydralazine and Labetalol for BP above 190 mmHg.    #Labile " bp  Not on pressors  POCUS good squeeze, no clear pathology  Formal echo shows EF 65% normal noble function    PLAN:  Monitor  Cards following  - PRN Hydralazine placed for SBP > 180 mmHg.   - PRN Labetalol placed for SBP > 180 mmHg.    Pulm  #Respiratory Failure  #Asthma exacerbation  Vent settings as above  7.45/45/66 on 420/16/5/21%. - 1/6/2024  No plan to recheck ABG at this time.      PLAN:  Duo-neb q6h  Start budesonide 0.25 mg nebs BID today  Montelukast 10mg qd    FEN/GI  F: euvolemic  E: Na+ 143, K+ 4.4, Mg++ 2.1  N: tube feeds    #Hyperkalemia   Orginally 5.9, shifted with Insulin  4.4 today.    PLAN:  CTM    GI  Prophylaxis: protonix     RENAL  UOP: 4400 cc , In: 1900 cc, net -2500 cc in last 24 hrs  Down 1350 ml since admission.    #rhabdomyolysis - resolving  Down trending CK with most recent CK of less than 1000, off maintenance fluids.    BUN/Cr: wnl, at baseline  Continue to monitor renal status and urine output - holding diuretics, re-evaluate tomorrow     Heme  H/H 12.6; stable  WBC 12.8  DVT prophylaxis: Lovenox    Endo  Glu 99, stable     PLAN:  SSI    ID  Stop Antibiotics 1/7/2024  Tylenol 1000 mg Q6H for fevers - likely neurogenic fever.    Repeating Blood Cultures. Obtain Respiratory and analysis today.     Dispo  52 y/o M w/hx of asthma/COPD (no PFT on file) s/p cardiac arrest, ROSC after epi 3x, shock 1x, likely from hypercapneic respiratory failure. Initially CV unstable with labile bp and tachycardia. Now stable, not requiring pressor or anti-htn. Gases improving. Good liver function. Producing urine, no Cr elevation, but hyperkalemic. Shifted, monitoring. On 1/4/24: EEG showed that patient was in status epilepticus, currently on 100 100 mcg/kg/hr profolol, Keppra loaded with 4g, Vimpat loaded with 800 mg. Continuing ICU care for airway protection and status epilepticus. Will be undergoing MRI tomorrow.    F Tube Feeds  A N/a  S propofol   T lovenox  H 30  U protonix  G 120  S holding off  due to seizures.  B monitor  I ETT, OG, PIV   D Stopped vancomycin and zosyn 1/3/24 - 1/7/24    Rolo Segovia M.D.  [facility] Internal Medicine, PGY-1   01/07/2024.

## 2024-01-07 NOTE — PROGRESS NOTES
This is an attestation of a separate note written by the ICU resident today. As the teaching physician, I was present for and have confirmed the key portions of the service documented in the resident's note from today. In addition to the resident's note, I add the following:      Mr. Pruitt is a 54 yo M with PMHx of tobacco use disorder, asthma, and COPD who was brought to [facility] on 1/1/23 after hypoxic cardiac arrest s/p ACLS with ROSC and intubation by EMS. Patient had called EMS with SOB after shoveling dirt prior to cardiac event and was found in respiratory distress with low O2 sats. CT head (-) for acute abnormalities. Hospitalization complicated by hypoxemic/hypercapnic respiratory failure and severe metabolic acidosis (ABG 6.9/76/216/16/-17) which resolved. Hospitalization further complicated by refractory non-convulsive status epilepticus.     Plan:  Cardiac arrest: Due to hypoxemia. TTM completed. Maintaining normothermia with tylenol and active cooling. Reflexes intact. MRI brain tomorrow, 1/8/24.   NCSE: On cEEG. On Keppra 1500 mg BID, Vimpat 200 mg BID, and Depacon 360 mg q8h. On propofol 100 mcg/kg/min. TAG levels q72h. Refractory seizures are a marker of poor neurological prognosis and irreversible brain injury. Mother understands this and does not want her son to suffer long-term in an assisted facility. She has stated that if the MRI brain shows injury, then she will transition to comfort-focused care.  Respiratory failure: On Pi13/15/5/21%.  Asthma/COPD: Continue duonebs q6h, budesonide nebs BID, and montelukast.  Nutrition: On TF  DVT ppx: Lovenox  GI ppx: PPI  Code status: DNR     50 minutes of critical care time was spent  in the critical care management of the patient. This included management of organ failures related to critical illness, titration of continuous infusions, management of mechanical ventilation, review of pertinent labs and imaging studies, discussion of the patient with  consulting services, and discussion of the patients condition with the patient and family.     Any Shore MD  [facility] PCCM Attending  [phone]

## 2024-01-07 NOTE — PLAN OF CARE
Pt received as charted on vent flowsheet. Ambu bag and mask at bedside. All alarms on and functional with adequate volume. Cuff pressure monitored via MLT. ETT size #7.0 .Pt with no apprent respiratory distress noted. Will continue to  Monitor.

## 2024-01-08 LAB
ALBUMIN SERPL BCP-MCNC: 3 G/DL (ref 3.5–5.2)
ALP SERPL-CCNC: 64 U/L (ref 55–135)
ALT SERPL W/O P-5'-P-CCNC: 46 U/L (ref 10–44)
ANION GAP SERPL CALC-SCNC: 16 MMOL/L (ref 8–16)
AST SERPL-CCNC: 54 U/L (ref 10–40)
BACTERIA BLD CULT: NORMAL
BACTERIA BLD CULT: NORMAL
BASOPHILS # BLD AUTO: 0.01 K/UL (ref 0–0.2)
BASOPHILS NFR BLD: 0.1 % (ref 0–1.9)
BILIRUB SERPL-MCNC: 0.7 MG/DL (ref 0.1–1)
BUN SERPL-MCNC: 26 MG/DL (ref 6–20)
CALCIUM SERPL-MCNC: 9.7 MG/DL (ref 8.7–10.5)
CHLORIDE SERPL-SCNC: 105 MMOL/L (ref 95–110)
CO2 SERPL-SCNC: 17 MMOL/L (ref 23–29)
CREAT SERPL-MCNC: 0.7 MG/DL (ref 0.5–1.4)
DIFFERENTIAL METHOD BLD: ABNORMAL
EOSINOPHIL # BLD AUTO: 0.1 K/UL (ref 0–0.5)
EOSINOPHIL NFR BLD: 0.4 % (ref 0–8)
ERYTHROCYTE [DISTWIDTH] IN BLOOD BY AUTOMATED COUNT: 12.8 % (ref 11.5–14.5)
EST. GFR  (NO RACE VARIABLE): >60 ML/MIN/1.73 M^2
GLUCOSE SERPL-MCNC: 77 MG/DL (ref 70–110)
HCT VFR BLD AUTO: 39.7 % (ref 40–54)
HGB BLD-MCNC: 13.9 G/DL (ref 14–18)
IMM GRANULOCYTES # BLD AUTO: 0.1 K/UL (ref 0–0.04)
IMM GRANULOCYTES NFR BLD AUTO: 0.9 % (ref 0–0.5)
LYMPHOCYTES # BLD AUTO: 1.4 K/UL (ref 1–4.8)
LYMPHOCYTES NFR BLD: 11.7 % (ref 18–48)
MCH RBC QN AUTO: 31.2 PG (ref 27–31)
MCHC RBC AUTO-ENTMCNC: 35 G/DL (ref 32–36)
MCV RBC AUTO: 89 FL (ref 82–98)
MONOCYTES # BLD AUTO: 1 K/UL (ref 0.3–1)
MONOCYTES NFR BLD: 8.8 % (ref 4–15)
NEUTROPHILS # BLD AUTO: 9 K/UL (ref 1.8–7.7)
NEUTROPHILS NFR BLD: 78.1 % (ref 38–73)
NRBC BLD-RTO: 0 /100 WBC
PLATELET # BLD AUTO: 143 K/UL (ref 150–450)
PMV BLD AUTO: 9.6 FL (ref 9.2–12.9)
POCT GLUCOSE: 100 MG/DL (ref 70–110)
POCT GLUCOSE: 113 MG/DL (ref 70–110)
POCT GLUCOSE: 77 MG/DL (ref 70–110)
POCT GLUCOSE: 77 MG/DL (ref 70–110)
POCT GLUCOSE: 92 MG/DL (ref 70–110)
POTASSIUM SERPL-SCNC: 4.6 MMOL/L (ref 3.5–5.1)
PROT SERPL-MCNC: 7.4 G/DL (ref 6–8.4)
RBC # BLD AUTO: 4.46 M/UL (ref 4.6–6.2)
SODIUM SERPL-SCNC: 138 MMOL/L (ref 136–145)
TRIGL SERPL-MCNC: 787 MG/DL (ref 30–150)
WBC # BLD AUTO: 11.53 K/UL (ref 3.9–12.7)

## 2024-01-08 PROCEDURE — 99900035 HC TECH TIME PER 15 MIN (STAT)

## 2024-01-08 PROCEDURE — C9254 INJECTION, LACOSAMIDE: HCPCS

## 2024-01-08 PROCEDURE — 25000242 PHARM REV CODE 250 ALT 637 W/ HCPCS: Performed by: STUDENT IN AN ORGANIZED HEALTH CARE EDUCATION/TRAINING PROGRAM

## 2024-01-08 PROCEDURE — 63600175 PHARM REV CODE 636 W HCPCS

## 2024-01-08 PROCEDURE — C9113 INJ PANTOPRAZOLE SODIUM, VIA: HCPCS

## 2024-01-08 PROCEDURE — 94003 VENT MGMT INPAT SUBQ DAY: CPT

## 2024-01-08 PROCEDURE — 25000003 PHARM REV CODE 250

## 2024-01-08 PROCEDURE — 25000003 PHARM REV CODE 250: Performed by: STUDENT IN AN ORGANIZED HEALTH CARE EDUCATION/TRAINING PROGRAM

## 2024-01-08 PROCEDURE — 85025 COMPLETE CBC W/AUTO DIFF WBC: CPT

## 2024-01-08 PROCEDURE — A4216 STERILE WATER/SALINE, 10 ML: HCPCS | Performed by: INTERNAL MEDICINE

## 2024-01-08 PROCEDURE — 25000003 PHARM REV CODE 250: Performed by: INTERNAL MEDICINE

## 2024-01-08 PROCEDURE — 20000000 HC ICU ROOM

## 2024-01-08 PROCEDURE — 95718 EEG PHYS/QHP 2-12 HR W/VEEG: CPT | Mod: ,,, | Performed by: PSYCHIATRY & NEUROLOGY

## 2024-01-08 PROCEDURE — 27100171 HC OXYGEN HIGH FLOW UP TO 24 HOURS

## 2024-01-08 PROCEDURE — 94640 AIRWAY INHALATION TREATMENT: CPT

## 2024-01-08 PROCEDURE — 84478 ASSAY OF TRIGLYCERIDES: CPT | Performed by: STUDENT IN AN ORGANIZED HEALTH CARE EDUCATION/TRAINING PROGRAM

## 2024-01-08 PROCEDURE — 63600175 PHARM REV CODE 636 W HCPCS: Performed by: STUDENT IN AN ORGANIZED HEALTH CARE EDUCATION/TRAINING PROGRAM

## 2024-01-08 PROCEDURE — 94761 N-INVAS EAR/PLS OXIMETRY MLT: CPT

## 2024-01-08 PROCEDURE — 36415 COLL VENOUS BLD VENIPUNCTURE: CPT | Performed by: INTERNAL MEDICINE

## 2024-01-08 PROCEDURE — 80053 COMPREHEN METABOLIC PANEL: CPT | Performed by: INTERNAL MEDICINE

## 2024-01-08 RX ORDER — FENOFIBRATE 160 MG/1
160 TABLET ORAL DAILY
Status: DISCONTINUED | OUTPATIENT
Start: 2024-01-08 | End: 2024-01-12 | Stop reason: HOSPADM

## 2024-01-08 RX ADMIN — PROPOFOL 100 MCG/KG/MIN: 10 INJECTION, EMULSION INTRAVENOUS at 07:01

## 2024-01-08 RX ADMIN — SODIUM CHLORIDE, PRESERVATIVE FREE 10 ML: 5 INJECTION INTRAVENOUS at 12:01

## 2024-01-08 RX ADMIN — PROPOFOL 100 MCG/KG/MIN: 10 INJECTION, EMULSION INTRAVENOUS at 04:01

## 2024-01-08 RX ADMIN — PANTOPRAZOLE SODIUM 40 MG: 40 INJECTION, POWDER, FOR SOLUTION INTRAVENOUS at 09:01

## 2024-01-08 RX ADMIN — MINERAL OIL, WHITE PETROLATUM: .03; .94 OINTMENT OPHTHALMIC at 04:01

## 2024-01-08 RX ADMIN — SODIUM CHLORIDE, PRESERVATIVE FREE 10 ML: 5 INJECTION INTRAVENOUS at 06:01

## 2024-01-08 RX ADMIN — LACOSAMIDE 200 MG: 10 INJECTION, SOLUTION INTRAVENOUS at 10:01

## 2024-01-08 RX ADMIN — DEXTROSE MONOHYDRATE 360 MG: 50 INJECTION, SOLUTION INTRAVENOUS at 11:01

## 2024-01-08 RX ADMIN — PROPOFOL 100 MCG/KG/MIN: 10 INJECTION, EMULSION INTRAVENOUS at 01:01

## 2024-01-08 RX ADMIN — MINERAL OIL, WHITE PETROLATUM: .03; .94 OINTMENT OPHTHALMIC at 10:01

## 2024-01-08 RX ADMIN — DEXTROSE MONOHYDRATE 360 MG: 50 INJECTION, SOLUTION INTRAVENOUS at 08:01

## 2024-01-08 RX ADMIN — ACETAMINOPHEN 1000 MG: 500 TABLET ORAL at 11:01

## 2024-01-08 RX ADMIN — ACETAMINOPHEN 1000 MG: 500 TABLET ORAL at 05:01

## 2024-01-08 RX ADMIN — ENOXAPARIN SODIUM 40 MG: 40 INJECTION SUBCUTANEOUS at 04:01

## 2024-01-08 RX ADMIN — IPRATROPIUM BROMIDE AND ALBUTEROL SULFATE 3 ML: 2.5; .5 SOLUTION RESPIRATORY (INHALATION) at 01:01

## 2024-01-08 RX ADMIN — LEVETIRACETAM 1500 MG: 500 INJECTION, SOLUTION INTRAVENOUS at 09:01

## 2024-01-08 RX ADMIN — IPRATROPIUM BROMIDE AND ALBUTEROL SULFATE 3 ML: 2.5; .5 SOLUTION RESPIRATORY (INHALATION) at 12:01

## 2024-01-08 RX ADMIN — LEVETIRACETAM 1500 MG: 500 INJECTION, SOLUTION INTRAVENOUS at 08:01

## 2024-01-08 RX ADMIN — IPRATROPIUM BROMIDE AND ALBUTEROL SULFATE 3 ML: 2.5; .5 SOLUTION RESPIRATORY (INHALATION) at 07:01

## 2024-01-08 RX ADMIN — BUDESONIDE 0.25 MG: 0.5 INHALANT RESPIRATORY (INHALATION) at 07:01

## 2024-01-08 RX ADMIN — FENOFIBRATE 160 MG: 160 TABLET ORAL at 06:01

## 2024-01-08 RX ADMIN — PROPOFOL 100 MCG/KG/MIN: 10 INJECTION, EMULSION INTRAVENOUS at 09:01

## 2024-01-08 RX ADMIN — PROPOFOL 100 MCG/KG/MIN: 10 INJECTION, EMULSION INTRAVENOUS at 11:01

## 2024-01-08 RX ADMIN — MINERAL OIL, WHITE PETROLATUM: .03; .94 OINTMENT OPHTHALMIC at 06:01

## 2024-01-08 RX ADMIN — SODIUM CHLORIDE, PRESERVATIVE FREE 10 ML: 5 INJECTION INTRAVENOUS at 11:01

## 2024-01-08 RX ADMIN — LACOSAMIDE 200 MG: 10 INJECTION, SOLUTION INTRAVENOUS at 09:01

## 2024-01-08 RX ADMIN — MONTELUKAST 10 MG: 10 TABLET, FILM COATED ORAL at 09:01

## 2024-01-08 RX ADMIN — ACETAMINOPHEN 1000 MG: 500 TABLET ORAL at 06:01

## 2024-01-08 RX ADMIN — DEXTROSE MONOHYDRATE 360 MG: 50 INJECTION, SOLUTION INTRAVENOUS at 03:01

## 2024-01-08 NOTE — PLAN OF CARE
Patient on vent with documented settings.  Alarms are set and functioning with adequate volumes.  AMBU bag and mask at bedside. Aerosol treatment. Will continue to monitor.

## 2024-01-08 NOTE — PROGRESS NOTES
Progress Note  [facility] Pulmonary [facility] Critical Care Medicine    Attending: Dr. Tyler Frazier MD   Admit Date: 1/1/2024  Today's Date: 01/08/2024    HPI  Per ER:   53-year-old M w/pmhx: asthma, was brought in by EMS as a cardiorespiratory arrest.  911 was called for shortness of breath, as patient was working in garden. When EMS arrived, the patient was slumped over face forward with a friend holding a non-rebreather mask over his face that was not hooked up to oxygen.  He had a Medrol Dosepak and an albuterol inhaler by his side.  EMS started giving the patient a nebulizer treatment and he became pulseless and apneic.  He was intubated with an ET tube and CPR was initiated. The patient regained pulses after CPR by the MANDI and epinepherine X3, shocked X1, unclear rhythm.    SUBJECTIVE:   OVERNIGHT    Patient seen at bedside this morning. He had his electrodes removed this morning to have MRI imaging taken. He continues to have cough, gag, corneal, and pupillary reflexes. Patient remains stable. Lots of disturbances on his EEG. Improved over previous time points in this stay. Currently off of EEG.        Review of Systems   Unable to perform ROS: Intubated     OBJECTIVE:     Vital Signs Trends/Hx Reviewed  Vitals:    01/08/24 1200 01/08/24 1215 01/08/24 1230 01/08/24 1301   BP: 109/66   118/72   Pulse: 82 82  85   Resp: 20 (!) 21  (!) 21   Temp:   98.1 °F (36.7 °C)    TempSrc:   Axillary    SpO2: (!) 90% 95%  (!) 93%   Weight:       Height:           Vent Mode: A/CMV-VC  Oxygen Concentration (%):  [] 30  Resp Rate Total:  [15 br/min-31 br/min] 20 br/min  Vt Set:  [450 mL] 450 mL  PEEP/CPAP:  [4.3 cmH20-6.5 cmH20] 5 cmH20  Mean Airway Pressure:  [7.2 cmH20-9 cmH20] 7.5 cmH20      Physical Exam  Constitutional:       Comments: RASS -5   HENT:      Head: Normocephalic and atraumatic.   Cardiovascular:      Rate and Rhythm: Normal rate and regular rhythm.      Pulses: Normal pulses.      Heart sounds: Normal  heart sounds. No murmur heard.  Pulmonary:      Breath sounds: No wheezing.      Comments: Intubated. clear mechanical breath sounds. Tachypnea to the 40s.  Abdominal:      General: Abdomen is flat. There is no distension.      Palpations: Abdomen is soft.   Musculoskeletal:      Right lower leg: No edema.      Left lower leg: No edema.   Neurological:      Comments: RASS -5  Patient has slight dyssymmetry of pupils, however reactive. He does have a cough, gag, corneal. No withdrawal to pain.        Laboratory:  Recent Labs   Lab 01/06/24  0539 01/07/24  0431 01/08/24  0436   WBC 8.57 12.85* 11.53   HGB 10.4* 12.6* 13.9*   HCT 29.7* 36.4* 39.7*    183 143*    143 138   K 3.7 4.4 4.6    105 105   CREATININE 0.8 0.8 0.7   BUN 19 24* 26*   CO2 26 24 17*   ALT 51* 54* 46*   AST 70* 67* 54*     Triglycerides: 787 1/8/2024    _______________________________  MRI Brain Without Contrast  Addendum Date: 1/8/2024    Although not as noticeable as caudate heads and basal ganglia there may be slight edema some of the cortex patchy bilaterally. Electronically signed by: Elias Obregon MD Date:01/08/2024 Time:12:47  Result Date: 1/8/2024  Vasogenic edema within the caudate heads and basal ganglia bilaterally.  This can be seen with a global hypoxic anoxic event.  This can also be seen with certain toxins, hypoglycemia, and viral infections.   Electronically signed by:Elias Obregon MD Date:01/08/2024 Time:11:24       ASSESSMENT & RECOMMENDATIONS     Neuro/Psych  #Non-Conclusive Status Epilepticus - Improved per [facility]  Intubated  Sedated w/ non-titratable propofol 100 mcg/kg/hr   Keppra loaded 1/4/24 with 4,000 mg. Keppra 1500 mg BID  Vimpat 800 mg Loaded 1/5/24. Vimpat 200 mg BID  Depakote 3g loaded 1/6/2024. Depakote 360 mg TID  RASS -5.  Goal compliant w/vent.  MRI showing concern for anoxic brain injury specifically to caudate heads and basal ganglia sparing cortex.   - Contacting [facility] for transfer to  Neuro ICU for better seizure control and repeat MRIs  No known issues prior to admission.     PLAN:  Continue sedation in the setting of Non-conclusive status epilepticus to obtain burst-suppression.  MRI Monday 1/8/23. Remove EEG electrodes as early as possible.   EEG less concerning for status epilepticus MRI read above. S/p 3 full dose AEDs.  Transfer to [facility] for further managing of seizures.    CV  #Post-arrest  Reportedly epi x3, shock x1 (unk rhythm)  CPK 4800 - Below 1000 at this time.  Trop 0.446 down trended.  Original EKG: Tachycardic w/depressions in inferior leads.  Had episodes of hypertension requiring Hydralazine and Labetalol for BP above 190 mmHg.    #Labile bp  Not on pressors  POCUS good squeeze, no clear pathology  Formal echo shows EF 65% normal noble function    PLAN:  Monitor  Cards following  - PRN Hydralazine placed for SBP > 180 mmHg.   - PRN Labetalol placed for SBP > 180 mmHg.    Pulm  #Respiratory Failure  #Asthma exacerbation  Vent settings as above  7.45/45/66 on 420/16/5/21%. - 1/6/2024  No plan to recheck ABG at this time.      PLAN:  Duo-neb q6h  Start budesonide 0.25 mg nebs BID today  Montelukast 10mg qd    FEN/GI  F: euvolemic  E: Na+ 138, K+ 4.6, Mg++ no repeat today  N: Tube feeds    #Hyperkalemia   Orginally 5.9, shifted with Insulin  4.6 today.    PLAN:  CTM    GI  Prophylaxis: protonix     RENAL  UOP: 2315 cc , In: 1150 cc, net -1165 cc in last 24 hrs  Down -2500 ml since admission.    #rhabdomyolysis - Resolved  Down trending CK with most recent CK of less than 1000, off maintenance fluids.    BUN/Cr: wnl, at baseline  Continue to monitor renal status and urine output   - holding diuretics, re-evaluate tomorrow     Heme  H/H 12.6; stable  WBC 12.8  DVT prophylaxis: Lovenox    Endo  Glu 77, stable     PLAN:  SSI    ID  Stop Antibiotics 1/7/2024  Tylenol 1000 mg Q6H for fevers - likely neurogenic fever.    Pending Blood Cultures.UA not concerning for infection at this time.  Ordered Respiratory cultures order, not obtained yet. Low suspicion of respiratory infection at this time. Continue to monitor.    Dispo  52 y/o M w/hx of asthma/COPD (no PFT on file) s/p cardiac arrest, ROSC after epi 3x, shock 1x, likely from hypercapneic respiratory failure. Initially CV unstable with labile bp and tachycardia. Now stable, not requiring pressor or anti-htn. Gases improving. Good liver function. Producing urine, no Cr elevation, but hyperkalemic. Shifted, monitoring. On 1/4/24: EEG showed that patient was in status epilepticus, currently on 100 100 mcg/kg/hr profolol, Keppra loaded with 4g, Vimpat loaded with 800 mg. Continuing ICU care for airway protection and status epilepticus. MRI today, Concerning for anxious injury to caudate and basal ganglia. Initiation of transfer to [facility] for Neuro ICU.    F Tube Feeds  A N/a  S propofol   T lovenox  H 30  U protonix  G 120  S holding off due to seizures.  B monitor  I ETT, OG, PIV   D Stopped vancomycin and zosyn 1/3/24 - 1/7/24    Rolo Segovia M.D.  [facility] Internal Medicine, PGY-1   01/08/2024.    Pt seen and examined with Pulmonary/Critical Care team and this note was reviewed and validated with the following additional comments: THis is an unusual case of anoxic-ischemic brain injury.  MRI most notable for hyperintensities in the caudate nuclei. His cortex is relatively spared. Pt does open eyes, he grimaces, and his brain stem reflexes are intact.  I do not see cortical responsiveness in his arms or legs.  I cannot be sure of the role that ongoing seizures or propofol play in his brain impairment, nor are we able to stop the propofol without continuous EEG.  I discussed this with pt's mother, HM, and NeuroCrit at [facility]. We will replace continuous EEG leads in AM and then slowly wean propofol in the AM..      Critical Care time was spent validating the history and physical exam, reviewing the lab and imaging results, and discussing the care of  the patient with the bedside nurse and the patient and/or surrogates. This critical care time did not overlap with that of any other provider or involve time for any procedures.  This patient has a high probability of sudden clinically significant deterioration which requires the highest level of physician preparedness to intervene urgently. I managed/supervised life or organ supporting interventions that required frequent physician assessments. I devoted my full attention in the ICU to the direct care of this patient for this period of time. Organ systems which are failing and require intensive, critical care support are: neurologic, respiratory, cardiovascular.  Critical Care time: 65 minutes    Gopal Hall MD  Phone [phone]

## 2024-01-08 NOTE — PLAN OF CARE
Problem: Infection  Goal: Absence of Infection Signs and Symptoms  Outcome: Ongoing, Progressing     Problem: Adult Inpatient Plan of Care  Goal: Plan of Care Review  Outcome: Ongoing, Progressing  Goal: Patient-Specific Goal (Individualized)  Outcome: Ongoing, Progressing  Goal: Absence of Hospital-Acquired Illness or Injury  Outcome: Ongoing, Progressing     Problem: Fall Injury Risk  Goal: Absence of Fall and Fall-Related Injury  Outcome: Ongoing, Progressing     Problem: Restraint, Nonbehavioral (Nonviolent)  Goal: Absence of Harm or Injury  Outcome: Ongoing, Progressing     Problem: Skin Injury Risk Increased  Goal: Skin Health and Integrity  Outcome: Ongoing, Progressing     Problem: Adjustment to Illness (Targeted Temperature Management)  Goal: Optimal Response to Life-Threatening Event  Outcome: Ongoing, Progressing     Problem: Body Temperature Regulation (Targeted Temperature Management)  Goal: Target Body Temperature Maintained  Outcome: Ongoing, Progressing     Problem: Dysrhythmia (Targeted Temperature Management)  Goal: Stable Cardiac Rate and Rhythm  Outcome: Ongoing, Progressing     Problem: Hemodynamic Instability (Targeted Temperature Management)  Goal: Effective Tissue Perfusion  Outcome: Ongoing, Progressing     Problem: Infection (Targeted Temperature Management)  Goal: Absence of Infection Signs and Symptoms  Outcome: Ongoing, Progressing     Problem: Adult Inpatient Plan of Care  Goal: Optimal Comfort and Wellbeing  Outcome: Ongoing, Not Progressing  Goal: Readiness for Transition of Care  Outcome: Ongoing, Not Progressing

## 2024-01-08 NOTE — PROGRESS NOTES
[facility] Medicine  Progress Note    Patient Name: Aaron Pruitt  MRN: [id number]  Patient Class: IP- Inpatient   Admission Date: 1/1/2024  Length of Stay: 6 days  Attending Physician: Yamila Cazares MD  Primary Care Provider: Gogo Vivar NP        Subjective:     Principal Problem:Acute respiratory failure with hypoxia and hypercarbia        HPI:  54 YO M with PMHx significant for  COPD/Asthma presented to the ER intubated on the field for acute respiratory failure post cardiac arrest s/p resuscitation and shock x1. Per chart review and   family at bedside reports he was shoveling some dirt in his yard when he got severely short of breath and suddenly had difficulty breathing and neighbors called EMS. When EMS arrived, reportedly still had a pulse but was struggling to breath, became pulseless. He was given narcan x2 without improvement. CPR and Epi x3. ROSC after 3rd round. Also reportedly get shocked x1. He was transferred to the ER and sedated. Labs in the ER with elevated lactic acid, elevated troponin, , elevated CPK 3294, elevated AST//82, drug screen with presumptive THC. CT head no acute intracranial abnormality, CT chest no acute pulmonary process.    Overview/Hospital Course:  1/3/2024 on TTM on fentanyl and propofol. K elevated to 5.7-->5.9, Lokelma 5g x 1, insulin/dextrose given  1/4/2024 Rewarming initiated. Sedation held, myoclonus noted. Restarted on propofol. EEG shows possible NCSE activity, started on AEDs  1/5/2024 Unchanged, Vimpat added, on keppra, vimpat and propofol.   1/6/2024 No clinical improvement, loaded with depacon  1/7/24 No improvement EEG result: The patient is a 53-year-old male with a history of anoxic brain injury who is currently maintained on intravenous infusions of propofol and on Keppra and Vimpat.  This is an abnormal EEG during comatose state.  The overall degree of disorganization and slowing for given age is suggestive of a severe  encephalopathy. The presence of epileptiform discharges is suggestive of cortical irritability with increased risk of focal seizures from either hemisphere.  There was a decrease in the overall burden of epileptiform discharges in this study compared to the prior study.  No seizures recorded during this study.     Interval History: Hypertensive this am to -200s, now improved.  cEEG read  The patient is a 53-year-old male with a history of anoxic brain injury who is currently maintained on intravenous infusions of propofol and on Keppra and Vimpat.  This is an abnormal EEG during comatose state.  The overall degree of disorganization and slowing for given age is suggestive of a severe encephalopathy. The presence of epileptiform discharges is suggestive of cortical irritability with increased risk of focal seizures from either hemisphere.  There was a decrease in the overall burden of epileptiform discharges in this study compared to the prior study.  No seizures recorded during this study.     Review of Systems   Unable to perform ROS: Acuity of condition     Objective:     Vital Signs (Most Recent):  Temp: 99.5 °F (37.5 °C) (01/07/24 1815)  Pulse: 84 (01/07/24 1827)  Resp: (!) 22 (01/07/24 1827)  BP: (!) 158/99 (01/07/24 1800)  SpO2: 100 % (01/07/24 1827) Vital Signs (24h Range):  Temp:  [97.7 °F (36.5 °C)-100 °F (37.8 °C)] 99.5 °F (37.5 °C)  Pulse:  [] 84  Resp:  [14-47] 22  SpO2:  [98 %-100 %] 100 %  BP: ()/() 158/99     Weight: 72.1 kg (158 lb 15.2 oz)  Body mass index is 20.97 kg/m².    Intake/Output Summary (Last 24 hours) at 1/7/2024 1833  Last data filed at 1/7/2024 1600  Gross per 24 hour   Intake 1213.46 ml   Output 3070 ml   Net -1856.54 ml         Physical Exam  Vitals and nursing note reviewed.   Constitutional:       General: He is not in acute distress.     Appearance: He is not toxic-appearing.      Comments: Intubated and sedated   HENT:      Head: Normocephalic and  atraumatic.      Mouth/Throat:      Mouth: Mucous membranes are moist.      Pharynx: No oropharyngeal exudate.   Eyes:      Pupils: Pupils are equal, round, and reactive to light.   Cardiovascular:      Rate and Rhythm: Normal rate.      Heart sounds: No murmur heard.     No friction rub. No gallop.   Pulmonary:      Comments: Intubated, on mechanical ventilation, sedated  Abdominal:      General: Bowel sounds are normal. There is no distension.      Palpations: Abdomen is soft.      Tenderness: There is no abdominal tenderness. There is no guarding or rebound.   Musculoskeletal:      Cervical back: No rigidity or tenderness.      Right lower leg: No edema.      Left lower leg: No edema.             Significant Labs: All pertinent labs within the past 24 hours have been reviewed.  CBC:   Recent Labs   Lab 01/06/24  0539 01/07/24  0431   WBC 8.57 12.85*   HGB 10.4* 12.6*   HCT 29.7* 36.4*    183     CMP:   Recent Labs   Lab 01/06/24  0539 01/07/24  0431    143   K 3.7 4.4    105   CO2 26 24   GLU 99 100   BUN 19 24*   CREATININE 0.8 0.8   CALCIUM 8.7 9.5   PROT 5.7* 6.9   ALBUMIN 2.7* 3.0*   BILITOT 0.7 0.7   ALKPHOS 38* 58   AST 70* 67*   ALT 51* 54*   ANIONGAP 9 14       Significant Imaging: I have reviewed all pertinent imaging results/findings within the past 24 hours.    Assessment/Plan:      * Acute respiratory failure with hypoxia and hypercarbia  Patient with Hypoxic Respiratory failure which is Acute.  he is not on home oxygen. Supplemental oxygen was provided and noted- Vent Mode: A/CMV-PC  Oxygen Concentration (%):  [] 21  Resp Rate Total:  [17 br/min-49 br/min] 24 br/min  Vt Set:  [420 mL] 420 mL  PEEP/CPAP:  [5 cmH20-8.5 cmH20] 5 cmH20  Mean Airway Pressure:  [7.8 jpF40-53.7 cmH20] 8 cmH20    .   Signs/symptoms of respiratory failure include- tachypnea, increased work of breathing, and respiratory distress. Contributing diagnoses includes -  unkonwn  Labs and images were  reviewed. Patient Has recent ABG, which has been reviewed. Will treat underlying causes and adjust management of respiratory failure as follows-     H/o severe asthma and COPD  Steroids, nebs, added ceftriaxone and azithromycin (CXR clear)    Acute encephalopathy        Seizure disorder, nonconvulsive, with status epilepticus  1/6-7  IMPRESSION:  This is an abnormal EEG during comatose state.  Diffuse disorganized low-amplitude slowing of the background was noted.  Generalized as well as independent left and right lateralized epileptiform discharges were noted which were pseudo periodic at < 2 Hz.  Two brief electrographic seizures emanating from the right frontal region were noted     CLINICAL CORRELATION:  The patient is a 53-year-old male with a history of anoxic brain injury who is currently maintained on intravenous infusions of propofol and on Keppra and Vimpat.  This is an abnormal EEG during comatose state.  The overall degree of disorganization and slowing for given age is suggestive of a severe encephalopathy. The presence of epileptiform discharges is suggestive of cortical irritability with increased risk of focal seizures from either hemisphere.  There was a decrease in the overall burden of epileptiform discharges in this study compared to the prior study.  No seizures recorded during this study.      Non-traumatic rhabdomyolysis  Vs traumatic post cardiac resuscitation  Monitor CPK - improved  Now off IVFs      Cardiac arrest  S/p CPR with ROSC now on TTM, now rewarming  Per Pulm/Crit  Cardiac arrest: Due to hypoxemia. TTM completed. Maintaining normothermia. Reflexes intact. MRI brain on Monday.     Unchanged, MRI on Monday, if evidence of severe anoxic brain injury family will likely transition to comfort care  If not possible transfer to [facility]        VTE Risk Mitigation (From admission, onward)           Ordered     enoxaparin injection 40 mg  Every 24 hours         01/01/24 1924     Apply  sequential compression device to lower extremities (if no contraindications). Medical venous thromboembolus prophylaxis is preferred.  Until discontinued         01/01/24 1928                    Discharge Planning   MARQUISE:      Code Status: DNR   Is the patient medically ready for discharge?:     Reason for patient still in hospital (select all that apply): Patient unstable, Patient trending condition, Imaging, and Consult recommendations  Discharge Plan A: Home            Critical care time spent on the evaluation and treatment of severe organ dysfunction, review of pertinent labs and imaging studies, discussions with consulting providers and discussions with patient/family: 31 minutes.      Yamila Cazares MD  Department of Hospital Medicine   [place] - Intensive Care

## 2024-01-08 NOTE — SUBJECTIVE & OBJECTIVE
Interval History: Hypertensive this am to -200s, now improved.  cEEG read  The patient is a 53-year-old male with a history of anoxic brain injury who is currently maintained on intravenous infusions of propofol and on Keppra and Vimpat.  This is an abnormal EEG during comatose state.  The overall degree of disorganization and slowing for given age is suggestive of a severe encephalopathy. The presence of epileptiform discharges is suggestive of cortical irritability with increased risk of focal seizures from either hemisphere.  There was a decrease in the overall burden of epileptiform discharges in this study compared to the prior study.  No seizures recorded during this study.     Review of Systems   Unable to perform ROS: Acuity of condition     Objective:     Vital Signs (Most Recent):  Temp: 99.5 °F (37.5 °C) (01/07/24 1815)  Pulse: 84 (01/07/24 1827)  Resp: (!) 22 (01/07/24 1827)  BP: (!) 158/99 (01/07/24 1800)  SpO2: 100 % (01/07/24 1827) Vital Signs (24h Range):  Temp:  [97.7 °F (36.5 °C)-100 °F (37.8 °C)] 99.5 °F (37.5 °C)  Pulse:  [] 84  Resp:  [14-47] 22  SpO2:  [98 %-100 %] 100 %  BP: ()/() 158/99     Weight: 72.1 kg (158 lb 15.2 oz)  Body mass index is 20.97 kg/m².    Intake/Output Summary (Last 24 hours) at 1/7/2024 1833  Last data filed at 1/7/2024 1600  Gross per 24 hour   Intake 1213.46 ml   Output 3070 ml   Net -1856.54 ml         Physical Exam  Vitals and nursing note reviewed.   Constitutional:       General: He is not in acute distress.     Appearance: He is not toxic-appearing.      Comments: Intubated and sedated   HENT:      Head: Normocephalic and atraumatic.      Mouth/Throat:      Mouth: Mucous membranes are moist.      Pharynx: No oropharyngeal exudate.   Eyes:      Pupils: Pupils are equal, round, and reactive to light.   Cardiovascular:      Rate and Rhythm: Normal rate.      Heart sounds: No murmur heard.     No friction rub. No gallop.   Pulmonary:       Comments: Intubated, on mechanical ventilation, sedated  Abdominal:      General: Bowel sounds are normal. There is no distension.      Palpations: Abdomen is soft.      Tenderness: There is no abdominal tenderness. There is no guarding or rebound.   Musculoskeletal:      Cervical back: No rigidity or tenderness.      Right lower leg: No edema.      Left lower leg: No edema.             Significant Labs: All pertinent labs within the past 24 hours have been reviewed.  CBC:   Recent Labs   Lab 01/06/24  0539 01/07/24  0431   WBC 8.57 12.85*   HGB 10.4* 12.6*   HCT 29.7* 36.4*    183     CMP:   Recent Labs   Lab 01/06/24  0539 01/07/24  0431    143   K 3.7 4.4    105   CO2 26 24   GLU 99 100   BUN 19 24*   CREATININE 0.8 0.8   CALCIUM 8.7 9.5   PROT 5.7* 6.9   ALBUMIN 2.7* 3.0*   BILITOT 0.7 0.7   ALKPHOS 38* 58   AST 70* 67*   ALT 51* 54*   ANIONGAP 9 14       Significant Imaging: I have reviewed all pertinent imaging results/findings within the past 24 hours.

## 2024-01-08 NOTE — PROCEDURES
Procedures    ICU EEG/VIDEO MONITORING REPORT     DATE OF SERVICE: 1/7/23-1/8/23  EEG NUMBER: OK -4  REQUESTED BY: Erin  LOCATION OF SERVICE: [facility]              Electroencephalographic (EEG) recording is with electrodes placed according to the International 10-20 placement system.  Thirty two (32) channels of digital signal are simultaneously recorded from the scalp and may include EKG, EMG, and/or eye monitors.   Recording band pass was 0.1 to 512 hz.  Digital video recording of the patient is simultaneously recorded with the EEG.  The nursing staff report clinical symptoms and may press an event button when the patient has symptoms of clinical interest to the treating physicians.  EEG and video recording is stored and archived in digital format.  The entire recording is visually reviewed and the times identified by computer analysis as being spikes or seizures are reviewed again.  Activation procedures which include photic stimulation, hyperventilation and instructing patients to perform simple task are done in selected patients.   Compresses spectral analysis (CSA) is also performed on the activity recorded from each individual channel.  This is displayed as a power display of frequencies from 0 to 30 Hz over time.   The CSA analysis is done and displayed continuously.  This is reviewed for asymmetries in power between homologous areas of the scalp and for presence of changes in power which canbe seen when seizures occur.  Sections of suspected abnormalities on the CSA is then compared with the original EEG recording.                Augmate software was also utilized in the review of this study.  This software suite analyzes the EEG recording in multiple domains.  Coherence and rhythmicity is computed to identify EEG sections which may contain organized seizures.  Each channel undergoes analysis to detect presence of spike and sharp waves which have special and morphological  characteristic of epileptic activity.  The routine EEG recording is converted from spacial into frequency domain.  This is then displayed comparing homologous areas to identify areas of significant asymmetry.  Algorithm to identify non-cortically generated artifact is used to separate eye movement, EMG and other artifact from the EEG.       Recording Times  Start on 1/7/23 at 07:00:41  Stop on 1/8/23 at 07:00:07  A total of 23 hours of EEG was recorded.     EEG FINDINGS  The record shows a fair  organization at rest, with no discernible posterior dominant rhythm with fair  reactivity. There is mild bilateral beta activity. There is continuous diffuse delta and theta range background slowing.      Drowsiness is characterized by attenuation of the background, vertex waves, and bilateral theta slowing. Stage II sleep is characterized by slowing, vertex waves, and symmetric sleep spindles.      Provocative maneuvers including hyperventilation and photic stimulation were not performed.      EKG recording shows a sinus rhythm.     There are no push button clinical events.      IMPRESSION:  Abnormal study due to moderate to severe  diffuse background slowing consistent with diffuse cerebral dysfunction and encephalopathy which may be on the basis of toxic, metabolic, or primary neuronal disorder.   Overall record improved from previous segment.      Edgar Darby MD  Department of Neurology  [facility]

## 2024-01-08 NOTE — ASSESSMENT & PLAN NOTE
Resolved or labs hemolysed . This patient has hyperkalemia which is controlled. We will monitor for arrhythmias with EKG or continuous telemetry. We will treat the hyperkalemia with Potassium Binders and Nebulized albuterol sulfate. The likely etiology of the hyperkalemia is  unknown .  The patients latest potassium has been reviewed and the results are listed below  Recent Labs   Lab 01/07/24  0431   K 4.4       Resolved

## 2024-01-08 NOTE — ASSESSMENT & PLAN NOTE
1/6-7  IMPRESSION:  This is an abnormal EEG during comatose state.  Diffuse disorganized low-amplitude slowing of the background was noted.  Generalized as well as independent left and right lateralized epileptiform discharges were noted which were pseudo periodic at < 2 Hz.  Two brief electrographic seizures emanating from the right frontal region were noted     CLINICAL CORRELATION:  The patient is a 53-year-old male with a history of anoxic brain injury who is currently maintained on intravenous infusions of propofol and on Keppra and Vimpat.  This is an abnormal EEG during comatose state.  The overall degree of disorganization and slowing for given age is suggestive of a severe encephalopathy. The presence of epileptiform discharges is suggestive of cortical irritability with increased risk of focal seizures from either hemisphere.  There was a decrease in the overall burden of epileptiform discharges in this study compared to the prior study.  No seizures recorded during this study.

## 2024-01-08 NOTE — PROGRESS NOTES
"[facility] - Intensive Care  Adult Nutrition  Progress Note    SUMMARY       Recommendations    Recommendation:  1. Continue current TF as tolerated.   2. Monitor weight/labs.   3. RD to continue to follow to monitor TF tolerance    Goals:  TF to meet at least 85% EEN/EPN by RD follow up  Nutrition Goal Status: progressing towards goal  Communication of RD Recs: reviewed with RN Suma)    Assessment and Plan  Nutrition Problem  Inadequate energy intake     Related to (etiology):   intubation     Signs and Symptoms (as evidenced by):   NPO, trickle TF      Interventions:  Collaboration with other providers  Modify rate of enteral nutrition     Nutrition Diagnosis Status:   Continues      Malnutrition Assessment  Unable to assess NFPE at this time       Reason for Assessment  Reason For Assessment: RD follow-up  Diagnosis:  (acute resp failure)  Relevant Medical History: bronchitis, neck surgery  General Information Comments: Pt remains intubated on vent. PICC line. NG tube. Receiving TF of Peptamen Intense VHP at 40ml/hr. No recent weight loss noted. On propofol. Isai 13-skin intact  Nutrition Discharge Planning: d/c needs to be determined    Nutrition Risk Screen  Nutrition Risk Screen: tube feeding or parenteral nutrition    Nutrition/Diet History  Food Preferences: unable to assess  Factors Affecting Nutritional Intake: NPO, on mechanical ventilation    Anthropometrics  Temp: 96.4 °F (35.8 °C)  Height Method: Estimated  Height: 6' 1" (185.4 cm)  Height (inches): 73 in  Weight Method: Bed Scale  Weight: 72.1 kg (158 lb 15.2 oz)  Weight (lb): 158.95 lb  Ideal Body Weight (IBW), Male: 184 lb  % Ideal Body Weight, Male (lb): 86.39 %  BMI (Calculated): 21  BMI Grade: 18.5-24.9 - normal  Usual Body Weight (UBW), k.3 kg ()  % Usual Body Weight: 104.26  % Weight Change From Usual Weight: 4.04 %     Lab/Procedures/Meds  Pertinent Labs Reviewed: reviewed  Pertinent Labs Comments: BUN 26H, Alb 3.0L  Pertinent " Medications Reviewed: reviewed  Pertinent Medications Comments: tylenol, pantoprazole, propofol    Estimated/Assessed Needs  Weight Used For Calorie Calculations: 72.1 kg (158 lb 15.2 oz)  Energy Calorie Requirements (kcal): 1911  Energy Need Method: [facility]  Protein Requirements: 108g (1.5g/kg)  Weight Used For Protein Calculations: 72 kg (158 lb 11.7 oz)  Estimated Fluid Requirement Method: RDA Method  RDA Method (mL): 1911     Nutrition Prescription Ordered  Current Diet Order: NPO  Current Nutrition Support Formula Ordered: Peptamen Intense VHP  Current Nutrition Support Rate Ordered: 40 (ml)  Current Nutrition Support Frequency Ordered: ml/hr    Evaluation of Received Nutrient/Fluid Intake  Enteral Calories (kcal): 960  Enteral Protein (gm): 88  Enteral (Free Water) Fluid (mL): 806  Other Calories (kcal): 934  Total Calories (kcal): 1894  % Kcal Needs: 99  % Protein Needs: 81  I/O: 1149/2315  Energy Calories Required: not meeting needs  Protein Required: not meeting needs  Fluid Required: not meeting needs  Comments: LBM 1/7  % Intake of Estimated Energy Needs: 75 - 100 %  % Meal Intake: NPO    Nutrition Risk  Level of Risk/Frequency of Follow-up:  (2xweekly)     Monitor and Evaluation  Food and Nutrient Intake: energy intake  Food and Nutrient Adminstration: diet order, enteral and parenteral nutrition administration  Physical Activity and Function: nutrition-related ADLs and IADLs  Anthropometric Measurements: weight  Biochemical Data, Medical Tests and Procedures: electrolyte and renal panel  Nutrition-Focused Physical Findings: overall appearance     Nutrition Follow-Up  RD Follow-up?: Yes

## 2024-01-09 ENCOUNTER — DOCUMENTATION ONLY (OUTPATIENT)
Dept: NEUROLOGY | Facility: CLINIC | Age: 54
End: 2024-01-09
Payer: MEDICAID

## 2024-01-09 PROBLEM — I10 HYPERTENSION: Status: ACTIVE | Noted: 2024-01-09

## 2024-01-09 PROBLEM — E78.1 HYPERTRIGLYCERIDEMIA: Status: ACTIVE | Noted: 2024-01-09

## 2024-01-09 LAB
ALBUMIN SERPL BCP-MCNC: 2.7 G/DL (ref 3.5–5.2)
ALLENS TEST: ABNORMAL
ALLENS TEST: ABNORMAL
ALP SERPL-CCNC: 56 U/L (ref 55–135)
ALT SERPL W/O P-5'-P-CCNC: 34 U/L (ref 10–44)
ANION GAP SERPL CALC-SCNC: 13 MMOL/L (ref 8–16)
AST SERPL-CCNC: 44 U/L (ref 10–40)
BACTERIA SPEC AEROBE CULT: ABNORMAL
BACTERIA SPEC AEROBE CULT: ABNORMAL
BASOPHILS # BLD AUTO: 0.01 K/UL (ref 0–0.2)
BASOPHILS NFR BLD: 0.1 % (ref 0–1.9)
BILIRUB SERPL-MCNC: 0.5 MG/DL (ref 0.1–1)
BUN SERPL-MCNC: 25 MG/DL (ref 6–20)
CALCIUM SERPL-MCNC: 9.1 MG/DL (ref 8.7–10.5)
CHLORIDE SERPL-SCNC: 104 MMOL/L (ref 95–110)
CO2 SERPL-SCNC: 22 MMOL/L (ref 23–29)
CREAT SERPL-MCNC: 0.8 MG/DL (ref 0.5–1.4)
DELSYS: ABNORMAL
DELSYS: ABNORMAL
DIFFERENTIAL METHOD BLD: ABNORMAL
EOSINOPHIL # BLD AUTO: 0 K/UL (ref 0–0.5)
EOSINOPHIL NFR BLD: 0.2 % (ref 0–8)
ERYTHROCYTE [DISTWIDTH] IN BLOOD BY AUTOMATED COUNT: 12.9 % (ref 11.5–14.5)
ERYTHROCYTE [SEDIMENTATION RATE] IN BLOOD BY WESTERGREN METHOD: 16 MM/H
ERYTHROCYTE [SEDIMENTATION RATE] IN BLOOD BY WESTERGREN METHOD: 16 MM/H
EST. GFR  (NO RACE VARIABLE): >60 ML/MIN/1.73 M^2
FIO2: 21
FIO2: 21
GLUCOSE SERPL-MCNC: 109 MG/DL (ref 70–110)
GRAM STN SPEC: ABNORMAL
HCO3 UR-SCNC: 27.9 MMOL/L (ref 24–28)
HCO3 UR-SCNC: 29.4 MMOL/L (ref 24–28)
HCT VFR BLD AUTO: 34.8 % (ref 40–54)
HGB BLD-MCNC: 12 G/DL (ref 14–18)
IMM GRANULOCYTES # BLD AUTO: 0.08 K/UL (ref 0–0.04)
IMM GRANULOCYTES NFR BLD AUTO: 0.9 % (ref 0–0.5)
LYMPHOCYTES # BLD AUTO: 1.2 K/UL (ref 1–4.8)
LYMPHOCYTES NFR BLD: 12.7 % (ref 18–48)
MCH RBC QN AUTO: 30.5 PG (ref 27–31)
MCHC RBC AUTO-ENTMCNC: 34.5 G/DL (ref 32–36)
MCV RBC AUTO: 89 FL (ref 82–98)
MIN VOL: 7.16
MODE: ABNORMAL
MODE: ABNORMAL
MONOCYTES # BLD AUTO: 0.9 K/UL (ref 0.3–1)
MONOCYTES NFR BLD: 10.2 % (ref 4–15)
NEUTROPHILS # BLD AUTO: 7 K/UL (ref 1.8–7.7)
NEUTROPHILS NFR BLD: 75.9 % (ref 38–73)
NRBC BLD-RTO: 0 /100 WBC
PCO2 BLDA: 44.5 MMHG (ref 35–45)
PCO2 BLDA: 45.4 MMHG (ref 35–45)
PEEP: 5
PEEP: 5
PH SMN: 7.4 [PH] (ref 7.35–7.45)
PH SMN: 7.43 [PH] (ref 7.35–7.45)
PIP: 16
PLATELET # BLD AUTO: 179 K/UL (ref 150–450)
PMV BLD AUTO: 8.8 FL (ref 9.2–12.9)
PO2 BLDA: 65 MMHG (ref 80–100)
PO2 BLDA: 67 MMHG (ref 80–100)
POC BE: 3 MMOL/L
POC BE: 5 MMOL/L
POC SATURATED O2: 92 % (ref 95–100)
POC SATURATED O2: 93 % (ref 95–100)
POC TCO2: 29 MMOL/L (ref 23–27)
POC TCO2: 31 MMOL/L (ref 23–27)
POCT GLUCOSE: 111 MG/DL (ref 70–110)
POTASSIUM SERPL-SCNC: 4.1 MMOL/L (ref 3.5–5.1)
PROT SERPL-MCNC: 6.6 G/DL (ref 6–8.4)
RBC # BLD AUTO: 3.93 M/UL (ref 4.6–6.2)
SAMPLE: ABNORMAL
SAMPLE: ABNORMAL
SITE: ABNORMAL
SITE: ABNORMAL
SODIUM SERPL-SCNC: 139 MMOL/L (ref 136–145)
SP02: 93
VT: 450
VT: 450
WBC # BLD AUTO: 9.21 K/UL (ref 3.9–12.7)

## 2024-01-09 PROCEDURE — 25000242 PHARM REV CODE 250 ALT 637 W/ HCPCS: Performed by: STUDENT IN AN ORGANIZED HEALTH CARE EDUCATION/TRAINING PROGRAM

## 2024-01-09 PROCEDURE — 94640 AIRWAY INHALATION TREATMENT: CPT

## 2024-01-09 PROCEDURE — 85025 COMPLETE CBC W/AUTO DIFF WBC: CPT

## 2024-01-09 PROCEDURE — C9113 INJ PANTOPRAZOLE SODIUM, VIA: HCPCS

## 2024-01-09 PROCEDURE — 63600175 PHARM REV CODE 636 W HCPCS: Performed by: STUDENT IN AN ORGANIZED HEALTH CARE EDUCATION/TRAINING PROGRAM

## 2024-01-09 PROCEDURE — 82803 BLOOD GASES ANY COMBINATION: CPT

## 2024-01-09 PROCEDURE — 99900035 HC TECH TIME PER 15 MIN (STAT)

## 2024-01-09 PROCEDURE — 63600175 PHARM REV CODE 636 W HCPCS

## 2024-01-09 PROCEDURE — 94003 VENT MGMT INPAT SUBQ DAY: CPT

## 2024-01-09 PROCEDURE — 94761 N-INVAS EAR/PLS OXIMETRY MLT: CPT

## 2024-01-09 PROCEDURE — 25000003 PHARM REV CODE 250: Performed by: STUDENT IN AN ORGANIZED HEALTH CARE EDUCATION/TRAINING PROGRAM

## 2024-01-09 PROCEDURE — 27100171 HC OXYGEN HIGH FLOW UP TO 24 HOURS

## 2024-01-09 PROCEDURE — 25000003 PHARM REV CODE 250: Performed by: INTERNAL MEDICINE

## 2024-01-09 PROCEDURE — 95714 VEEG EA 12-26 HR UNMNTR: CPT

## 2024-01-09 PROCEDURE — 25000003 PHARM REV CODE 250

## 2024-01-09 PROCEDURE — C9254 INJECTION, LACOSAMIDE: HCPCS

## 2024-01-09 PROCEDURE — 20000000 HC ICU ROOM

## 2024-01-09 PROCEDURE — A4216 STERILE WATER/SALINE, 10 ML: HCPCS | Performed by: INTERNAL MEDICINE

## 2024-01-09 PROCEDURE — 95720 EEG PHY/QHP EA INCR W/VEEG: CPT | Mod: ,,, | Performed by: PSYCHIATRY & NEUROLOGY

## 2024-01-09 PROCEDURE — 80053 COMPREHEN METABOLIC PANEL: CPT | Performed by: INTERNAL MEDICINE

## 2024-01-09 PROCEDURE — 36600 WITHDRAWAL OF ARTERIAL BLOOD: CPT

## 2024-01-09 PROCEDURE — 36415 COLL VENOUS BLD VENIPUNCTURE: CPT | Performed by: INTERNAL MEDICINE

## 2024-01-09 RX ORDER — FENTANYL CITRATE-0.9 % NACL/PF 10 MCG/ML
0-200 PLASTIC BAG, INJECTION (ML) INTRAVENOUS CONTINUOUS
Status: DISCONTINUED | OUTPATIENT
Start: 2024-01-10 | End: 2024-01-10

## 2024-01-09 RX ORDER — MAGNESIUM SULFATE HEPTAHYDRATE 40 MG/ML
2 INJECTION, SOLUTION INTRAVENOUS ONCE
Status: COMPLETED | OUTPATIENT
Start: 2024-01-10 | End: 2024-01-10

## 2024-01-09 RX ORDER — ACETAMINOPHEN 500 MG
1000 TABLET ORAL EVERY 6 HOURS PRN
Status: DISCONTINUED | OUTPATIENT
Start: 2024-01-09 | End: 2024-01-12 | Stop reason: HOSPADM

## 2024-01-09 RX ORDER — FENTANYL CITRATE 50 UG/ML
50 INJECTION, SOLUTION INTRAMUSCULAR; INTRAVENOUS
Status: DISCONTINUED | OUTPATIENT
Start: 2024-01-10 | End: 2024-01-10

## 2024-01-09 RX ORDER — IPRATROPIUM BROMIDE 0.5 MG/2.5ML
0.5 SOLUTION RESPIRATORY (INHALATION) ONCE
Status: COMPLETED | OUTPATIENT
Start: 2024-01-10 | End: 2024-01-10

## 2024-01-09 RX ORDER — ALBUTEROL SULFATE 2.5 MG/.5ML
5 SOLUTION RESPIRATORY (INHALATION) CONTINUOUS
Status: ACTIVE | OUTPATIENT
Start: 2024-01-10 | End: 2024-01-10

## 2024-01-09 RX ADMIN — DEXTROSE MONOHYDRATE 360 MG: 50 INJECTION, SOLUTION INTRAVENOUS at 08:01

## 2024-01-09 RX ADMIN — LACOSAMIDE 200 MG: 10 INJECTION, SOLUTION INTRAVENOUS at 10:01

## 2024-01-09 RX ADMIN — IPRATROPIUM BROMIDE AND ALBUTEROL SULFATE 3 ML: 2.5; .5 SOLUTION RESPIRATORY (INHALATION) at 07:01

## 2024-01-09 RX ADMIN — PROPOFOL 100 MCG/KG/MIN: 10 INJECTION, EMULSION INTRAVENOUS at 12:01

## 2024-01-09 RX ADMIN — MONTELUKAST 10 MG: 10 TABLET, FILM COATED ORAL at 08:01

## 2024-01-09 RX ADMIN — PROPOFOL 80 MCG/KG/MIN: 10 INJECTION, EMULSION INTRAVENOUS at 05:01

## 2024-01-09 RX ADMIN — FENOFIBRATE 160 MG: 160 TABLET ORAL at 08:01

## 2024-01-09 RX ADMIN — ACETAMINOPHEN 1000 MG: 500 TABLET ORAL at 06:01

## 2024-01-09 RX ADMIN — DEXTROSE MONOHYDRATE 360 MG: 50 INJECTION, SOLUTION INTRAVENOUS at 02:01

## 2024-01-09 RX ADMIN — IPRATROPIUM BROMIDE AND ALBUTEROL SULFATE 3 ML: 2.5; .5 SOLUTION RESPIRATORY (INHALATION) at 01:01

## 2024-01-09 RX ADMIN — SODIUM CHLORIDE, PRESERVATIVE FREE 10 ML: 5 INJECTION INTRAVENOUS at 05:01

## 2024-01-09 RX ADMIN — MINERAL OIL, WHITE PETROLATUM: .03; .94 OINTMENT OPHTHALMIC at 05:01

## 2024-01-09 RX ADMIN — BUDESONIDE 0.25 MG: 0.5 INHALANT RESPIRATORY (INHALATION) at 07:01

## 2024-01-09 RX ADMIN — PROPOFOL 100 MCG/KG/MIN: 10 INJECTION, EMULSION INTRAVENOUS at 02:01

## 2024-01-09 RX ADMIN — SODIUM CHLORIDE, PRESERVATIVE FREE 10 ML: 5 INJECTION INTRAVENOUS at 12:01

## 2024-01-09 RX ADMIN — PROPOFOL 100 MCG/KG/MIN: 10 INJECTION, EMULSION INTRAVENOUS at 03:01

## 2024-01-09 RX ADMIN — PROPOFOL 100 MCG/KG/MIN: 10 INJECTION, EMULSION INTRAVENOUS at 05:01

## 2024-01-09 RX ADMIN — PROPOFOL 100 MCG/KG/MIN: 10 INJECTION, EMULSION INTRAVENOUS at 08:01

## 2024-01-09 RX ADMIN — ENOXAPARIN SODIUM 40 MG: 40 INJECTION SUBCUTANEOUS at 05:01

## 2024-01-09 RX ADMIN — SODIUM CHLORIDE, PRESERVATIVE FREE 10 ML: 5 INJECTION INTRAVENOUS at 08:01

## 2024-01-09 RX ADMIN — LEVETIRACETAM 1500 MG: 500 INJECTION, SOLUTION INTRAVENOUS at 08:01

## 2024-01-09 RX ADMIN — PANTOPRAZOLE SODIUM 40 MG: 40 INJECTION, POWDER, FOR SOLUTION INTRAVENOUS at 08:01

## 2024-01-09 RX ADMIN — IPRATROPIUM BROMIDE AND ALBUTEROL SULFATE 3 ML: 2.5; .5 SOLUTION RESPIRATORY (INHALATION) at 12:01

## 2024-01-09 RX ADMIN — MINERAL OIL, WHITE PETROLATUM: .03; .94 OINTMENT OPHTHALMIC at 02:01

## 2024-01-09 RX ADMIN — PROPOFOL 80 MCG/KG/MIN: 10 INJECTION, EMULSION INTRAVENOUS at 08:01

## 2024-01-09 RX ADMIN — MINERAL OIL, WHITE PETROLATUM: .03; .94 OINTMENT OPHTHALMIC at 10:01

## 2024-01-09 NOTE — ASSESSMENT & PLAN NOTE
Patient with Hypoxic Respiratory failure which is Acute.  he is not on home oxygen. Supplemental oxygen was provided and noted- Vent Mode: A/CMV-VC  Oxygen Concentration (%):  [] 21  Resp Rate Total:  [16 br/min-25 br/min] 19 br/min  Vt Set:  [450 mL] 450 mL  PEEP/CPAP:  [4.6 cmH20-5.9 cmH20] 5 cmH20  Mean Airway Pressure:  [7.6 cmH20-9.9 cmH20] 9.8 cmH20    .   Signs/symptoms of respiratory failure include- tachypnea, increased work of breathing, and respiratory distress. Contributing diagnoses includes -  unkonwn  Labs and images were reviewed. Patient Has recent ABG, which has been reviewed. Will treat underlying causes and adjust management of respiratory failure as follows-   Continue home Singulair  H/o severe asthma and COPD  Steroids, nebs, added ceftriaxone and azithromycin (CXR clear)

## 2024-01-09 NOTE — PLAN OF CARE
The pt underwent an MRI on 1/8 which showed vasogenic edema of the caudate heads and basal ganglia bilaterally.The pt had an EEG with moderate to severe diffuse background slowing. Remains on 3 AEDs and propofol at 100cc/hr. The pt remains sedated and intubated. A discussion was made to transfer the pt to the [facility] unit at [facility],pending bed availability. The pt's mother is in agreement with the transfer. The sw encouraged her to call if she has any further questions or concerns. The sw will continue to follow the pt throughout his transitions of care and will assist with any d/c needs.     [place] - Intensive Care  Discharge Reassessment    Primary Care Provider: Gogo Vivar NP    Expected Discharge Date:     Reassessment (most recent)       Discharge Reassessment - 01/09/24 4479          Discharge Reassessment    Assessment Type Discharge Planning Reassessment (P)      Did the patient's condition or plan change since previous assessment? No (P)      Discharge Plan discussed with: Parent(s) (P)      Name(s) and Number(s) Amanda Tobias(mother)[phone] (P)      Communicated MARQUISE with patient/caregiver Date not available/Unable to determine (P)      Discharge Plan A Hospital Transfer (P)    [facility] N[facility] Unit???    Discharge Plan B Other (P)    TBD    DME Needed Upon Discharge  none (P)      Transition of Care Barriers None (P)      Why the patient remains in the hospital Requires continued medical care (P)         Post-Acute Status    Discharge Delays -- (P)    bed availability

## 2024-01-09 NOTE — PROGRESS NOTES
EEG Hook up   Electrodes had to be fixed.No      LTM Rehook [facility] Darby    No redness or skin breakdown from previous EEG  Skin Integrity: Normal     Alvin Wilson   01/09/2024 4:52 PM

## 2024-01-09 NOTE — PLAN OF CARE
Care Plan    Pt remains in ICU at this time. No acute events overnight. NSR 90s. SBP 100s-130s. SpO2 90s-100. UOP 1,050 mL. 1 BM overnight. Propofol gtt remains infusing.   POC reviewed with pt. Questions and concerns addressed. Safety and infection precautions in place. See below and flowsheets for full assessment and VS info.     Neuro:  [place] Coma Scale  Best Eye Response: 1-->(E1) none  Best Motor Response: 1-->(M1) none  Best Verbal Response: 1-->(V1) none  Ellie Coma Scale Score: 3  Assessment Qualifiers: patient chemically sedated or paralyzed, patient intubated  Pupil PERRLA: no  24 hr Temp:  [96.4 °F (35.8 °C)-99.5 °F (37.5 °C)]      CV:  Rhythm: normal sinus rhythm  DVT prophylaxis: VTE Required Core Measure: (SCDs) Sequential compression device initiated/maintained, Pharmacological prophylaxis initiated/maintained    Resp:     Vent Mode: A/CMV-VC  Set Rate: 16 BPM  Oxygen Concentration (%): 30  Vt Set: 450 mL  PEEP/CPAP: 4.8 cmH20  Pressure Support: 5 cmH20    GI/:  GI prophylaxis: yes  Diet/Nutrition Received: tube feeding  Last Bowel Movement: 01/08/24  Voiding Characteristics: external catheter   Intake/Output Summary (Last 24 hours) at 1/9/2024 0600  Last data filed at 1/9/2024 0552  Gross per 24 hour   Intake 2164.29 ml   Output 2050 ml   Net 114.29 ml       Labs/Accuchecks:  Recent Labs   Lab 01/09/24  0505   WBC 9.21   RBC 3.93*   HGB 12.0*   HCT 34.8*         Recent Labs   Lab 01/09/24  0505      K 4.1   CO2 22*      BUN 25*   CREATININE 0.8   ALKPHOS 56   ALT 34   AST 44*   BILITOT 0.5      Recent Labs   Lab 01/03/24  0419   INR 1.0      Recent Labs   Lab 01/02/24  1914 01/04/24  1140 01/06/24  0539   CPK 5908*   < > 754*   TROPONINI 0.384*  --   --     < > = values in this interval not displayed.       Electrolytes: N/A - electrolytes WDL  Accuchecks: Q6H    Gtts/LDAs:   propofol 100 mcg/kg/min (01/09/24 9151)       Lines/Drains/Airways       Peripherally Inserted  Central Catheter Line  Duration             PICC Triple Lumen 01/03/24 1845 right basilic 5 days              Drain  Duration                  NG/OG Tube 01/01/24 1722 [place] sump 16 Fr. Left nostril 7 days    Male External Urinary Catheter 01/06/24 0155 3 days              Airway  Duration                  Airway - Non-Surgical Endotracheal Tube -- days                    Skin/Wounds  Bathing/Skin Care: incontinence care;linen changed (01/08/24 2330)  Wounds: No  Wound care consulted: No    Consults  Consults (From admission, onward)          Status Ordering Provider     Inpatient consult to PICC team ([facility])  Once        Provider:  (Not yet assigned)    Acknowledged LAWANDA TELLEZ     Inpatient consult to Cardiology-[facility]  Once        Provider:  (Not yet assigned)    Completed ALEX OBRIEN

## 2024-01-09 NOTE — PROGRESS NOTES
[facility] Medicine  Progress Note    Patient Name: Aaron Pruitt  MRN: [id number]  Patient Class: IP- Inpatient   Admission Date: 1/1/2024  Length of Stay: 8 days  Attending Physician: Kay Gonzalez*  Primary Care Provider: Gogo Vivar NP        Subjective:     Principal Problem:Acute respiratory failure with hypoxia and hypercarbia        HPI:  54 YO M with PMHx significant for  COPD/Asthma presented to the ER intubated on the field for acute respiratory failure post cardiac arrest s/p resuscitation and shock x1. Per chart review and   family at bedside reports he was shoveling some dirt in his yard when he got severely short of breath and suddenly had difficulty breathing and neighbors called EMS. When EMS arrived, reportedly still had a pulse but was struggling to breath, became pulseless. He was given narcan x2 without improvement. CPR and Epi x3. ROSC after 3rd round. Also reportedly get shocked x1. He was transferred to the ER and sedated. Labs in the ER with elevated lactic acid, elevated troponin, , elevated CPK 3294, elevated AST//82, drug screen with presumptive THC. CT head no acute intracranial abnormality, CT chest no acute pulmonary process.    Overview/Hospital Course:  1/3/2024 on TTM on fentanyl and propofol. K elevated to 5.7-->5.9, Lokelma 5g x 1, insulin/dextrose given  1/4/2024 Rewarming initiated. Sedation held, myoclonus noted. Restarted on propofol. EEG shows possible NCSE activity, started on AEDs  1/5/2024 Unchanged, Vimpat added, on keppra, vimpat and propofol.   1/6/2024 No clinical improvement, loaded with depacon  1/7/24 No improvement EEG result: The patient is a 53-year-old male with a history of anoxic brain injury who is currently maintained on intravenous infusions of propofol and on Keppra and Vimpat.  This is an abnormal EEG during comatose state.  The overall degree of disorganization and slowing for given age is suggestive of a  severe encephalopathy. The presence of epileptiform discharges is suggestive of cortical irritability with increased risk of focal seizures from either hemisphere.  There was a decrease in the overall burden of epileptiform discharges in this study compared to the prior study.  No seizures recorded during this study.   1/8/24 EEG without seizures since yesterday.   MRI brain The diffusion sequence is normal without evidence of acute ischemia or infarct.  There is abnormal increased signal of the caudate heads and basal ganglia bilaterally.   remaining hemispheres are unremarkable.  Posterior fossa structures are normal.  Flow voids are present where expected.  Pituitary, craniocervical junction and midline structures are unremarkable.     Interval History:  Intubated,  Initial EEG with moderate to severe diffuse background slowing and await repeat EEG this afternoon  MRI on 01/08 with concern for vasogenic edema of the caudate head and basal ganglia bilateral.  Does not follow command  On propofol Plan to wean today while on EEG  Continue Keppra, Vimpat and Depakote  Plan to transfer [facility] pending bed availability    Review of Systems   Unable to perform ROS: Intubated     Objective:     Vital Signs (Most Recent):  Temp: 99.5 °F (37.5 °C) (01/09/24 1630)  Pulse: 100 (01/09/24 1630)  Resp: 18 (01/09/24 1630)  BP: 129/81 (01/09/24 1630)  SpO2: (!) 94 % (01/09/24 1630) Vital Signs (24h Range):  Temp:  [97 °F (36.1 °C)-99.5 °F (37.5 °C)] 99.5 °F (37.5 °C)  Pulse:  [] 100  Resp:  [16-21] 18  SpO2:  [92 %-100 %] 94 %  BP: ()/(57-93) 129/81     Weight: 72.1 kg (158 lb 15.2 oz)  Body mass index is 20.97 kg/m².    Intake/Output Summary (Last 24 hours) at 1/9/2024 1653  Last data filed at 1/9/2024 1630  Gross per 24 hour   Intake 1950.55 ml   Output 1550 ml   Net 400.55 ml         Physical Exam  Vitals and nursing note reviewed.   Constitutional:       General: He is not in acute distress.     Appearance: He is  "not toxic-appearing.      Comments: Intubated and sedated   HENT:      Head: Normocephalic and atraumatic.      Mouth/Throat:      Mouth: Mucous membranes are moist.   Cardiovascular:      Rate and Rhythm: Normal rate.      Heart sounds: No murmur heard.     No friction rub. No gallop.   Pulmonary:      Comments: Intubated, on mechanical ventilation, sedated  Abdominal:      General: Bowel sounds are normal. There is no distension.      Palpations: Abdomen is soft.      Tenderness: There is no rebound.   Musculoskeletal:      Cervical back: No rigidity or tenderness.      Right lower leg: No edema.      Left lower leg: No edema.             Significant Labs: A1C: No results for input(s): "HGBA1C" in the last 4320 hours.  ABGs:   Recent Labs   Lab 01/09/24  1131   PH 7.397   PCO2 45.4*   HCO3 27.9   POCSATURATED 92   BE 3*   PO2 65*     Blood Culture:   Recent Labs   Lab 01/07/24  1745   LABBLOO No Growth to date  No Growth to date  No Growth to date  No Growth to date     CBC:   Recent Labs   Lab 01/08/24  0436 01/09/24  0505   WBC 11.53 9.21   HGB 13.9* 12.0*   HCT 39.7* 34.8*   * 179     CMP:   Recent Labs   Lab 01/08/24  0436 01/09/24  0505    139   K 4.6 4.1    104   CO2 17* 22*   GLU 77 109   BUN 26* 25*   CREATININE 0.7 0.8   CALCIUM 9.7 9.1   PROT 7.4 6.6   ALBUMIN 3.0* 2.7*   BILITOT 0.7 0.5   ALKPHOS 64 56   AST 54* 44*   ALT 46* 34   ANIONGAP 16 13     Lactic Acid: No results for input(s): "LACTATE" in the last 48 hours.  Lipase: No results for input(s): "LIPASE" in the last 48 hours.  Lipid Panel:   Recent Labs   Lab 01/08/24  0436   TRIG 787*     Magnesium: No results for input(s): "MG" in the last 48 hours.  Troponin: No results for input(s): "TROPONINI", "TROPONINIHS" in the last 48 hours.  TSH: No results for input(s): "TSH" in the last 4320 hours.  Urine Culture: No results for input(s): "LABURIN" in the last 48 hours.  Urine Studies: No results for input(s): "COLORU", " ""APPEARANCEUA", "PHUR", "SPECGRAV", "PROTEINUA", "GLUCUA", "KETONESU", "BILIRUBINUA", "OCCULTUA", "NITRITE", "UROBILINOGEN", "LEUKOCYTESUR", "RBCUA", "WBCUA", "BACTERIA", "SQUAMEPITHEL", "HYALINECASTS" in the last 48 hours.    Invalid input(s): "WRIGHTSUR"    Significant Imaging: I have reviewed all pertinent imaging results/findings within the past 24 hours.    Assessment/Plan:      * Acute respiratory failure with hypoxia and hypercarbia  Patient with Hypoxic Respiratory failure which is Acute.  he is not on home oxygen. Supplemental oxygen was provided and noted- Vent Mode: A/CMV-VC  Oxygen Concentration (%):  [] 21  Resp Rate Total:  [16 br/min-25 br/min] 19 br/min  Vt Set:  [450 mL] 450 mL  PEEP/CPAP:  [4.6 cmH20-5.9 cmH20] 5 cmH20  Mean Airway Pressure:  [7.6 cmH20-9.9 cmH20] 9.8 cmH20    .   Signs/symptoms of respiratory failure include- tachypnea, increased work of breathing, and respiratory distress. Contributing diagnoses includes -  unkonwn  Labs and images were reviewed. Patient Has recent ABG, which has been reviewed. Will treat underlying causes and adjust management of respiratory failure as follows-   Continue home Singulair  H/o severe asthma and COPD  Steroids, nebs, added ceftriaxone and azithromycin (CXR clear)    Hypertriglyceridemia    Triglycerides 787  Start fenofibrate    Hypertension  BP meds  Not on Labile blood pressure with intermittent hypotension  TTE    Left Ventricle: The left ventricle is normal in size. Normal wall thickness. There is concentric remodeling. Normal wall motion. There is normal systolic function. Biplane (2D) method of discs ejection fraction is 65%. There is normal diastolic function.    Right Ventricle: Normal right ventricular cavity size. Wall thickness is normal. Right ventricle wall motion  is normal. Systolic function is normal.    IVC/SVC: Patient is ventilated, cannot use IVC diameter to estimate right atrial pressure.    Acute encephalopathy  Status " epilepticus  EEG were nonconvulsive status epilepticus,    On Keppra 1500 mg b.i.d., Vimpat 200 mg b.i.d., Depakote 360 mg t.i.d.  Planning repeat EEG on 01/09 and weaning propofol  Pending transfer to neuro critical Care with bed availability  Appreciate pulmonary crit: Discussion with family.  Plan future family meeting after repeat EEG      Seizure disorder, nonconvulsive, with status epilepticus  1/6-7  IMPRESSION:  This is an abnormal EEG during comatose state.  Diffuse disorganized low-amplitude slowing of the background was noted.  Generalized as well as independent left and right lateralized epileptiform discharges were noted which were pseudo periodic at < 2 Hz.  Two brief electrographic seizures emanating from the right frontal region were noted     CLINICAL CORRELATION:  The patient is a 53-year-old male with a history of anoxic brain injury who is currently maintained on intravenous infusions of propofol and on Keppra and Vimpat.  This is an abnormal EEG during comatose state.  The overall degree of disorganization and slowing for given age is suggestive of a severe encephalopathy. The presence of epileptiform discharges is suggestive of cortical irritability with increased risk of focal seizures from either hemisphere.  There was a decrease in the overall burden of epileptiform discharges in this study compared to the prior study.  No seizures recorded during this study.    1/8  Abnormal study due to moderate to severe  diffuse background slowing consistent with diffuse cerebral dysfunction and encephalopathy which may be on the basis of toxic, metabolic, or primary neuronal disorder.       Requested transfer for Neuro-critical ICU, no beds at this time, also, patient is out of status epilepticus and plan is to repeat EEG in am and wean propofol by 20mcg/kg/hr every 2 hours and monitor for seizure activity    Rhabdomyolysis  Vs traumatic post cardiac resuscitation  Monitor CPK - improved  Now off  IVFs      Cardiac arrest  S/p CPR with ROSC now on TTM, now rewarming  Per Pulm/Crit  Cardiac arrest: Due to hypoxemia. TTM completed. Maintaining normothermia. Reflexes intact. MRI brain on Monday.     Unchanged, MRI on Monday, if evidence of severe anoxic brain injury family will likely transition to comfort care  If not possible transfer to [facility]  MRI brain on 01/08 with concern for vasogenic edema of the caudate head and basal ganglia bilateral          VTE Risk Mitigation (From admission, onward)           Ordered     enoxaparin injection 40 mg  Every 24 hours         01/01/24 1855     Apply sequential compression device to lower extremities (if no contraindications). Medical venous thromboembolus prophylaxis is preferred.  Until discontinued         01/01/24 1928                    Discharge Planning   MARQUISE:      Code Status: DNR   Is the patient medically ready for discharge?:     Reason for patient still in hospital (select all that apply): Patient trending condition  Discharge Plan A: Hospital Transfer ([facility]???)   Discharge Delays:  (bed availability)        Critical care time spent on the evaluation and treatment of severe organ dysfunction, review of pertinent labs and imaging studies, discussions with consulting providers and discussions with patient/family: 35 minutes.      Kay Gonzalez MD  Department of Hospital Medicine   [facility] - Intensive Care

## 2024-01-09 NOTE — PROGRESS NOTES
[facility] Medicine  Progress Note    Patient Name: Aaron Pruitt  MRN: [id number]  Patient Class: IP- Inpatient   Admission Date: 1/1/2024  Length of Stay: 7 days  Attending Physician: Yamila Cazares MD  Primary Care Provider: Gogo Vivar NP        Subjective:     Principal Problem:Acute respiratory failure with hypoxia and hypercarbia        HPI:  52 YO M with PMHx significant for  COPD/Asthma presented to the ER intubated on the field for acute respiratory failure post cardiac arrest s/p resuscitation and shock x1. Per chart review and   family at bedside reports he was shoveling some dirt in his yard when he got severely short of breath and suddenly had difficulty breathing and neighbors called EMS. When EMS arrived, reportedly still had a pulse but was struggling to breath, became pulseless. He was given narcan x2 without improvement. CPR and Epi x3. ROSC after 3rd round. Also reportedly get shocked x1. He was transferred to the ER and sedated. Labs in the ER with elevated lactic acid, elevated troponin, , elevated CPK 3294, elevated AST//82, drug screen with presumptive THC. CT head no acute intracranial abnormality, CT chest no acute pulmonary process.    Overview/Hospital Course:  1/3/2024 on TTM on fentanyl and propofol. K elevated to 5.7-->5.9, Lokelma 5g x 1, insulin/dextrose given  1/4/2024 Rewarming initiated. Sedation held, myoclonus noted. Restarted on propofol. EEG shows possible NCSE activity, started on AEDs  1/5/2024 Unchanged, Vimpat added, on keppra, vimpat and propofol.   1/6/2024 No clinical improvement, loaded with depacon  1/7/24 No improvement EEG result: The patient is a 53-year-old male with a history of anoxic brain injury who is currently maintained on intravenous infusions of propofol and on Keppra and Vimpat.  This is an abnormal EEG during comatose state.  The overall degree of disorganization and slowing for given age is suggestive of a severe  encephalopathy. The presence of epileptiform discharges is suggestive of cortical irritability with increased risk of focal seizures from either hemisphere.  There was a decrease in the overall burden of epileptiform discharges in this study compared to the prior study.  No seizures recorded during this study.   1/8/24 EEG without seizures since yesterday.   MRI brain The diffusion sequence is normal without evidence of acute ischemia or infarct.  There is abnormal increased signal of the caudate heads and basal ganglia bilaterally.   remaining hemispheres are unremarkable.  Posterior fossa structures are normal.  Flow voids are present where expected.  Pituitary, craniocervical junction and midline structures are unremarkable.     Interval History: MRI performed   The diffusion sequence is normal without evidence of acute ischemia or infarct.  There is abnormal increased signal of the caudate heads and basal ganglia bilaterally.   remaining hemispheres are unremarkable.  Posterior fossa structures are normal.  Flow voids are present where expected.  Pituitary, craniocervical junction and midline structures are unremarkable.   Requested transfer to [facility] ICU, at this time due to diversion bed is not available    Review of Systems  Objective:     Vital Signs (Most Recent):  Temp: 99 °F (37.2 °C) (01/08/24 2100)  Pulse: 95 (01/08/24 2100)  Resp: 17 (01/08/24 2100)  BP: 100/62 (01/08/24 2100)  SpO2: 96 % (01/08/24 2100) Vital Signs (24h Range):  Temp:  [96.4 °F (35.8 °C)-99.3 °F (37.4 °C)] 99 °F (37.2 °C)  Pulse:  [] 95  Resp:  [13-35] 17  SpO2:  [88 %-100 %] 96 %  BP: ()/() 100/62     Weight: 72.1 kg (158 lb 15.2 oz)  Body mass index is 20.97 kg/m².    Intake/Output Summary (Last 24 hours) at 1/8/2024 2153  Last data filed at 1/8/2024 2110  Gross per 24 hour   Intake 1787.6 ml   Output 1550 ml   Net 237.6 ml         Physical Exam        Significant Labs: All pertinent labs within the past  24 hours have been reviewed.  CBC:   Recent Labs   Lab 01/07/24 0431 01/08/24 0436   WBC 12.85* 11.53   HGB 12.6* 13.9*   HCT 36.4* 39.7*    143*     CMP:   Recent Labs   Lab 01/07/24  0431 01/08/24 0436    138   K 4.4 4.6    105   CO2 24 17*    77   BUN 24* 26*   CREATININE 0.8 0.7   CALCIUM 9.5 9.7   PROT 6.9 7.4   ALBUMIN 3.0* 3.0*   BILITOT 0.7 0.7   ALKPHOS 58 64   AST 67* 54*   ALT 54* 46*   ANIONGAP 14 16       Significant Imaging: I have reviewed all pertinent imaging results/findings within the past 24 hours.    Assessment/Plan:      * Acute respiratory failure with hypoxia and hypercarbia  Patient with Hypoxic Respiratory failure which is Acute.  he is not on home oxygen. Supplemental oxygen was provided and noted- Vent Mode: A/CMV-VC  Oxygen Concentration (%):  [21-30] 30  Resp Rate Total:  [15 br/min-31 br/min] 17 br/min  Vt Set:  [450 mL] 450 mL  PEEP/CPAP:  [4.6 cmH20-6.5 cmH20] 4.6 cmH20  Mean Airway Pressure:  [7.2 cmH20-9 cmH20] 8.2 cmH20    .   Signs/symptoms of respiratory failure include- tachypnea, increased work of breathing, and respiratory distress. Contributing diagnoses includes -  unkonwn  Labs and images were reviewed. Patient Has recent ABG, which has been reviewed. Will treat underlying causes and adjust management of respiratory failure as follows-     H/o severe asthma and COPD  Steroids, nebs, added ceftriaxone and azithromycin (CXR clear)    Acute encephalopathy        Seizure disorder, nonconvulsive, with status epilepticus  1/6-7  IMPRESSION:  This is an abnormal EEG during comatose state.  Diffuse disorganized low-amplitude slowing of the background was noted.  Generalized as well as independent left and right lateralized epileptiform discharges were noted which were pseudo periodic at < 2 Hz.  Two brief electrographic seizures emanating from the right frontal region were noted     CLINICAL CORRELATION:  The patient is a 53-year-old male with a  history of anoxic brain injury who is currently maintained on intravenous infusions of propofol and on Keppra and Vimpat.  This is an abnormal EEG during comatose state.  The overall degree of disorganization and slowing for given age is suggestive of a severe encephalopathy. The presence of epileptiform discharges is suggestive of cortical irritability with increased risk of focal seizures from either hemisphere.  There was a decrease in the overall burden of epileptiform discharges in this study compared to the prior study.  No seizures recorded during this study.    1/8  Abnormal study due to moderate to severe  diffuse background slowing consistent with diffuse cerebral dysfunction and encephalopathy which may be on the basis of toxic, metabolic, or primary neuronal disorder.       Requested transfer for Neuro-critical ICU, no beds at this time, also, patient is out of status epilepticus and plan is to repeat EEG in am and wean propofol by 20mcg/kg/hr every 2 hours and monitor for seizure activity    Non-traumatic rhabdomyolysis  Vs traumatic post cardiac resuscitation  Monitor CPK - improved  Now off IVFs      Cardiac arrest  S/p CPR with ROSC now on TTM, now rewarming  Per Pulm/Crit  Cardiac arrest: Due to hypoxemia. TTM completed. Maintaining normothermia. Reflexes intact. MRI brain on Monday.     Unchanged, MRI on Monday, if evidence of severe anoxic brain injury family will likely transition to comfort care  If not possible transfer to [facility]        VTE Risk Mitigation (From admission, onward)           Ordered     enoxaparin injection 40 mg  Every 24 hours         01/01/24 1855     Apply sequential compression device to lower extremities (if no contraindications). Medical venous thromboembolus prophylaxis is preferred.  Until discontinued         01/01/24 1928                    Discharge Planning   MARQUISE:      Code Status: DNR   Is the patient medically ready for discharge?:     Reason for patient still in  hospital (select all that apply): Patient trending condition, Imaging, and Consult recommendations  Discharge Plan A: Home            Critical care time spent on the evaluation and treatment of severe organ dysfunction, review of pertinent labs and imaging studies, discussions with consulting providers and discussions with patient/family: 36 minutes.      Yamila Cazares MD  Department of Hospital Medicine   [facility] Intensive [facility]

## 2024-01-09 NOTE — CONSULTS
Progress Note  [facility]    Attending: Dr. Tyler Frazier MD   Admit Date: 1/1/2024  Today's Date: 01/09/2024    HPI  Per ER:   53-year-old M w/pmhx: asthma, was brought in by EMS as a cardiorespiratory arrest.  911 was called for shortness of breath, as patient was working in garden. When EMS arrived, the patient was slumped over face forward with a friend holding a non-rebreather mask over his face that was not hooked up to oxygen.  He had a Medrol Dosepak and an albuterol inhaler by his side.  EMS started giving the patient a nebulizer treatment and he became pulseless and apneic.  He was intubated with an ET tube and CPR was initiated. The patient regained pulses after CPR by the MANDI and epinepherine X3, shocked X1, unclear rhythm.    SUBJECTIVE:   OVERNIGHT    Underwent MRI on 1/8 which showed vasogenic edema of the caudate heads and basal ganglia bilaterally. EEG with moderate to severe diffuse background slowing. Remains on 3 AEDs and propofol at 100cc/hr.     Sedated and intubated this morning.     OBJECTIVE:     Vital Signs Trends  Vitals:    01/09/24 1045 01/09/24 1100 01/09/24 1115 01/09/24 1122   BP: 135/81 127/82 119/69    BP Location:       Patient Position:       Pulse: 88 87 88 86   Resp: 18 16 16 16   Temp: 98.4 °F (36.9 °C) 98.6 °F (37 °C) 98.6 °F (37 °C) 98.4 °F (36.9 °C)   TempSrc:       SpO2: 98% 96% (!) 93% (!) 94%   Weight:       Height:           Vent Mode: A/CMV-VC  Oxygen Concentration (%):  [] 21  Resp Rate Total:  [16 br/min-31 br/min] 16 br/min  Vt Set:  [450 mL] 450 mL  PEEP/CPAP:  [4.6 cmH20-5.9 cmH20] 5 cmH20  Mean Airway Pressure:  [7.6 cmH20-9.9 cmH20] 8.7 cmH20    Physical Exam:  GEN: middle-aged man, laying in bed, sedated  HEENT: face symmetric, pupils equal and reactive bilaterally  CV: regular rhythm, normal rate, no audible murmurs  PULM: CTAB, no wheezing, no crackles  Abd: soft, non-distended  Ext: mild edema of bilateral upper  extremities  MSK: sedated, does not move extremities  Neuro: sedated, has intact cough/gag    Laboratory:  Recent Labs   Lab 01/07/24  0431 01/08/24  0436 01/09/24  0505   WBC 12.85* 11.53 9.21   HGB 12.6* 13.9* 12.0*   HCT 36.4* 39.7* 34.8*    143* 179    138 139   K 4.4 4.6 4.1    105 104   CREATININE 0.8 0.7 0.8   BUN 24* 26* 25*   CO2 24 17* 22*   ALT 54* 46* 34   AST 67* 54* 44*     Triglycerides: 787 1/8/2024  _______________________________  MRI Brain Without Contrast  Addendum Date: 1/8/2024    Although not as noticeable as caudate heads and basal ganglia there may be slight edema some of the cortex patchy bilaterally. Electronically signed by: Elias Obregon MD Date:01/08/2024 Time:12:47  Result Date: 1/8/2024  Vasogenic edema within the caudate heads and basal ganglia bilaterally.  This can be seen with a global hypoxic anoxic event.  This can also be seen with certain toxins, hypoglycemia, and viral infections.   Electronically signed by:Elias Obregon MD Date:01/08/2024 Time:11:24       ASSESSMENT & RECOMMENDATIONS     Neuro/Psych  # Non-Conclusive Status Epilepticus   # Acute metabolic encephalopathy  EEG with non-convulsive status epilepticus. Currently requiring multiple AEDs to control seizure activity. On propofol 100 mcg/kg/hr, Keppra loaded 1/4/24 with 4,000 mg, Vimpat 800 mg Loaded 1/5/24, and Depakote 3g loaded 1/6/2024. Seizure activity has resolved on EEG but remains on propofol 100cc/hr. He has not followed any commands but only briefly able to hold sedation. While he does have some abnormalities on MRI brain that are suggestive of anoxic brain injury and status epilepticus post-cardiac arrest can be a sign of severe anoxic brain injury post-arrest in itself, wonder if seizure activity was affecting his neurological exam earlier during admission. Will try to wean off propofol while monitoring on EEG to better determine neurological status. Continuing to update family daily.    - continue propofol, wean by 20mcg every 2 hours once back on EEG  - Keppra 1500 mg BID  - Vimpat 200 mg BID  - Depakote 360 mg TID  - discussed transfer to NCC unit at [facility], pending bed availability    CV  # Cardiac arrest  Secondary to respiratory arrest from asthma exacerbation. Reportedly received epi x3 and shock x1 (unk rhythm). Completed TTM. Neuro prognostication thus far has included EEG initially with non-convulsive status, CT head x2, and MRI brain. Brain stem reflexes intact, has not followed any commands thus far but have only been able to discontinue propofol briefly due to status.     # Hypertension  Labile blood pressures with intermittent hypotension during periods of increased agitation. Overall improving. No history of hypertension and not on anti-hypertensives prior to admission. TTE with EF 65% normal noble function  - PRN Hydralazine placed for SBP > 180 mmHg.   - PRN Labetalol placed for SBP > 180 mmHg.    Pulm  # Acute hypoxemic respiratory Failure secondary to Asthma exacerbation  Treated for acute asthma exacerbation on admission with nebulizers, nebulizers, IV Mag with rapid improvement. No evidence of ongoing asthma   - Duo-neb q6h  - budesonide 0.25 mg nebs BID   - continue home Montelukast 10mg qd    FEN/GI  # Nutrition  Tolerating tube feeds at goal    # Hypertriglyceridemia  On high dose propofol with . Using propofol for refractory seizures, will decrease as able.   - start fenofibrate   - trend TG q72 hours    # Hyperkalemia   Orginally 5.9 after cardiac arrest. Shifted in the ED, K has remained wnl.     # Hepatocellular liver injury  AST/ALT improving during admission.      RENAL  # Rhabdomyolysis - Resolved  CK peaked at 5,900. Was treated with IVF. CK <1,000, IVF discontinued.   Down trending CK with most recent CK of less than 1000, off maintenance fluids.    # ROHAN  Likely secondary to rhabdomyolysis and cardiac arrest. Cr peaked at 1.3, now normalized. Continues to  have good UOP.     Heme  # Normocytic anemia  Hemoglobin mildly low but has remained stable during admission. No evidence of active bleeding.     Endo  Blood sugars have remained within goal    ID  # Fevers  Unlikely to be secondary to infectious source. Cultures have remained negative. No indication for antibiotics at this time.   - s/p Azithromycin 1/2-1/3  - s/p Ceftriaxone 1/2-1/3  - s/p Pip-Tazo 1/3-1/6  - s/p Vanc 1/3-1/6    F Tube Feeds  A tylenol  S propofol   T lovenox  H 30  U protonix  G at goal  S holding off due to seizures.  B monitor  I ETT, OG, PIV, PICC  D off abx    Danelle Weaver MD  [employer] PCCM Fellow    Pt seen and examined with Pulmonary/Critical Care team and this note was reviewed and validated with the following additional comments: EEG back on.  Weaning propofol.  Hopefully pt will emerge.    Critical Care time was spent validating the history and physical exam, reviewing the lab and imaging results, and discussing the care of the patient with the bedside nurse and the patient and/or surrogates. This critical care time did not overlap with that of any other provider or involve time for any procedures.  This patient has a high probability of sudden clinically significant deterioration which requires the highest level of physician preparedness to intervene urgently. I managed/supervised life or organ supporting interventions that required frequent physician assessments. I devoted my full attention in the ICU to the direct care of this patient for this period of time. Organ systems which are failing and require intensive, critical care support are: Neurologic, respiratory  Critical Care time: 45 minutes    Gopal Hall MD  Phone [phone]

## 2024-01-09 NOTE — ASSESSMENT & PLAN NOTE
S/p CPR with ROSC now on TTM, now rewarming  Per Pulm/Crit  Cardiac arrest: Due to hypoxemia. TTM completed. Maintaining normothermia. Reflexes intact. MRI brain on Monday.     Unchanged, MRI on Monday, if evidence of severe anoxic brain injury family will likely transition to comfort care  If not possible transfer to [facility]

## 2024-01-09 NOTE — ASSESSMENT & PLAN NOTE
1/6-7  IMPRESSION:  This is an abnormal EEG during comatose state.  Diffuse disorganized low-amplitude slowing of the background was noted.  Generalized as well as independent left and right lateralized epileptiform discharges were noted which were pseudo periodic at < 2 Hz.  Two brief electrographic seizures emanating from the right frontal region were noted     CLINICAL CORRELATION:  The patient is a 53-year-old male with a history of anoxic brain injury who is currently maintained on intravenous infusions of propofol and on Keppra and Vimpat.  This is an abnormal EEG during comatose state.  The overall degree of disorganization and slowing for given age is suggestive of a severe encephalopathy. The presence of epileptiform discharges is suggestive of cortical irritability with increased risk of focal seizures from either hemisphere.  There was a decrease in the overall burden of epileptiform discharges in this study compared to the prior study.  No seizures recorded during this study.    1/8  Abnormal study due to moderate to severe  diffuse background slowing consistent with diffuse cerebral dysfunction and encephalopathy which may be on the basis of toxic, metabolic, or primary neuronal disorder.       Requested transfer for Neuro-critical ICU, no beds at this time, also, patient is out of status epilepticus and plan is to repeat EEG in am and wean propofol by 20mcg/kg/hr every 2 hours and monitor for seizure activity

## 2024-01-09 NOTE — ASSESSMENT & PLAN NOTE
Patient with Hypoxic Respiratory failure which is Acute.  he is not on home oxygen. Supplemental oxygen was provided and noted- Vent Mode: A/CMV-VC  Oxygen Concentration (%):  [21-30] 30  Resp Rate Total:  [15 br/min-31 br/min] 17 br/min  Vt Set:  [450 mL] 450 mL  PEEP/CPAP:  [4.6 cmH20-6.5 cmH20] 4.6 cmH20  Mean Airway Pressure:  [7.2 cmH20-9 cmH20] 8.2 cmH20    .   Signs/symptoms of respiratory failure include- tachypnea, increased work of breathing, and respiratory distress. Contributing diagnoses includes -  unkonwn  Labs and images were reviewed. Patient Has recent ABG, which has been reviewed. Will treat underlying causes and adjust management of respiratory failure as follows-     H/o severe asthma and COPD  Steroids, nebs, added ceftriaxone and azithromycin (CXR clear)

## 2024-01-09 NOTE — SUBJECTIVE & OBJECTIVE
"Interval History:  Intubated,  Initial EEG with moderate to severe diffuse background slowing and await repeat EEG this afternoon  MRI on 01/08 with concern for vasogenic edema of the caudate head and basal ganglia bilateral.  Does not follow command  On propofol Plan to wean today while on EEG  Continue Keppra, Vimpat and Depakote  Plan to transfer to [facility] pending bed availability    Review of Systems   Unable to perform ROS: Intubated     Objective:     Vital Signs (Most Recent):  Temp: 99.5 °F (37.5 °C) (01/09/24 1630)  Pulse: 100 (01/09/24 1630)  Resp: 18 (01/09/24 1630)  BP: 129/81 (01/09/24 1630)  SpO2: (!) 94 % (01/09/24 1630) Vital Signs (24h Range):  Temp:  [97 °F (36.1 °C)-99.5 °F (37.5 °C)] 99.5 °F (37.5 °C)  Pulse:  [] 100  Resp:  [16-21] 18  SpO2:  [92 %-100 %] 94 %  BP: ()/(57-93) 129/81     Weight: 72.1 kg (158 lb 15.2 oz)  Body mass index is 20.97 kg/m².    Intake/Output Summary (Last 24 hours) at 1/9/2024 1653  Last data filed at 1/9/2024 1630  Gross per 24 hour   Intake 1950.55 ml   Output 1550 ml   Net 400.55 ml         Physical Exam  Vitals and nursing note reviewed.   Constitutional:       General: He is not in acute distress.     Appearance: He is not toxic-appearing.      Comments: Intubated and sedated   HENT:      Head: Normocephalic and atraumatic.      Mouth/Throat:      Mouth: Mucous membranes are moist.   Cardiovascular:      Rate and Rhythm: Normal rate.      Heart sounds: No murmur heard.     No friction rub. No gallop.   Pulmonary:      Comments: Intubated, on mechanical ventilation, sedated  Abdominal:      General: Bowel sounds are normal. There is no distension.      Palpations: Abdomen is soft.      Tenderness: There is no rebound.   Musculoskeletal:      Cervical back: No rigidity or tenderness.      Right lower leg: No edema.      Left lower leg: No edema.             Significant Labs: A1C: No results for input(s): "HGBA1C" in the last 4320 hours.  ABGs:   Recent Labs " "  Lab 01/09/24  1131   PH 7.397   PCO2 45.4*   HCO3 27.9   POCSATURATED 92   BE 3*   PO2 65*     Blood Culture:   Recent Labs   Lab 01/07/24  1745   LABBLOO No Growth to date  No Growth to date  No Growth to date  No Growth to date     CBC:   Recent Labs   Lab 01/08/24  0436 01/09/24  0505   WBC 11.53 9.21   HGB 13.9* 12.0*   HCT 39.7* 34.8*   * 179     CMP:   Recent Labs   Lab 01/08/24  0436 01/09/24  0505    139   K 4.6 4.1    104   CO2 17* 22*   GLU 77 109   BUN 26* 25*   CREATININE 0.7 0.8   CALCIUM 9.7 9.1   PROT 7.4 6.6   ALBUMIN 3.0* 2.7*   BILITOT 0.7 0.5   ALKPHOS 64 56   AST 54* 44*   ALT 46* 34   ANIONGAP 16 13     Lactic Acid: No results for input(s): "LACTATE" in the last 48 hours.  Lipase: No results for input(s): "LIPASE" in the last 48 hours.  Lipid Panel:   Recent Labs   Lab 01/08/24  0436   TRIG 787*     Magnesium: No results for input(s): "MG" in the last 48 hours.  Troponin: No results for input(s): "TROPONINI", "TROPONINIHS" in the last 48 hours.  TSH: No results for input(s): "TSH" in the last 4320 hours.  Urine Culture: No results for input(s): "LABURIN" in the last 48 hours.  Urine Studies: No results for input(s): "COLORU", "APPEARANCEUA", "PHUR", "SPECGRAV", "PROTEINUA", "GLUCUA", "KETONESU", "BILIRUBINUA", "OCCULTUA", "NITRITE", "UROBILINOGEN", "LEUKOCYTESUR", "RBCUA", "WBCUA", "BACTERIA", "SQUAMEPITHEL", "HYALINECASTS" in the last 48 hours.    Invalid input(s): "WRIGHTSUR"    Significant Imaging: I have reviewed all pertinent imaging results/findings within the past 24 hours.  "

## 2024-01-09 NOTE — SUBJECTIVE & OBJECTIVE
Interval History: MRI performed   The diffusion sequence is normal without evidence of acute ischemia or infarct.  There is abnormal increased signal of the caudate heads and basal ganglia bilaterally.   remaining hemispheres are unremarkable.  Posterior fossa structures are normal.  Flow voids are present where expected.  Pituitary, craniocervical junction and midline structures are unremarkable.   Requested transfer to [facility] for Neuro ICU, at this time due to diversion bed is not available    Review of Systems  Objective:     Vital Signs (Most Recent):  Temp: 99 °F (37.2 °C) (01/08/24 2100)  Pulse: 95 (01/08/24 2100)  Resp: 17 (01/08/24 2100)  BP: 100/62 (01/08/24 2100)  SpO2: 96 % (01/08/24 2100) Vital Signs (24h Range):  Temp:  [96.4 °F (35.8 °C)-99.3 °F (37.4 °C)] 99 °F (37.2 °C)  Pulse:  [] 95  Resp:  [13-35] 17  SpO2:  [88 %-100 %] 96 %  BP: ()/() 100/62     Weight: 72.1 kg (158 lb 15.2 oz)  Body mass index is 20.97 kg/m².    Intake/Output Summary (Last 24 hours) at 1/8/2024 2153  Last data filed at 1/8/2024 2110  Gross per 24 hour   Intake 1787.6 ml   Output 1550 ml   Net 237.6 ml         Physical Exam        Significant Labs: All pertinent labs within the past 24 hours have been reviewed.  CBC:   Recent Labs   Lab 01/07/24  0431 01/08/24 0436   WBC 12.85* 11.53   HGB 12.6* 13.9*   HCT 36.4* 39.7*    143*     CMP:   Recent Labs   Lab 01/07/24  0431 01/08/24 0436    138   K 4.4 4.6    105   CO2 24 17*    77   BUN 24* 26*   CREATININE 0.8 0.7   CALCIUM 9.5 9.7   PROT 6.9 7.4   ALBUMIN 3.0* 3.0*   BILITOT 0.7 0.7   ALKPHOS 58 64   AST 67* 54*   ALT 54* 46*   ANIONGAP 14 16       Significant Imaging: I have reviewed all pertinent imaging results/findings within the past 24 hours.

## 2024-01-09 NOTE — ASSESSMENT & PLAN NOTE
S/p CPR with ROSC now on TTM, now rewarming  Per Pulm/Crit  Cardiac arrest: Due to hypoxemia. TTM completed. Maintaining normothermia. Reflexes intact. MRI brain on Monday.     Unchanged, MRI on Monday, if evidence of severe anoxic brain injury family will likely transition to comfort care  If not possible transfer to [facility]  MRI brain on 01/08 with concern for vasogenic edema of the caudate head and basal ganglia bilateral

## 2024-01-09 NOTE — PROCEDURES
EEG Extended Monitoring greater than one hour    Date/Time: 1/1/2024 5:20 PM    Performed by: Edgar Darby MD  Authorized by: Natacha Greenberg MD          ICU EEG/VIDEO MONITORING REPORT     DATE OF SERVICE: 1/8/23  EEG NUMBER: [id number]  REQUESTED BY: Erin  LOCATION OF SERVICE: [facility]              Electroencephalographic (EEG) recording is with electrodes placed according to the International 10-20 placement system.  Thirty two (32) channels of digital signal are simultaneously recorded from the scalp and may include EKG, EMG, and/or eye monitors.   Recording band pass was 0.1 to 512 hz.  Digital video recording of the patient is simultaneously recorded with the EEG.  The nursing staff report clinical symptoms and may press an event button when the patient has symptoms of clinical interest to the treating physicians.  EEG and video recording is stored and archived in digital format.  The entire recording is visually reviewed and the times identified by computer analysis as being spikes or seizures are reviewed again.  Activation procedures which include photic stimulation, hyperventilation and instructing patients to perform simple task are done in selected patients.   Compresses spectral analysis (CSA) is also performed on the activity recorded from each individual channel.  This is displayed as a power display of frequencies from 0 to 30 Hz over time.   The CSA analysis is done and displayed continuously.  This is reviewed for asymmetries in power between homologous areas of the scalp and for presence of changes in power which canbe seen when seizures occur.  Sections of suspected abnormalities on the CSA is then compared with the original EEG recording.                [employer] software was also utilized in the review of this study.  This software suite analyzes the EEG recording in multiple domains.  Coherence and rhythmicity is computed to identify EEG sections which may contain  organized seizures.  Each channel undergoes analysis to detect presence of spike and sharp waves which have special and morphological characteristic of epileptic activity.  The routine EEG recording is converted from spacial into frequency domain.  This is then displayed comparing homologous areas to identify areas of significant asymmetry.  Algorithm to identify non-cortically generated artifact is used to separate eye movement, EMG and other artifact from the EEG.       Recording Times  Start on 1/8/23 at 07:00:41  Stop on 1/8/23 at 07:00:07  A total of 6 hours of EEG was recorded.     EEG FINDINGS  The record shows a fair  organization at rest, with no discernible posterior dominant rhythm with fair  reactivity. There is mild bilateral beta activity. There is continuous diffuse delta and theta range background slowing.      Drowsiness is characterized by attenuation of the background, vertex waves, and bilateral theta slowing. Stage II sleep is characterized by slowing, vertex waves, and symmetric sleep spindles.      Provocative maneuvers including hyperventilation and photic stimulation were not performed.      EKG recording shows a sinus rhythm.     There are no push button clinical events.      IMPRESSION:  Abnormal study due to moderate to severe  diffuse background slowing consistent with diffuse cerebral dysfunction and encephalopathy which may be on the basis of toxic, metabolic, or primary neuronal disorder.       Edgar Darby MD  Department of Neurology  [facility]

## 2024-01-09 NOTE — ASSESSMENT & PLAN NOTE
BP meds  Not on Labile blood pressure with intermittent hypotension  TTE    Left Ventricle: The left ventricle is normal in size. Normal wall thickness. There is concentric remodeling. Normal wall motion. There is normal systolic function. Biplane (2D) method of discs ejection fraction is 65%. There is normal diastolic function.    Right Ventricle: Normal right ventricular cavity size. Wall thickness is normal. Right ventricle wall motion  is normal. Systolic function is normal.    IVC/SVC: Patient is ventilated, cannot use IVC diameter to estimate right atrial pressure.

## 2024-01-10 LAB
ALLENS TEST: ABNORMAL
ALLENS TEST: ABNORMAL
ANION GAP SERPL CALC-SCNC: 9 MMOL/L (ref 8–16)
BASOPHILS # BLD AUTO: 0.01 K/UL (ref 0–0.2)
BASOPHILS NFR BLD: 0.1 % (ref 0–1.9)
BUN SERPL-MCNC: 26 MG/DL (ref 6–20)
CALCIUM SERPL-MCNC: 9 MG/DL (ref 8.7–10.5)
CHLORIDE SERPL-SCNC: 103 MMOL/L (ref 95–110)
CO2 SERPL-SCNC: 26 MMOL/L (ref 23–29)
CREAT SERPL-MCNC: 0.8 MG/DL (ref 0.5–1.4)
DELSYS: ABNORMAL
DIFFERENTIAL METHOD BLD: ABNORMAL
EOSINOPHIL # BLD AUTO: 0 K/UL (ref 0–0.5)
EOSINOPHIL NFR BLD: 0.4 % (ref 0–8)
ERYTHROCYTE [DISTWIDTH] IN BLOOD BY AUTOMATED COUNT: 13.2 % (ref 11.5–14.5)
ERYTHROCYTE [SEDIMENTATION RATE] IN BLOOD BY WESTERGREN METHOD: 12 MM/H
EST. GFR  (NO RACE VARIABLE): >60 ML/MIN/1.73 M^2
FIO2: 21
FIO2: 25 %
GLUCOSE SERPL-MCNC: 110 MG/DL (ref 70–110)
HCO3 UR-SCNC: 29.5 MMOL/L (ref 24–28)
HCT VFR BLD AUTO: 33.1 % (ref 40–54)
HGB BLD-MCNC: 11 G/DL (ref 14–18)
IMM GRANULOCYTES # BLD AUTO: 0.12 K/UL (ref 0–0.04)
IMM GRANULOCYTES NFR BLD AUTO: 1.1 % (ref 0–0.5)
LYMPHOCYTES # BLD AUTO: 1.1 K/UL (ref 1–4.8)
LYMPHOCYTES NFR BLD: 9.9 % (ref 18–48)
MCH RBC QN AUTO: 30.2 PG (ref 27–31)
MCHC RBC AUTO-ENTMCNC: 33.2 G/DL (ref 32–36)
MCV RBC AUTO: 91 FL (ref 82–98)
MODE: ABNORMAL
MONOCYTES # BLD AUTO: 1.2 K/UL (ref 0.3–1)
MONOCYTES NFR BLD: 11.5 % (ref 4–15)
NEUTROPHILS # BLD AUTO: 8.2 K/UL (ref 1.8–7.7)
NEUTROPHILS NFR BLD: 77 % (ref 38–73)
NRBC BLD-RTO: 0 /100 WBC
PCO2 BLDA: 46.8 MMHG (ref 35–45)
PCO2 BLDA: 52.3 MMHG (ref 35–45)
PEEP: 5
PH SMN: 7.36 [PH] (ref 7.35–7.45)
PH SMN: 7.44 [PH] (ref 7.35–7.45)
PLATELET # BLD AUTO: 232 K/UL (ref 150–450)
PMV BLD AUTO: 9.1 FL (ref 9.2–12.9)
PO2 BLDA: 63 MMHG (ref 80–100)
PO2 BLDA: 77.1 MMHG (ref 80–100)
POC BASE DEFICIT: 6.9 MMOL/L (ref -2–2)
POC BE: 4 MMOL/L
POC HCO3: 32 MMOL/L (ref 24–28)
POC PERFORMED BY: ABNORMAL
POC SATURATED O2: 90 % (ref 95–100)
POC SATURATED O2: 96.1 % (ref 95–100)
POC TCO2: 31 MMOL/L (ref 23–27)
POCT GLUCOSE: 101 MG/DL (ref 70–110)
POCT GLUCOSE: 102 MG/DL (ref 70–110)
POCT GLUCOSE: 110 MG/DL (ref 70–110)
POCT GLUCOSE: 112 MG/DL (ref 70–110)
POCT GLUCOSE: 114 MG/DL (ref 70–110)
POTASSIUM SERPL-SCNC: 4.6 MMOL/L (ref 3.5–5.1)
RBC # BLD AUTO: 3.64 M/UL (ref 4.6–6.2)
SAMPLE: ABNORMAL
SITE: ABNORMAL
SODIUM SERPL-SCNC: 138 MMOL/L (ref 136–145)
SPECIMEN SOURCE: ABNORMAL
VT: 400
WBC # BLD AUTO: 10.66 K/UL (ref 3.9–12.7)

## 2024-01-10 PROCEDURE — 25000003 PHARM REV CODE 250

## 2024-01-10 PROCEDURE — 99900035 HC TECH TIME PER 15 MIN (STAT)

## 2024-01-10 PROCEDURE — 63600175 PHARM REV CODE 636 W HCPCS

## 2024-01-10 PROCEDURE — A4216 STERILE WATER/SALINE, 10 ML: HCPCS | Performed by: INTERNAL MEDICINE

## 2024-01-10 PROCEDURE — 25000242 PHARM REV CODE 250 ALT 637 W/ HCPCS: Performed by: STUDENT IN AN ORGANIZED HEALTH CARE EDUCATION/TRAINING PROGRAM

## 2024-01-10 PROCEDURE — 94761 N-INVAS EAR/PLS OXIMETRY MLT: CPT | Mod: XB

## 2024-01-10 PROCEDURE — 27100171 HC OXYGEN HIGH FLOW UP TO 24 HOURS

## 2024-01-10 PROCEDURE — 25000003 PHARM REV CODE 250: Performed by: STUDENT IN AN ORGANIZED HEALTH CARE EDUCATION/TRAINING PROGRAM

## 2024-01-10 PROCEDURE — C9254 INJECTION, LACOSAMIDE: HCPCS

## 2024-01-10 PROCEDURE — 94640 AIRWAY INHALATION TREATMENT: CPT

## 2024-01-10 PROCEDURE — 20000000 HC ICU ROOM

## 2024-01-10 PROCEDURE — 94003 VENT MGMT INPAT SUBQ DAY: CPT

## 2024-01-10 PROCEDURE — 25000003 PHARM REV CODE 250: Performed by: INTERNAL MEDICINE

## 2024-01-10 PROCEDURE — 36415 COLL VENOUS BLD VENIPUNCTURE: CPT | Performed by: STUDENT IN AN ORGANIZED HEALTH CARE EDUCATION/TRAINING PROGRAM

## 2024-01-10 PROCEDURE — 80048 BASIC METABOLIC PNL TOTAL CA: CPT | Performed by: STUDENT IN AN ORGANIZED HEALTH CARE EDUCATION/TRAINING PROGRAM

## 2024-01-10 PROCEDURE — 99900026 HC AIRWAY MAINTENANCE (STAT)

## 2024-01-10 PROCEDURE — C9113 INJ PANTOPRAZOLE SODIUM, VIA: HCPCS

## 2024-01-10 PROCEDURE — 63600175 PHARM REV CODE 636 W HCPCS: Performed by: STUDENT IN AN ORGANIZED HEALTH CARE EDUCATION/TRAINING PROGRAM

## 2024-01-10 PROCEDURE — 36600 WITHDRAWAL OF ARTERIAL BLOOD: CPT

## 2024-01-10 PROCEDURE — 94644 CONT INHLJ TX 1ST HOUR: CPT

## 2024-01-10 PROCEDURE — 27200966 HC CLOSED SUCTION SYSTEM

## 2024-01-10 PROCEDURE — 51798 US URINE CAPACITY MEASURE: CPT

## 2024-01-10 PROCEDURE — 85025 COMPLETE CBC W/AUTO DIFF WBC: CPT

## 2024-01-10 PROCEDURE — 95714 VEEG EA 12-26 HR UNMNTR: CPT

## 2024-01-10 PROCEDURE — 25000242 PHARM REV CODE 250 ALT 637 W/ HCPCS: Performed by: FAMILY MEDICINE

## 2024-01-10 PROCEDURE — 82803 BLOOD GASES ANY COMBINATION: CPT

## 2024-01-10 RX ORDER — ALBUTEROL SULFATE 2.5 MG/.5ML
10 SOLUTION RESPIRATORY (INHALATION) CONTINUOUS
Status: DISCONTINUED | OUTPATIENT
Start: 2024-01-10 | End: 2024-01-10

## 2024-01-10 RX ORDER — ALBUTEROL SULFATE 2.5 MG/.5ML
10 SOLUTION RESPIRATORY (INHALATION) ONCE
Status: COMPLETED | OUTPATIENT
Start: 2024-01-10 | End: 2024-01-10

## 2024-01-10 RX ADMIN — IPRATROPIUM BROMIDE 0.5 MG: 0.5 SOLUTION RESPIRATORY (INHALATION) at 12:01

## 2024-01-10 RX ADMIN — PROPOFOL 100 MCG/KG/MIN: 10 INJECTION, EMULSION INTRAVENOUS at 03:01

## 2024-01-10 RX ADMIN — PROPOFOL 60 MCG/KG/MIN: 10 INJECTION, EMULSION INTRAVENOUS at 12:01

## 2024-01-10 RX ADMIN — DEXTROSE MONOHYDRATE 360 MG: 50 INJECTION, SOLUTION INTRAVENOUS at 01:01

## 2024-01-10 RX ADMIN — FENOFIBRATE 160 MG: 160 TABLET ORAL at 08:01

## 2024-01-10 RX ADMIN — BUDESONIDE 0.25 MG: 0.5 INHALANT RESPIRATORY (INHALATION) at 07:01

## 2024-01-10 RX ADMIN — IPRATROPIUM BROMIDE AND ALBUTEROL SULFATE 3 ML: 2.5; .5 SOLUTION RESPIRATORY (INHALATION) at 07:01

## 2024-01-10 RX ADMIN — SODIUM CHLORIDE, PRESERVATIVE FREE 10 ML: 5 INJECTION INTRAVENOUS at 05:01

## 2024-01-10 RX ADMIN — MONTELUKAST 10 MG: 10 TABLET, FILM COATED ORAL at 08:01

## 2024-01-10 RX ADMIN — FENTANYL CITRATE 25 MCG/HR: 50 INJECTION, SOLUTION INTRAMUSCULAR; INTRAVENOUS at 12:01

## 2024-01-10 RX ADMIN — LEVETIRACETAM 1500 MG: 500 INJECTION, SOLUTION INTRAVENOUS at 08:01

## 2024-01-10 RX ADMIN — SODIUM CHLORIDE, PRESERVATIVE FREE 10 ML: 5 INJECTION INTRAVENOUS at 12:01

## 2024-01-10 RX ADMIN — IPRATROPIUM BROMIDE AND ALBUTEROL SULFATE 3 ML: 2.5; .5 SOLUTION RESPIRATORY (INHALATION) at 12:01

## 2024-01-10 RX ADMIN — ALBUTEROL SULFATE 10 MG: 2.5 SOLUTION RESPIRATORY (INHALATION) at 12:01

## 2024-01-10 RX ADMIN — LACOSAMIDE 200 MG: 10 INJECTION, SOLUTION INTRAVENOUS at 10:01

## 2024-01-10 RX ADMIN — FENTANYL CITRATE 50 MCG: 50 INJECTION INTRAMUSCULAR; INTRAVENOUS at 12:01

## 2024-01-10 RX ADMIN — PROPOFOL 100 MCG/KG/MIN: 10 INJECTION, EMULSION INTRAVENOUS at 05:01

## 2024-01-10 RX ADMIN — MINERAL OIL, WHITE PETROLATUM: .03; .94 OINTMENT OPHTHALMIC at 01:01

## 2024-01-10 RX ADMIN — PROPOFOL 100 MCG/KG/MIN: 10 INJECTION, EMULSION INTRAVENOUS at 07:01

## 2024-01-10 RX ADMIN — MINERAL OIL, WHITE PETROLATUM: .03; .94 OINTMENT OPHTHALMIC at 05:01

## 2024-01-10 RX ADMIN — ENOXAPARIN SODIUM 40 MG: 40 INJECTION SUBCUTANEOUS at 04:01

## 2024-01-10 RX ADMIN — MAGNESIUM SULFATE HEPTAHYDRATE 2 G: 40 INJECTION, SOLUTION INTRAVENOUS at 12:01

## 2024-01-10 RX ADMIN — MINERAL OIL, WHITE PETROLATUM: .03; .94 OINTMENT OPHTHALMIC at 10:01

## 2024-01-10 RX ADMIN — PROPOFOL 100 MCG/KG/MIN: 10 INJECTION, EMULSION INTRAVENOUS at 12:01

## 2024-01-10 RX ADMIN — PROPOFOL 80 MCG/KG/MIN: 10 INJECTION, EMULSION INTRAVENOUS at 08:01

## 2024-01-10 RX ADMIN — LACOSAMIDE 200 MG: 10 INJECTION, SOLUTION INTRAVENOUS at 09:01

## 2024-01-10 RX ADMIN — PROPOFOL 50 MCG/KG/MIN: 10 INJECTION, EMULSION INTRAVENOUS at 05:01

## 2024-01-10 RX ADMIN — DEXTROSE MONOHYDRATE 360 MG: 50 INJECTION, SOLUTION INTRAVENOUS at 03:01

## 2024-01-10 RX ADMIN — PANTOPRAZOLE SODIUM 40 MG: 40 INJECTION, POWDER, FOR SOLUTION INTRAVENOUS at 08:01

## 2024-01-10 RX ADMIN — BUDESONIDE 0.25 MG: 0.5 INHALANT RESPIRATORY (INHALATION) at 12:01

## 2024-01-10 NOTE — EICU
Intervention Initiated From:  Bedside    Adriane intervened regarding:  Other      Doctor Notified:  Yes    Comments: Bedside RN reports decreased output with urinary retention, bladder scan shows 565cc urine.  Requests order for straight cath.   GHULAM Vera M.D. notified

## 2024-01-10 NOTE — EICU
eICU Note :    Called by the [facility] eRN:    Problem:, patient having urinary retention, bladder scan shows 565cc of urine.     Pertinent History and labs reviewed : 52 y/o     Acute respiratory failure with hypoxia and hypercarbia    Cardiac arrest    Rhabdomyolysis    Seizure disorder, nonconvulsive, with status epilepticus    Acute encephalopathy    Hypertension    Hypertriglyceridemia     Treatment /Intervention given: Straight Cath x1        Radha Bobo M.D  eICU Physician

## 2024-01-10 NOTE — CONSULTS
Progress Note  [facility]    Attending: Dr. Tyler Frazier MD   Admit Date: 1/1/2024  Today's Date: 01/10/2024    HPI  Per ER:   53-year-old M w/pmhx: asthma, was brought in by EMS as a cardiorespiratory arrest.  911 was called for shortness of breath, as patient was working in garden. When EMS arrived, the patient was slumped over face forward with a friend holding a non-rebreather mask over his face that was not hooked up to oxygen.  He had a Medrol Dosepak and an albuterol inhaler by his side.  EMS started giving the patient a nebulizer treatment and he became pulseless and apneic.  He was intubated with an ET tube and CPR was initiated. The patient regained pulses after CPR by the MANDI and epinepherine X3, shocked X1, unclear rhythm.    SUBJECTIVE:   OVERNIGHT    Weaned down to propofol 60, had vent dyssynchrony overnight and evidence of air trapping with elevated expiratory pressors. Sedation increased with improvement.     Remains intubated and sedated this morning.      OBJECTIVE:     Vital Signs Trends  Vitals:    01/10/24 1000 01/10/24 1206 01/10/24 1210 01/10/24 1222   BP: 126/76   (!) 140/80   BP Location: Left arm      Patient Position: Lying      Pulse: 90  90 90   Resp: (!) 24  17 (!) 30   Temp:    99 °F (37.2 °C)   TempSrc:       SpO2: 96% 95% 96% 97%   Weight:       Height:           Vent Mode: A/CMV-VC  Oxygen Concentration (%):  [] 25  Resp Rate Total:  [14 br/min-36 br/min] 17 br/min  Vt Set:  [400 mL-450 mL] 420 mL  PEEP/CPAP:  [5 cmH20-6.5 cmH20] 5 cmH20  Mean Airway Pressure:  [8.5 cmH20-9.8 cmH20] 9.4 cmH20    Physical Exam:  GEN: middle-aged man, resting in bed  HEENT: face symmetric, conjunctivae anicteric, pupils equal and reactive bilaterally  CV: regular rhythm, normal rate, no audible murmurs  PULM: normal respiratory effort, no wheezing, no crackles  Abd: soft, non-tender  Ext: no LE edema, mild bilateral upper extremity edema  Neuro: sedated,  not following commands, intact cough, does not withdraw from pain    Laboratory:  Recent Labs   Lab 01/07/24  0431 01/08/24  0436 01/09/24  0505 01/10/24  0432   WBC 12.85* 11.53 9.21 10.66   HGB 12.6* 13.9* 12.0* 11.0*   HCT 36.4* 39.7* 34.8* 33.1*    143* 179 232    138 139 138   K 4.4 4.6 4.1 4.6    105 104 103   CREATININE 0.8 0.7 0.8 0.8   BUN 24* 26* 25* 26*   CO2 24 17* 22* 26   ALT 54* 46* 34  --    AST 67* 54* 44*  --    Triglycerides: 787 1/8/2024  _______________________________  MRI Brain Without Contrast  Addendum Date: 1/8/2024    Although not as noticeable as caudate heads and basal ganglia there may be slight edema some of the cortex patchy bilaterally. Electronically signed by: Elias Obregon MD Date:01/08/2024 Time:12:47  Result Date: 1/8/2024  Vasogenic edema within the caudate heads and basal ganglia bilaterally.  This can be seen with a global hypoxic anoxic event.  This can also be seen with certain toxins, hypoglycemia, and viral infections.   Electronically signed by:Elias Obregon MD Date:01/08/2024 Time:11:24     ASSESSMENT & RECOMMENDATIONS     Neuro/Psych  # Non-Conclusive Status Epilepticus   # Acute metabolic encephalopathy  EEG with non-convulsive status epilepticus. Currently requiring multiple AEDs to control seizure activity. On propofol 100 mcg/kg/hr, Keppra loaded 1/4/24 with 4,000 mg, Vimpat 800 mg Loaded 1/5/24, and Depakote 3g loaded 1/6/2024. Seizure activity resolved on EEG while on propofol 100cc/hr. He has not followed any commands but only briefly able to hold sedation. While he does have some abnormalities on MRI brain that are suggestive of anoxic brain injury and status epilepticus post-cardiac arrest can be a sign of severe anoxic brain injury post-arrest in itself, wonder if seizure activity was affecting his neurological exam earlier during admission. Tried weaning off propofol on 1/10, on 40cc/hr of propofol had recurrence of generalized spikes on  EEG. EEG physician recommending increasing propofol and increasing depakote dose. Will discuss case again with neuro critical care at [facility].   - continue propofol at 50cc/hr  - Keppra 1500 mg BID  - Vimpat 200 mg BID  - increase Depakote 840 mg BID  - discuss transfer to NCC unit at [facility]  - repeat MRI in the next 1-2 days per discussion with radiology     CV  # Cardiac arrest  Secondary to respiratory arrest from asthma exacerbation. Reportedly received epi x3 and shock x1 (unk rhythm). TTE with EF 65% normal diastolic function. Completed TTM. Neuro prognostication thus far has included EEG initially with non-convulsive status, CT head x2, and MRI brain. Brain stem reflexes intact, has not followed any commands thus far but have only been able to discontinue propofol briefly due to status.   - avoid fevers    # Hypertension  Labile blood pressures with intermittent hypotension during periods of increased agitation. Overall improving. No history of hypertension and not on anti-hypertensives prior to admission.   - PRN Hydralazine placed for SBP > 180 mmHg.   - PRN Labetalol placed for SBP > 180 mmHg.    Pulm  # Acute hypoxemic respiratory Failure secondary to Asthma exacerbation  Treated for acute asthma exacerbation on admission with nebulizers, steroids, and IV Mag with rapid improvement. No evidence of ongoing asthma exacerbation.   - Duo-neb q6h  - budesonide 0.25 mg nebs BID   - continue home Montelukast 10mg qd    FEN/GI  # Nutrition  Tolerating tube feeds at goal    # Hypertriglyceridemia  On high dose propofol with . Using high dose propofol for refractory seizures, will decrease as able.   - continue fenofibrate daily   - trend TG q72 hours    # Hyperkalemia   Orginally 5.9 after cardiac arrest. Shifted in the ED, K has remained wnl.     # Hepatocellular liver injury  AST/ALT improving during admission.      RENAL  # Rhabdomyolysis - Resolved  CK peaked at 5,900. Was treated with IVF. CK  <1,000, IVF discontinued.   Down trending CK with most recent CK of less than 1000, off maintenance fluids.    # ROHAN  Likely secondary to rhabdomyolysis and cardiac arrest. Cr peaked at 1.3, now normalized. Continues to have good UOP.     Heme  # Normocytic anemia  Hemoglobin mildly low but has remained stable during admission. No evidence of active bleeding.     Endo  Blood sugars have remained within goal    ID  # Fevers  Unlikely to be secondary to infectious source. Cultures have remained negative. No indication for antibiotics at this time.   - s/p Azithromycin 1/2-1/3  - s/p Ceftriaxone 1/2-1/3  - s/p Pip-Tazo 1/3-1/6  - s/p Vanc 1/3-1/6    F Tube Feeds  A tylenol  S propofol   T lovenox  H 30  U protonix  G at goal  S holding off due to seizures.  B monitor  I ETT, OG, PIV, PICC  D off abx    Danelle Weaver MD  [employer] PCCM Fellow    Pt seen and examined with Pulmonary/Critical Care team and this note was reviewed and validated with the following additional comments: Started having seizures as we weaned the propofol. This is an ominous sign.  Propofol increased again.  We will discuss with pt's mother. Adding 4th line AEDs under continous EEG monitoring will require transfer to [facility] if that is a strategy that pt's mother would agree that pt would want to try.    Critical Care time was spent validating the history and physical exam, reviewing the lab and imaging results, and discussing the care of the patient with the bedside nurse and the patient and/or surrogates. This critical care time did not overlap with that of any other provider or involve time for any procedures.  This patient has a high probability of sudden clinically significant deterioration which requires the highest level of physician preparedness to intervene urgently. I managed/supervised life or organ supporting interventions that required frequent physician assessments. I devoted my full attention in the ICU to the direct care of this  patient for this period of time. Organ systems which are failing and require intensive, critical care support are: neurologic, respiratory  Critical Care time: 50 minutes    Gopal Hall MD  Phone [phone]

## 2024-01-10 NOTE — PLAN OF CARE
Called to bedside for vent alarming. Peak pressure alarming.Has breath-stacking. No change on ACPC. RT had manually decompressed. Some success in lowering flow from 80 to 60 but biggest success found with increasing propofol to 80.     CXR added, no PTX, appears to have ETT ~6cm above michael but this is no change from prior. Appropriate ventilation on ABG.     FVL on vent shows no obstruction, no wheezing currently, some paradoxical abdominal motion, on nebs q6. Exp hold maneuver performed (seems to be problematic getting accurate maneuver) so challenging to tell what total peep or iPEEP is, but on flow/time scalar seems to be auto-peeping in addition to breath stacking and on spontaneous estimated PEEP prev alarming around 11-17). After manual decompression this lowered. Despite gas, my presumption is he is gas trapping. In this clinical context, plan as below:    Continuous albuterol nebs x1h  Atrovent x1  Magnesium sulfate  Ordered heliox (assuming we have this here)  Increase propofol to 100mcg/kg/min  Fentanyl gtt  RASS -4  Advance ETT 2cm and reshoot film  SAT in am    If continued breathstackin.   Lower Vt and rate from 450 > 400 and 16 > 12 respectively  7.   Permissive Hypercarbia    Notified EICU of plan    Javid Alvarez, DO  PGY-VI, [employer] PCCM Fellow

## 2024-01-10 NOTE — SUBJECTIVE & OBJECTIVE
"Interval History:  Intubated,  Repeat EEG   Abnormal study due to moderate to severe  diffuse background slowing consistent with diffuse cerebral dysfunction and encephalopathy which may be on the basis of toxic, metabolic, or primary neuronal disorder.     On propofol -continue to wean while on EEG  Continue Keppra, Vimpat and Depakote  Plan to transfer to [facility] pending bed availability    Review of Systems   Unable to perform ROS: Intubated     Objective:     Vital Signs (Most Recent):  Temp: 100.2 °F (37.9 °C) (01/10/24 1724)  Pulse: 107 (01/10/24 1724)  Resp: (!) 27 (01/10/24 1724)  BP: 118/75 (01/10/24 1600)  SpO2: 97 % (01/10/24 1724) Vital Signs (24h Range):  Temp:  [97.2 °F (36.2 °C)-100.2 °F (37.9 °C)] 100.2 °F (37.9 °C)  Pulse:  [] 107  Resp:  [14-35] 27  SpO2:  [90 %-100 %] 97 %  BP: ()/() 118/75     Weight: 72.1 kg (158 lb 15.2 oz)  Body mass index is 20.97 kg/m².    Intake/Output Summary (Last 24 hours) at 1/10/2024 1744  Last data filed at 1/10/2024 1600  Gross per 24 hour   Intake 1806.37 ml   Output 1650 ml   Net 156.37 ml           Physical Exam  Vitals and nursing note reviewed.   Constitutional:       General: He is not in acute distress.     Appearance: He is not toxic-appearing.      Comments: Intubated and sedated   HENT:      Head: Normocephalic and atraumatic.      Mouth/Throat:      Mouth: Mucous membranes are moist.   Cardiovascular:      Rate and Rhythm: Normal rate.      Heart sounds: No murmur heard.     No friction rub. No gallop.   Pulmonary:      Comments: Intubated, on mechanical ventilation, sedated  Abdominal:      General: Bowel sounds are normal. There is no distension.      Palpations: Abdomen is soft.      Tenderness: There is no rebound.   Musculoskeletal:      Cervical back: No rigidity or tenderness.      Right lower leg: No edema.      Left lower leg: No edema.             Significant Labs: A1C: No results for input(s): "HGBA1C" in the last 4320 " "hours.  ABGs:   Recent Labs   Lab 01/09/24  1131 01/09/24  2206 01/10/24  0224   PH 7.397 7.429 7.360   PCO2 45.4* 44.5 52.3*   HCO3 27.9 29.4* 29.5*   POCSATURATED 92 93 90   BE 3* 5* 4*   PO2 65* 67* 63*       Blood Culture:   No results for input(s): "LABBLOO" in the last 48 hours.    CBC:   Recent Labs   Lab 01/09/24  0505 01/10/24  0432   WBC 9.21 10.66   HGB 12.0* 11.0*   HCT 34.8* 33.1*    232       CMP:   Recent Labs   Lab 01/09/24  0505 01/10/24  0432    138   K 4.1 4.6    103   CO2 22* 26    110   BUN 25* 26*   CREATININE 0.8 0.8   CALCIUM 9.1 9.0   PROT 6.6  --    ALBUMIN 2.7*  --    BILITOT 0.5  --    ALKPHOS 56  --    AST 44*  --    ALT 34  --    ANIONGAP 13 9       Lactic Acid: No results for input(s): "LACTATE" in the last 48 hours.  Lipase: No results for input(s): "LIPASE" in the last 48 hours.  Lipid Panel:   No results for input(s): "CHOL", "HDL", "LDLCALC", "TRIG", "CHOLHDL" in the last 48 hours.    Magnesium: No results for input(s): "MG" in the last 48 hours.  Troponin: No results for input(s): "TROPONINI", "TROPONINIHS" in the last 48 hours.  TSH: No results for input(s): "TSH" in the last 4320 hours.  Urine Culture: No results for input(s): "LABURIN" in the last 48 hours.  Urine Studies: No results for input(s): "COLORU", "APPEARANCEUA", "PHUR", "SPECGRAV", "PROTEINUA", "GLUCUA", "KETONESU", "BILIRUBINUA", "OCCULTUA", "NITRITE", "UROBILINOGEN", "LEUKOCYTESUR", "RBCUA", "WBCUA", "BACTERIA", "SQUAMEPITHEL", "HYALINECASTS" in the last 48 hours.    Invalid input(s): "WRIGHTSUR"    Significant Imaging: I have reviewed all pertinent imaging results/findings within the past 24 hours.  "

## 2024-01-10 NOTE — EICU
eICU Note :    Called by the [facility]:    Problem: Check Out by the Fellow regarding Auto PEEP, disconnecting from Ventilator .      Pertinent History and labs reviewed : 54 y/o     Acute respiratory failure with hypoxia and hypercarbia    Cardiac arrest    Rhabdomyolysis    Seizure disorder, nonconvulsive, with status epilepticus    Acute encephalopathy    Hypertension    Hypertriglyceridemia     Treatment /Intervention given: increase sedation,  with Versed in addition to Propofol    Radha Bobo M.D  eICU Physician   2:26 AM   ABG :notified of the AB.36/52/63//13 , on Mechanical Ventilation , tidal volume 400, rate of 12 and FiO2 21% will increase the tidal volume to 420 keep the rate at 12 and FiO2 to 25%

## 2024-01-10 NOTE — PLAN OF CARE
Patient on vent with documented settings.  Alarms are set and functioning with adequate volumes.  AMBU bag and mask at bedside. Plan of care on going.

## 2024-01-10 NOTE — PROCEDURES
Procedures    ICU EEG/VIDEO MONITORING REPORT     DATE OF SERVICE: 1/9/23-1/10/24  EEG NUMBER: [id number] -[id number]  REQUESTED BY: Erin  LOCATION OF SERVICE: [facility]              Electroencephalographic (EEG) recording is with electrodes placed according to the International 10-20 placement system.  Thirty two (32) channels of digital signal are simultaneously recorded from the scalp and may include EKG, EMG, and/or eye monitors.   Recording band pass was 0.1 to 512 hz.  Digital video recording of the patient is simultaneously recorded with the EEG.  The nursing staff report clinical symptoms and may press an event button when the patient has symptoms of clinical interest to the treating physicians.  EEG and video recording is stored and archived in digital format.  The entire recording is visually reviewed and the times identified by computer analysis as being spikes or seizures are reviewed again.  Activation procedures which include photic stimulation, hyperventilation and instructing patients to perform simple task are done in selected patients.   Compresses spectral analysis (CSA) is also performed on the activity recorded from each individual channel.  This is displayed as a power display of frequencies from 0 to 30 Hz over time.   The CSA analysis is done and displayed continuously.  This is reviewed for asymmetries in power between homologous areas of the scalp and for presence of changes in power which canbe seen when seizures occur.  Sections of suspected abnormalities on the CSA is then compared with the original EEG recording.                [employer] software was also utilized in the review of this study.  This software suite analyzes the EEG recording in multiple domains.  Coherence and rhythmicity is computed to identify EEG sections which may contain organized seizures.  Each channel undergoes analysis to detect presence of spike and sharp waves which have special and morphological  characteristic of epileptic activity.  The routine EEG recording is converted from spacial into frequency domain.  This is then displayed comparing homologous areas to identify areas of significant asymmetry.  Algorithm to identify non-cortically generated artifact is used to separate eye movement, EMG and other artifact from the EEG.       Recording Times  Start on 1/9/24 at 16:18:43  Stop on 1/10/24  at 07:00:03  A total of 14 hours of EEG was recorded.     EEG FINDINGS  The record shows a fair  organization at rest, with no discernible posterior dominant rhythm with fair  reactivity. There is mild bilateral beta activity. There is continuous diffuse delta and theta range background slowing.      Drowsiness is characterized by attenuation of the background, vertex waves, and bilateral theta slowing. Stage II sleep is characterized by slowing, vertex waves, and symmetric sleep spindles.      Provocative maneuvers including hyperventilation and photic stimulation were not performed.      EKG recording shows a sinus rhythm.     There are no push button clinical events.      IMPRESSION:  Abnormal study due to moderate to severe  diffuse background slowing consistent with diffuse cerebral dysfunction and encephalopathy which may be on the basis of toxic, metabolic, or primary neuronal disorder.       Edgar Darby MD  Department of Neurology  [facility]

## 2024-01-10 NOTE — NURSING
2045 Notified Kim MARI of pt ventilator asynchrony despite multiple setting changes with Gaudencio CRT and HR increasing to 120s. Kim MARI orders to increase Propofol gtt to 80 mcg/kg/min.     2145 Notified Kim MARI of pt having minimal improvement since Propofol gtt titration. Kim MARI orders for stat ABG and stat CXR. Kim MARI to come to bedside.

## 2024-01-10 NOTE — ASSESSMENT & PLAN NOTE
S/p CPR with ROSC now on TTM, now rewarming  Per Pulm/Crit  Cardiac arrest: Due to hypoxemia. TTM completed. Maintaining normothermia. Reflexes intact. MRI brain on Monday.     Unchanged, MRI on Monday, if evidence of severe anoxic brain injury family will likely transition to comfort care  If not possible transfer to [facility]  MRI brain on 01/08 with concern for vasogenic edema of the caudate head and basal ganglia bilateral

## 2024-01-10 NOTE — RESPIRATORY THERAPY
After ABG done at 0224, Vt increased from 400 to 420, and FiO2 increased from 21% to 25% per Dr. Vera with EICU.

## 2024-01-10 NOTE — PROGRESS NOTES
[facility]  Progress Note    Patient Name: Aaron Pruitt  MRN: [id number]  Patient Class: IP- Inpatient   Admission Date: 1/1/2024  Length of Stay: 9 days  Attending Physician: Kay Gonzalez*  Primary Care Provider: Gogo Vivar NP        Subjective:     Principal Problem:Acute respiratory failure with hypoxia and hypercarbia        HPI:  52 YO M with PMHx significant for  COPD/Asthma presented to the ER intubated on the field for acute respiratory failure post cardiac arrest s/p resuscitation and shock x1. Per chart review and   family at bedside reports he was shoveling some dirt in his yard when he got severely short of breath and suddenly had difficulty breathing and neighbors called EMS. When EMS arrived, reportedly still had a pulse but was struggling to breath, became pulseless. He was given narcan x2 without improvement. CPR and Epi x3. ROSC after 3rd round. Also reportedly get shocked x1. He was transferred to the ER and sedated. Labs in the ER with elevated lactic acid, elevated troponin, , elevated CPK 3294, elevated AST//82, drug screen with presumptive THC. CT head no acute intracranial abnormality, CT chest no acute pulmonary process.    Overview/Hospital Course:  1/3/2024 on TTM on fentanyl and propofol. K elevated to 5.7-->5.9, Lokelma 5g x 1, insulin/dextrose given  1/4/2024 Rewarming initiated. Sedation held, myoclonus noted. Restarted on propofol. EEG shows possible NCSE activity, started on AEDs  1/5/2024 Unchanged, Vimpat added, on keppra, vimpat and propofol.   1/6/2024 No clinical improvement, loaded with depacon  1/7/24 No improvement EEG result: The patient is a 53-year-old male with a history of anoxic brain injury who is currently maintained on intravenous infusions of propofol and on Keppra and Vimpat.  This is an abnormal EEG during comatose state.  The overall degree of disorganization and slowing for given age is suggestive of a  severe encephalopathy. The presence of epileptiform discharges is suggestive of cortical irritability with increased risk of focal seizures from either hemisphere.  There was a decrease in the overall burden of epileptiform discharges in this study compared to the prior study.  No seizures recorded during this study.   1/8/24 EEG without seizures since yesterday.   MRI brain The diffusion sequence is normal without evidence of acute ischemia or infarct.  There is abnormal increased signal of the caudate heads and basal ganglia bilaterally.   remaining hemispheres are unremarkable.  Posterior fossa structures are normal.  Flow voids are present where expected.  Pituitary, craniocervical junction and midline structures are unremarkable.     Interval History:  Intubated,  Repeat EEG   Abnormal study due to moderate to severe  diffuse background slowing consistent with diffuse cerebral dysfunction and encephalopathy which may be on the basis of toxic, metabolic, or primary neuronal disorder.     On propofol -continue to wean while on EEG  Continue Keppra, Vimpat and Depakote  Plan to transfer to [facility] pending bed availability    Review of Systems   Unable to perform ROS: Intubated     Objective:     Vital Signs (Most Recent):  Temp: 100.2 °F (37.9 °C) (01/10/24 1724)  Pulse: 107 (01/10/24 1724)  Resp: (!) 27 (01/10/24 1724)  BP: 118/75 (01/10/24 1600)  SpO2: 97 % (01/10/24 1724) Vital Signs (24h Range):  Temp:  [97.2 °F (36.2 °C)-100.2 °F (37.9 °C)] 100.2 °F (37.9 °C)  Pulse:  [] 107  Resp:  [14-35] 27  SpO2:  [90 %-100 %] 97 %  BP: ()/() 118/75     Weight: 72.1 kg (158 lb 15.2 oz)  Body mass index is 20.97 kg/m².    Intake/Output Summary (Last 24 hours) at 1/10/2024 1744  Last data filed at 1/10/2024 1600  Gross per 24 hour   Intake 1806.37 ml   Output 1650 ml   Net 156.37 ml           Physical Exam  Vitals and nursing note reviewed.   Constitutional:       General: He is not in acute distress.      "Appearance: He is not toxic-appearing.      Comments: Intubated and sedated   HENT:      Head: Normocephalic and atraumatic.      Mouth/Throat:      Mouth: Mucous membranes are moist.   Cardiovascular:      Rate and Rhythm: Normal rate.      Heart sounds: No murmur heard.     No friction rub. No gallop.   Pulmonary:      Comments: Intubated, on mechanical ventilation, sedated  Abdominal:      General: Bowel sounds are normal. There is no distension.      Palpations: Abdomen is soft.      Tenderness: There is no rebound.   Musculoskeletal:      Cervical back: No rigidity or tenderness.      Right lower leg: No edema.      Left lower leg: No edema.             Significant Labs: A1C: No results for input(s): "HGBA1C" in the last 4320 hours.  ABGs:   Recent Labs   Lab 01/09/24  1131 01/09/24  2206 01/10/24  0224   PH 7.397 7.429 7.360   PCO2 45.4* 44.5 52.3*   HCO3 27.9 29.4* 29.5*   POCSATURATED 92 93 90   BE 3* 5* 4*   PO2 65* 67* 63*       Blood Culture:   No results for input(s): "LABBLOO" in the last 48 hours.    CBC:   Recent Labs   Lab 01/09/24  0505 01/10/24  0432   WBC 9.21 10.66   HGB 12.0* 11.0*   HCT 34.8* 33.1*    232       CMP:   Recent Labs   Lab 01/09/24  0505 01/10/24  0432    138   K 4.1 4.6    103   CO2 22* 26    110   BUN 25* 26*   CREATININE 0.8 0.8   CALCIUM 9.1 9.0   PROT 6.6  --    ALBUMIN 2.7*  --    BILITOT 0.5  --    ALKPHOS 56  --    AST 44*  --    ALT 34  --    ANIONGAP 13 9       Lactic Acid: No results for input(s): "LACTATE" in the last 48 hours.  Lipase: No results for input(s): "LIPASE" in the last 48 hours.  Lipid Panel:   No results for input(s): "CHOL", "HDL", "LDLCALC", "TRIG", "CHOLHDL" in the last 48 hours.    Magnesium: No results for input(s): "MG" in the last 48 hours.  Troponin: No results for input(s): "TROPONINI", "TROPONINIHS" in the last 48 hours.  TSH: No results for input(s): "TSH" in the last 4320 hours.  Urine Culture: No results for input(s): " ""LABURIN" in the last 48 hours.  Urine Studies: No results for input(s): "COLORU", "APPEARANCEUA", "PHUR", "SPECGRAV", "PROTEINUA", "GLUCUA", "KETONESU", "BILIRUBINUA", "OCCULTUA", "NITRITE", "UROBILINOGEN", "LEUKOCYTESUR", "RBCUA", "WBCUA", "BACTERIA", "SQUAMEPITHEL", "HYALINECASTS" in the last 48 hours.    Invalid input(s): "WRIGHTSUR"    Significant Imaging: I have reviewed all pertinent imaging results/findings within the past 24 hours.    Assessment/Plan:      * Acute respiratory failure with hypoxia and hypercarbia  Patient with Hypoxic Respiratory failure which is Acute.  he is not on home oxygen. Supplemental oxygen was provided and noted- Vent Mode: A/CMV-VC  Oxygen Concentration (%):  [] 21  Resp Rate Total:  [16 br/min-25 br/min] 19 br/min  Vt Set:  [450 mL] 450 mL  PEEP/CPAP:  [4.6 cmH20-5.9 cmH20] 5 cmH20  Mean Airway Pressure:  [7.6 cmH20-9.9 cmH20] 9.8 cmH20    .   Signs/symptoms of respiratory failure include- tachypnea, increased work of breathing, and respiratory distress. Contributing diagnoses includes -  unkonwn  Labs and images were reviewed. Patient Has recent ABG, which has been reviewed. Will treat underlying causes and adjust management of respiratory failure as follows-   Continue home Singulair  H/o severe asthma and COPD  Steroids, nebs, added ceftriaxone and azithromycin (CXR clear)    Hypertriglyceridemia    Triglycerides 787  Start fenofibrate    Hypertension  BP meds  Not on Labile blood pressure with intermittent hypotension  TTE    Left Ventricle: The left ventricle is normal in size. Normal wall thickness. There is concentric remodeling. Normal wall motion. There is normal systolic function. Biplane (2D) method of discs ejection fraction is 65%. There is normal diastolic function.    Right Ventricle: Normal right ventricular cavity size. Wall thickness is normal. Right ventricle wall motion  is normal. Systolic function is normal.    IVC/SVC: Patient is ventilated, cannot " use IVC diameter to estimate right atrial pressure.    Acute encephalopathy  Status epilepticus  EEG were nonconvulsive status epilepticus,    On Keppra 1500 mg b.i.d., Vimpat 200 mg b.i.d., Depakote 360 mg t.i.d.  Planning repeat EEG on 01/09 and weaning propofol  Pending transfer to neuro critical Care with bed availability  Appreciate pulmonary crit: Discussion with family.  Plan future family meeting after repeat EEG      Seizure disorder, nonconvulsive, with status epilepticus  1/6-7  IMPRESSION:  This is an abnormal EEG during comatose state.  Diffuse disorganized low-amplitude slowing of the background was noted.  Generalized as well as independent left and right lateralized epileptiform discharges were noted which were pseudo periodic at < 2 Hz.  Two brief electrographic seizures emanating from the right frontal region were noted     CLINICAL CORRELATION:  The patient is a 53-year-old male with a history of anoxic brain injury who is currently maintained on intravenous infusions of propofol and on Keppra and Vimpat.  This is an abnormal EEG during comatose state.  The overall degree of disorganization and slowing for given age is suggestive of a severe encephalopathy. The presence of epileptiform discharges is suggestive of cortical irritability with increased risk of focal seizures from either hemisphere.  There was a decrease in the overall burden of epileptiform discharges in this study compared to the prior study.  No seizures recorded during this study.    1/8  Abnormal study due to moderate to severe  diffuse background slowing consistent with diffuse cerebral dysfunction and encephalopathy which may be on the basis of toxic, metabolic, or primary neuronal disorder.       Requested transfer for Neuro-critical ICU, no beds at this time, also, patient is out of status epilepticus and plan is to repeat EEG in am and wean propofol by 20mcg/kg/hr every 2 hours and monitor for seizure  activity    Rhabdomyolysis  Vs traumatic post cardiac resuscitation  Monitor CPK - improved  Now off IVFs      Cardiac arrest  S/p CPR with ROSC now on TTM, now rewarming  Per Pulm/Crit  Cardiac arrest: Due to hypoxemia. TTM completed. Maintaining normothermia. Reflexes intact. MRI brain on Monday.     Unchanged, MRI on Monday, if evidence of severe anoxic brain injury family will likely transition to comfort care  If not possible transfer to [facility]  MRI brain on 01/08 with concern for vasogenic edema of the caudate head and basal ganglia bilateral          VTE Risk Mitigation (From admission, onward)           Ordered     enoxaparin injection 40 mg  Every 24 hours         01/01/24 1855     Apply sequential compression device to lower extremities (if no contraindications). Medical venous thromboembolus prophylaxis is preferred.  Until discontinued         01/01/24 1928                    Discharge Planning   MARQUISE:      Code Status: DNR   Is the patient medically ready for discharge?:     Reason for patient still in hospital (select all that apply): Patient trending condition  Discharge Plan A: Hospital Transfer ([facility]???)   Discharge Delays:  (bed availability)        Critical care time spent on the evaluation and treatment of severe organ dysfunction, review of pertinent labs and imaging studies, discussions with consulting providers and discussions with patient/family: 35 minutes.      Kay Gonzalez MD  Department of Hospital Medicine   [facility] - Intensive Care

## 2024-01-10 NOTE — PROGRESS NOTES
EEG   AM Check Electrodes had to be fixed.No    Skin Integrity:  Recording checked remotely. Could not assess skin integrity.      Tanesha Smallwood   01/10/2024 10:45 AM

## 2024-01-10 NOTE — RESPIRATORY THERAPY
Advanced ETT tube 2cm per MD Kim order. Continuous neb given with reduction in wheezing. Since new sedation given, pt no longer auto-PEEPing. RR reduced to 12 from 16 and Vt decreased from 450 to 400 by Kim MARI.

## 2024-01-11 VITALS
OXYGEN SATURATION: 97 % | BODY MASS INDEX: 21.06 KG/M2 | HEART RATE: 92 BPM | WEIGHT: 158.94 LBS | RESPIRATION RATE: 15 BRPM | TEMPERATURE: 100 F | DIASTOLIC BLOOD PRESSURE: 78 MMHG | SYSTOLIC BLOOD PRESSURE: 130 MMHG | HEIGHT: 73 IN

## 2024-01-11 PROBLEM — J44.9 COPD (CHRONIC OBSTRUCTIVE PULMONARY DISEASE): Status: ACTIVE | Noted: 2024-01-11

## 2024-01-11 PROBLEM — J45.909 SEVERE ASTHMA: Status: ACTIVE | Noted: 2024-01-11

## 2024-01-11 PROBLEM — G40.911: Status: ACTIVE | Noted: 2024-01-11

## 2024-01-11 PROBLEM — Z71.89 ACP (ADVANCE CARE PLANNING): Status: ACTIVE | Noted: 2024-01-11

## 2024-01-11 LAB
ALBUMIN SERPL BCP-MCNC: 2.7 G/DL (ref 3.5–5.2)
ALP SERPL-CCNC: 57 U/L (ref 55–135)
ALT SERPL W/O P-5'-P-CCNC: 28 U/L (ref 10–44)
ANION GAP SERPL CALC-SCNC: 10 MMOL/L (ref 8–16)
AST SERPL-CCNC: 38 U/L (ref 10–40)
BASOPHILS # BLD AUTO: 0.01 K/UL (ref 0–0.2)
BASOPHILS NFR BLD: 0.1 % (ref 0–1.9)
BILIRUB SERPL-MCNC: 0.5 MG/DL (ref 0.1–1)
BUN SERPL-MCNC: 31 MG/DL (ref 6–20)
CALCIUM SERPL-MCNC: 9.3 MG/DL (ref 8.7–10.5)
CHLORIDE SERPL-SCNC: 102 MMOL/L (ref 95–110)
CO2 SERPL-SCNC: 28 MMOL/L (ref 23–29)
CREAT SERPL-MCNC: 0.8 MG/DL (ref 0.5–1.4)
DIFFERENTIAL METHOD BLD: ABNORMAL
EOSINOPHIL # BLD AUTO: 0 K/UL (ref 0–0.5)
EOSINOPHIL NFR BLD: 0.3 % (ref 0–8)
ERYTHROCYTE [DISTWIDTH] IN BLOOD BY AUTOMATED COUNT: 13.2 % (ref 11.5–14.5)
EST. GFR  (NO RACE VARIABLE): >60 ML/MIN/1.73 M^2
GLUCOSE SERPL-MCNC: 116 MG/DL (ref 70–110)
HCT VFR BLD AUTO: 30.7 % (ref 40–54)
HGB BLD-MCNC: 10.2 G/DL (ref 14–18)
IMM GRANULOCYTES # BLD AUTO: 0.09 K/UL (ref 0–0.04)
IMM GRANULOCYTES NFR BLD AUTO: 1 % (ref 0–0.5)
LYMPHOCYTES # BLD AUTO: 1.1 K/UL (ref 1–4.8)
LYMPHOCYTES NFR BLD: 12 % (ref 18–48)
MCH RBC QN AUTO: 30.4 PG (ref 27–31)
MCHC RBC AUTO-ENTMCNC: 33.2 G/DL (ref 32–36)
MCV RBC AUTO: 91 FL (ref 82–98)
MONOCYTES # BLD AUTO: 1.2 K/UL (ref 0.3–1)
MONOCYTES NFR BLD: 14.1 % (ref 4–15)
NEUTROPHILS # BLD AUTO: 6.3 K/UL (ref 1.8–7.7)
NEUTROPHILS NFR BLD: 72.5 % (ref 38–73)
NRBC BLD-RTO: 0 /100 WBC
PLATELET # BLD AUTO: 241 K/UL (ref 150–450)
PMV BLD AUTO: 8.7 FL (ref 9.2–12.9)
POCT GLUCOSE: 110 MG/DL (ref 70–110)
POCT GLUCOSE: 112 MG/DL (ref 70–110)
POTASSIUM SERPL-SCNC: 4.7 MMOL/L (ref 3.5–5.1)
PROT SERPL-MCNC: 6.6 G/DL (ref 6–8.4)
RBC # BLD AUTO: 3.36 M/UL (ref 4.6–6.2)
SODIUM SERPL-SCNC: 140 MMOL/L (ref 136–145)
TRIGL SERPL-MCNC: 174 MG/DL (ref 30–150)
VALPROATE SERPL-MCNC: 18.8 UG/ML (ref 50–100)
WBC # BLD AUTO: 8.73 K/UL (ref 3.9–12.7)

## 2024-01-11 PROCEDURE — 25000003 PHARM REV CODE 250: Performed by: STUDENT IN AN ORGANIZED HEALTH CARE EDUCATION/TRAINING PROGRAM

## 2024-01-11 PROCEDURE — 27100171 HC OXYGEN HIGH FLOW UP TO 24 HOURS

## 2024-01-11 PROCEDURE — 84478 ASSAY OF TRIGLYCERIDES: CPT | Performed by: STUDENT IN AN ORGANIZED HEALTH CARE EDUCATION/TRAINING PROGRAM

## 2024-01-11 PROCEDURE — C9113 INJ PANTOPRAZOLE SODIUM, VIA: HCPCS

## 2024-01-11 PROCEDURE — 63600175 PHARM REV CODE 636 W HCPCS

## 2024-01-11 PROCEDURE — 20000000 HC ICU ROOM

## 2024-01-11 PROCEDURE — 80053 COMPREHEN METABOLIC PANEL: CPT

## 2024-01-11 PROCEDURE — 25000242 PHARM REV CODE 250 ALT 637 W/ HCPCS: Performed by: STUDENT IN AN ORGANIZED HEALTH CARE EDUCATION/TRAINING PROGRAM

## 2024-01-11 PROCEDURE — 94640 AIRWAY INHALATION TREATMENT: CPT

## 2024-01-11 PROCEDURE — 94761 N-INVAS EAR/PLS OXIMETRY MLT: CPT

## 2024-01-11 PROCEDURE — 99900026 HC AIRWAY MAINTENANCE (STAT)

## 2024-01-11 PROCEDURE — 25000003 PHARM REV CODE 250

## 2024-01-11 PROCEDURE — 25000003 PHARM REV CODE 250: Performed by: INTERNAL MEDICINE

## 2024-01-11 PROCEDURE — 95714 VEEG EA 12-26 HR UNMNTR: CPT

## 2024-01-11 PROCEDURE — 95720 EEG PHY/QHP EA INCR W/VEEG: CPT | Mod: ,,, | Performed by: PSYCHIATRY & NEUROLOGY

## 2024-01-11 PROCEDURE — C9254 INJECTION, LACOSAMIDE: HCPCS

## 2024-01-11 PROCEDURE — 36415 COLL VENOUS BLD VENIPUNCTURE: CPT | Mod: XB | Performed by: STUDENT IN AN ORGANIZED HEALTH CARE EDUCATION/TRAINING PROGRAM

## 2024-01-11 PROCEDURE — A4216 STERILE WATER/SALINE, 10 ML: HCPCS | Performed by: INTERNAL MEDICINE

## 2024-01-11 PROCEDURE — 63600175 PHARM REV CODE 636 W HCPCS: Performed by: STUDENT IN AN ORGANIZED HEALTH CARE EDUCATION/TRAINING PROGRAM

## 2024-01-11 PROCEDURE — 80164 ASSAY DIPROPYLACETIC ACD TOT: CPT | Performed by: STUDENT IN AN ORGANIZED HEALTH CARE EDUCATION/TRAINING PROGRAM

## 2024-01-11 PROCEDURE — 94003 VENT MGMT INPAT SUBQ DAY: CPT

## 2024-01-11 PROCEDURE — 27200966 HC CLOSED SUCTION SYSTEM

## 2024-01-11 PROCEDURE — 99900035 HC TECH TIME PER 15 MIN (STAT)

## 2024-01-11 PROCEDURE — 85025 COMPLETE CBC W/AUTO DIFF WBC: CPT

## 2024-01-11 RX ORDER — LORAZEPAM 2 MG/ML
INJECTION INTRAMUSCULAR
Status: DISCONTINUED
Start: 2024-01-11 | End: 2024-01-11 | Stop reason: WASHOUT

## 2024-01-11 RX ORDER — HYDRALAZINE HYDROCHLORIDE 20 MG/ML
10 INJECTION INTRAMUSCULAR; INTRAVENOUS EVERY 6 HOURS PRN
Status: CANCELLED | OUTPATIENT
Start: 2024-01-11

## 2024-01-11 RX ORDER — SODIUM CHLORIDE 0.9 % (FLUSH) 0.9 %
10 SYRINGE (ML) INJECTION EVERY 12 HOURS PRN
Status: CANCELLED | OUTPATIENT
Start: 2024-01-11

## 2024-01-11 RX ORDER — ENOXAPARIN SODIUM 100 MG/ML
40 INJECTION SUBCUTANEOUS EVERY 24 HOURS
Status: CANCELLED | OUTPATIENT
Start: 2024-01-11

## 2024-01-11 RX ORDER — SODIUM CHLORIDE FOR INHALATION 3 %
4 VIAL, NEBULIZER (ML) INHALATION
Status: CANCELLED | OUTPATIENT
Start: 2024-01-11

## 2024-01-11 RX ORDER — BUDESONIDE 0.5 MG/2ML
0.25 INHALANT ORAL EVERY 12 HOURS
Status: CANCELLED | OUTPATIENT
Start: 2024-01-11

## 2024-01-11 RX ORDER — SODIUM CHLORIDE 0.9 % (FLUSH) 0.9 %
10 SYRINGE (ML) INJECTION
Status: CANCELLED | OUTPATIENT
Start: 2024-01-11

## 2024-01-11 RX ORDER — GLUCAGON 1 MG
1 KIT INJECTION
Status: CANCELLED | OUTPATIENT
Start: 2024-01-11

## 2024-01-11 RX ORDER — ALBUTEROL SULFATE 2.5 MG/.5ML
5 SOLUTION RESPIRATORY (INHALATION) EVERY 4 HOURS PRN
Status: CANCELLED | OUTPATIENT
Start: 2024-01-11

## 2024-01-11 RX ORDER — MONTELUKAST SODIUM 10 MG/1
10 TABLET ORAL DAILY
Status: CANCELLED | OUTPATIENT
Start: 2024-01-12

## 2024-01-11 RX ORDER — ACETAMINOPHEN 500 MG
1000 TABLET ORAL EVERY 6 HOURS PRN
Status: CANCELLED | OUTPATIENT
Start: 2024-01-11

## 2024-01-11 RX ORDER — FUROSEMIDE 10 MG/ML
40 INJECTION INTRAMUSCULAR; INTRAVENOUS ONCE
Status: COMPLETED | OUTPATIENT
Start: 2024-01-11 | End: 2024-01-11

## 2024-01-11 RX ORDER — LEVETIRACETAM 500 MG/5ML
1500 INJECTION, SOLUTION, CONCENTRATE INTRAVENOUS EVERY 12 HOURS
Status: CANCELLED | OUTPATIENT
Start: 2024-01-11

## 2024-01-11 RX ORDER — FENOFIBRATE 160 MG/1
160 TABLET ORAL DAILY
Status: CANCELLED | OUTPATIENT
Start: 2024-01-12

## 2024-01-11 RX ORDER — IBUPROFEN 200 MG
16 TABLET ORAL
Status: CANCELLED | OUTPATIENT
Start: 2024-01-11

## 2024-01-11 RX ORDER — LORAZEPAM 2 MG/ML
2 INJECTION INTRAMUSCULAR ONCE AS NEEDED
Status: DISCONTINUED | OUTPATIENT
Start: 2024-01-11 | End: 2024-01-12 | Stop reason: HOSPADM

## 2024-01-11 RX ORDER — PANTOPRAZOLE SODIUM 40 MG/10ML
40 INJECTION, POWDER, LYOPHILIZED, FOR SOLUTION INTRAVENOUS DAILY
Status: CANCELLED | OUTPATIENT
Start: 2024-01-12

## 2024-01-11 RX ORDER — IPRATROPIUM BROMIDE AND ALBUTEROL SULFATE 2.5; .5 MG/3ML; MG/3ML
3 SOLUTION RESPIRATORY (INHALATION) EVERY 6 HOURS
Status: CANCELLED | OUTPATIENT
Start: 2024-01-11

## 2024-01-11 RX ORDER — SODIUM CHLORIDE 0.9 % (FLUSH) 0.9 %
10 SYRINGE (ML) INJECTION EVERY 6 HOURS
Status: CANCELLED | OUTPATIENT
Start: 2024-01-11

## 2024-01-11 RX ORDER — INSULIN ASPART 100 [IU]/ML
0-5 INJECTION, SOLUTION INTRAVENOUS; SUBCUTANEOUS EVERY 6 HOURS PRN
Status: CANCELLED | OUTPATIENT
Start: 2024-01-11

## 2024-01-11 RX ORDER — LABETALOL HYDROCHLORIDE 5 MG/ML
5 INJECTION, SOLUTION INTRAVENOUS EVERY 5 MIN PRN
Status: CANCELLED | OUTPATIENT
Start: 2024-01-11

## 2024-01-11 RX ORDER — IBUPROFEN 200 MG
24 TABLET ORAL
Status: CANCELLED | OUTPATIENT
Start: 2024-01-11

## 2024-01-11 RX ORDER — LORAZEPAM 2 MG/ML
2 INJECTION INTRAMUSCULAR ONCE AS NEEDED
Status: CANCELLED | OUTPATIENT
Start: 2024-01-11

## 2024-01-11 RX ADMIN — DEXTROSE MONOHYDRATE 840 MG: 50 INJECTION, SOLUTION INTRAVENOUS at 02:01

## 2024-01-11 RX ADMIN — SODIUM CHLORIDE, PRESERVATIVE FREE 10 ML: 5 INJECTION INTRAVENOUS at 05:01

## 2024-01-11 RX ADMIN — SODIUM CHLORIDE, PRESERVATIVE FREE 10 ML: 5 INJECTION INTRAVENOUS at 06:01

## 2024-01-11 RX ADMIN — PROPOFOL 80 MCG/KG/MIN: 10 INJECTION, EMULSION INTRAVENOUS at 03:01

## 2024-01-11 RX ADMIN — IPRATROPIUM BROMIDE AND ALBUTEROL SULFATE 3 ML: 2.5; .5 SOLUTION RESPIRATORY (INHALATION) at 08:01

## 2024-01-11 RX ADMIN — PROPOFOL 80 MCG/KG/MIN: 10 INJECTION, EMULSION INTRAVENOUS at 12:01

## 2024-01-11 RX ADMIN — SODIUM CHLORIDE, PRESERVATIVE FREE 10 ML: 5 INJECTION INTRAVENOUS at 12:01

## 2024-01-11 RX ADMIN — PANTOPRAZOLE SODIUM 40 MG: 40 INJECTION, POWDER, FOR SOLUTION INTRAVENOUS at 09:01

## 2024-01-11 RX ADMIN — FENOFIBRATE 160 MG: 160 TABLET ORAL at 08:01

## 2024-01-11 RX ADMIN — PROPOFOL 80 MCG/KG/MIN: 10 INJECTION, EMULSION INTRAVENOUS at 06:01

## 2024-01-11 RX ADMIN — BUDESONIDE 0.25 MG: 0.5 INHALANT RESPIRATORY (INHALATION) at 07:01

## 2024-01-11 RX ADMIN — LEVETIRACETAM 1500 MG: 500 INJECTION, SOLUTION INTRAVENOUS at 09:01

## 2024-01-11 RX ADMIN — PROPOFOL 80 MCG/KG/MIN: 10 INJECTION, EMULSION INTRAVENOUS at 07:01

## 2024-01-11 RX ADMIN — MONTELUKAST 10 MG: 10 TABLET, FILM COATED ORAL at 08:01

## 2024-01-11 RX ADMIN — ENOXAPARIN SODIUM 40 MG: 40 INJECTION SUBCUTANEOUS at 05:01

## 2024-01-11 RX ADMIN — BUDESONIDE 0.25 MG: 0.5 INHALANT RESPIRATORY (INHALATION) at 08:01

## 2024-01-11 RX ADMIN — MINERAL OIL, WHITE PETROLATUM: .03; .94 OINTMENT OPHTHALMIC at 06:01

## 2024-01-11 RX ADMIN — MINERAL OIL, WHITE PETROLATUM: .03; .94 OINTMENT OPHTHALMIC at 02:01

## 2024-01-11 RX ADMIN — IPRATROPIUM BROMIDE AND ALBUTEROL SULFATE 3 ML: 2.5; .5 SOLUTION RESPIRATORY (INHALATION) at 12:01

## 2024-01-11 RX ADMIN — SODIUM CHLORIDE, PRESERVATIVE FREE 10 ML: 5 INJECTION INTRAVENOUS at 11:01

## 2024-01-11 RX ADMIN — PROPOFOL 80 MCG/KG/MIN: 10 INJECTION, EMULSION INTRAVENOUS at 09:01

## 2024-01-11 RX ADMIN — LEVETIRACETAM 1500 MG: 500 INJECTION, SOLUTION INTRAVENOUS at 10:01

## 2024-01-11 RX ADMIN — PROPOFOL 80 MCG/KG/MIN: 10 INJECTION, EMULSION INTRAVENOUS at 10:01

## 2024-01-11 RX ADMIN — LACOSAMIDE 200 MG: 10 INJECTION, SOLUTION INTRAVENOUS at 09:01

## 2024-01-11 RX ADMIN — FUROSEMIDE 40 MG: 10 INJECTION, SOLUTION INTRAVENOUS at 09:01

## 2024-01-11 RX ADMIN — ACETAMINOPHEN 1000 MG: 500 TABLET ORAL at 06:01

## 2024-01-11 RX ADMIN — IPRATROPIUM BROMIDE AND ALBUTEROL SULFATE 3 ML: 2.5; .5 SOLUTION RESPIRATORY (INHALATION) at 07:01

## 2024-01-11 RX ADMIN — LACOSAMIDE 200 MG: 10 INJECTION, SOLUTION INTRAVENOUS at 10:01

## 2024-01-11 RX ADMIN — MINERAL OIL, WHITE PETROLATUM: .03; .94 OINTMENT OPHTHALMIC at 10:01

## 2024-01-11 NOTE — PROCEDURES
Procedures    ICU EEG/VIDEO MONITORING REPORT     DATE OF SERVICE: 1/10/24-1/1124  EEG NUMBER: [id number]  REQUESTED BY: Erin  LOCATION OF SERVICE: [facility]              Electroencephalographic (EEG) recording is with electrodes placed according to the International 10-20 placement system.  Thirty two (32) channels of digital signal are simultaneously recorded from the scalp and may include EKG, EMG, and/or eye monitors.   Recording band pass was 0.1 to 512 hz.  Digital video recording of the patient is simultaneously recorded with the EEG.  The nursing staff report clinical symptoms and may press an event button when the patient has symptoms of clinical interest to the treating physicians.  EEG and video recording is stored and archived in digital format.  The entire recording is visually reviewed and the times identified by computer analysis as being spikes or seizures are reviewed again.  Activation procedures which include photic stimulation, hyperventilation and instructing patients to perform simple task are done in selected patients.   Compresses spectral analysis (CSA) is also performed on the activity recorded from each individual channel.  This is displayed as a power display of frequencies from 0 to 30 Hz over time.   The CSA analysis is done and displayed continuously.  This is reviewed for asymmetries in power between homologous areas of the scalp and for presence of changes in power which canbe seen when seizures occur.  Sections of suspected abnormalities on the CSA is then compared with the original EEG recording.                XYDO software was also utilized in the review of this study.  This software suite analyzes the EEG recording in multiple domains.  Coherence and rhythmicity is computed to identify EEG sections which may contain organized seizures.  Each channel undergoes analysis to detect presence of spike and sharp waves which have special and morphological  characteristic of epileptic activity.  The routine EEG recording is converted from spacial into frequency domain.  This is then displayed comparing homologous areas to identify areas of significant asymmetry.  Algorithm to identify non-cortically generated artifact is used to separate eye movement, EMG and other artifact from the EEG.       Recording Times  Start on 1/10/24 at  07:00:40  Stop on 1/11/24  at 07:00:00  A total of 23 hours of EEG was recorded.     EEG FINDINGS  The record shows a fair  organization at rest, with no discernible posterior dominant rhythm with fair  reactivity. There is mild bilateral beta activity. There is continuous diffuse delta and theta range background slowing. Later in the recording, there is emergence of nearly continuous generalized 1-2 Hz periodic spike and wave discharges waxing and waning with level of sedation.      Drowsiness is characterized by attenuation of the background, vertex waves, and bilateral theta slowing. Stage II sleep is characterized by slowing, vertex waves, and symmetric sleep spindles.      Provocative maneuvers including hyperventilation and photic stimulation were not performed.      EKG recording shows a sinus rhythm.     There are no push button clinical events.      IMPRESSION:  Abnormal study due to moderate to severe  diffuse background slowing consistent with diffuse cerebral dysfunction and encephalopathy which may be on the basis of toxic, metabolic, or primary neuronal disorder.  Generalized periodic epileptiform discharges are consistent with a high degree of cortical irritability and epileptic potential.      Edgar Darby MD  Department of Neurology  [facility]

## 2024-01-11 NOTE — SUBJECTIVE & OBJECTIVE
Interval History: Intubated, Reported patient in status  on EEG monitor once weaned down on propofol.  Propofol resume  Continue Keppra, Vimpat and Depakote    Repeat EEG   Abnormal study due to moderate to severe  diffuse background slowing consistent with diffuse cerebral dysfunction and encephalopathy which may be on the basis of toxic, metabolic, or primary neuronal disorder.    Appreciate pulmonary crit-discussion with family and updated on persistent status epilepticus once weaning.  On goals of care discussion family would rather follow through with transfer to [facility] for Initiating the 4th antiseizure medication trial versus transitioning to comfort measure now.    Plan to transfer to [facility] pending bed availability    Review of Systems   Unable to perform ROS: Intubated     Objective:     Vital Signs (Most Recent):  Temp: 98.3 °F (36.8 °C) (01/11/24 1100)  Pulse: 93 (01/11/24 1330)  Resp: 14 (01/11/24 1330)  BP: 118/63 (01/11/24 1330)  SpO2: 95 % (01/11/24 1330) Vital Signs (24h Range):  Temp:  [98.3 °F (36.8 °C)-100.4 °F (38 °C)] 98.3 °F (36.8 °C)  Pulse:  [] 93  Resp:  [13-43] 14  SpO2:  [94 %-100 %] 95 %  BP: (104-153)/(56-98) 118/63     Weight: 72.1 kg (158 lb 15.2 oz)  Body mass index is 20.97 kg/m².    Intake/Output Summary (Last 24 hours) at 1/11/2024 1345  Last data filed at 1/11/2024 1300  Gross per 24 hour   Intake 1190.37 ml   Output 2650 ml   Net -1459.63 ml           Physical Exam  Vitals and nursing note reviewed.   Constitutional:       General: He is not in acute distress.     Appearance: He is not toxic-appearing.      Comments: Intubated and sedated   HENT:      Head: Normocephalic and atraumatic.      Mouth/Throat:      Mouth: Mucous membranes are moist.   Cardiovascular:      Rate and Rhythm: Normal rate.      Heart sounds: No murmur heard.     No friction rub. No gallop.   Pulmonary:      Comments: Intubated, on mechanical ventilation, sedated  Abdominal:      General:  "Bowel sounds are normal. There is no distension.      Palpations: Abdomen is soft.      Tenderness: There is no rebound.   Musculoskeletal:      Cervical back: No rigidity or tenderness.      Right lower leg: No edema.      Left lower leg: No edema.             Significant Labs: A1C: No results for input(s): "HGBA1C" in the last 4320 hours.  ABGs:   Recent Labs   Lab 01/09/24 2206 01/10/24  0224 01/10/24  2011   PH 7.429 7.360 7.443   PCO2 44.5 52.3* 46.8*   HCO3 29.4* 29.5* 32.0*   POCSATURATED 93 90 96.1   BE 5* 4*  --    PO2 67* 63* 77.1*       Blood Culture:   No results for input(s): "LABBLOO" in the last 48 hours.    CBC:   Recent Labs   Lab 01/10/24  0432 01/11/24  0419   WBC 10.66 8.73   HGB 11.0* 10.2*   HCT 33.1* 30.7*    241       CMP:   Recent Labs   Lab 01/10/24  0432 01/11/24  0823    140   K 4.6 4.7    102   CO2 26 28    116*   BUN 26* 31*   CREATININE 0.8 0.8   CALCIUM 9.0 9.3   PROT  --  6.6   ALBUMIN  --  2.7*   BILITOT  --  0.5   ALKPHOS  --  57   AST  --  38   ALT  --  28   ANIONGAP 9 10       Lactic Acid: No results for input(s): "LACTATE" in the last 48 hours.  Lipase: No results for input(s): "LIPASE" in the last 48 hours.  Lipid Panel:   Recent Labs   Lab 01/11/24  0419   TRIG 174*       Magnesium: No results for input(s): "MG" in the last 48 hours.  Troponin: No results for input(s): "TROPONINI", "TROPONINIHS" in the last 48 hours.  TSH: No results for input(s): "TSH" in the last 4320 hours.  Urine Culture: No results for input(s): "LABURIN" in the last 48 hours.  Urine Studies: No results for input(s): "COLORU", "APPEARANCEUA", "PHUR", "SPECGRAV", "PROTEINUA", "GLUCUA", "KETONESU", "BILIRUBINUA", "OCCULTUA", "NITRITE", "UROBILINOGEN", "LEUKOCYTESUR", "RBCUA", "WBCUA", "BACTERIA", "SQUAMEPITHEL", "HYALINECASTS" in the last 48 hours.    Invalid input(s): "WRIGHTSUR"    Significant Imaging: I have reviewed all pertinent imaging results/findings within the past 24 " hours.

## 2024-01-11 NOTE — NURSING
Pt transported by Tanja RN, David, EZEQUIEL, RT and transport for MRI scan. Placed on portable cardiac monitor and portable vent. Propofol gtt continued on MRI pump. VSS for transport to MRI and back to ICU room 563. Pt tolerated scan well, all VSS. Upon arrival back to 563, pt reconnected to ICU monitor and vent in room. VSS .

## 2024-01-11 NOTE — PROGRESS NOTES
Per Dr COLLINS Mancia, patient does not need to be placed back on continuous EEG monitoring. Plan is to transfer to [facility].      [place] EEG tech notified of decision.

## 2024-01-11 NOTE — PLAN OF CARE
NeuroICU attending    Discussed with JO MARI at [facility]. Ok for patient to come to [facility] as long as family is aware that we do not have more to offer than the optimal care Mr. Pruitt has been receiving there. Currently no beds in NSCCU at [facility] so patient MUST be put back on continuous EEG for STATUS monitoring and ongoing treatment. Discussed with EEG dept here at [facility] just now and they will aid in getting EEG put back on today.    Sebastien Vega MD MPH  NeuroCritical Care & Vascular Neurology      Update 4:43 pm 1/11/2024    Discussed with Dr Darby in epilepsy. No seizures on EEG currently. I Discussed with Staff at [facility] to not wean anesthetic for at least 48 hours now and if eeg return to status again then this is a poor prognostic factor for neurological recovery. The fact that the patient has gone back into status during this 10 days post anoxic injury is already a poor prognostic factor for any meaningful recovery.    Sebastien Vega MD MPH  NeuroCritical Care & Vascular Neurology

## 2024-01-11 NOTE — PLAN OF CARE
Recommendation:  1. Increase TF of Peptamen Intense VHP to 45ml/hr which would provide 1080 kcal, 99g protein, & 907ml free water.   2. Monitor weight/labs.   3. RD to continue to follow to monitor TF tolerance    Goals:  TF to meet at least 85% EEN/EPN by RD follow up  Nutrition Goal Status: progressing towards goal

## 2024-01-11 NOTE — PROGRESS NOTES
Spoke with Tanesha in EEG office at [facility]. Made aware that EEG will be removed to transport to MRI. Per tech, not sure if patient will be able to be reconnected today due to staffing and other needs at other facilities. Tech will make Dr Darby aware of disconnection.

## 2024-01-11 NOTE — PLAN OF CARE
Problem: Adult Inpatient Plan of Care  Goal: Absence of Hospital-Acquired Illness or Injury  Outcome: Ongoing, Progressing     Problem: Fall Injury Risk  Goal: Absence of Fall and Fall-Related Injury  Outcome: Ongoing, Progressing     Problem: Restraint, Nonbehavioral (Nonviolent)  Goal: Absence of Harm or Injury  Outcome: Ongoing, Progressing     Problem: Dysrhythmia (Targeted Temperature Management)  Goal: Stable Cardiac Rate and Rhythm  Outcome: Ongoing, Progressing     Problem: Hemodynamic Instability (Targeted Temperature Management)  Goal: Effective Tissue Perfusion  Outcome: Ongoing, Progressing     Problem: Adult Inpatient Plan of Care  Goal: Plan of Care Review  Outcome: Ongoing, Not Progressing  Goal: Patient-Specific Goal (Individualized)  Outcome: Ongoing, Not Progressing  Goal: Optimal Comfort and Wellbeing  Outcome: Ongoing, Not Progressing  Goal: Readiness for Transition of Care  Outcome: Ongoing, Not Progressing     Problem: Skin Injury Risk Increased  Goal: Skin Health and Integrity  Outcome: Ongoing, Not Progressing     Problem: Adjustment to Illness (Targeted Temperature Management)  Goal: Optimal Response to Life-Threatening Event  Outcome: Ongoing, Not Progressing     Problem: Body Temperature Regulation (Targeted Temperature Management)  Goal: Target Body Temperature Maintained  Outcome: Ongoing, Not Progressing     Problem: Infection (Targeted Temperature Management)  Goal: Absence of Infection Signs and Symptoms  Outcome: Ongoing, Not Progressing

## 2024-01-11 NOTE — PROGRESS NOTES
"[facility]  Intensive Care  Adult Nutrition  Progress Note    SUMMARY       Recommendations    Recommendation:  1. Increase TF of Peptamen Intense VHP to 45ml/hr which would provide 1080 kcal, 99g protein, & 907ml free water.   2. Monitor weight/labs.   3. RD to continue to follow to monitor TF tolerance    Goals:  TF to meet at least 85% EEN/EPN by RD follow up  Nutrition Goal Status: progressing towards goal  Communication of RD Recs: reviewed with RN (Halina)    Assessment and Plan   Nutrition Problem  Inadequate energy intake     Related to (etiology):   intubation     Signs and Symptoms (as evidenced by):   NPO on TF     Interventions:  Collaboration with other providers  Modify rate of enteral nutrition     Nutrition Diagnosis Status:   Continues       Malnutrition Assessment  Unable to assess NFPE at this time    Reason for Assessment  Reason For Assessment: RD follow-up  Diagnosis:  (acute resp failure)  Relevant Medical History: bronchitis, neck surgery  General Information Comments: Pt remains intubated on vent. PICC line. NG tube. Receiving TF of Peptamen INtense VHP at 40ml/hr. Remains on propofol. No recent weight loss noted. No recent weight loss noted  Nutrition Discharge Planning: d/c needs to be determined    Nutrition Risk Screen  Nutrition Risk Screen: tube feeding or parenteral nutrition    Nutrition/Diet History  Food Preferences: unable to assess  Factors Affecting Nutritional Intake: NPO, on mechanical ventilation    Anthropometrics  Temp: (!) 100.4 °F (38 °C)  Height Method: Estimated  Height: 6' 1" (185.4 cm)  Height (inches): 73 in  Weight Method: Bed Scale  Weight: 72.1 kg (158 lb 15.2 oz)  Weight (lb): 158.95 lb  Ideal Body Weight (IBW), Male: 184 lb  % Ideal Body Weight, Male (lb): 86.39 %  BMI (Calculated): 21  BMI Grade: 18.5-24.9 - normal  Usual Body Weight (UBW), k.3 kg ()  % Usual Body Weight: 104.26  % Weight Change From Usual Weight: 4.04 %   "   Lab/Procedures/Meds  Pertinent Labs Reviewed: reviewed  Pertinent Labs Comments: BUN 26H, Alb 2.7L, Trig 787H  Pertinent Medications Reviewed: reviewed  Pertinent Medications Comments: pantoprazole, propofol, fentanyl    Estimated/Assessed Needs  Weight Used For Calorie Calculations: 72.1 kg (158 lb 15.2 oz)  Energy Calorie Requirements (kcal): 1971  Energy Need Method: [facility]  Protein Requirements: 108g (1.5g/kg)  Weight Used For Protein Calculations: 72 kg (158 lb 11.7 oz)  Estimated Fluid Requirement Method: RDA Method  RDA Method (mL): 1971     Nutrition Prescription Ordered  Current Diet Order: NPO  Current Nutrition Support Formula Ordered: Peptamen Intense VHP  Current Nutrition Support Rate Ordered: 40 (ml)  Current Nutrition Support Frequency Ordered: ml/hr    Evaluation of Received Nutrient/Fluid Intake  Enteral Calories (kcal): 960  Enteral Protein (gm): 88  Enteral (Free Water) Fluid (mL): 806  Other Calories (kcal): 747  Total Calories (kcal): 1707  % Kcal Needs: 86  % Protein Needs: 81  I/O: 2082/1650  Energy Calories Required: not meeting needs  Protein Required: not meeting needs  Fluid Required: not meeting needs  Comments: LBM 1/8  % Intake of Estimated Energy Needs: 75 - 100 %  % Meal Intake: NPO    Nutrition Risk  Level of Risk/Frequency of Follow-up:  (2xweekly)     Monitor and Evaluation  Food and Nutrient Intake: energy intake  Food and Nutrient Adminstration: diet order, enteral and parenteral nutrition administration  Physical Activity and Function: nutrition-related ADLs and IADLs  Anthropometric Measurements: weight  Biochemical Data, Medical Tests and Procedures: electrolyte and renal panel  Nutrition-Focused Physical Findings: overall appearance     Nutrition Follow-Up  RD Follow-up?: Yes

## 2024-01-11 NOTE — PLAN OF CARE
Max temp 99, HR 80s-90s, SpO2 >93% on the Vent ACMV 25% PEEP 5 Rate 12. SBP 110s-140s.     TF continued @ 40. No BM during shift. UO 1400.    Propofol gtt continued @ non-titrating dose of 80mcg/kg/min    Pt went to MRI today. EEG was placed back on patient around 1700.    Plan of care reviewed with pt and mother.

## 2024-01-11 NOTE — ASSESSMENT & PLAN NOTE
Patient with Hypoxic Respiratory failure which is Acute.  he is not on home oxygen. Supplemental oxygen was provided and noted- Vent Mode: A/CMV-PC  Oxygen Concentration (%):  [25-30] 25  Resp Rate Total:  [13 br/min-42 br/min] 15 br/min  PEEP/CPAP:  [5 cmH20] 5 cmH20  Pressure Support:  [5 cmH20] 5 cmH20  Mean Airway Pressure:  [7.3 cmH20-10.2 cmH20] 7.3 cmH20    .   Signs/symptoms of respiratory failure include- tachypnea, increased work of breathing, and respiratory distress. Contributing diagnoses includes -  unkonwn  Labs and images were reviewed. Patient Has recent ABG, which has been reviewed. Will treat underlying causes and adjust management of respiratory failure as follows-   Continue home Singulair  H/o severe asthma and COPD  Steroids, nebs, added ceftriaxone and azithromycin (CXR clear)- discontinued

## 2024-01-11 NOTE — PROGRESS NOTES
Progress Note  [facility]    Attending: Dr. Tyler Frazier MD   Admit Date: 1/1/2024  Today's Date: 01/11/2024    HPI  Per ER:   53-year-old M w/pmhx: asthma, was brought in by EMS as a cardiorespiratory arrest.  911 was called for shortness of breath, as patient was working in garden. When EMS arrived, the patient was slumped over face forward with a friend holding a non-rebreather mask over his face that was not hooked up to oxygen.  He had a Medrol Dosepak and an albuterol inhaler by his side.  EMS started giving the patient a nebulizer treatment and he became pulseless and apneic.  He was intubated with an ET tube and CPR was initiated. The patient regained pulses after CPR by the MANDI and epinepherine X3, shocked X1, unclear rhythm.    SUBJECTIVE:   OVERNIGHT    Patient seen at bedside this morning. No overnight events. Patient continues to be intubated and sedated this morning.     OBJECTIVE:     Vital Signs Trends  Vitals:    01/11/24 1245 01/11/24 1300 01/11/24 1315 01/11/24 1330   BP:  126/74 121/64 118/63   BP Location:  Left arm     Patient Position:  Lying     Pulse: 96 98 95 93   Resp: 14 13 13 14   Temp:       TempSrc:       SpO2: 96% 96% 95% 95%   Weight:       Height:           Vent Mode: A/CMV-PC  Oxygen Concentration (%):  [25-30] 25  Resp Rate Total:  [13 br/min-42 br/min] 15 br/min  PEEP/CPAP:  [5 cmH20] 5 cmH20  Pressure Support:  [5 cmH20] 5 cmH20  Mean Airway Pressure:  [7.3 cmH20-10.2 cmH20] 7.3 cmH20    Physical Exam:  GEN: middle-aged man, resting in bed  HEENT: face symmetric, conjunctivae anicteric, pupils equal and reactive bilaterally  CV: regular rhythm, normal rate, no audible murmurs  PULM: normal respiratory effort, no wheezing, no crackles  Abd: soft, non-tender  Ext: no LE edema, mild bilateral upper extremity edema  Neuro: sedated, not following commands, intact cough, Intact pupillary, intact gag, does not withdraw from  pain    Laboratory:  Recent Labs   Lab 01/08/24  0436 01/09/24  0505 01/10/24  0432 01/11/24  0419 01/11/24  0823   WBC 11.53 9.21 10.66 8.73  --    HGB 13.9* 12.0* 11.0* 10.2*  --    HCT 39.7* 34.8* 33.1* 30.7*  --    * 179 232 241  --     139 138  --  140   K 4.6 4.1 4.6  --  4.7    104 103  --  102   CREATININE 0.7 0.8 0.8  --  0.8   BUN 26* 25* 26*  --  31*   CO2 17* 22* 26  --  28   ALT 46* 34  --   --  28   AST 54* 44*  --   --  38   Triglycerides: 174 1/11/2024  _______________________________  MRI Brain Without Contrast  Addendum Date: 1/8/2024    Although not as noticeable as caudate heads and basal ganglia there may be slight edema some of the cortex patchy bilaterally. Electronically signed by: Elias Obregon MD Date:01/08/2024 Time:12:47  Result Date: 1/8/2024  Vasogenic edema within the caudate heads and basal ganglia bilaterally.  This can be seen with a global hypoxic anoxic event.  This can also be seen with certain toxins, hypoglycemia, and viral infections.   Electronically signed by:Elias Obregon MD Date:01/08/2024 Time:11:24     ASSESSMENT & RECOMMENDATIONS     Neuro/Psych  # Non-Conclusive Status Epilepticus   # Acute metabolic encephalopathy  EEG with non-convulsive status epilepticus. Currently requiring multiple AEDs to control seizure activity. On propofol 100 mcg/kg/hr, Keppra loaded 1/4/24 with 4,000 mg, Vimpat 800 mg Loaded 1/5/24, and Depakote 3g loaded 1/6/2024. Seizure activity resolved on EEG while on propofol 100cc/hr. He has not followed any commands but only briefly able to hold sedation. While he does have some abnormalities on MRI brain that are suggestive of anoxic brain injury and status epilepticus post-cardiac arrest can be a sign of severe anoxic brain injury post-arrest in itself, wonder if seizure activity was affecting his neurological exam earlier during admission. Tried weaning off propofol on 1/10, on 40cc/hr of propofol had recurrence of  generalized spikes on EEG. EEG physician recommending increasing propofol and increasing depakote dose. Will discuss case again with neuro critical care at [facility].   - continue propofol at 50cc/hr  - Keppra 1500 mg BID  - Vimpat 200 mg BID  - increase Depakote 840 mg BID  - discuss transfer to NCC unit at [facility]  - repeat MRI today   - Likely transfer to NCC unit at [facility].     CV  # Cardiac arrest  Secondary to respiratory arrest from asthma exacerbation. Reportedly received epi x3 and shock x1 (unk rhythm). TTE with EF 65% normal diastolic function. Completed TTM. Neuro prognostication thus far has included EEG initially with non-convulsive status, CT head x2, and MRI brain. Brain stem reflexes intact, has not followed any commands thus far but have only been able to discontinue propofol briefly due to status.   - avoid fevers    # Hypertension  Labile blood pressures with intermittent hypotension during periods of increased agitation. Overall improving. No history of hypertension and not on anti-hypertensives prior to admission.   - PRN Hydralazine placed for SBP > 180 mmHg.   - PRN Labetalol placed for SBP > 180 mmHg.    Pulm  # Acute hypoxemic respiratory Failure secondary to Asthma exacerbation  Treated for acute asthma exacerbation on admission with nebulizers, steroids, and IV Mag with rapid improvement. No evidence of ongoing asthma exacerbation.   - Duo-neb q6h  - budesonide 0.25 mg nebs BID   - continue home Montelukast 10mg qd    FEN/GI  # Nutrition  Tolerating tube feeds at goal    # Hypertriglyceridemia  On high dose propofol with . Using high dose propofol for refractory seizures, will decrease as able. 1/11 Triglycerides down to 171.  - continue fenofibrate daily   - trend TG q72 hours    # Hyperkalemia   Orginally 5.9 after cardiac arrest. Shifted in the ED, K has remained wnl.     # Hepatocellular liver injury  AST/ALT improving during admission.      RENAL  # Rhabdomyolysis -  Resolved  CK peaked at 5,900. Was treated with IVF. CK <1,000, IVF discontinued.   Down trending CK with most recent CK of less than 1000, off maintenance fluids.    # ROHAN -resolved  Likely secondary to rhabdomyolysis and cardiac arrest. Cr peaked at 1.3, now normalized. Continues to have good UOP.     Upper extremity edema  - one time dose of 40 mg lasix.  - continue to monitor urinary out.    Heme  # Normocytic anemia  Hemoglobin mildly low but has remained stable during admission. No evidence of active bleeding.     Endo  Blood sugars have remained within goal    ID  # Fevers  Unlikely to be secondary to infectious source. Cultures have remained negative. No indication for antibiotics at this time.   - s/p Azithromycin 1/2-1/3  - s/p Ceftriaxone 1/2-1/3  - s/p Pip-Tazo 1/3-1/6  - s/p Vanc 1/3-1/6    F Tube Feeds  A tylenol  S propofol   T lovenox  H 30  U protonix  G at goal  S holding off due to seizures.  B monitor  I ETT, OG, PIV, PICC  D off abx    Rolo Segovia M.D.  [facility] Internal Medicine PGY-1    Pt seen and examined with Pulmonary/Critical Care team and this note was reviewed and validated with the following additional comments: Started having seizures as we weaned the propofol. This is an ominous sign.  Propofol increased again.  We will discuss with pt's mother. Adding 4th line AEDs under continous EEG monitoring will require transfer to [facility] if that is a strategy that pt's mother would agree that pt would want to try.    Critical Care time was spent validating the history and physical exam, reviewing the lab and imaging results, and discussing the care of the patient with the bedside nurse and the patient and/or surrogates. This critical care time did not overlap with that of any other provider or involve time for any procedures.  This patient has a high probability of sudden clinically significant deterioration which requires the highest level of physician preparedness to intervene urgently. I  managed/supervised life or organ supporting interventions that required frequent physician assessments. I devoted my full attention in the ICU to the direct care of this patient for this period of time. Organ systems which are failing and require intensive, critical care support are: neurologic, respiratory  Critical Care time: 50 minutes    Gopal Hall MD  Phone [phone]

## 2024-01-11 NOTE — PROGRESS NOTES
[facility]  Progress Note    Patient Name: Aaron Pruitt  MRN: [id number]  Patient Class: IP- Inpatient   Admission Date: 1/1/2024  Length of Stay: 10 days  Attending Physician: Kay Gonzalez*  Primary Care Provider: Gogo Vivar NP        Subjective:     Principal Problem:Acute respiratory failure with hypoxia and hypercarbia        HPI:  54 YO M with PMHx significant for  COPD/Asthma presented to the ER intubated on the field for acute respiratory failure post cardiac arrest s/p resuscitation and shock x1. Per chart review and   family at bedside reports he was shoveling some dirt in his yard when he got severely short of breath and suddenly had difficulty breathing and neighbors called EMS. When EMS arrived, reportedly still had a pulse but was struggling to breath, became pulseless. He was given narcan x2 without improvement. CPR and Epi x3. ROSC after 3rd round. Also reportedly get shocked x1. He was transferred to the ER and sedated. Labs in the ER with elevated lactic acid, elevated troponin, , elevated CPK 3294, elevated AST//82, drug screen with presumptive THC. CT head no acute intracranial abnormality, CT chest no acute pulmonary process.    Overview/Hospital Course:  1/3/2024 on TTM on fentanyl and propofol. K elevated to 5.7-->5.9, Lokelma 5g x 1, insulin/dextrose given  1/4/2024 Rewarming initiated. Sedation held, myoclonus noted. Restarted on propofol. EEG shows possible NCSE activity, started on AEDs  1/5/2024 Unchanged, Vimpat added, on keppra, vimpat and propofol.   1/6/2024 No clinical improvement, loaded with depacon  1/7/24 No improvement EEG result: The patient is a 53-year-old male with a history of anoxic brain injury who is currently maintained on intravenous infusions of propofol and on Keppra and Vimpat.  This is an abnormal EEG during comatose state.  The overall degree of disorganization and slowing for given age is suggestive of a  severe encephalopathy. The presence of epileptiform discharges is suggestive of cortical irritability with increased risk of focal seizures from either hemisphere.  There was a decrease in the overall burden of epileptiform discharges in this study compared to the prior study.  No seizures recorded during this study.   1/8/24 EEG without seizures since yesterday.   MRI brain The diffusion sequence is normal without evidence of acute ischemia or infarct.  There is abnormal increased signal of the caudate heads and basal ganglia bilaterally.   remaining hemispheres are unremarkable.  Posterior fossa structures are normal.  Flow voids are present where expected.  Pituitary, craniocervical junction and midline structures are unremarkable.     Interval History: Intubated, Reported patient in status  on EEG monitor once weaned down on propofol.  Propofol resume  Continue Keppra, Vimpat and Depakote    Repeat EEG   Abnormal study due to moderate to severe  diffuse background slowing consistent with diffuse cerebral dysfunction and encephalopathy which may be on the basis of toxic, metabolic, or primary neuronal disorder.    Appreciate pulmonary crit-discussion with family and updated on persistent status epilepticus once weaning.  On goals of care discussion family would rather follow through with transfer to [facility] for Initiating the 4th antiseizure medication trial versus transitioning to comfort measure now.    Plan to transfer to [facility] pending bed availability    Review of Systems   Unable to perform ROS: Intubated     Objective:     Vital Signs (Most Recent):  Temp: 98.3 °F (36.8 °C) (01/11/24 1100)  Pulse: 93 (01/11/24 1330)  Resp: 14 (01/11/24 1330)  BP: 118/63 (01/11/24 1330)  SpO2: 95 % (01/11/24 1330) Vital Signs (24h Range):  Temp:  [98.3 °F (36.8 °C)-100.4 °F (38 °C)] 98.3 °F (36.8 °C)  Pulse:  [] 93  Resp:  [13-43] 14  SpO2:  [94 %-100 %] 95 %  BP: (104-153)/(56-98) 118/63     Weight: 72.1 kg  "(158 lb 15.2 oz)  Body mass index is 20.97 kg/m².    Intake/Output Summary (Last 24 hours) at 1/11/2024 1345  Last data filed at 1/11/2024 1300  Gross per 24 hour   Intake 1190.37 ml   Output 2650 ml   Net -1459.63 ml           Physical Exam  Vitals and nursing note reviewed.   Constitutional:       General: He is not in acute distress.     Appearance: He is not toxic-appearing.      Comments: Intubated and sedated   HENT:      Head: Normocephalic and atraumatic.      Mouth/Throat:      Mouth: Mucous membranes are moist.   Cardiovascular:      Rate and Rhythm: Normal rate.      Heart sounds: No murmur heard.     No friction rub. No gallop.   Pulmonary:      Comments: Intubated, on mechanical ventilation, sedated  Abdominal:      General: Bowel sounds are normal. There is no distension.      Palpations: Abdomen is soft.      Tenderness: There is no rebound.   Musculoskeletal:      Cervical back: No rigidity or tenderness.      Right lower leg: No edema.      Left lower leg: No edema.             Significant Labs: A1C: No results for input(s): "HGBA1C" in the last 4320 hours.  ABGs:   Recent Labs   Lab 01/09/24 2206 01/10/24  0224 01/10/24  2011   PH 7.429 7.360 7.443   PCO2 44.5 52.3* 46.8*   HCO3 29.4* 29.5* 32.0*   POCSATURATED 93 90 96.1   BE 5* 4*  --    PO2 67* 63* 77.1*       Blood Culture:   No results for input(s): "LABBLOO" in the last 48 hours.    CBC:   Recent Labs   Lab 01/10/24  0432 01/11/24  0419   WBC 10.66 8.73   HGB 11.0* 10.2*   HCT 33.1* 30.7*    241       CMP:   Recent Labs   Lab 01/10/24  0432 01/11/24  0823    140   K 4.6 4.7    102   CO2 26 28    116*   BUN 26* 31*   CREATININE 0.8 0.8   CALCIUM 9.0 9.3   PROT  --  6.6   ALBUMIN  --  2.7*   BILITOT  --  0.5   ALKPHOS  --  57   AST  --  38   ALT  --  28   ANIONGAP 9 10       Lactic Acid: No results for input(s): "LACTATE" in the last 48 hours.  Lipase: No results for input(s): "LIPASE" in the last 48 hours.  Lipid " "Panel:   Recent Labs   Lab 01/11/24  0419   TRIG 174*       Magnesium: No results for input(s): "MG" in the last 48 hours.  Troponin: No results for input(s): "TROPONINI", "TROPONINIHS" in the last 48 hours.  TSH: No results for input(s): "TSH" in the last 4320 hours.  Urine Culture: No results for input(s): "LABURIN" in the last 48 hours.  Urine Studies: No results for input(s): "COLORU", "APPEARANCEUA", "PHUR", "SPECGRAV", "PROTEINUA", "GLUCUA", "KETONESU", "BILIRUBINUA", "OCCULTUA", "NITRITE", "UROBILINOGEN", "LEUKOCYTESUR", "RBCUA", "WBCUA", "BACTERIA", "SQUAMEPITHEL", "HYALINECASTS" in the last 48 hours.    Invalid input(s): "WRIGHTSUR"    Significant Imaging: I have reviewed all pertinent imaging results/findings within the past 24 hours.    Assessment/Plan:      * Acute respiratory failure with hypoxia and hypercarbia  Patient with Hypoxic Respiratory failure which is Acute.  he is not on home oxygen. Supplemental oxygen was provided and noted- Vent Mode: A/CMV-PC  Oxygen Concentration (%):  [25-30] 25  Resp Rate Total:  [13 br/min-42 br/min] 15 br/min  PEEP/CPAP:  [5 cmH20] 5 cmH20  Pressure Support:  [5 cmH20] 5 cmH20  Mean Airway Pressure:  [7.3 cmH20-10.2 cmH20] 7.3 cmH20    .   Signs/symptoms of respiratory failure include- tachypnea, increased work of breathing, and respiratory distress. Contributing diagnoses includes -  unkonwn  Labs and images were reviewed. Patient Has recent ABG, which has been reviewed. Will treat underlying causes and adjust management of respiratory failure as follows-   Continue home Singulair  H/o severe asthma and COPD  Steroids, nebs, added ceftriaxone and azithromycin (CXR clear)- discontinued        Hypertriglyceridemia    Triglycerides 787  Start fenofibrate    Hypertension  BP meds  Not on Labile blood pressure with intermittent hypotension  TTE    Left Ventricle: The left ventricle is normal in size. Normal wall thickness. There is concentric remodeling. Normal wall " motion. There is normal systolic function. Biplane (2D) method of discs ejection fraction is 65%. There is normal diastolic function.    Right Ventricle: Normal right ventricular cavity size. Wall thickness is normal. Right ventricle wall motion  is normal. Systolic function is normal.    IVC/SVC: Patient is ventilated, cannot use IVC diameter to estimate right atrial pressure.    Acute encephalopathy  Status epilepticus  EEG were nonconvulsive status epilepticus,    On Keppra 1500 mg b.i.d., Vimpat 200 mg b.i.d., Depakote 360 mg t.i.d.  Planning repeat EEG on 01/09 and weaning propofol  Pending transfer to neuro critical Care with bed availability  Appreciate pulmonary crit: Discussion with family.  Plan future family meeting after repeat EEG      Seizure disorder, nonconvulsive, with status epilepticus  1/6-7  IMPRESSION:  This is an abnormal EEG during comatose state.  Diffuse disorganized low-amplitude slowing of the background was noted.  Generalized as well as independent left and right lateralized epileptiform discharges were noted which were pseudo periodic at < 2 Hz.  Two brief electrographic seizures emanating from the right frontal region were noted     CLINICAL CORRELATION:  The patient is a 53-year-old male with a history of anoxic brain injury who is currently maintained on intravenous infusions of propofol and on Keppra and Vimpat.  This is an abnormal EEG during comatose state.  The overall degree of disorganization and slowing for given age is suggestive of a severe encephalopathy. The presence of epileptiform discharges is suggestive of cortical irritability with increased risk of focal seizures from either hemisphere.  There was a decrease in the overall burden of epileptiform discharges in this study compared to the prior study.  No seizures recorded during this study.    1/8  Abnormal study due to moderate to severe  diffuse background slowing consistent with diffuse cerebral dysfunction and  encephalopathy which may be on the basis of toxic, metabolic, or primary neuronal disorder.       Requested transfer for Neuro-critical ICU, no beds at this time, also, patient is out of status epilepticus and plan is to repeat EEG in am and wean propofol by 20mcg/kg/hr every 2 hours and monitor for seizure activity    1/11/2024     Appreciate pulmonary crit-discussion with family and updated on persistent status epilepticus once weaning.  On goals of care discussion family would rather follow through with transfer to [facility] for Initiating the 4th antiseizure medication trial versus transitioning to comfort measure now.    Per [facility] continue continuous EEG  monitoring pending transferred to [facility]    Rhabdomyolysis  Vs traumatic post cardiac resuscitation  Monitor CPK - improved  Now off IVFs      Cardiac arrest  S/p CPR with ROSC now on TTM, now rewarming  Per Pulm/Crit  Cardiac arrest: Due to hypoxemia. TTM completed. Maintaining normothermia. Reflexes intact. MRI brain on Monday.     Unchanged, MRI on Monday, if evidence of severe anoxic brain injury family will likely transition to comfort care  If not possible transfer to [facility]  MRI brain on 01/08 with concern for vasogenic edema of the caudate head and basal ganglia bilateral          VTE Risk Mitigation (From admission, onward)           Ordered     enoxaparin injection 40 mg  Every 24 hours         01/01/24 1855     Apply sequential compression device to lower extremities (if no contraindications). Medical venous thromboembolus prophylaxis is preferred.  Until discontinued         01/01/24 1928                    Discharge Planning   MARQUISE: 1/11/2024     Code Status: DNR   Is the patient medically ready for discharge?:     Reason for patient still in hospital (select all that apply): Patient trending condition  Discharge Plan A: Hospital Transfer ([facility]???)   Discharge Delays:  (bed availability)        Critical  care time spent on the evaluation and treatment of severe organ dysfunction, review of pertinent labs and imaging studies, discussions with consulting providers and discussions with patient/family: 45 minutes.      Kay Gonzalez MD  Department of Hospital Medicine   [place] - Intensive Care

## 2024-01-11 NOTE — ASSESSMENT & PLAN NOTE
1/6-7  IMPRESSION:  This is an abnormal EEG during comatose state.  Diffuse disorganized low-amplitude slowing of the background was noted.  Generalized as well as independent left and right lateralized epileptiform discharges were noted which were pseudo periodic at < 2 Hz.  Two brief electrographic seizures emanating from the right frontal region were noted     CLINICAL CORRELATION:  The patient is a 53-year-old male with a history of anoxic brain injury who is currently maintained on intravenous infusions of propofol and on Keppra and Vimpat.  This is an abnormal EEG during comatose state.  The overall degree of disorganization and slowing for given age is suggestive of a severe encephalopathy. The presence of epileptiform discharges is suggestive of cortical irritability with increased risk of focal seizures from either hemisphere.  There was a decrease in the overall burden of epileptiform discharges in this study compared to the prior study.  No seizures recorded during this study.    1/8  Abnormal study due to moderate to severe  diffuse background slowing consistent with diffuse cerebral dysfunction and encephalopathy which may be on the basis of toxic, metabolic, or primary neuronal disorder.       Requested transfer for Neuro-critical ICU, no beds at this time, also, patient is out of status epilepticus and plan is to repeat EEG in am and wean propofol by 20mcg/kg/hr every 2 hours and monitor for seizure activity    1/11/2024     Appreciate pulmonary crit-discussion with family and updated on persistent status epilepticus once weaning.  On goals of care discussion family would rather follow through with transfer to [facility] for Initiating the 4th antiseizure medication trial versus transitioning to comfort measure now.    Per [facility] continue continuous EEG  monitoring pending transferred to [facility]

## 2024-01-12 ENCOUNTER — HOSPITAL ENCOUNTER (INPATIENT)
Facility: HOSPITAL | Age: 54
LOS: 13 days | Discharge: NURSING, REHAB OR LONG-TERM CARE | DRG: 004 | End: 2024-01-25
Attending: PSYCHIATRY & NEUROLOGY | Admitting: PSYCHIATRY & NEUROLOGY
Payer: MEDICAID

## 2024-01-12 DIAGNOSIS — I46.9 CARDIORESPIRATORY ARREST: ICD-10-CM

## 2024-01-12 DIAGNOSIS — G40.911: Primary | ICD-10-CM

## 2024-01-12 DIAGNOSIS — G93.1 ANOXIC BRAIN INJURY: ICD-10-CM

## 2024-01-12 PROBLEM — F17.200 SMOKER: Status: ACTIVE | Noted: 2024-01-12

## 2024-01-12 LAB
ABO + RH BLD: NORMAL
ALBUMIN SERPL BCP-MCNC: 2.8 G/DL (ref 3.5–5.2)
ALLENS TEST: ABNORMAL
ALP SERPL-CCNC: 62 U/L (ref 55–135)
ALT SERPL W/O P-5'-P-CCNC: 29 U/L (ref 10–44)
ANION GAP SERPL CALC-SCNC: 10 MMOL/L (ref 8–16)
AST SERPL-CCNC: 42 U/L (ref 10–40)
BASOPHILS # BLD AUTO: 0.01 K/UL (ref 0–0.2)
BASOPHILS NFR BLD: 0.1 % (ref 0–1.9)
BILIRUB SERPL-MCNC: 0.4 MG/DL (ref 0.1–1)
BLD GP AB SCN CELLS X3 SERPL QL: NORMAL
BUN SERPL-MCNC: 30 MG/DL (ref 6–20)
CALCIUM SERPL-MCNC: 9.4 MG/DL (ref 8.7–10.5)
CHLORIDE SERPL-SCNC: 102 MMOL/L (ref 95–110)
CO2 SERPL-SCNC: 28 MMOL/L (ref 23–29)
CREAT SERPL-MCNC: 0.7 MG/DL (ref 0.5–1.4)
DELSYS: ABNORMAL
DIFFERENTIAL METHOD BLD: ABNORMAL
EOSINOPHIL # BLD AUTO: 0 K/UL (ref 0–0.5)
EOSINOPHIL NFR BLD: 0.3 % (ref 0–8)
ERYTHROCYTE [DISTWIDTH] IN BLOOD BY AUTOMATED COUNT: 13.2 % (ref 11.5–14.5)
ERYTHROCYTE [SEDIMENTATION RATE] IN BLOOD BY WESTERGREN METHOD: 18 MM/H
EST. GFR  (NO RACE VARIABLE): >60 ML/MIN/1.73 M^2
ESTIMATED AVG GLUCOSE: 85 MG/DL (ref 68–131)
FIO2: 50
GLUCOSE SERPL-MCNC: 102 MG/DL (ref 70–110)
HBA1C MFR BLD: 4.6 % (ref 4–5.6)
HCO3 UR-SCNC: 32.9 MMOL/L (ref 24–28)
HCT VFR BLD AUTO: 29.6 % (ref 40–54)
HGB BLD-MCNC: 10 G/DL (ref 14–18)
IMM GRANULOCYTES # BLD AUTO: 0.07 K/UL (ref 0–0.04)
IMM GRANULOCYTES NFR BLD AUTO: 0.6 % (ref 0–0.5)
LYMPHOCYTES # BLD AUTO: 0.7 K/UL (ref 1–4.8)
LYMPHOCYTES NFR BLD: 5.9 % (ref 18–48)
MAGNESIUM SERPL-MCNC: 2.3 MG/DL (ref 1.6–2.6)
MCH RBC QN AUTO: 31.2 PG (ref 27–31)
MCHC RBC AUTO-ENTMCNC: 33.8 G/DL (ref 32–36)
MCV RBC AUTO: 92 FL (ref 82–98)
MODE: ABNORMAL
MONOCYTES # BLD AUTO: 1 K/UL (ref 0.3–1)
MONOCYTES NFR BLD: 8.3 % (ref 4–15)
NEUTROPHILS # BLD AUTO: 10.2 K/UL (ref 1.8–7.7)
NEUTROPHILS NFR BLD: 84.8 % (ref 38–73)
NRBC BLD-RTO: 0 /100 WBC
PCO2 BLDA: 52.4 MMHG (ref 35–45)
PEEP: 5
PH SMN: 7.41 [PH] (ref 7.35–7.45)
PHOSPHATE SERPL-MCNC: 3.2 MG/DL (ref 2.7–4.5)
PLATELET # BLD AUTO: 329 K/UL (ref 150–450)
PMV BLD AUTO: 9 FL (ref 9.2–12.9)
PO2 BLDA: 199 MMHG (ref 80–100)
POC BE: 8 MMOL/L
POC SATURATED O2: 100 % (ref 95–100)
POC TCO2: 34 MMOL/L (ref 23–27)
POCT GLUCOSE: 108 MG/DL (ref 70–110)
POCT GLUCOSE: 110 MG/DL (ref 70–110)
POTASSIUM SERPL-SCNC: 4.5 MMOL/L (ref 3.5–5.1)
PROT SERPL-MCNC: 6.9 G/DL (ref 6–8.4)
RBC # BLD AUTO: 3.21 M/UL (ref 4.6–6.2)
SAMPLE: ABNORMAL
SITE: ABNORMAL
SODIUM SERPL-SCNC: 140 MMOL/L (ref 136–145)
SP02: 100
SPECIMEN OUTDATE: NORMAL
VT: 400
WBC # BLD AUTO: 11.98 K/UL (ref 3.9–12.7)

## 2024-01-12 PROCEDURE — 25000003 PHARM REV CODE 250

## 2024-01-12 PROCEDURE — 99291 CRITICAL CARE FIRST HOUR: CPT | Mod: FS,,, | Performed by: PSYCHIATRY & NEUROLOGY

## 2024-01-12 PROCEDURE — 27100171 HC OXYGEN HIGH FLOW UP TO 24 HOURS

## 2024-01-12 PROCEDURE — 95700 EEG CONT REC W/VID EEG TECH: CPT

## 2024-01-12 PROCEDURE — 94761 N-INVAS EAR/PLS OXIMETRY MLT: CPT | Mod: XB

## 2024-01-12 PROCEDURE — C9254 INJECTION, LACOSAMIDE: HCPCS

## 2024-01-12 PROCEDURE — 36600 WITHDRAWAL OF ARTERIAL BLOOD: CPT

## 2024-01-12 PROCEDURE — 36415 COLL VENOUS BLD VENIPUNCTURE: CPT | Performed by: PSYCHIATRY & NEUROLOGY

## 2024-01-12 PROCEDURE — 99900035 HC TECH TIME PER 15 MIN (STAT)

## 2024-01-12 PROCEDURE — 94640 AIRWAY INHALATION TREATMENT: CPT

## 2024-01-12 PROCEDURE — 63600175 PHARM REV CODE 636 W HCPCS

## 2024-01-12 PROCEDURE — 51701 INSERT BLADDER CATHETER: CPT

## 2024-01-12 PROCEDURE — 25000242 PHARM REV CODE 250 ALT 637 W/ HCPCS

## 2024-01-12 PROCEDURE — 84100 ASSAY OF PHOSPHORUS: CPT

## 2024-01-12 PROCEDURE — 99900026 HC AIRWAY MAINTENANCE (STAT)

## 2024-01-12 PROCEDURE — 80053 COMPREHEN METABOLIC PANEL: CPT

## 2024-01-12 PROCEDURE — 25000003 PHARM REV CODE 250: Performed by: PSYCHIATRY & NEUROLOGY

## 2024-01-12 PROCEDURE — 94002 VENT MGMT INPAT INIT DAY: CPT

## 2024-01-12 PROCEDURE — 51798 US URINE CAPACITY MEASURE: CPT

## 2024-01-12 PROCEDURE — 82803 BLOOD GASES ANY COMBINATION: CPT

## 2024-01-12 PROCEDURE — 20000000 HC ICU ROOM

## 2024-01-12 PROCEDURE — 5A1955Z RESPIRATORY VENTILATION, GREATER THAN 96 CONSECUTIVE HOURS: ICD-10-PCS | Performed by: INTERNAL MEDICINE

## 2024-01-12 PROCEDURE — 95720 EEG PHY/QHP EA INCR W/VEEG: CPT | Mod: ,,, | Performed by: PSYCHIATRY & NEUROLOGY

## 2024-01-12 PROCEDURE — 99499 UNLISTED E&M SERVICE: CPT | Mod: ,,,

## 2024-01-12 PROCEDURE — 94668 MNPJ CHEST WALL SBSQ: CPT

## 2024-01-12 PROCEDURE — 83735 ASSAY OF MAGNESIUM: CPT

## 2024-01-12 PROCEDURE — 95714 VEEG EA 12-26 HR UNMNTR: CPT

## 2024-01-12 PROCEDURE — 27200966 HC CLOSED SUCTION SYSTEM

## 2024-01-12 PROCEDURE — 83036 HEMOGLOBIN GLYCOSYLATED A1C: CPT

## 2024-01-12 PROCEDURE — 86901 BLOOD TYPING SEROLOGIC RH(D): CPT

## 2024-01-12 PROCEDURE — 85025 COMPLETE CBC W/AUTO DIFF WBC: CPT

## 2024-01-12 RX ORDER — FAMOTIDINE 10 MG/ML
20 INJECTION INTRAVENOUS 2 TIMES DAILY
Status: DISCONTINUED | OUTPATIENT
Start: 2024-01-12 | End: 2024-01-12

## 2024-01-12 RX ORDER — FENTANYL CITRATE 50 UG/ML
25 INJECTION, SOLUTION INTRAMUSCULAR; INTRAVENOUS
Status: DISCONTINUED | OUTPATIENT
Start: 2024-01-12 | End: 2024-01-13

## 2024-01-12 RX ORDER — CLOBAZAM 10 MG/1
10 TABLET ORAL DAILY
Status: DISCONTINUED | OUTPATIENT
Start: 2024-01-12 | End: 2024-01-14

## 2024-01-12 RX ORDER — ENOXAPARIN SODIUM 100 MG/ML
40 INJECTION SUBCUTANEOUS EVERY 24 HOURS
Status: DISCONTINUED | OUTPATIENT
Start: 2024-01-12 | End: 2024-01-25 | Stop reason: HOSPADM

## 2024-01-12 RX ORDER — SODIUM,POTASSIUM PHOSPHATES 280-250MG
2 POWDER IN PACKET (EA) ORAL
Status: DISCONTINUED | OUTPATIENT
Start: 2024-01-12 | End: 2024-01-25 | Stop reason: HOSPADM

## 2024-01-12 RX ORDER — LABETALOL HCL 20 MG/4 ML
10 SYRINGE (ML) INTRAVENOUS EVERY 4 HOURS PRN
Status: DISCONTINUED | OUTPATIENT
Start: 2024-01-12 | End: 2024-01-25 | Stop reason: HOSPADM

## 2024-01-12 RX ORDER — SODIUM,POTASSIUM PHOSPHATES 280-250MG
2 POWDER IN PACKET (EA) ORAL
Status: DISCONTINUED | OUTPATIENT
Start: 2024-01-12 | End: 2024-01-12

## 2024-01-12 RX ORDER — AMOXICILLIN 250 MG
1 CAPSULE ORAL DAILY
Status: DISCONTINUED | OUTPATIENT
Start: 2024-01-12 | End: 2024-01-12

## 2024-01-12 RX ORDER — IPRATROPIUM BROMIDE AND ALBUTEROL SULFATE 2.5; .5 MG/3ML; MG/3ML
3 SOLUTION RESPIRATORY (INHALATION) EVERY 6 HOURS PRN
Status: DISCONTINUED | OUTPATIENT
Start: 2024-01-12 | End: 2024-01-12

## 2024-01-12 RX ORDER — HYDRALAZINE HYDROCHLORIDE 20 MG/ML
10 INJECTION INTRAMUSCULAR; INTRAVENOUS EVERY 4 HOURS PRN
Status: DISCONTINUED | OUTPATIENT
Start: 2024-01-12 | End: 2024-01-25 | Stop reason: HOSPADM

## 2024-01-12 RX ORDER — FENOFIBRATE 145 MG/1
160 TABLET, FILM COATED ORAL DAILY
Status: DISCONTINUED | OUTPATIENT
Start: 2024-01-12 | End: 2024-01-12

## 2024-01-12 RX ORDER — LANOLIN ALCOHOL/MO/W.PET/CERES
800 CREAM (GRAM) TOPICAL
Status: DISCONTINUED | OUTPATIENT
Start: 2024-01-12 | End: 2024-01-12

## 2024-01-12 RX ORDER — SODIUM CHLORIDE 0.9 % (FLUSH) 0.9 %
10 SYRINGE (ML) INJECTION
Status: DISCONTINUED | OUTPATIENT
Start: 2024-01-12 | End: 2024-01-12

## 2024-01-12 RX ORDER — PROPOFOL 10 MG/ML
INJECTION, EMULSION INTRAVENOUS
Status: DISPENSED
Start: 2024-01-12 | End: 2024-01-12

## 2024-01-12 RX ORDER — AMOXICILLIN 250 MG
1 CAPSULE ORAL DAILY
Status: DISCONTINUED | OUTPATIENT
Start: 2024-01-13 | End: 2024-01-18

## 2024-01-12 RX ORDER — MONTELUKAST SODIUM 10 MG/1
10 TABLET ORAL DAILY
Status: DISCONTINUED | OUTPATIENT
Start: 2024-01-12 | End: 2024-01-12

## 2024-01-12 RX ORDER — LEVETIRACETAM 500 MG/5ML
1500 INJECTION, SOLUTION, CONCENTRATE INTRAVENOUS EVERY 12 HOURS
Status: DISCONTINUED | OUTPATIENT
Start: 2024-01-12 | End: 2024-01-22

## 2024-01-12 RX ORDER — IPRATROPIUM BROMIDE AND ALBUTEROL SULFATE 2.5; .5 MG/3ML; MG/3ML
3 SOLUTION RESPIRATORY (INHALATION) EVERY 6 HOURS
Status: DISCONTINUED | OUTPATIENT
Start: 2024-01-12 | End: 2024-01-17

## 2024-01-12 RX ORDER — MUPIROCIN 20 MG/G
OINTMENT TOPICAL 2 TIMES DAILY
Status: COMPLETED | OUTPATIENT
Start: 2024-01-12 | End: 2024-01-16

## 2024-01-12 RX ORDER — BUDESONIDE 0.5 MG/2ML
1 INHALANT ORAL EVERY 12 HOURS
Status: DISCONTINUED | OUTPATIENT
Start: 2024-01-12 | End: 2024-01-12

## 2024-01-12 RX ORDER — BUDESONIDE 0.5 MG/2ML
0.25 INHALANT ORAL EVERY 12 HOURS
Status: DISCONTINUED | OUTPATIENT
Start: 2024-01-12 | End: 2024-01-24

## 2024-01-12 RX ORDER — FLUTICASONE PROPIONATE 50 MCG
1 SPRAY, SUSPENSION (ML) NASAL DAILY
Status: ON HOLD | COMMUNITY
Start: 2023-05-16 | End: 2024-01-25 | Stop reason: HOSPADM

## 2024-01-12 RX ORDER — FAMOTIDINE 20 MG/1
20 TABLET, FILM COATED ORAL 2 TIMES DAILY
Status: DISCONTINUED | OUTPATIENT
Start: 2024-01-12 | End: 2024-01-24

## 2024-01-12 RX ORDER — MONTELUKAST SODIUM 10 MG/1
10 TABLET ORAL DAILY
Status: DISCONTINUED | OUTPATIENT
Start: 2024-01-13 | End: 2024-01-24

## 2024-01-12 RX ORDER — LANOLIN ALCOHOL/MO/W.PET/CERES
800 CREAM (GRAM) TOPICAL
Status: DISCONTINUED | OUTPATIENT
Start: 2024-01-12 | End: 2024-01-25 | Stop reason: HOSPADM

## 2024-01-12 RX ADMIN — MUPIROCIN: 20 OINTMENT TOPICAL at 08:01

## 2024-01-12 RX ADMIN — PROPOFOL 50 MCG/KG/MIN: 10 INJECTION, EMULSION INTRAVENOUS at 02:01

## 2024-01-12 RX ADMIN — DEXTROSE MONOHYDRATE 360 MG: 50 INJECTION, SOLUTION INTRAVENOUS at 09:01

## 2024-01-12 RX ADMIN — PROPOFOL 50 MCG/KG/MIN: 10 INJECTION, EMULSION INTRAVENOUS at 05:01

## 2024-01-12 RX ADMIN — SENNOSIDES AND DOCUSATE SODIUM 1 TABLET: 50; 8.6 TABLET ORAL at 09:01

## 2024-01-12 RX ADMIN — LEVETIRACETAM 1500 MG: 100 INJECTION INTRAVENOUS at 01:01

## 2024-01-12 RX ADMIN — KETAMINE HYDROCHLORIDE 25 MCG/KG/MIN: 100 INJECTION INTRAMUSCULAR; INTRAVENOUS at 01:01

## 2024-01-12 RX ADMIN — IPRATROPIUM BROMIDE AND ALBUTEROL SULFATE 3 ML: .5; 3 SOLUTION RESPIRATORY (INHALATION) at 09:01

## 2024-01-12 RX ADMIN — IPRATROPIUM BROMIDE AND ALBUTEROL SULFATE 3 ML: .5; 3 SOLUTION RESPIRATORY (INHALATION) at 07:01

## 2024-01-12 RX ADMIN — PROPOFOL 50 MCG/KG/MIN: 10 INJECTION, EMULSION INTRAVENOUS at 06:01

## 2024-01-12 RX ADMIN — DEXTROSE MONOHYDRATE 360 MG: 50 INJECTION, SOLUTION INTRAVENOUS at 05:01

## 2024-01-12 RX ADMIN — PROPOFOL 50 MCG/KG/MIN: 10 INJECTION, EMULSION INTRAVENOUS at 11:01

## 2024-01-12 RX ADMIN — BUDESONIDE INHALATION 0.25 MG: 0.5 SUSPENSION RESPIRATORY (INHALATION) at 09:01

## 2024-01-12 RX ADMIN — BUDESONIDE INHALATION 0.25 MG: 0.5 SUSPENSION RESPIRATORY (INHALATION) at 07:01

## 2024-01-12 RX ADMIN — LEVETIRACETAM 1500 MG: 100 INJECTION INTRAVENOUS at 09:01

## 2024-01-12 RX ADMIN — CLOBAZAM 10 MG: 10 TABLET ORAL at 11:01

## 2024-01-12 RX ADMIN — FAMOTIDINE 20 MG: 10 INJECTION, SOLUTION INTRAVENOUS at 09:01

## 2024-01-12 RX ADMIN — LEVETIRACETAM 1500 MG: 100 INJECTION INTRAVENOUS at 08:01

## 2024-01-12 RX ADMIN — FENOFIBRATE 145 MG: 145 TABLET, FILM COATED ORAL at 09:01

## 2024-01-12 RX ADMIN — ENOXAPARIN SODIUM 40 MG: 40 INJECTION SUBCUTANEOUS at 05:01

## 2024-01-12 RX ADMIN — FAMOTIDINE 20 MG: 20 TABLET, FILM COATED ORAL at 08:01

## 2024-01-12 RX ADMIN — MUPIROCIN: 20 OINTMENT TOPICAL at 09:01

## 2024-01-12 RX ADMIN — DEXTROSE MONOHYDRATE 360 MG: 50 INJECTION, SOLUTION INTRAVENOUS at 01:01

## 2024-01-12 RX ADMIN — KETAMINE HYDROCHLORIDE 50 MCG/KG/MIN: 100 INJECTION INTRAMUSCULAR; INTRAVENOUS at 05:01

## 2024-01-12 RX ADMIN — DEXTROSE MONOHYDRATE 360 MG: 50 INJECTION, SOLUTION INTRAVENOUS at 02:01

## 2024-01-12 RX ADMIN — LACOSAMIDE 200 MG: 10 INJECTION INTRAVENOUS at 01:01

## 2024-01-12 RX ADMIN — MONTELUKAST 10 MG: 10 TABLET, FILM COATED ORAL at 09:01

## 2024-01-12 NOTE — H&P
"[facility] - Neuro Critical Care  Neurocritical Care  History & Physical    Admit Date: 1/12/2024  Service Date: 01/12/2024  Length of Stay: 0    Subjective:     Chief Complaint: New onset refractory status epilepticus    History of Present Illness: Mr. Aaron Pruitt is a 53 year old M with PMHx significant for COPD/Asthma c/b smoking and HTN who presents to [facility] for NCSE. He initially presented to [facility] ER on 01/01 after being intubated in the field following acute respiratory failure post cardiac arrest s/p resuscitation and shock x1. Per chart review, he was shoveling some dirt in his yard when he got severely short of breath and suddenly had difficulty breathing. EMS was activated by his neighbors. Upon EMS arrival, he reportedly still had a pulse, though with severe respiratory distress, and subsequently became pulseless. He was given narcan x2 without improvement. CPR and Epi x3. ROSC after 3rd round of CPR with shock x 1. CT head on arrival to [facility] without acute intracranial abnormality, and CT chest without acute pulmonary process.TTM was initiated on 01/03 with fentanyl and propofol gtts for sedation, and rewarming was initiated on 01/04 with subsequent weaning of sedation. During this period, myoclonus was clinically noted, and propofol was resumed. EEG showed NCSE, prompting the initiation of keppra and vimpat with continuation of propofol. On 01/06, there was slight improvement in EEG, with NCSE noted to be "partially treated," at which time, the patient was then loaded with depacon. EEG was improved leading to weaning of propofol with eventual return to [facility]. MRI completed 01/08 and 01/11 completed with with GoC conversation held at [facility]. Family opted to forgo comfort measures at this point and transfer to [facility] for trial of 4th AED, per chart review. He arrived to [facility] on 80 mcg/kg/min of non-titrating propofol from [facility] which was dropped to 50 mcg/kg/min, followed by the immediate addition of 25 mcg/kg/min of " ketamine. EEG cap placed.     He will be admitted to [facility] for hourly neuromonitoring and a higher level of care.      Past Medical History:   Diagnosis Date    Bronchitis     Bronchitis      Past Surgical History:   Procedure Laterality Date    NECK SURGERY        Current Facility-Administered Medications on File Prior to Encounter   Medication Dose Route Frequency Provider Last Rate Last Admin    [COMPLETED] furosemide injection 40 mg  40 mg Intravenous Once Rolo Segovia MD   40 mg at 01/11/24 0900    [DISCONTINUED] acetaminophen tablet 1,000 mg  1,000 mg Oral Q6H PRN Ariel Heller MD   1,000 mg at 01/11/24 1811    [DISCONTINUED] albuterol sulfate nebulizer solution 5 mg  5 mg Nebulization Q4H PRN David Olson MD   5 mg at 01/03/24 0726    [DISCONTINUED] albuterol-ipratropium 2.5 mg-0.5 mg/3 mL nebulizer solution 3 mL  3 mL Nebulization Q6H Any Shore MD   3 mL at 01/11/24 1908    [DISCONTINUED] budesonide nebulizer solution 0.25 mg  0.25 mg Nebulization Q12H Danelle Weaver MD   0.25 mg at 01/11/24 1908    [DISCONTINUED] dextrose 10% bolus 125 mL 125 mL  12.5 g Intravenous PRN David Field MD        [DISCONTINUED] dextrose 10% bolus 250 mL 250 mL  25 g Intravenous PRN David Field MD        [DISCONTINUED] enoxaparin injection 40 mg  40 mg Subcutaneous Q24H (prophylaxis, 1700) Ariel Helelr MD   40 mg at 01/11/24 1715    [DISCONTINUED] fenofibrate tablet 160 mg  160 mg Per OG tube Daily Danelle Weaver MD   160 mg at 01/11/24 0855    [DISCONTINUED] glucagon (human recombinant) injection 1 mg  1 mg Intramuscular PRN David Field MD        [DISCONTINUED] glucose chewable tablet 16 g  16 g Oral PRN Anderson Leiva MD        [DISCONTINUED] glucose chewable tablet 24 g  24 g Oral PRN Anderson Leiva MD        [DISCONTINUED] hydrALAZINE injection 10 mg  10 mg Intravenous Q6H PRN Any Shore MD   10 mg at 01/07/24 0618    [DISCONTINUED] insulin aspart U-100 pen 0-5  Units  0-5 Units Subcutaneous Q6H PRN David Field MD   2 Units at 01/04/24 1911    [DISCONTINUED] labetaloL injection 5 mg  5 mg Intravenous Q5 Min PRN Rolo Segovia MD   5 mg at 01/07/24 0840    [DISCONTINUED] lacosamide (VIMPAT) 200 mg in sodium chloride 0.9% 100 mL IVPB  200 mg Intravenous Q12H Rolo Segovia MD   Stopped at 01/11/24 2300    [DISCONTINUED] levETIRAcetam injection 1,500 mg  1,500 mg Intravenous Q12H Any Shore MD   1,500 mg at 01/11/24 2200    [DISCONTINUED] LORazepam (ATIVAN) 2 mg/mL injection             [DISCONTINUED] LORazepam injection 2 mg  2 mg Intravenous Once PRN Ariel Heller MD        [DISCONTINUED] montelukast tablet 10 mg  10 mg Per OG tube Daily David Olson MD   10 mg at 01/11/24 0855    [DISCONTINUED] montelukast tablet 10 mg  10 mg Oral Daily Norah Mora PA-C        [DISCONTINUED] pantoprazole injection 40 mg  40 mg Intravenous Daily Ariel Heller MD   40 mg at 01/11/24 0901    [DISCONTINUED] propofol (DIPRIVAN) 10 mg/mL infusion (NON-TITRATING)  80 mcg/kg/min (Dosing Weight) Intravenous Continuous Danelle Weaver MD 28.3 mL/hr at 01/11/24 2246 80 mcg/kg/min at 01/11/24 2246    [DISCONTINUED] sodium chloride 0.9% flush 10 mL  10 mL Intravenous Q12H PRN Anderson Leiva MD        [DISCONTINUED] sodium chloride 0.9% flush 10 mL  10 mL Intravenous Q6H Yamila Cazares MD   10 mL at 01/11/24 2329    [DISCONTINUED] sodium chloride 0.9% flush 10 mL  10 mL Intravenous PRN Yamila Cazares MD        [DISCONTINUED] sodium chloride 3% nebulizer solution 4 mL  4 mL Nebulization PRN Rolo Segovia MD        [DISCONTINUED] valproate (DEPACON) 840 mg in dextrose 5 % (D5W) 100 mL IVPB  28.5 mg/kg/day (Dosing Weight) Intravenous Q12H Natacha Greenberg MD   Stopped at 01/11/24 1508    [DISCONTINUED] white petrolatum-mineral oil (SYSTANE NIGHTTIME) ophthalmic ointment   Both Eyes Q8H Anderson Leiva MD   Given at 01/11/24 7756      Current Outpatient Medications on File Prior to Encounter   Medication Sig Dispense Refill    albuterol 90 mcg/actuation inhaler Inhale 1-2 puffs into the lungs every 6 (six) hours as needed for Wheezing. 1 Inhaler 0    fluticasone (FLOVENT HFA) 220 mcg/actuation inhaler Inhale 1 puff into the lungs 2 (two) times daily. Controller      MEPOLIZUMAB 100 mg/mL autoinjector Inject into the skin.      mometasone-formoterol (DULERA) 200-5 mcg/actuation inhaler Dulera 200 mcg-5 mcg/actuation HFA aerosol inhaler   INHALE 2 PUFFS BY MOUTH TWICE DAILY. RINSE MOUTH AND THROAT AFTER USE      naproxen (NAPROSYN) 500 MG tablet naproxen 500 mg tablet   TAKE 1 TABLET BY MOUTH TWICE DAILY AS NEEDED      nicotine (NICODERM CQ) 14 mg/24 hr 1 patch.      varenicline (CHANTIX) 1 mg Tab Take by mouth.        Allergies: Sulfa (sulfonamide antibiotics)  No family history on file.  Social History     Tobacco Use    Smoking status: Every Day     Review of Systems   Unable to perform ROS: Intubated     Objective:     Vitals:    Temp: 99.9 °F (37.7 °C)  Pulse: 83  Resp: 18  SpO2: 100 %  Oxygen Concentration (%): 50  Vent Mode: A/C  Set Rate: 18 BPM  Vt Set: 400 mL  PEEP/CPAP: 5 cmH20  Peak Airway Pressure: 32 cmH20  Mean Airway Pressure: 9.8 cmH20  Plateau Pressure: 0 cmH20    Temp  Min: 98.3 °F (36.8 °C)  Max: 101.5 °F (38.6 °C)  Pulse  Min: 83  Max: 102  BP  Min: 104/74  Max: 144/98  MAP (mmHg)  Min: 78  Max: 107  Resp  Min: 13  Max: 43  SpO2  Min: 94 %  Max: 100 %  Oxygen Concentration (%)  Min: 25  Max: 50    No intake/output data recorded.            Physical Exam  Constitutional:       General: He is not in acute distress.  Cardiovascular:      Rate and Rhythm: Normal rate and regular rhythm.   Pulmonary:      Effort: Pulmonary effort is normal. No respiratory distress.   Abdominal:      Palpations: Abdomen is soft.   Neurological:      Comments: Sedation: 80 propofol  [id number]  Does not follow commands  PERRL  Pupils brisk  (+)  cough, corneals  (-) gag  Does not respond to noxious stimuli in any extremity              Today I personally reviewed pertinent medications, lines/drains/airways, imaging, cardiology results, laboratory results, microbiology results, notably: CT. MRI, EEG reports        Assessment/Plan:     Neuro  * New onset refractory status epilepticus  54 y/o M 12 days post respiratory failure c/b cardiac arrest (Epi x 3, CPR x 3, Shock x 1) with RSE in setting of propofol wean despite continued keppra, vimpat, depacon administration. He arrived to [facility] on 80 mcg/kg/min of non-titrating propofol from [facility] which was dropped to 50 mcg/kg/min, followed by the immediate addition of 25 mcg/kg/min of ketamine. EEG cap placed and epilepsy notified.    -Admit to [facility]CC  -Epilepsy consult, appreciate recs  -Q1h neurochecks, vital checks  -Daily CBC, CMP, Mag, Phos  -MRI x 2 with T2 BG hyperintensity bilaterally  -cEEG  -Continue keppra 1500 mg bid, Vimpat 200 mg bid, Depakote 360 mg tid  -Maintain propofol at 50 mcg/kg/min and ketamine at 25 mcg/kg/min overnight unless patient confirmed to be in NCSE, will increase ketamine in 25 mcg/kg/min increments    Acute encephalopathy  See primary problem, known NCSE post arrest  CT head unremarkable on admission to [facility]  MRI x 2 with T2 BG hyperintensity bilaterally  Daily ABG, pH WNL  As the majority of this patient's metabolic derangements were corrected at [facility], suspect encephalopathy 2/2 anoxic injury/NCSE    Seizure disorder, nonconvulsive, with status epilepticus  See primary problem     Pulmonary  COPD (chronic obstructive pulmonary disease)  Known history of smoking  Resume steroids, duonebs  Daily CXR, ABG while intubated  O2 Sat goal 88%-92%    Severe asthma  Known hx of asthma with acute onset SOB/hypoxia while shoveling dirt leading to respiratory/cardiac arrest  Resume home singulair  Continue duonebs/steroids  Ceftriaxone/Azithromycin initiated by [facility] then d/c prior to transfer in  setting of clear CXR      Acute respiratory failure with hypoxia and hypercarbia  Patient with Hypercapnic and Hypoxic Respiratory failure which is Acute. He is not on home oxygen. Supplemental oxygen was provided and noted-   Oxygen Concentration (%):  [25-30] 25  Resp Rate Total:  [13 br/min-23 br/min] 15 br/min  PEEP/CPAP:  [5 cmH20] 5 cmH20  Mean Airway Pressure:  [7.2 cmH20-8 cmH20] 7.2 cmH20    Daily CXR, ABG  SBT if/when propofol able to be weaned  See asthma/COPD  Likely not 2/2 infectious source  - s/p Azithromycin 1/2-1/3  - s/p Ceftriaxone 1/2-1/3  - s/p Pip-Tazo 1/3-1/6  - s/p Vanc 1/3-1/6       Cardiac/Vascular  Hypertriglyceridemia  , continue fenofibrate started by [facility]  Trend TG q48 hours while on propofol    Essential hypertension  SBP < 180  PRN labetalol, hydralazine    Cardiac arrest  Cardiac arrest on 01/01 requiring 3 rounds of CPR, Epi x 3, and 1 shock to obtain ROSC  S/p TTM, maintain normothermia  MRI x 2 concerning for anoxic injury    TTE @ [facility]    Left Ventricle: The left ventricle is normal in size. Normal wall thickness. There is concentric remodeling. Normal wall motion. There is normal systolic function. Biplane (2D) method of discs ejection fraction is 65%. There is normal diastolic function.    Right Ventricle: Normal right ventricular cavity size. Wall thickness is normal. Right ventricle wall motion  is normal. Systolic function is normal.    IVC/SVC: Patient is ventilated, cannot use IVC diameter to estimate right atrial pressure.        Orthopedic  Rhabdomyolysis  CK as high as 5000s at [facility], downtrended to 700s on 01/07  IVF d/c at [facility]  Daily CMP  Hourly I/O        Palliative Care  ACP (advance care planning)  Patient made DNR at [facility]  Family chose to transfer patient for initiation of 4th AED trial, forgoing comfort measures at this time per chart review    Other  Smoker  Per chart review   Cessation counseling as appropriate in this intubated patient             The patient  is being Prophylaxed for:  Venous Thromboembolism with: Mechanical or Chemical  Stress Ulcer with: H2B  Ventilator Pneumonia with: chlorhexidine oral care    Activity Orders            Turn patient starting at 01/12 0200    Elevate HOB starting at 01/12 0008    Diet NPO: NPO starting at 01/12 0008          Full Code    CCT 60 Min    CHIN AlejoC  Neurocritical Care  [facility] - Neuro Critical Care

## 2024-01-12 NOTE — SIGNIFICANT EVENT
Patient arrived on 80 mcg/kg/min of propofol. Following discussion with Dr. Vega, decision made to decrease to 50 and initiate ketamine at 25 mcg/kg/min. I notified Dr. Naylor with epilepsy of patient's arrival and changes to anesthetics. He stated he would follow up with me in a few hours. This morning upon further correspondence, he said he is having technical issues accessing the EEG. Mychal RN, Ashwin, EZEQUIEL, and myself ensured the network cord is adequately plugged into the wall. Dr. Naylor was still unable to access the EEG, stating he while have the techs come by this morning to further trouble shoot. In the meantime, a recorded video of EEG was sent to Dr. Naylor. He stated that overall the EEG looks similar, though the location of the charges are still present. He recommended raising the ketamine with an attempt to reduce these discharges. Ketamine increased by 25 mcg/kg/min. Both propofol and ketamine gtts currently at a rate of 50. VSS.     Also, per documentation, DNR order placed at [facility] 01/06. Code status to be changed to DNR in computer upon attending arrival.

## 2024-01-12 NOTE — NURSING
Zofia at [facility] updated of patient's blood glucose done and wnl and patient still being prepared for transfer to [facility].

## 2024-01-12 NOTE — NURSING
Pt transferred per EMS at 2330. Patient's Mother Amanda was notified of transfer and new room number 9071 at [facility] and given the phone number [phone] to call and check on patient as needed. She said she will go visit patient in the morning. I notified Zofia NIEVES that I notified the pt;s Mother Amanda.

## 2024-01-12 NOTE — ASSESSMENT & PLAN NOTE
52 y/o M 12 days post respiratory failure c/b cardiac arrest (Epi x 3, CPR x 3, Shock x 1) with RSE in setting of propofol wean despite continued keppra, vimpat, depacon administration. He arrived to [facility] on 80 mcg/kg/min of non-titrating propofol from [facility] which was dropped to 50 mcg/kg/min, followed by the immediate addition of 25 mcg/kg/min of ketamine. EEG cap placed and epilepsy notified.    -Admit to NCC  -Epilepsy consult, appreciate recs  -Q1h neurochecks, vital checks  -Daily CBC, CMP, Mag, Phos  -MRI x 2 with T2 BG hyperintensity bilaterally  -cEEG  -Continue keppra 1500 mg bid, Vimpat 200 mg bid, Depakote 360 mg tid  -Maintain propofol at 50 mcg/kg/min and ketamine at 25 mcg/kg/min overnight unless patient confirmed to be in NCSE, will increase ketamine in 25 mcg/kg/min increments

## 2024-01-12 NOTE — PLAN OF CARE
[facility] - Neuro Critical Care  Initial Discharge Assessment       Primary Care Provider: Gogo Vivar NP    Admission Diagnosis: Anoxic brain injury [G93.1]    Admission Date: 1/12/2024  Expected Discharge Date:     Transition of Care Barriers: None    Payor: MEDICAID / Plan[place] / Product Type: Managed Medicaid /     Extended Emergency Contact Information  Primary Emergency Contact: Amanda Tobias   [place]  Mobile Phone: [phone]  Relation: Mother  Secondary Emergency Contact: Raya Tobias  Mobile Phone: [phone]  Relation: Sister    Discharge Plan A: Home Health  Discharge Plan B: Home      [id number] [address] [facility] [address]  [address]  [facility] [address]  Phone: [phone] Fax: [phone]      Initial Assessment (most recent)       Adult Discharge Assessment - 01/12/24 1326          Discharge Assessment    Assessment Type Discharge Planning Assessment     Confirmed/corrected address, phone number and insurance Yes     Confirmed Demographics Correct on Facesheet     Source of Information health record;family;unable to respond     If unable to respond/provide information was family/caregiver contacted? Other (see comments)     Communicated MARQUISE with patient/caregiver Date not available/Unable to determine     Reason For Admission New onset refractory status epilepticus     People in Home alone     Facility Arrived From: [facility]     Do you expect to return to your current living situation? Yes     Do you have help at home or someone to help you manage your care at home? No     Prior to hospitilization cognitive status: Alert/Oriented     Current cognitive status: Coma/Sedated/Intubated     Walking or Climbing Stairs Difficulty no     Dressing/Bathing Difficulty no     Home Accessibility not wheelchair accessible;stairs to enter home     Number of Stairs, Main Entrance five      Surface of Stairs, Main Entrance concrete     Stair Railings, Main Entrance railing on left side (ascending)     Home Layout Able to live on 1st floor     Equipment Currently Used at Home nebulizer     Readmission within 30 days? No     Patient currently being followed by outpatient case management? No     Do you currently have service(s) that help you manage your care at home? No     Do you take prescription medications? Yes     Do you have prescription coverage? Yes     Coverage [place] [employer]     Do you have any problems affording any of your prescribed medications? No     Is the patient taking medications as prescribed? yes     How do you get to doctors appointments? car, drives self     Are you on dialysis? No     Do you take coumadin? No     Discharge Plan A Home Health     Discharge Plan B Home     DME Needed Upon Discharge  other (see comments)   TBD    Discharge Plan discussed with: Parent(s)     Name(s) and Number(s) Amanda Tobias (Mother)  [phone]     Transition of Care Barriers None                      Pt is transfer from [facility].  Pt's mother left to go to Urgent care.  Pt's mother is primary point of contact. Amanda Tobias (Mother) [phone]. Pt lives in a H with 5ste.  Pt does not use any HME, HH, HD, or coumadin. Pt was previously independent with ADLs. Pt's family can assist with transpo at [place].     Discharge Plan A Home Health    Discharge Plan B Home    DME Needed Upon Discharge  other (see comments)   TBD     No other discharge needs identified at this time.    Discharge Plan A and Plan B have been determined by review of patient's clinical status, future medical and therapeutic needs, and coverage/benefits for post-acute care in coordination with multidisciplinary team members.     Fely Regan LMSW  Case Management [facility]  [phone]

## 2024-01-12 NOTE — PLAN OF CARE
Recommendations     1. If/when able, initiate TFs. Rec'd Impact Peptide @ 50 mL/hr to provide 1800 kcals, 113 g of protein, 924 mL fluid.   2. RD to monitor & follow-up.     Goals: Meet % EEN, EPN by RD f/u date  Nutrition Goal Status: new  Communication of RD Recs: reviewed with RN

## 2024-01-12 NOTE — NURSING
Pt left per EMS transport to [facility] room 9071 now. Pt w/o distress or change at transfer.

## 2024-01-12 NOTE — NURSING
Report called to Zofia NIEVES. Patient will transfer to [facility] Room 9071 Neuro ICU soon. EMS is here and preparing to transport. Full report given from admit on 1/12024 to present.and time allowed for questions. Patient will transfer on Ventilator and propofol at 80 mcgs continuous  (non-titrating) and EMS aware of the propofol rate. Patient was on continuous EEG but stopped now. Patient responds to pain by moving eyes. Has positive cough,gag,corneal reflexes. No purposeful  motor response and does not follow commands. Pupils are 3 and reactive. Condom cath with adequate uop and intact. Triple lumen PICC to right upper arm intact and all ports functioning, I will notify patient's Mother of his transfer (she knew he was going to transfer when bed available and assigned. A bible was in the room and sent with patient per EMS as well as a new bag of patient's tube feeding. For transfer tube feeding stopped and NGT flushed with sterile water and clamped to maintain patency. Safety maintained.

## 2024-01-12 NOTE — HPI
"Mr. Aaron Pruitt is a 53 year old M with PMHx significant for COPD/Asthma c/b smoking and HTN who presents to [facility] for NCSE. [facility] after being intubated in the field following acute respiratory failure post cardiac arrest s/p resuscitation and shock x1. Per chart review, he was shoveling some dirt in his yard when he got severely short of breath and suddenly had difficulty breathing. EMS was activated by his neighbors. Upon EMS arrival, he reportedly still had a pulse, though with severe respiratory distress, and subsequently became pulseless. He was given narcan x2 without improvement. CPR and Epi x3. ROSC after 3rd round of CPR with shock x 1. CT head on arrival to [facility] without acute intracranial abnormality, and CT chest without acute pulmonary process.TTM was initiated on 01/03 with fentanyl and propofol gtts for sedation, and rewarming was initiated on 01/04 with subsequent weaning of sedation. During this period, myoclonus was clinically noted, and propofol was resumed. EEG showed NCSE, prompting the initiation of keppra and vimpat with continuation of propofol. On 01/06, there was slight improvement in EEG, with NCSE noted to be "partially treated," at which time, the patient was then loaded with depacon. EEG was improved leading to weaning of propofol with eventual [place] [facility]. MRI completed 01/08 and 01/11 completed with with GoC conversation held at [facility]. Family opted to forgo comfort measures at this point and transfer to [facility] for trial of 4th AED, per chart review. He arrived to [facility] on 80 mcg/kg/min of non-titrating propofol from [facility] which was dropped to 50 mcg/kg/min, followed by the immediate addition of 25 mcg/kg/min of ketamine. EEG cap placed.     He will be admitted to [facility] for hourly neuromonitoring and a higher level of care.  "

## 2024-01-12 NOTE — ASSESSMENT & PLAN NOTE
Known hx of asthma with acute onset SOB/hypoxia while shoveling dirt leading to respiratory/cardiac arrest  Resume home singulair  Continue duonebs/steroids  Ceftriaxone/Azithromycin initiated by [facility] then d/c prior to transfer in setting of clear CXR

## 2024-01-12 NOTE — CONSULTS
Inpatient consult to Physical Medicine Rehab  Consult performed by: Rukhsana Galarza NP  Consult ordered by: Norah Mora PA-C      Consult received.  Reviewed patient history and current admission.  PM&R following. Will continue to follow pt for a potential rehab candidate pending  medical stability.     Rukhsana Galarza NP  Physical Medicine & Rehabilitation   01/12/2024

## 2024-01-12 NOTE — ASSESSMENT & PLAN NOTE
Cardiac arrest on 01/01 requiring 3 rounds of CPR, Epi x 3, and 1 shock to obtain ROSC  S/p TTM, maintain normothermia  MRI x 2 concerning for anoxic injury    TTE @ [facility]    Left Ventricle: The left ventricle is normal in size. Normal wall thickness. There is concentric remodeling. Normal wall motion. There is normal systolic function. Biplane (2D) method of discs ejection fraction is 65%. There is normal diastolic function.    Right Ventricle: Normal right ventricular cavity size. Wall thickness is normal. Right ventricle wall motion  is normal. Systolic function is normal.    IVC/SVC: Patient is ventilated, cannot use IVC diameter to estimate right atrial pressure.

## 2024-01-12 NOTE — ASSESSMENT & PLAN NOTE
Patient made DNR at [facility]  Family chose to transfer patient for initiation of 4th AED trial, forgoing comfort measures at this time per chart review

## 2024-01-12 NOTE — PLAN OF CARE
"[facility] Care Plan    POC reviewed with Aaron Pruitt and family at 0300. Pt unable to verbalize understanding. Questions and concerns addressed. No acute events overnight. Pt progressing toward goals. Will continue to monitor. See below and flowsheets for full assessment and VS info.     -Non-titrating propofol gtt decreased to 50 mcg  -Ketamine gtt started at 25 mcg      Is this a stroke patient? no    Neuro:  [place] Coma Scale  Best Eye Response: 1-->(E1) none  Best Motor Response: 1-->(M1) none  Best Verbal Response: 1-->(V1) none  Ellie Coma Scale Score: 3  Assessment Qualifiers: patient chemically sedated or paralyzed, patient intubated  Pupil PERRLA: yes     24hr Temp:  [98.3 °F (36.8 °C)-101.5 °F (38.6 °C)]     CV:   Rhythm: normal sinus rhythm  BP goals:   SBP < 180  MAP > 65    Resp:      Vent Mode: A/C  Set Rate: 18 BPM  Oxygen Concentration (%): 50  Vt Set: 400 mL  PEEP/CPAP: 5 cmH20    Plan: wean to extubate    GI/:     Diet/Nutrition Received: NPO  Last Bowel Movement: 01/08/24  Voiding Characteristics: external catheter    Intake/Output Summary (Last 24 hours) at 1/12/2024 0424  Last data filed at 1/12/2024 0401  Gross per 24 hour   Intake 216.47 ml   Output 175 ml   Net 41.47 ml          Labs/Accuchecks:  Recent Labs   Lab 01/12/24 0102   WBC 11.98   RBC 3.21*   HGB 10.0*   HCT 29.6*         Recent Labs   Lab 01/12/24  0102      K 4.5   CO2 28      BUN 30*   CREATININE 0.7   ALKPHOS 62   ALT 29   AST 42*   BILITOT 0.4    No results for input(s): "PROTIME", "INR", "APTT", "HEPANTIXA" in the last 168 hours.   Recent Labs   Lab 01/06/24  0539   *       Electrolytes: N/A - electrolytes WDL  Accuchecks: none    Gtts:   ketamine 2,500 mg in sodium chloride 0.9% 250 mL infusion 25 mcg/kg/min (01/12/24 0401)    propofol 50 mcg/kg/min (01/12/24 0401)       LDA/Wounds:  Lines/Drains/Airways       Peripherally Inserted Central Catheter Line  Duration             PICC Triple Lumen " 01/03/24 1845 right basilic 8 days              Drain  Duration                  NG/OG Tube 01/01/24 1722 [place] sump 16 Fr. Left nostril 10 days    Male External Urinary Catheter 01/06/24 0155 6 days              Airway  Duration                  Airway - Non-Surgical Endotracheal Tube -- days              Peripheral Intravenous Line  Duration                  Peripheral IV - Single Lumen 01/12/24 0215 20 G Distal;Left;Posterior Forearm <1 day                  Wounds: No  Wound care consulted: No

## 2024-01-12 NOTE — SUBJECTIVE & OBJECTIVE
Past Medical History:   Diagnosis Date    Bronchitis     Bronchitis      Past Surgical History:   Procedure Laterality Date    NECK SURGERY        Current Facility-Administered Medications on File Prior to Encounter   Medication Dose Route Frequency Provider Last Rate Last Admin    [COMPLETED] furosemide injection 40 mg  40 mg Intravenous Once Rolo Segovia MD   40 mg at 01/11/24 0900    [DISCONTINUED] acetaminophen tablet 1,000 mg  1,000 mg Oral Q6H PRN Ariel Heller MD   1,000 mg at 01/11/24 1811    [DISCONTINUED] albuterol sulfate nebulizer solution 5 mg  5 mg Nebulization Q4H PRN David Olson MD   5 mg at 01/03/24 0726    [DISCONTINUED] albuterol-ipratropium 2.5 mg-0.5 mg/3 mL nebulizer solution 3 mL  3 mL Nebulization Q6H Any Shore MD   3 mL at 01/11/24 1908    [DISCONTINUED] budesonide nebulizer solution 0.25 mg  0.25 mg Nebulization Q12H Danelle Weaver MD   0.25 mg at 01/11/24 1908    [DISCONTINUED] dextrose 10% bolus 125 mL 125 mL  12.5 g Intravenous PRN David Field MD        [DISCONTINUED] dextrose 10% bolus 250 mL 250 mL  25 g Intravenous PRN David Field MD        [DISCONTINUED] enoxaparin injection 40 mg  40 mg Subcutaneous Q24H (prophylaxis, 1700) Ariel Heller MD   40 mg at 01/11/24 1715    [DISCONTINUED] fenofibrate tablet 160 mg  160 mg Per OG tube Daily Danelle Weaver MD   160 mg at 01/11/24 0855    [DISCONTINUED] glucagon (human recombinant) injection 1 mg  1 mg Intramuscular PRN David Field MD        [DISCONTINUED] glucose chewable tablet 16 g  16 g Oral PRN Anderson Leiva MD        [DISCONTINUED] glucose chewable tablet 24 g  24 g Oral PRN Anderson Leiva MD        [DISCONTINUED] hydrALAZINE injection 10 mg  10 mg Intravenous Q6H PRN Any Shore MD   10 mg at 01/07/24 0618    [DISCONTINUED] insulin aspart U-100 pen 0-5 Units  0-5 Units Subcutaneous Q6H PRN David Field MD   2 Units at 01/04/24 1911    [DISCONTINUED] labetaloL  injection 5 mg  5 mg Intravenous Q5 Min PRN Rolo Segovia MD   5 mg at 01/07/24 0840    [DISCONTINUED] lacosamide (VIMPAT) 200 mg in sodium chloride 0.9% 100 mL IVPB  200 mg Intravenous Q12H Rolo Segovia MD   Stopped at 01/11/24 2300    [DISCONTINUED] levETIRAcetam injection 1,500 mg  1,500 mg Intravenous Q12H Any Shore MD   1,500 mg at 01/11/24 2200    [DISCONTINUED] LORazepam (ATIVAN) 2 mg/mL injection             [DISCONTINUED] LORazepam injection 2 mg  2 mg Intravenous Once PRN Ariel Heller MD        [DISCONTINUED] montelukast tablet 10 mg  10 mg Per OG tube Daily David Olson MD   10 mg at 01/11/24 0855    [DISCONTINUED] montelukast tablet 10 mg  10 mg Oral Daily Norah Mora PA-C        [DISCONTINUED] pantoprazole injection 40 mg  40 mg Intravenous Daily Ariel Heller MD   40 mg at 01/11/24 0901    [DISCONTINUED] propofol (DIPRIVAN) 10 mg/mL infusion (NON-TITRATING)  80 mcg/kg/min (Dosing Weight) Intravenous Continuous Danelle Weaver MD 28.3 mL/hr at 01/11/24 2246 80 mcg/kg/min at 01/11/24 2246    [DISCONTINUED] sodium chloride 0.9% flush 10 mL  10 mL Intravenous Q12H PRN Anderson Leiva MD        [DISCONTINUED] sodium chloride 0.9% flush 10 mL  10 mL Intravenous Q6H Yamila Cazares MD   10 mL at 01/11/24 2329    [DISCONTINUED] sodium chloride 0.9% flush 10 mL  10 mL Intravenous PRN Yamila Cazares MD        [DISCONTINUED] sodium chloride 3% nebulizer solution 4 mL  4 mL Nebulization PRN Rolo Segovia MD        [DISCONTINUED] valproate (DEPACON) 840 mg in dextrose 5 % (D5W) 100 mL IVPB  28.5 mg/kg/day (Dosing Weight) Intravenous Q12H Natacha Greenberg MD   Stopped at 01/11/24 1508    [DISCONTINUED] white petrolatum-mineral oil (SYSTANE NIGHTTIME) ophthalmic ointment   Both Eyes Q8H Anderson Leiva MD   Given at 01/11/24 2228     Current Outpatient Medications on File Prior to Encounter   Medication Sig Dispense Refill    albuterol 90  mcg/actuation inhaler Inhale 1-2 puffs into the lungs every 6 (six) hours as needed for Wheezing. 1 Inhaler 0    fluticasone (FLOVENT HFA) 220 mcg/actuation inhaler Inhale 1 puff into the lungs 2 (two) times daily. Controller      MEPOLIZUMAB 100 mg/mL autoinjector Inject into the skin.      mometasone-formoterol (DULERA) 200-5 mcg/actuation inhaler Dulera 200 mcg-5 mcg/actuation HFA aerosol inhaler   INHALE 2 PUFFS BY MOUTH TWICE DAILY. RINSE MOUTH AND THROAT AFTER USE      naproxen (NAPROSYN) 500 MG tablet naproxen 500 mg tablet   TAKE 1 TABLET BY MOUTH TWICE DAILY AS NEEDED      nicotine (NICODERM CQ) 14 mg/24 hr 1 patch.      varenicline (CHANTIX) 1 mg Tab Take by mouth.        Allergies: Sulfa (sulfonamide antibiotics)  No family history on file.  Social History     Tobacco Use    Smoking status: Every Day     Review of Systems   Unable to perform ROS: Intubated     Objective:     Vitals:    Temp: 99.9 °F (37.7 °C)  Pulse: 83  Resp: 18  SpO2: 100 %  Oxygen Concentration (%): 50  Vent Mode: A/C  Set Rate: 18 BPM  Vt Set: 400 mL  PEEP/CPAP: 5 cmH20  Peak Airway Pressure: 32 cmH20  Mean Airway Pressure: 9.8 cmH20  Plateau Pressure: 0 cmH20    Temp  Min: 98.3 °F (36.8 °C)  Max: 101.5 °F (38.6 °C)  Pulse  Min: 83  Max: 102  BP  Min: 104/74  Max: 144/98  MAP (mmHg)  Min: 78  Max: 107  Resp  Min: 13  Max: 43  SpO2  Min: 94 %  Max: 100 %  Oxygen Concentration (%)  Min: 25  Max: 50    No intake/output data recorded.            Physical Exam  Constitutional:       General: He is not in acute distress.  Cardiovascular:      Rate and Rhythm: Normal rate and regular rhythm.   Pulmonary:      Effort: Pulmonary effort is normal. No respiratory distress.   Abdominal:      Palpations: Abdomen is soft.   Neurological:      Comments: Sedation: 80 propofol  [id number]  Does not follow commands  PERRL  Pupils brisk  (+) cough, corneals  (-) gag  Does not respond to noxious stimuli in any extremity              Today I personally  reviewed pertinent medications, lines/drains/airways, imaging, cardiology results, laboratory results, microbiology results, notably: CT. MRI, EEG reports

## 2024-01-12 NOTE — ASSESSMENT & PLAN NOTE
CK as high as 5000s at [facility], downtrended to 700s on 01/07  IVF d/c at [facility]  Daily CMP  Hourly I/O

## 2024-01-12 NOTE — PROGRESS NOTES
Discussed with NCC again with recommendation to continue proposal  X 48 hours and then wean, if returns status again, means very poor prognosis and NCC concern any further treatment options may not offer a different prognosis for any meaningful recovery.

## 2024-01-12 NOTE — ASSESSMENT & PLAN NOTE
Patient with Hypercapnic and Hypoxic Respiratory failure which is Acute. He is not on home oxygen. Supplemental oxygen was provided and noted-   Oxygen Concentration (%):  [25-30] 25  Resp Rate Total:  [13 br/min-23 br/min] 15 br/min  PEEP/CPAP:  [5 cmH20] 5 cmH20  Mean Airway Pressure:  [7.2 cmH20-8 cmH20] 7.2 cmH20    Daily CXR, ABG  SBT if/when propofol able to be weaned  See asthma/COPD  Likely not 2/2 infectious source  - s/p Azithromycin 1/2-1/3  - s/p Ceftriaxone 1/2-1/3  - s/p Pip-Tazo 1/3-1/6  - s/p Vanc 1/3-1/6

## 2024-01-12 NOTE — ASSESSMENT & PLAN NOTE
See primary problem, known NCSE post arrest  CT head unremarkable on admission to [facility]  MRI x 2 with T2 BG hyperintensity bilaterally  Daily ABG, pH WNL  As the majority of this patient's metabolic derangements were corrected at [facility], suspect encephalopathy 2/2 anoxic injury/NCSE

## 2024-01-12 NOTE — CONSULTS
Nutrition consult received regarding TF recommendations.  Please see note from 9:16 AM today for full assessment.     Recommendations     1. If/when able, initiate TFs. Rec'd Impact Peptide @ 50 mL/hr to provide 1800 kcals, 113 g of protein, 924 mL fluid.   2. RD to monitor & follow-up.     Goals: Meet % EEN, EPN by RD f/u date  Nutrition Goal Status: new  Communication of RD Recs: reviewed with RN    Louise Berry MS, RD, LDN

## 2024-01-12 NOTE — NURSING
Patient arrived to [facility] from [facility] by Nasim    Report received from: OSROD NIEVES, Luba    Type of stroke/diagnosis: Post cardiac arrest/ Status epilepticus      Current symptoms: Intubated, sedated on 80 mcg Propofol, +cough,corneals, no gag reflex, pupils 2 & brisk, no movement all 4 extremities    Skin Assessment done: Yes  Wounds noted: None  Skin Assessment Verified by: EZEQUIEL Armijo and EZEQUIEL Contreras Completed? No/intubated    Patient Belongings on Admit: 2 silver balloons, Bible, [employer], Purell hand , chapstick, vaseline    [facility] notified: ALLEY Mora

## 2024-01-12 NOTE — CONSULTS
"[facility] - Neuro Critical Care  Adult Nutrition  Consult Note    SUMMARY     Recommendations    1. If/when able, initiate TFs. Rec'd Impact Peptide @ 50 mL/hr to provide 1800 kcals, 113 g of protein, 924 mL fluid.   2. RD to monitor & follow-up.    Goals: Meet % EEN, EPN by RD f/u date  Nutrition Goal Status: new  Communication of RD Recs: reviewed with RN    Assessment and Plan    Nutrition Problem:  Inadequate energy intake    Related to (etiology):   Inability to consume sufficient energy     Signs and Symptoms (as evidenced by):   NPO    Interventions(treatment strategy):  Collaboration of nutrition care w/ other providers  EN    Nutrition Diagnosis Status:   New    Reason for Assessment    Reason For Assessment: consult  Diagnosis: other (see comments) (Refractory status epilepticus)  Relevant Medical History: COPD  Interdisciplinary Rounds: did not attend    General Information Comments: Intubated, sedated. Per Meli ARREDONDO assessment 1/10, pt w/ UBW of 69kg. Appears nourished w/ no indicators of malnutrition.  Nutrition Discharge Planning: Pending clinical course    Nutrition/Diet History    Factors Affecting Nutritional Intake: on mechanical ventilation, NPO    Anthropometrics    Temp: 97.3 °F (36.3 °C)  Height: 6' 1" (185.4 cm)  Height (inches): 73 in  Weight Method: Bed Scale  Weight: 72.1 kg (158 lb 15.2 oz)  Weight (lb): 158.95 lb  Ideal Body Weight (IBW), Male: 184 lb  % Ideal Body Weight, Male (lb): 86.39 %  BMI (Calculated): 21  BMI Grade: 18.5-24.9 - normal    Lab/Procedures/Meds    Pertinent Labs Reviewed: reviewed  Pertinent Medications Reviewed: reviewed  Pertinent Medications Comments: Propofol, Ketamine    Estimated/Assessed Needs    Weight Used For Calorie Calculations: 72.1 kg (158 lb 15.2 oz)    Energy Calorie Requirements (kcal): 1879 kcal/d  Energy Need Method: [place]    Protein Requirements:  g/d (1.2-1.5 g/kg)  Weight Used For Protein Calculations: 72.1 kg (158 lb 15.2 " oz)    Estimated Fluid Requirement Method:  (Per MD)  RDA Method (mL): 1879    Nutrition Prescription Ordered    Current Diet Order: NPO    Evaluation of Received Nutrient/Fluid Intake    Other Calories (kcal): 570 (Propofol)    I/O: +1.2L since admit    Comments: LBM: 1/8    Nutrition Risk    Level of Risk/Frequency of Follow-up:  (1x/week)     Monitor and Evaluation    Food and Nutrient Intake: enteral nutrition intake, food and beverage intake  Food and Nutrient Adminstration: diet order  Physical Activity and Function: nutrition-related ADLs and IADLs  Anthropometric Measurements: weight, weight change  Biochemical Data, Medical Tests and Procedures: glucose/endocrine profile, lipid profile, inflammatory profile, gastrointestinal profile, electrolyte and renal panel  Nutrition-Focused Physical Findings: overall appearance     Nutrition Follow-Up    RD Follow-up?: Yes

## 2024-01-12 NOTE — PLAN OF CARE
"[facility] Care Plan    POC reviewed with Aaron Pruitt and family at 1400. Pt verbalized understanding. Questions and concerns addressed. No acute events today. Pt progressing toward goals. Will continue to monitor. See below and flowsheets for full assessment and VS info.     -Propofol gtt continued @50  -Ketamine gtt continued @ 50  -cEEG continued  -Pt needed to be straight cathed today due to retention  -TF started today         Is this a stroke patient? no    Neuro:  Ellie Coma Scale  Best Eye Response: 1-->(E1) none  Best Motor Response: 1-->(M1) none  Best Verbal Response: 1-->(V1) none  Ellie Coma Scale Score: 3  Assessment Qualifiers: patient chemically sedated or paralyzed, patient intubated  Pupil PERRLA: yes     24 hr Temp:  [96.4 °F (35.8 °C)-101.5 °F (38.6 °C)]     CV:   Rhythm: normal sinus rhythm  BP goals:   SBP < 180  MAP > 65    Resp:      Vent Mode: A/C  Set Rate: 18 BPM  Oxygen Concentration (%): 50  Vt Set: 400 mL  PEEP/CPAP: 5 cmH20    Plan: N/A    GI/:     Diet/Nutrition Received: NPO  Last Bowel Movement: 01/08/24  Voiding Characteristics: external catheter    Intake/Output Summary (Last 24 hours) at 1/12/2024 1716  Last data filed at 1/12/2024 1639  Gross per 24 hour   Intake 313.92 ml   Output 875 ml   Net -561.08 ml          Labs/Accuchecks:  Recent Labs   Lab 01/12/24  0102   WBC 11.98   RBC 3.21*   HGB 10.0*   HCT 29.6*         Recent Labs   Lab 01/12/24  0102      K 4.5   CO2 28      BUN 30*   CREATININE 0.7   ALKPHOS 62   ALT 29   AST 42*   BILITOT 0.4    No results for input(s): "PROTIME", "INR", "APTT", "HEPANTIXA" in the last 168 hours.   Recent Labs   Lab 01/06/24  0539   *       Electrolytes: N/A - electrolytes WDL  Accuchecks: none    Gtts:   ketamine 2,500 mg in sodium chloride 0.9% 250 mL infusion 50 mcg/kg/min (01/12/24 0657)    propofol 50 mcg/kg/min (01/12/24 1987)       LDA/Wounds:  Lines/Drains/Airways       Peripherally Inserted Central Catheter " Line  Duration             PICC Triple Lumen 01/03/24 1845 right basilic 8 days              Drain  Duration                  NG/OG Tube 01/01/24 1722 [place] sump 16 Fr. Left nostril 10 days              Airway  Duration                  Airway - Non-Surgical Endotracheal Tube -- days              Peripheral Intravenous Line  Duration                  Peripheral IV - Single Lumen 01/12/24 0215 20 G Distal;Left;Posterior Forearm <1 day                  Wounds: Yes  Wound care consulted: No

## 2024-01-12 NOTE — PROCEDURES
EEG    Date/Time: 1/12/2024 12:02 AM    Performed by: Edgar Darby MD  Authorized by: Kay Gonzalez MD          ICU EEG/VIDEO MONITORING REPORT     DATE OF SERVICE: 1/10/23-1/11/24  EEG NUMBER: [id number]  REQUESTED BY: Erin  LOCATION OF SERVICE: [facility]              Electroencephalographic (EEG) recording is with electrodes placed according to the International 10-20 placement system.  Thirty two (32) channels of digital signal are simultaneously recorded from the scalp and may include EKG, EMG, and/or eye monitors.   Recording band pass was 0.1 to 512 hz.  Digital video recording of the patient is simultaneously recorded with the EEG.  The nursing staff report clinical symptoms and may press an event button when the patient has symptoms of clinical interest to the treating physicians.  EEG and video recording is stored and archived in digital format.  The entire recording is visually reviewed and the times identified by computer analysis as being spikes or seizures are reviewed again.  Activation procedures which include photic stimulation, hyperventilation and instructing patients to perform simple task are done in selected patients.   Compresses spectral analysis (CSA) is also performed on the activity recorded from each individual channel.  This is displayed as a power display of frequencies from 0 to 30 Hz over time.   The CSA analysis is done and displayed continuously.  This is reviewed for asymmetries in power between homologous areas of the scalp and for presence of changes in power which canbe seen when seizures occur.  Sections of suspected abnormalities on the CSA is then compared with the original EEG recording.                [employer] software was also utilized in the review of this study.  This software suite analyzes the EEG recording in multiple domains.  Coherence and rhythmicity is computed to identify EEG sections which may contain organized seizures.  Each  channel undergoes analysis to detect presence of spike and sharp waves which have special and morphological characteristic of epileptic activity.  The routine EEG recording is converted from spacial into frequency domain.  This is then displayed comparing homologous areas to identify areas of significant asymmetry.  Algorithm to identify non-cortically generated artifact is used to separate eye movement, EMG and other artifact from the EEG.       Recording Times  Start on 1/11/24 at 07:00:42  Stop on 1/11/24 at 14:13:44  Start on 1/11/24 at 16:35:58  Stop on 1/11/24 at 23:09:43  Start on 1/12/24 at 00:26:51  Stop on 1/12/24 at 07:00:06  Start on 1/12/24 at 07:00:43  Stop on 1/12/24 at 08:24:23  A total of 21  hours of EEG was recorded.     EEG FINDINGS  The record shows a fair  organization at rest, with no discernible posterior dominant rhythm with fair  reactivity. There is mild bilateral beta activity. There is continuous diffuse delta and theta range background slowing. There are intermittent runs of generalized periodic epileptiform discharges waxing and waning throughout the study.      Drowsiness is characterized by attenuation of the background, vertex waves, and bilateral theta slowing. Stage II sleep is characterized by slowing, vertex waves, and symmetric sleep spindles.      Provocative maneuvers including hyperventilation and photic stimulation were not performed.      EKG recording shows a sinus rhythm.     There are no push button clinical events.      IMPRESSION:  Abnormal study due to moderate to severe  diffuse background slowing consistent with diffuse cerebral dysfunction and encephalopathy which may be on the basis of toxic, metabolic, or primary neuronal disorder.  Generalized periodic epileptiform discharges are indicative of diffuse cortical irritability and seizure potential and may be seen in settings of global cerebral injury such as anoxic brain injury.      Edgar Darby MD  Department  of Neurology  [facility]

## 2024-01-13 ENCOUNTER — DOCUMENTATION ONLY (OUTPATIENT)
Dept: NEUROLOGY | Facility: CLINIC | Age: 54
End: 2024-01-13

## 2024-01-13 LAB
ALBUMIN SERPL BCP-MCNC: 2.6 G/DL (ref 3.5–5.2)
ALLENS TEST: ABNORMAL
ALP SERPL-CCNC: 63 U/L (ref 55–135)
ALT SERPL W/O P-5'-P-CCNC: 24 U/L (ref 10–44)
ANION GAP SERPL CALC-SCNC: 9 MMOL/L (ref 8–16)
AST SERPL-CCNC: 36 U/L (ref 10–40)
BACTERIA BLD CULT: NORMAL
BACTERIA BLD CULT: NORMAL
BASOPHILS # BLD AUTO: 0.01 K/UL (ref 0–0.2)
BASOPHILS NFR BLD: 0.1 % (ref 0–1.9)
BILIRUB SERPL-MCNC: 0.5 MG/DL (ref 0.1–1)
BUN SERPL-MCNC: 24 MG/DL (ref 6–20)
CALCIUM SERPL-MCNC: 9.1 MG/DL (ref 8.7–10.5)
CHLORIDE SERPL-SCNC: 102 MMOL/L (ref 95–110)
CO2 SERPL-SCNC: 26 MMOL/L (ref 23–29)
CREAT SERPL-MCNC: 0.8 MG/DL (ref 0.5–1.4)
DELSYS: ABNORMAL
DIFFERENTIAL METHOD BLD: ABNORMAL
EOSINOPHIL # BLD AUTO: 0 K/UL (ref 0–0.5)
EOSINOPHIL NFR BLD: 0.5 % (ref 0–8)
ERYTHROCYTE [DISTWIDTH] IN BLOOD BY AUTOMATED COUNT: 13 % (ref 11.5–14.5)
ERYTHROCYTE [SEDIMENTATION RATE] IN BLOOD BY WESTERGREN METHOD: 18 MM/H
EST. GFR  (NO RACE VARIABLE): >60 ML/MIN/1.73 M^2
FIO2: 50
GLUCOSE SERPL-MCNC: 97 MG/DL (ref 70–110)
HCO3 UR-SCNC: 30.3 MMOL/L (ref 24–28)
HCT VFR BLD AUTO: 28.5 % (ref 40–54)
HGB BLD-MCNC: 9.5 G/DL (ref 14–18)
IMM GRANULOCYTES # BLD AUTO: 0.06 K/UL (ref 0–0.04)
IMM GRANULOCYTES NFR BLD AUTO: 0.8 % (ref 0–0.5)
LYMPHOCYTES # BLD AUTO: 1.1 K/UL (ref 1–4.8)
LYMPHOCYTES NFR BLD: 13.7 % (ref 18–48)
MAGNESIUM SERPL-MCNC: 2.3 MG/DL (ref 1.6–2.6)
MCH RBC QN AUTO: 30.7 PG (ref 27–31)
MCHC RBC AUTO-ENTMCNC: 33.3 G/DL (ref 32–36)
MCV RBC AUTO: 92 FL (ref 82–98)
MODE: ABNORMAL
MONOCYTES # BLD AUTO: 0.6 K/UL (ref 0.3–1)
MONOCYTES NFR BLD: 7.6 % (ref 4–15)
NEUTROPHILS # BLD AUTO: 5.9 K/UL (ref 1.8–7.7)
NEUTROPHILS NFR BLD: 77.3 % (ref 38–73)
NRBC BLD-RTO: 0 /100 WBC
PCO2 BLDA: 46.6 MMHG (ref 35–45)
PEEP: 5
PH SMN: 7.42 [PH] (ref 7.35–7.45)
PHOSPHATE SERPL-MCNC: 3.6 MG/DL (ref 2.7–4.5)
PLATELET # BLD AUTO: 299 K/UL (ref 150–450)
PMV BLD AUTO: 9.1 FL (ref 9.2–12.9)
PO2 BLDA: 197 MMHG (ref 80–100)
POC BE: 6 MMOL/L
POC SATURATED O2: 100 % (ref 95–100)
POC TCO2: 32 MMOL/L (ref 23–27)
POTASSIUM SERPL-SCNC: 4.2 MMOL/L (ref 3.5–5.1)
PROT SERPL-MCNC: 6.6 G/DL (ref 6–8.4)
RBC # BLD AUTO: 3.09 M/UL (ref 4.6–6.2)
SAMPLE: ABNORMAL
SITE: ABNORMAL
SODIUM SERPL-SCNC: 137 MMOL/L (ref 136–145)
SP02: 93
TRIGL SERPL-MCNC: 519 MG/DL (ref 30–150)
VALPROATE SERPL-MCNC: 21.8 UG/ML (ref 50–100)
VT: 400
WBC # BLD AUTO: 7.65 K/UL (ref 3.9–12.7)

## 2024-01-13 PROCEDURE — 94761 N-INVAS EAR/PLS OXIMETRY MLT: CPT

## 2024-01-13 PROCEDURE — 84100 ASSAY OF PHOSPHORUS: CPT

## 2024-01-13 PROCEDURE — 25000242 PHARM REV CODE 250 ALT 637 W/ HCPCS

## 2024-01-13 PROCEDURE — 99900035 HC TECH TIME PER 15 MIN (STAT)

## 2024-01-13 PROCEDURE — 83735 ASSAY OF MAGNESIUM: CPT

## 2024-01-13 PROCEDURE — 94640 AIRWAY INHALATION TREATMENT: CPT

## 2024-01-13 PROCEDURE — 94668 MNPJ CHEST WALL SBSQ: CPT

## 2024-01-13 PROCEDURE — 85025 COMPLETE CBC W/AUTO DIFF WBC: CPT

## 2024-01-13 PROCEDURE — 20000000 HC ICU ROOM

## 2024-01-13 PROCEDURE — C9254 INJECTION, LACOSAMIDE: HCPCS

## 2024-01-13 PROCEDURE — 51701 INSERT BLADDER CATHETER: CPT

## 2024-01-13 PROCEDURE — 27100171 HC OXYGEN HIGH FLOW UP TO 24 HOURS

## 2024-01-13 PROCEDURE — 99900026 HC AIRWAY MAINTENANCE (STAT)

## 2024-01-13 PROCEDURE — 51798 US URINE CAPACITY MEASURE: CPT

## 2024-01-13 PROCEDURE — 95714 VEEG EA 12-26 HR UNMNTR: CPT

## 2024-01-13 PROCEDURE — 25000003 PHARM REV CODE 250

## 2024-01-13 PROCEDURE — 80164 ASSAY DIPROPYLACETIC ACD TOT: CPT | Performed by: PSYCHIATRY & NEUROLOGY

## 2024-01-13 PROCEDURE — 94003 VENT MGMT INPAT SUBQ DAY: CPT

## 2024-01-13 PROCEDURE — 84478 ASSAY OF TRIGLYCERIDES: CPT

## 2024-01-13 PROCEDURE — 80053 COMPREHEN METABOLIC PANEL: CPT

## 2024-01-13 PROCEDURE — 63600175 PHARM REV CODE 636 W HCPCS

## 2024-01-13 PROCEDURE — 82803 BLOOD GASES ANY COMBINATION: CPT

## 2024-01-13 PROCEDURE — 25000003 PHARM REV CODE 250: Performed by: PSYCHIATRY & NEUROLOGY

## 2024-01-13 PROCEDURE — 95720 EEG PHY/QHP EA INCR W/VEEG: CPT | Mod: ,,, | Performed by: PSYCHIATRY & NEUROLOGY

## 2024-01-13 PROCEDURE — 99291 CRITICAL CARE FIRST HOUR: CPT | Mod: ,,, | Performed by: PSYCHIATRY & NEUROLOGY

## 2024-01-13 PROCEDURE — 36600 WITHDRAWAL OF ARTERIAL BLOOD: CPT

## 2024-01-13 RX ORDER — SILODOSIN 4 MG/1
4 CAPSULE ORAL DAILY
Status: DISCONTINUED | OUTPATIENT
Start: 2024-01-13 | End: 2024-01-24

## 2024-01-13 RX ORDER — ACETAMINOPHEN 325 MG/1
650 TABLET ORAL EVERY 6 HOURS PRN
Status: DISCONTINUED | OUTPATIENT
Start: 2024-01-13 | End: 2024-01-17

## 2024-01-13 RX ORDER — FENTANYL CITRATE 50 UG/ML
25 INJECTION, SOLUTION INTRAMUSCULAR; INTRAVENOUS ONCE
Status: DISCONTINUED | OUTPATIENT
Start: 2024-01-13 | End: 2024-01-15

## 2024-01-13 RX ORDER — FENTANYL CITRATE-0.9 % NACL/PF 10 MCG/ML
0-150 PLASTIC BAG, INJECTION (ML) INTRAVENOUS CONTINUOUS
Status: DISCONTINUED | OUTPATIENT
Start: 2024-01-13 | End: 2024-01-20

## 2024-01-13 RX ORDER — FENTANYL CITRATE 50 UG/ML
50 INJECTION, SOLUTION INTRAMUSCULAR; INTRAVENOUS
Status: DISCONTINUED | OUTPATIENT
Start: 2024-01-13 | End: 2024-01-13

## 2024-01-13 RX ADMIN — IPRATROPIUM BROMIDE AND ALBUTEROL SULFATE 3 ML: .5; 3 SOLUTION RESPIRATORY (INHALATION) at 12:01

## 2024-01-13 RX ADMIN — LACOSAMIDE 200 MG: 10 INJECTION INTRAVENOUS at 12:01

## 2024-01-13 RX ADMIN — FENTANYL CITRATE 25 MCG: 50 INJECTION INTRAMUSCULAR; INTRAVENOUS at 06:01

## 2024-01-13 RX ADMIN — FAMOTIDINE 20 MG: 20 TABLET, FILM COATED ORAL at 08:01

## 2024-01-13 RX ADMIN — DEXTROSE MONOHYDRATE 360 MG: 50 INJECTION, SOLUTION INTRAVENOUS at 02:01

## 2024-01-13 RX ADMIN — PROPOFOL 15 MCG/KG/MIN: 10 INJECTION, EMULSION INTRAVENOUS at 07:01

## 2024-01-13 RX ADMIN — MUPIROCIN: 20 OINTMENT TOPICAL at 08:01

## 2024-01-13 RX ADMIN — LEVETIRACETAM 1500 MG: 100 INJECTION INTRAVENOUS at 08:01

## 2024-01-13 RX ADMIN — LACOSAMIDE 200 MG: 10 INJECTION INTRAVENOUS at 01:01

## 2024-01-13 RX ADMIN — MUPIROCIN: 20 OINTMENT TOPICAL at 09:01

## 2024-01-13 RX ADMIN — BUDESONIDE INHALATION 0.25 MG: 0.5 SUSPENSION RESPIRATORY (INHALATION) at 07:01

## 2024-01-13 RX ADMIN — DEXTROSE MONOHYDRATE 360 MG: 50 INJECTION, SOLUTION INTRAVENOUS at 10:01

## 2024-01-13 RX ADMIN — SILODOSIN 4 MG: 4 CAPSULE ORAL at 10:01

## 2024-01-13 RX ADMIN — FENTANYL CITRATE 25 MCG: 50 INJECTION INTRAMUSCULAR; INTRAVENOUS at 12:01

## 2024-01-13 RX ADMIN — FAMOTIDINE 20 MG: 20 TABLET, FILM COATED ORAL at 09:01

## 2024-01-13 RX ADMIN — SENNOSIDES AND DOCUSATE SODIUM 1 TABLET: 8.6; 5 TABLET ORAL at 08:01

## 2024-01-13 RX ADMIN — FENTANYL CITRATE 50 MCG: 50 INJECTION, SOLUTION INTRAMUSCULAR; INTRAVENOUS at 08:01

## 2024-01-13 RX ADMIN — Medication 50 MCG/HR: at 09:01

## 2024-01-13 RX ADMIN — DEXTROSE MONOHYDRATE 360 MG: 50 INJECTION, SOLUTION INTRAVENOUS at 06:01

## 2024-01-13 RX ADMIN — KETAMINE HYDROCHLORIDE 50 MCG/KG/MIN: 100 INJECTION INTRAMUSCULAR; INTRAVENOUS at 08:01

## 2024-01-13 RX ADMIN — MONTELUKAST 10 MG: 10 TABLET, FILM COATED ORAL at 08:01

## 2024-01-13 RX ADMIN — IPRATROPIUM BROMIDE AND ALBUTEROL SULFATE 3 ML: .5; 3 SOLUTION RESPIRATORY (INHALATION) at 07:01

## 2024-01-13 RX ADMIN — CLOBAZAM 10 MG: 10 TABLET ORAL at 08:01

## 2024-01-13 RX ADMIN — ENOXAPARIN SODIUM 40 MG: 40 INJECTION SUBCUTANEOUS at 05:01

## 2024-01-13 RX ADMIN — LEVETIRACETAM 1500 MG: 100 INJECTION INTRAVENOUS at 09:01

## 2024-01-13 NOTE — PROGRESS NOTES
"[facility]  Neurocritical Care  Progress Note    Admit Date: 1/12/2024  Service Date: 01/13/2024  Length of Stay: 1    Subjective:     Chief Complaint: New onset refractory status epilepticus    History of Present Illness: Mr. Aaron Pruitt is a 53 year old M with PMHx significant for COPD/Asthma c/b smoking and HTN who presents to [facility] for NCSE. He initially presented to [facility] ER on 01/01 after being intubated in the field following acute respiratory failure post cardiac arrest s/p resuscitation and shock x1. Per chart review, he was shoveling some dirt in his yard when he got severely short of breath and suddenly had difficulty breathing. EMS was activated by his neighbors. Upon EMS arrival, he reportedly still had a pulse, though with severe respiratory distress, and subsequently became pulseless. He was given narcan x2 without improvement. CPR and Epi x3. ROSC after 3rd round of CPR with shock x 1. CT head on arrival to [facility] without acute intracranial abnormality, and CT chest without acute pulmonary process.TTM was initiated on 01/03 with fentanyl and propofol gtts for sedation, and rewarming was initiated on 01/04 with subsequent weaning of sedation. During this period, myoclonus was clinically noted, and propofol was resumed. EEG showed NCSE, prompting the initiation of keppra and vimpat with continuation of propofol. On 01/06, there was slight improvement in EEG, with NCSE noted to be "partially treated," at which time, the patient was then loaded with depacon. EEG was improved leading to weaning of propofol with eventual return to [facility]. MRI completed 01/08 and 01/11 completed with with GoC conversation held at [facility]. Family opted to forgo comfort measures at this point and transfer to [facility] for trial of 4th AED, per chart review. He arrived to [facility] on 80 mcg/kg/min of non-titrating propofol from [facility] which was dropped to 50 mcg/kg/min, followed by the immediate addition of 25 mcg/kg/min of " ketamine. EEG cap placed.     He will be admitted to [facility] for hourly neuromonitoring and a higher level of care.    Hospital Course: 01/13: no electrographic seizures reported, triglicerides increased into low 500s, propofol decreased to 30mcg, decrease further in 4 hrs if EEG unchanged, cont ketamine at 50mcg/kg and scheduled AEDs    Interval History:      Review of Systems   Unable to perform ROS: Intubated       Objective:     Vitals:  Temp: 98.6 °F (37 °C)  Pulse: 93  Rhythm: normal sinus rhythm  BP: (!) 155/96  MAP (mmHg): 99  Resp: 18  SpO2: 100 %  Oxygen Concentration (%): 50  Vent Mode: A/C  Set Rate: 18 BPM  Vt Set: 400 mL  PEEP/CPAP: 5 cmH20  Peak Airway Pressure: 31 cmH20  Mean Airway Pressure: 9.7 cmH20  Plateau Pressure: 0 cmH20    Temp  Min: 96.4 °F (35.8 °C)  Max: 99 °F (37.2 °C)  Pulse  Min: 71  Max: 93  BP  Min: 104/68  Max: 166/107  MAP (mmHg)  Min: 81  Max: 132  Resp  Min: 18  Max: 19  SpO2  Min: 95 %  Max: 100 %  Oxygen Concentration (%)  Min: 50  Max: 50    01/12 0701 - 01/13 0700  In: 1638.1 [I.V.:1023.3]  Out: 1775 [Urine:1775]   Unmeasured Output  Stool Occurrence: 0        Physical Exam  Constitutional:       Comments: Coma GCS 3   HENT:      Head: Normocephalic.   Eyes:      Pupils: Pupils are equal, round, and reactive to light.      Comments: Sluggish lateral eye movements   Cardiovascular:      Rate and Rhythm: Normal rate.      Heart sounds: Normal heart sounds.   Pulmonary:      Breath sounds: Normal breath sounds.   Abdominal:      Palpations: Abdomen is soft.   Musculoskeletal:      Cervical back: Neck supple.   Skin:     General: Skin is warm.   Neurological:      Comments: No movement in extremities to noxious, no myoclonus or tonic/clonic movements              Medications:  Continuousketamine 2,500 mg in sodium chloride 0.9% 250 mL infusion, Last Rate: 50 mcg/kg/min (01/13/24 1105)  propofol, Last Rate: 30 mcg/kg/min (01/13/24 1105)    Scheduledalbuterol-ipratropium, 3 mL,  Q6H  budesonide, 0.25 mg, Q12H  cloBAZam, 10 mg, Daily  enoxparin, 40 mg, Q24H (prophylaxis, 1700)  famotidine, 20 mg, BID  lacosamide (VIMPAT) IVPB, 200 mg, Q12H  levETIRAcetam (Keppra) IV (PEDS and ADULTS), 1,500 mg, Q12H  montelukast, 10 mg, Daily  mupirocin, , BID  senna-docusate 8.6-50 mg, 1 tablet, Daily  silodosin, 4 mg, Daily  valproate sodium (DEPACON) IVPB, 360 mg, Q8H    PRNfentaNYL, 25 mcg, Q1H PRN  hydrALAZINE, 10 mg, Q4H PRN  labetalol, 10 mg, Q4H PRN  magnesium oxide, 800 mg, PRN  magnesium oxide, 800 mg, PRN  potassium bicarbonate, 35 mEq, PRN  potassium bicarbonate, 50 mEq, PRN  potassium bicarbonate, 60 mEq, PRN  potassium, sodium phosphates, 2 packet, PRN  potassium, sodium phosphates, 2 packet, PRN  potassium, sodium phosphates, 2 packet, PRN      Today I personally reviewed pertinent medications, lines/drains/airways, laboratory results, notably:    Diet  Diet NPO  Diet NPO        Assessment/Plan:     Neuro  * New onset refractory status epilepticus  52 y/o M 12 days post respiratory failure c/b cardiac arrest (Epi x 3, CPR x 3, Shock x 1) with RSE in setting of propofol wean despite continued keppra, vimpat, depacon administration. He arrived to [facility] on 80 mcg/kg/min of non-titrating propofol from [facility] which was dropped to 50 mcg/kg/min, followed by the immediate addition of 25 mcg/kg/min of ketamine. EEG cap placed and epilepsy notified.    -Admit to NCC  -Epilepsy consult, appreciate recs  -Q1h neurochecks, vital checks  -Daily CBC, CMP, Mag, Phos  -MRI x 2 with T2 BG hyperintensity bilaterally  -cEEG  -Continue keppra 1500 mg bid, Vimpat 200 mg bid, Depakote 360 mg tid  -Maintain propofol at 50 mcg/kg/min and ketamine at 25 mcg/kg/min overnight unless patient confirmed to be in NCSE, will increase ketamine in 25 mcg/kg/min increments  1/13: maintain ketamine at 50mcg, wean down propofol to 30mcg and further to 15mcg if EEG unchanged, cont clobazam as well as prior AEDs    Seizure disorder,  nonconvulsive, with status epilepticus  See primary problem     Pulmonary  Severe asthma  Known hx of asthma with acute onset SOB/hypoxia while shoveling dirt leading to respiratory/cardiac arrest  Resume home singulair  Continue duonebs/steroids  Ceftriaxone/Azithromycin initiated by [facility] then d/c prior to transfer in setting of clear CXR, presently off emperic abx      Cardiac/Vascular  Essential hypertension  SBP < 180  PRN labetalol, hydralazine  Presently normotensive on no meds    Cardiorespiratory arrest  Cardiac arrest on 01/01 requiring 3 rounds of CPR, Epi x 3, and 1 shock to obtain ROSC  S/p TTM, maintain normothermia  MRI x 2 concerning for anoxic injury    TTE @ [facility]    Left Ventricle: The left ventricle is normal in size. Normal wall thickness. There is concentric remodeling. Normal wall motion. There is normal systolic function. Biplane (2D) method of discs ejection fraction is 65%. There is normal diastolic function.    Right Ventricle: Normal right ventricular cavity size. Wall thickness is normal. Right ventricle wall motion  is normal. Systolic function is normal.    IVC/SVC: Patient is ventilated, cannot use IVC diameter to estimate right atrial pressure.        Palliative Care  ACP (advance care planning)  Patient made DNR at [facility]  Family chose to transfer patient for initiation of 4th AED trial, forgoing comfort measures at this time per chart review  1/13: family aware of prognosis if this trial of anaesthetics and additional scheduled AED is not helpful          The patient is being Prophylaxed for:  Venous Thromboembolism with: Chemical  Stress Ulcer with: H2B  Ventilator Pneumonia with: chlorhexidine oral care    Activity Orders            Elevate HOB Elevate (30-45 degrees) Elevate HOB to 30 - 45 degrees during feeding unless otherwise stated starting at 01/12 1011    Turn patient starting at 01/12 0724    Elevate HOB starting at 01/12 0008    Diet NPO: NPO starting at 01/12 0008        CRC  38 min  DNR    Reece Johnson MD  Neurocritical Care  [facility]

## 2024-01-13 NOTE — PLAN OF CARE
"[facility] Care Plan    POC reviewed with Aaron Pruitt and family at 0300. No acute events overnight. Pt not progressing toward goals. Will continue to monitor. See below and flowsheets for full assessment and VS info.     TF at goal  Afebrile overnight  No clinical seizure activity overnight  Ketamine and propofol gtt continued   Straight cath x2      Is this a stroke patient? no    Neuro:  Ellie Coma Scale  Best Eye Response: 1-->(E1) none  Best Motor Response: 1-->(M1) none  Best Verbal Response: 1-->(V1) none  Ellie Coma Scale Score: 3  Assessment Qualifiers: patient chemically sedated or paralyzed, patient intubated  Pupil PERRLA: yes     24hr Temp:  [96.4 °F (35.8 °C)-98.8 °F (37.1 °C)]     CV:   Rhythm: normal sinus rhythm  BP goals:   SBP < 180  MAP > 65    Resp:      Vent Mode: A/C  Set Rate: 18 BPM  Oxygen Concentration (%): 50  Vt Set: 400 mL  PEEP/CPAP: 5 cmH20    Plan: status epilepticus    GI/:     Diet/Nutrition Received: tube feeding  Last Bowel Movement: 01/08/24  Voiding Characteristics: due to void    Intake/Output Summary (Last 24 hours) at 1/13/2024 0632  Last data filed at 1/13/2024 0605  Gross per 24 hour   Intake 1638.05 ml   Output 1775 ml   Net -136.95 ml     Unmeasured Output  Stool Occurrence: 0    Labs/Accuchecks:  Recent Labs   Lab 01/13/24  0000   WBC 7.65   RBC 3.09*   HGB 9.5*   HCT 28.5*         Recent Labs   Lab 01/13/24  0000      K 4.2   CO2 26      BUN 24*   CREATININE 0.8   ALKPHOS 63   ALT 24   AST 36   BILITOT 0.5    No results for input(s): "PROTIME", "INR", "APTT", "HEPANTIXA" in the last 168 hours. No results for input(s): "CPK", "CPKMB", "TROPONINI", "MB" in the last 168 hours.    Electrolytes: N/A - electrolytes WDL  Accuchecks: none    Gtts:   ketamine 2,500 mg in sodium chloride 0.9% 250 mL infusion 50 mcg/kg/min (01/13/24 0605)    propofol 50 mcg/kg/min (01/13/24 0605)       LDA/Wounds:  Lines/Drains/Airways       Peripherally Inserted Central " Catheter Line  Duration             PICC Triple Lumen 01/03/24 1845 right basilic 9 days              Drain  Duration                  NG/OG Tube 01/01/24 1722 [place] sump 16 Fr. Left nostril 11 days              Airway  Duration                  Airway - Non-Surgical Endotracheal Tube -- days              Peripheral Intravenous Line  Duration                  Peripheral IV - Single Lumen 01/12/24 0215 20 G Distal;Left;Posterior Forearm 1 day                  Wounds: Yes  Wound care consulted: Yes

## 2024-01-13 NOTE — PROCEDURES
[facility] EEG/VIDEO MONITORING REPORT  Aaron Pruitt  [id number]  1970    DATE OF SERVICE:  01/12/2024  DATE OF ADMISSION: 1/12/2024 12:02 AM    ADMITTING/REQUESTING PROVIDER: Sebastien Vega MD    REASON FOR CONSULT:  53-year-old man admitted after cardiopulmonary arrest with prolonged decreased responsiveness.  Evaluate for evidence of epileptiform activity.    METHODOLOGY   Electroencephalographic (EEG) recording is with electrodes placed according to the International 10-20 placement system.  Thirty two (32) channels of digital signal (sampling rate of 512/sec) including T1 and T2 was simultaneously recorded from the scalp and may include  EKG, EMG, and/or eye monitors.  Recording band pass was 0.1 to 512 hz.  Digital video recording of the patient is simultaneously recorded with the EEG.  The patient is instructed report clinical symptoms which may occur during the recording session.  EEG and video recording is stored and archived in digital format.  Activation procedures which include photic stimulation, hyperventilation and instructing patients to perform simple task are done in selected patients.   The EEG is displayed on a monitor screen and can be reviewed using different montages.  Computer assisted analysis is employed to detect spike and electrographic seizure activity.   The entire record is submitted for computer analysis.  The entire recording is visually reviewed and the times identified by computer analysis as being spikes or seizures are reviewed again.  Compresses spectral analysis (CSA) is also performed on the activity recorded from each individual channel.  This is displayed as a power display of frequencies from 0 to 30 Hz over time.   The CSA is reviewed looking for asymmetries in power between homologous areas of the scalp and then compared with the original EEG recording.     Media[employer] software is also utilized in the review of this study.  This software suite analyzes the EEG recording in  multiple domains.  Coherence and rhythmicity is computed to identify EEG sections which may contain organized seizures.  Each channel undergoes analysis to detect presence of spike and sharp waves which have special and morphological characteristic of epileptic activity.  The routine EEG recording is converted from spacial into frequency domain.  This is then displayed comparing homologous areas to identify areas of significant asymmetry.  Algorithm to identify non-cortically generated artifact is used to separate eye movement, EMG and other artifact from the EEG.      RECORDING TIMES  Start on 01/12/2024 at 08:39 a.m.  Stop on 01/13/2024 at 07:00 a.m.  A total of 21 hours and 33 minutes of EEG recording is obtained.    EEG FINDINGS  Background activity:   The background is continuous, symmetric, predominantly delta activity.  There are runs of embedded generalized discharges occurring every several seconds which become more frequent and prominent with stimulation organizing in frequency up to approximately 1 hz.  There are no pushbutton activations.    Sleep:  Rudimentary sleep architecture is apparent    Activation procedures:   Hyperventilation is not performed  Photic stimulation is not performed    Cardiac Monitor:   Heart rate appears generally regular on a single lead EKG.    Impression:   This is an abnormal continuous EEG monitoring study because of diffuse suppression of the background with embedded sharp features consistent with diffuse cortical dysfunction and a severe encephalopathy with evidence of cortical irritation which becomes more prominent with stimulation.  There are no pushbutton activations and no electrographic seizures.    Shante Trejo MD PhD [facility]  Neurology-Epilepsy  [facility].

## 2024-01-13 NOTE — ASSESSMENT & PLAN NOTE
54 y/o M 12 days post respiratory failure c/b cardiac arrest (Epi x 3, CPR x 3, Shock x 1) with RSE in setting of propofol wean despite continued keppra, vimpat, depacon administration. He arrived to [facility] on 80 mcg/kg/min of non-titrating propofol from [facility] which was dropped to 50 mcg/kg/min, followed by the immediate addition of 25 mcg/kg/min of ketamine. EEG cap placed and epilepsy notified.    -Admit to NCC  -Epilepsy consult, appreciate recs  -Q1h neurochecks, vital checks  -Daily CBC, CMP, Mag, Phos  -MRI x 2 with T2 BG hyperintensity bilaterally  -cEEG  -Continue keppra 1500 mg bid, Vimpat 200 mg bid, Depakote 360 mg tid  -Maintain propofol at 50 mcg/kg/min and ketamine at 25 mcg/kg/min overnight unless patient confirmed to be in NCSE, will increase ketamine in 25 mcg/kg/min increments  1/13: maintain ketamine at 50mcg, wean down propofol to 30mcg and further to 15mcg if EEG unchanged, cont clobazam as well as prior AEDs

## 2024-01-13 NOTE — PROGRESS NOTES
EEG Hook up  AM Check Electrodes had to be fixed.No    Skin Integrity: Normal     Alvin Wilson   01/13/2024 1:44 PM

## 2024-01-13 NOTE — ASSESSMENT & PLAN NOTE
Patient made DNR at [facility]  Family chose to transfer patient for initiation of 4th AED trial, forgoing comfort measures at this time per chart review  1/13: family aware of prognosis if this trial of anaesthetics and additional scheduled AED is not helpful

## 2024-01-13 NOTE — ASSESSMENT & PLAN NOTE
Cardiac arrest on 01/01 requiring 3 rounds of CPR, Epi x 3, and 1 shock to obtain ROSC  S/p TTM, maintain normothermia  MRI x 2 concerning for anoxic injury    TTE @ [facility]    Left Ventricle: The left ventricle is normal in size. Normal wall thickness. There is concentric remodeling. Normal wall motion. There is normal systolic function. Biplane (2D) method of discs ejection fraction is 65%. There is normal diastolic function.    Right Ventricle: Normal right ventricular cavity size. Wall thickness is normal. Right ventricle wall motion  is normal. Systolic function is normal.    IVC/SVC: Patient is ventilated, cannot use IVC diameter to estimate right atrial pressure.

## 2024-01-13 NOTE — HOSPITAL COURSE
01/13: no electrographic seizures reported, triglicerides increased into low 500s, propofol decreased to 30mcg, decrease further in 4 hrs if EEG unchanged, cont ketamine at 50mcg/kg and scheduled AEDs  01/14: EEG mostly suppressed, off propofol, decrease keppra to 30mcg today with potential to turn off in am  Escalate clobazam to 20mg qday and dc depakote, cont keppra and vimpat at current doses, plan to update family today, although improvement in seizure control, no change in neuro exam  01/15/2024 Increased GPDs s/p discontinuing ketamine gtt, onfi increased to BID. Awaiting arrival of family for GOC conversation  01/16/2024 cEEG unchanged overnight, remains on ictal-interictal spectrum, d/c'd. Neuro exam unchanged, remains w/ brainstem reflexes intact, intermittent non-purposeful movements of extremities. Ongoing GOC discussions with family   01/17/2024 NAEON. Mildly hypercarbic on AM ABG, increased rate/TV for eucarbia. Starting FWF for mild hypernatremia. Ongoing GOC discussions w/ family, see ACP note for full details   01/18/2024 NAEON, hypercarbia resolved. Awaiting family decision re GOC  01/19/2024 Added propranolol and increased oxycodone for neuro storming/episodes of non-purposeful movement. Family electing to proceed w/ trach/PEG, please see ACP note for full details. Gen surgery consulted, CM/SW updated.   01/20/2024 NAEON, continued non-purposeful flailing movements of LUE/LLE. Trach/PEG next week, timing TBD  01/21/2024 Repeat routine cEEG overnight relatively unchanged from prior, slight decrease in frequency of GPDs, remains w/o evolution/seizures, no clear background, no evidence of electrographic response to stimuli. Neuro exam stably poor, slightly decreased flailing movements 2/2 increase in fent gtt. Trach/PEG/placement pending  01/22/2024 No issues overnight. Remains on fentanyl gtt d/t continuous flailing movements. Plan for trach/PEG today. CM/SW following for LTAC placement.  01/23/2024  S/p trach/PEG yesterday. Fentanyl gtt weaned off, scheduled oxy increased. Still w/ apparent stimulation induced coughing episodes, unrelieved by PRN fentanyl/valium. Plans for DC to LTAC.  01/24/2024 Haldol given overnight, significantly improved coughing/flailing episodes. Passed SBT today, placed on trach collar. Stable for discharge to LTAC.  1/25/24: awaiting LTAC placement

## 2024-01-13 NOTE — SUBJECTIVE & OBJECTIVE
Interval History:      Review of Systems   Unable to perform ROS: Intubated       Objective:     Vitals:  Temp: 98.6 °F (37 °C)  Pulse: 93  Rhythm: normal sinus rhythm  BP: (!) 155/96  MAP (mmHg): 99  Resp: 18  SpO2: 100 %  Oxygen Concentration (%): 50  Vent Mode: A/C  Set Rate: 18 BPM  Vt Set: 400 mL  PEEP/CPAP: 5 cmH20  Peak Airway Pressure: 31 cmH20  Mean Airway Pressure: 9.7 cmH20  Plateau Pressure: 0 cmH20    Temp  Min: 96.4 °F (35.8 °C)  Max: 99 °F (37.2 °C)  Pulse  Min: 71  Max: 93  BP  Min: 104/68  Max: 166/107  MAP (mmHg)  Min: 81  Max: 132  Resp  Min: 18  Max: 19  SpO2  Min: 95 %  Max: 100 %  Oxygen Concentration (%)  Min: 50  Max: 50    01/12 0701 - 01/13 0700  In: 1638.1 [I.V.:1023.3]  Out: 1775 [Urine:1775]   Unmeasured Output  Stool Occurrence: 0        Physical Exam  Constitutional:       Comments: Coma GCS 3   HENT:      Head: Normocephalic.   Eyes:      Pupils: Pupils are equal, round, and reactive to light.      Comments: Sluggish lateral eye movements   Cardiovascular:      Rate and Rhythm: Normal rate.      Heart sounds: Normal heart sounds.   Pulmonary:      Breath sounds: Normal breath sounds.   Abdominal:      Palpations: Abdomen is soft.   Musculoskeletal:      Cervical back: Neck supple.   Skin:     General: Skin is warm.   Neurological:      Comments: No movement in extremities to noxious, no myoclonus or tonic/clonic movements              Medications:  Continuousketamine 2,500 mg in sodium chloride 0.9% 250 mL infusion, Last Rate: 50 mcg/kg/min (01/13/24 1105)  propofol, Last Rate: 30 mcg/kg/min (01/13/24 1105)    Scheduledalbuterol-ipratropium, 3 mL, Q6H  budesonide, 0.25 mg, Q12H  cloBAZam, 10 mg, Daily  enoxparin, 40 mg, Q24H (prophylaxis, 1700)  famotidine, 20 mg, BID  lacosamide (VIMPAT) IVPB, 200 mg, Q12H  levETIRAcetam (Keppra) IV (PEDS and ADULTS), 1,500 mg, Q12H  montelukast, 10 mg, Daily  mupirocin, , BID  senna-docusate 8.6-50 mg, 1 tablet, Daily  silodosin, 4 mg,  Daily  valproate sodium (DEPACON) IVPB, 360 mg, Q8H    PRNfentaNYL, 25 mcg, Q1H PRN  hydrALAZINE, 10 mg, Q4H PRN  labetalol, 10 mg, Q4H PRN  magnesium oxide, 800 mg, PRN  magnesium oxide, 800 mg, PRN  potassium bicarbonate, 35 mEq, PRN  potassium bicarbonate, 50 mEq, PRN  potassium bicarbonate, 60 mEq, PRN  potassium, sodium phosphates, 2 packet, PRN  potassium, sodium phosphates, 2 packet, PRN  potassium, sodium phosphates, 2 packet, PRN      Today I personally reviewed pertinent medications, lines/drains/airways, laboratory results, notably:    Diet  Diet NPO  Diet NPO

## 2024-01-13 NOTE — ASSESSMENT & PLAN NOTE
Known hx of asthma with acute onset SOB/hypoxia while shoveling dirt leading to respiratory/cardiac arrest  Resume home singulair  Continue duonebs/steroids  Ceftriaxone/Azithromycin initiated by [facility] then d/c prior to transfer in setting of clear CXR, presently off emperic abx

## 2024-01-14 LAB
ALBUMIN SERPL BCP-MCNC: 2.8 G/DL (ref 3.5–5.2)
ALP SERPL-CCNC: 130 U/L (ref 55–135)
ALT SERPL W/O P-5'-P-CCNC: 254 U/L (ref 10–44)
ANION GAP SERPL CALC-SCNC: 8 MMOL/L (ref 8–16)
AST SERPL-CCNC: 295 U/L (ref 10–40)
BASOPHILS # BLD AUTO: 0.03 K/UL (ref 0–0.2)
BASOPHILS NFR BLD: 0.2 % (ref 0–1.9)
BILIRUB SERPL-MCNC: 1 MG/DL (ref 0.1–1)
BUN SERPL-MCNC: 30 MG/DL (ref 6–20)
CALCIUM SERPL-MCNC: 9.3 MG/DL (ref 8.7–10.5)
CHLORIDE SERPL-SCNC: 108 MMOL/L (ref 95–110)
CO2 SERPL-SCNC: 26 MMOL/L (ref 23–29)
CREAT SERPL-MCNC: 0.8 MG/DL (ref 0.5–1.4)
DIFFERENTIAL METHOD BLD: ABNORMAL
EOSINOPHIL # BLD AUTO: 0 K/UL (ref 0–0.5)
EOSINOPHIL NFR BLD: 0.1 % (ref 0–8)
ERYTHROCYTE [DISTWIDTH] IN BLOOD BY AUTOMATED COUNT: 13.2 % (ref 11.5–14.5)
EST. GFR  (NO RACE VARIABLE): >60 ML/MIN/1.73 M^2
GLUCOSE SERPL-MCNC: 119 MG/DL (ref 70–110)
HCT VFR BLD AUTO: 29.1 % (ref 40–54)
HGB BLD-MCNC: 9.8 G/DL (ref 14–18)
IMM GRANULOCYTES # BLD AUTO: 0.1 K/UL (ref 0–0.04)
IMM GRANULOCYTES NFR BLD AUTO: 0.6 % (ref 0–0.5)
LYMPHOCYTES # BLD AUTO: 0.9 K/UL (ref 1–4.8)
LYMPHOCYTES NFR BLD: 5.4 % (ref 18–48)
MAGNESIUM SERPL-MCNC: 2.6 MG/DL (ref 1.6–2.6)
MCH RBC QN AUTO: 31.4 PG (ref 27–31)
MCHC RBC AUTO-ENTMCNC: 33.7 G/DL (ref 32–36)
MCV RBC AUTO: 93 FL (ref 82–98)
MONOCYTES # BLD AUTO: 1.8 K/UL (ref 0.3–1)
MONOCYTES NFR BLD: 11 % (ref 4–15)
NEUTROPHILS # BLD AUTO: 13.3 K/UL (ref 1.8–7.7)
NEUTROPHILS NFR BLD: 82.7 % (ref 38–73)
NRBC BLD-RTO: 0 /100 WBC
PHOSPHATE SERPL-MCNC: 2.7 MG/DL (ref 2.7–4.5)
PLATELET # BLD AUTO: 359 K/UL (ref 150–450)
PMV BLD AUTO: 8.5 FL (ref 9.2–12.9)
POTASSIUM SERPL-SCNC: 4.6 MMOL/L (ref 3.5–5.1)
PROCALCITONIN SERPL IA-MCNC: 0.35 NG/ML
PROT SERPL-MCNC: 6.9 G/DL (ref 6–8.4)
RBC # BLD AUTO: 3.12 M/UL (ref 4.6–6.2)
SODIUM SERPL-SCNC: 142 MMOL/L (ref 136–145)
WBC # BLD AUTO: 16.04 K/UL (ref 3.9–12.7)

## 2024-01-14 PROCEDURE — 94003 VENT MGMT INPAT SUBQ DAY: CPT

## 2024-01-14 PROCEDURE — 27100171 HC OXYGEN HIGH FLOW UP TO 24 HOURS

## 2024-01-14 PROCEDURE — 20000000 HC ICU ROOM

## 2024-01-14 PROCEDURE — 25000003 PHARM REV CODE 250

## 2024-01-14 PROCEDURE — 99900035 HC TECH TIME PER 15 MIN (STAT)

## 2024-01-14 PROCEDURE — 84145 PROCALCITONIN (PCT): CPT

## 2024-01-14 PROCEDURE — 99900026 HC AIRWAY MAINTENANCE (STAT)

## 2024-01-14 PROCEDURE — C9254 INJECTION, LACOSAMIDE: HCPCS

## 2024-01-14 PROCEDURE — 25000003 PHARM REV CODE 250: Performed by: PSYCHIATRY & NEUROLOGY

## 2024-01-14 PROCEDURE — 85025 COMPLETE CBC W/AUTO DIFF WBC: CPT

## 2024-01-14 PROCEDURE — 63600175 PHARM REV CODE 636 W HCPCS

## 2024-01-14 PROCEDURE — 80053 COMPREHEN METABOLIC PANEL: CPT

## 2024-01-14 PROCEDURE — 27200966 HC CLOSED SUCTION SYSTEM

## 2024-01-14 PROCEDURE — 94668 MNPJ CHEST WALL SBSQ: CPT

## 2024-01-14 PROCEDURE — 95714 VEEG EA 12-26 HR UNMNTR: CPT

## 2024-01-14 PROCEDURE — 25000242 PHARM REV CODE 250 ALT 637 W/ HCPCS

## 2024-01-14 PROCEDURE — 99291 CRITICAL CARE FIRST HOUR: CPT | Mod: ,,, | Performed by: PSYCHIATRY & NEUROLOGY

## 2024-01-14 PROCEDURE — 94761 N-INVAS EAR/PLS OXIMETRY MLT: CPT

## 2024-01-14 PROCEDURE — 83735 ASSAY OF MAGNESIUM: CPT

## 2024-01-14 PROCEDURE — 84100 ASSAY OF PHOSPHORUS: CPT

## 2024-01-14 PROCEDURE — 94640 AIRWAY INHALATION TREATMENT: CPT

## 2024-01-14 RX ORDER — POLYETHYLENE GLYCOL 3350 17 G/17G
17 POWDER, FOR SOLUTION ORAL DAILY
Status: DISCONTINUED | OUTPATIENT
Start: 2024-01-14 | End: 2024-01-18

## 2024-01-14 RX ORDER — CLOBAZAM 10 MG/1
20 TABLET ORAL NIGHTLY
Status: DISCONTINUED | OUTPATIENT
Start: 2024-01-14 | End: 2024-01-15

## 2024-01-14 RX ORDER — CLOBAZAM 10 MG/1
20 TABLET ORAL ONCE
Status: COMPLETED | OUTPATIENT
Start: 2024-01-14 | End: 2024-01-14

## 2024-01-14 RX ORDER — BISACODYL 10 MG/1
10 SUPPOSITORY RECTAL DAILY
Status: DISCONTINUED | OUTPATIENT
Start: 2024-01-14 | End: 2024-01-25 | Stop reason: HOSPADM

## 2024-01-14 RX ORDER — CLOBAZAM 10 MG/1
20 TABLET ORAL DAILY
Status: DISCONTINUED | OUTPATIENT
Start: 2024-01-15 | End: 2024-01-14

## 2024-01-14 RX ADMIN — LEVETIRACETAM 1500 MG: 100 INJECTION INTRAVENOUS at 08:01

## 2024-01-14 RX ADMIN — ENOXAPARIN SODIUM 40 MG: 40 INJECTION SUBCUTANEOUS at 04:01

## 2024-01-14 RX ADMIN — BUDESONIDE INHALATION 0.25 MG: 0.5 SUSPENSION RESPIRATORY (INHALATION) at 07:01

## 2024-01-14 RX ADMIN — MUPIROCIN: 20 OINTMENT TOPICAL at 08:01

## 2024-01-14 RX ADMIN — LACOSAMIDE 200 MG: 10 INJECTION INTRAVENOUS at 12:01

## 2024-01-14 RX ADMIN — IPRATROPIUM BROMIDE AND ALBUTEROL SULFATE 3 ML: .5; 3 SOLUTION RESPIRATORY (INHALATION) at 07:01

## 2024-01-14 RX ADMIN — FAMOTIDINE 20 MG: 20 TABLET, FILM COATED ORAL at 08:01

## 2024-01-14 RX ADMIN — POLYETHYLENE GLYCOL 3350 17 G: 17 POWDER, FOR SOLUTION ORAL at 08:01

## 2024-01-14 RX ADMIN — MONTELUKAST 10 MG: 10 TABLET, FILM COATED ORAL at 08:01

## 2024-01-14 RX ADMIN — CLOBAZAM 20 MG: 10 TABLET ORAL at 12:01

## 2024-01-14 RX ADMIN — CLOBAZAM 10 MG: 10 TABLET ORAL at 08:01

## 2024-01-14 RX ADMIN — IPRATROPIUM BROMIDE AND ALBUTEROL SULFATE 3 ML: .5; 3 SOLUTION RESPIRATORY (INHALATION) at 12:01

## 2024-01-14 RX ADMIN — DEXTROSE MONOHYDRATE 360 MG: 50 INJECTION, SOLUTION INTRAVENOUS at 05:01

## 2024-01-14 RX ADMIN — CLOBAZAM 20 MG: 10 TABLET ORAL at 08:01

## 2024-01-14 RX ADMIN — KETAMINE HYDROCHLORIDE 30 MCG/KG/MIN: 100 INJECTION INTRAMUSCULAR; INTRAVENOUS at 10:01

## 2024-01-14 RX ADMIN — SENNOSIDES AND DOCUSATE SODIUM 1 TABLET: 8.6; 5 TABLET ORAL at 08:01

## 2024-01-14 RX ADMIN — SILODOSIN 4 MG: 4 CAPSULE ORAL at 08:01

## 2024-01-14 RX ADMIN — Medication 100 MCG/HR: at 06:01

## 2024-01-14 RX ADMIN — BISACODYL 10 MG: 10 SUPPOSITORY RECTAL at 09:01

## 2024-01-14 NOTE — DISCHARGE SUMMARY
[facility] Medicine  Discharge Summary      Patient Name: Aaron Pruitt  MRN: [id number]  SHEA: [id number]  Patient Class: IP- Inpatient  Admission Date: 1/1/2024  Hospital Length of Stay: 10 days  Discharge Date and Time: 1/11/2024 11:59 PM  Attending Physician: No att. providers found   Discharging Provider: Kay Gonzalez MD  Primary Care Provider: Gogo Vivar NP    Primary Care Team: Networked reference to record PCT     HPI:   54 YO M with PMHx significant for  COPD/Asthma presented to the ER intubated on the field for acute respiratory failure post cardiac arrest s/p resuscitation and shock x1. Per chart review and   family at bedside reports he was shoveling some dirt in his yard when he got severely short of breath and suddenly had difficulty breathing and neighbors called EMS. When EMS arrived, reportedly still had a pulse but was struggling to breath, became pulseless. He was given narcan x2 without improvement. CPR and Epi x3. ROSC after 3rd round. Also reportedly get shocked x1. He was transferred to the ER and sedated. Labs in the ER with elevated lactic acid, elevated troponin, , elevated CPK 3294, elevated AST//82, drug screen with presumptive THC. CT head no acute intracranial abnormality, CT chest no acute pulmonary process.    * No surgery found *      Hospital Course:   1/3/2024 on TTM on fentanyl and propofol. K elevated to 5.7-->5.9, Lokelma 5g x 1, insulin/dextrose given  1/4/2024 Rewarming initiated. Sedation held, myoclonus noted. Restarted on propofol. EEG shows possible NCSE activity, started on AEDs  1/5/2024 Unchanged, Vimpat added, on keppra, vimpat and propofol.   1/6/2024 No clinical improvement, loaded with depacon  1/7/24 No improvement EEG result: The patient is a 53-year-old male with a history of anoxic brain injury who is currently maintained on intravenous infusions of propofol and on Keppra and Vimpat.  This is an abnormal EEG during  comatose state.  The overall degree of disorganization and slowing for given age is suggestive of a severe encephalopathy. The presence of epileptiform discharges is suggestive of cortical irritability with increased risk of focal seizures from either hemisphere.  There was a decrease in the overall burden of epileptiform discharges in this study compared to the prior study.  No seizures recorded during this study.   1/8/24 EEG without seizures since yesterday.   MRI brain The diffusion sequence is normal without evidence of acute ischemia or infarct.  There is abnormal increased signal of the caudate heads and basal ganglia bilaterally.   remaining hemispheres are unremarkable.  Posterior fossa structures are normal.  Flow voids are present where expected.  Pituitary, craniocervical junction and midline structures are unremarkable.      Goals of Care Treatment Preferences:  Code Status: DNR      Consults:   Consults (From admission, onward)          Status Ordering Provider     Inpatient consult to [facility]iology[facility]  Once        Provider:  (Not yet assigned)    Completed ALEX OBRIEN            Neuro  Acute encephalopathy  Status epilepticus  EEG were nonconvulsive status epilepticus,    On Keppra 1500 mg b.i.d., Vimpat 200 mg b.i.d., Depakote 360 mg t.i.d.  Planning repeat EEG on 01/09 and weaning propofol  Pending transfer to neuro critical Care with bed availability  Appreciate pulmonary crit: Discussion with family.  Plan future family meeting after repeat EEG      Seizure disorder, nonconvulsive, with status epilepticus  1/6-7  IMPRESSION:  This is an abnormal EEG during comatose state.  Diffuse disorganized low-amplitude slowing of the background was noted.  Generalized as well as independent left and right lateralized epileptiform discharges were noted which were pseudo periodic at < 2 Hz.  Two brief electrographic seizures emanating from the right frontal region were noted     CLINICAL CORRELATION:  The  patient is a 53-year-old male with a history of anoxic brain injury who is currently maintained on intravenous infusions of propofol and on Keppra and Vimpat.  This is an abnormal EEG during comatose state.  The overall degree of disorganization and slowing for given age is suggestive of a severe encephalopathy. The presence of epileptiform discharges is suggestive of cortical irritability with increased risk of focal seizures from either hemisphere.  There was a decrease in the overall burden of epileptiform discharges in this study compared to the prior study.  No seizures recorded during this study.    1/8  Abnormal study due to moderate to severe  diffuse background slowing consistent with diffuse cerebral dysfunction and encephalopathy which may be on the basis of toxic, metabolic, or primary neuronal disorder.       Requested transfer for Neuro-critical ICU, no beds at this time, also, patient is out of status epilepticus and plan is to repeat EEG in am and wean propofol by 20mcg/kg/hr every 2 hours and monitor for seizure activity    1/11/2024     Appreciate pulmonary crit-discussion with family and updated on persistent status epilepticus once weaning.  On goals of care discussion family would rather follow through with transfer to [facility] for Initiating the 4th antiseizure medication trial versus transitioning to comfort measure now.    Per NNC continue continuous EEG  monitoring pending transferred to [facility]    Pulmonary  * Acute respiratory failure with hypoxia and hypercarbia  Patient with Hypoxic Respiratory failure which is Acute.  he is not on home oxygen. Supplemental oxygen was provided and noted- Vent Mode: A/C  Oxygen Concentration (%):  [50] 50  Resp Rate Total:  [18 br/min-26 br/min] 18 br/min  Vt Set:  [400 mL] 400 mL  PEEP/CPAP:  [5 cmH20] 5 cmH20  Mean Airway Pressure:  [8.9 bgI93-30 cmH20] 8.9 cmH20    .   Signs/symptoms of respiratory failure include- tachypnea,  increased work of breathing, and respiratory distress. Contributing diagnoses includes -  unkonwn  Labs and images were reviewed. Patient Has recent ABG, which has been reviewed. Will treat underlying causes and adjust management of respiratory failure as follows-   Continue home Singulair  H/o severe asthma and COPD  Steroids, nebs, added ceftriaxone and azithromycin (CXR clear)- discontinued        Cardiac/Vascular  Hypertriglyceridemia    Triglycerides 787  Start fenofibrate    Essential hypertension  BP meds  Not on Labile blood pressure with intermittent hypotension  TTE    Left Ventricle: The left ventricle is normal in size. Normal wall thickness. There is concentric remodeling. Normal wall motion. There is normal systolic function. Biplane (2D) method of discs ejection fraction is 65%. There is normal diastolic function.    Right Ventricle: Normal right ventricular cavity size. Wall thickness is normal. Right ventricle wall motion  is normal. Systolic function is normal.    IVC/SVC: Patient is ventilated, cannot use IVC diameter to estimate right atrial pressure.    Cardiorespiratory arrest  S/p CPR with ROSC now on TTM, now rewarming  Per Pulm/Crit  Cardiac arrest: Due to hypoxemia. TTM completed. Maintaining normothermia. Reflexes intact. MRI brain on Monday.     Unchanged, MRI on Monday, if evidence of severe anoxic brain injury family will likely transition to comfort care  If not possible transfer [facility]  MRI brain on 01/08 with concern for vasogenic edema of the caudate head and basal ganglia bilateral        Orthopedic  Rhabdomyolysis  Vs traumatic post cardiac resuscitation  Monitor CPK - improved  Now off IVFs      Other  ACP (advance care planning)    Family wants to continue with aggressive therapy      Final Active Diagnoses:    Diagnosis Date Noted POA    PRINCIPAL PROBLEM:  Acute respiratory failure with hypoxia and hypercarbia [J96.01, J96.02] 01/02/2024 Yes    ACP (advance care planning)  [Z71.89] 01/11/2024 Not Applicable    Essential hypertension [I10] 01/09/2024 Yes    Hypertriglyceridemia [E78.1] 01/09/2024 Yes    Acute encephalopathy [G93.40] 01/06/2024 Yes    Seizure disorder, nonconvulsive, with status epilepticus [G40.901] 01/04/2024 Yes    Cardiorespiratory arrest [I46.9] 01/02/2024 Yes    Rhabdomyolysis [M62.82] 01/02/2024 Yes      Problems Resolved During this Admission:    Diagnosis Date Noted Date Resolved POA    Hyperkalemia [E87.5] 01/02/2024 01/07/2024 Yes       Discharged Condition: critical    Disposition: Hospice/Medical Facility    Follow Up:    Patient Instructions:   No discharge procedures on file.    Significant Diagnostic Studies:     Pending Diagnostic Studies:       None           Medications:  Reconciled Home Medications:      Medication List        ASK your doctor about these medications      albuterol 90 mcg/actuation inhaler  Commonly known as: PROVENTIL/VENTOLIN HFA  Inhale 1-2 puffs into the lungs every 6 (six) hours as needed for Wheezing.     mepolizumab 100 mg/mL Atin  Generic drug: mepolizumab  Inject into the skin.     mometasone-formoterol 200-5 mcg/actuation inhaler  Commonly known as: DULERA  Dulera 200 mcg-5 mcg/actuation HFA aerosol inhaler   INHALE 2 PUFFS BY MOUTH TWICE DAILY. RINSE MOUTH AND THROAT AFTER USE     nicotine 14 mg/24 hr  Commonly known as: NICODERM CQ  1 patch.     varenicline 1 mg Tab  Commonly known as: CHANTIX  Take by mouth.              Indwelling Lines/Drains at time of discharge:   Lines/Drains/Airways       Peripherally Inserted Central Catheter Line  Duration             PICC Triple Lumen 01/03/24 1845 right basilic 10 days              Drain  Duration                  NG/OG Tube 01/01/24 1722 Dundy sump 16 Fr. Left nostril 12 days              Airway  Duration                  Airway - Non-Surgical Endotracheal Tube -- days                    Time spent on the discharge of patient: 35 minutes    Critical care time spent on the  evaluation and treatment of severe organ dysfunction, review of pertinent labs and imaging studies, discussions with consulting providers and discussions with patient/family: 35 minutes.     Kay Gonzalez MD  Department of Hospital Medicine  [place] - Intensive Care

## 2024-01-14 NOTE — ASSESSMENT & PLAN NOTE
BP meds  Not on Labile blood pressure with intermittent hypotension  TTE    Left Ventricle: The left ventricle is normal in size. Normal wall thickness. There is concentric remodeling. Normal wall motion. There is normal systolic function. Biplane (2D) method of discs ejection fraction is 65%. There is normal diastolic function.    Right Ventricle: Normal right ventricular cavity size. Wall thickness is normal. Right ventricle wall motion  is normal. Systolic function is normal.    IVC/SVC: Patient is ventilated, cannot use IVC diameter to estimate right atrial pressure.

## 2024-01-14 NOTE — PROGRESS NOTES
"[facility] Neuro [facility]  Neurocritical Care  Progress Note    Admit Date: 1/12/2024  Service Date: 01/14/2024  Length of Stay: 2    Subjective:     Chief Complaint: New onset refractory status epilepticus    History of Present Illness: Mr. Aaron Pruitt is a 53 year old M with PMHx significant for COPD/Asthma c/b smoking and HTN who presents to [facility] for NCSE. He initially presented to [facility] ER on 01/01 after being intubated in the field following acute respiratory failure post cardiac arrest s/p resuscitation and shock x1. Per chart review, he was shoveling some dirt in his yard when he got severely short of breath and suddenly had difficulty breathing. EMS was activated by his neighbors. Upon EMS arrival, he reportedly still had a pulse, though with severe respiratory distress, and subsequently became pulseless. He was given narcan x2 without improvement. CPR and Epi x3. ROSC after 3rd round of CPR with shock x 1. CT head on arrival to [facility] without acute intracranial abnormality, and CT chest without acute pulmonary process.TTM was initiated on 01/03 with fentanyl and propofol gtts for sedation, and rewarming was initiated on 01/04 with subsequent weaning of sedation. During this period, myoclonus was clinically noted, and propofol was resumed. EEG showed NCSE, prompting the initiation of keppra and vimpat with continuation of propofol. On 01/06, there was slight improvement in EEG, with NCSE noted to be "partially treated," at which time, the patient was then loaded with depacon. EEG was improved leading to weaning of propofol with eventual return [place] [facility]. MRI completed 01/08 and 01/11 completed with with GoC conversation held at [facility]. Family opted to forgo comfort measures at this point and transfer to [facility] for trial of 4th AED, per chart review. He arrived to [facility] on 80 mcg/kg/min of non-titrating propofol from [facility] which was dropped to 50 mcg/kg/min, followed by the immediate addition of 25 mcg/kg/min of " ketamine. EEG cap placed.     He will be admitted to [facility] for hourly neuromonitoring and a higher level of care.    Hospital Course: 01/13: no electrographic seizures reported, triglicerides increased into low 500s, propofol decreased to 30mcg, decrease further in 4 hrs if EEG unchanged, cont ketamine at 50mcg/kg and scheduled AEDs  01/14: EEG mostly suppressed, off propofol, decrease keppra to 30mcg today with potential to turn off in am  Escalate clobazam to 20mg bid and dc depakote, cont keppra and vimpat at current doses, plan to update family today, although improvement in seizure control, no change in neuro exam    Interval History:      Review of Systems   Unable to perform ROS: Intubated       Objective:     Vitals:  Temp: 96.4 °F (35.8 °C)  Pulse: 80  Rhythm: normal sinus rhythm  BP: 129/77  MAP (mmHg): 97  Resp: 18  SpO2: 100 %  Oxygen Concentration (%): 50  Vent Mode: A/C  Set Rate: 18 BPM  Vt Set: 400 mL  PEEP/CPAP: 5 cmH20  Peak Airway Pressure: 24 cmH20  Mean Airway Pressure: 10 cmH20  Plateau Pressure: 0 cmH20    Temp  Min: 96.4 °F (35.8 °C)  Max: 100.2 °F (37.9 °C)  Pulse  Min: 80  Max: 111  BP  Min: 117/70  Max: 180/92  MAP (mmHg)  Min: 87  Max: 132  Resp  Min: 18  Max: 39  SpO2  Min: 98 %  Max: 100 %  Oxygen Concentration (%)  Min: 50  Max: 50    01/13 0701 - 01/14 0700  In: 2606.3 [I.V.:833]  Out: 700 [Urine:700]   Unmeasured Output  Urine Occurrence: 2  Stool Occurrence: 0  Pad Count: 2        Physical Exam  Constitutional:       Comments: Coma GCS 3   HENT:      Head: Normocephalic.   Eyes:      Extraocular Movements: Extraocular movements intact.      Pupils: Pupils are equal, round, and reactive to light.      Comments: Sluggish lateral eye movements   Cardiovascular:      Rate and Rhythm: Normal rate.      Heart sounds: Normal heart sounds.   Pulmonary:      Breath sounds: Normal breath sounds.   Abdominal:      Palpations: Abdomen is soft.   Musculoskeletal:      Cervical back: Neck supple.    Skin:     General: Skin is warm.   Neurological:      Comments: No movement in extremities to noxious, no myoclonus or tonic/clonic movements  Positive gag and cough  No corneal response              Medications:  Continuousfentanyl, Last Rate: 100 mcg/hr (01/14/24 0805)  ketamine 2,500 mg in sodium chloride 0.9% 250 mL infusion, Last Rate: 30 mcg/kg/min (01/14/24 1003)    Scheduledalbuterol-ipratropium, 3 mL, Q6H  bisacodyL, 10 mg, Daily  budesonide, 0.25 mg, Q12H  [START ON 1/15/2024] cloBAZam, 20 mg, Daily  enoxparin, 40 mg, Q24H (prophylaxis, 1700)  famotidine, 20 mg, BID  fentaNYL, 25 mcg, Once  lacosamide (VIMPAT) IVPB, 200 mg, Q12H  levETIRAcetam (Keppra) IV (PEDS and ADULTS), 1,500 mg, Q12H  montelukast, 10 mg, Daily  mupirocin, , BID  polyethylene glycol, 17 g, Daily  senna-docusate 8.6-50 mg, 1 tablet, Daily  silodosin, 4 mg, Daily    PRNacetaminophen, 650 mg, Q6H PRN  fentanyl, 50 mcg, Q1H PRN  hydrALAZINE, 10 mg, Q4H PRN  labetalol, 10 mg, Q4H PRN  magnesium oxide, 800 mg, PRN  magnesium oxide, 800 mg, PRN  potassium bicarbonate, 35 mEq, PRN  potassium bicarbonate, 50 mEq, PRN  potassium bicarbonate, 60 mEq, PRN  potassium, sodium phosphates, 2 packet, PRN  potassium, sodium phosphates, 2 packet, PRN  potassium, sodium phosphates, 2 packet, PRN      Today I personally reviewed pertinent medications, lines/drains/airways, imaging, laboratory results, notably:    Diet  Diet NPO  Diet NPO        Assessment/Plan:     Neuro  * New onset refractory status epilepticus  52 y/o M 12 days post respiratory failure c/b cardiac arrest (Epi x 3, CPR x 3, Shock x 1) with RSE in setting of propofol wean despite continued keppra, vimpat, depacon administration. He arrived to [facility] on 80 mcg/kg/min of non-titrating propofol from [facility] which was dropped to 50 mcg/kg/min, followed by the immediate addition of 25 mcg/kg/min of ketamine. EEG cap placed and epilepsy notified.    -Admit to NCC  -Epilepsy consult, appreciate  recs  -Q1h neurochecks, vital checks  -Daily CBC, CMP, Mag, Phos  -MRI x 2 with T2 BG hyperintensity bilaterally  -cEEG  -Continue keppra 1500 mg bid, Vimpat 200 mg bid, Depakote 360 mg tid  -Maintain propofol at 50 mcg/kg/min and ketamine at 25 mcg/kg/min overnight unless patient confirmed to be in NCSE, will increase ketamine in 25 mcg/kg/min increments  1/13: maintain ketamine at 50mcg, wean down propofol to 30mcg and further to 15mcg if EEG unchanged, cont clobazam as well as prior AEDs  1/14: off propofol, ketamine at 30mcg, clobazam at 20mg daily, keppra, vimpat    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  No improvement in exam with improvement in seizures    Seizure disorder, nonconvulsive, with status epilepticus  See primary problem     Pulmonary  Severe asthma  Known hx of asthma with acute onset SOB/hypoxia while shoveling dirt leading to respiratory/cardiac arrest  Resume home singulair  Continue duonebs/steroids  Ceftriaxone/Azithromycin initiated by [facility] then d/c prior to transfer in setting of clear CXR, presently off emperic abx      Cardiac/Vascular  Essential hypertension  SBP < 180  PRN labetalol, hydralazine  Presently normotensive on no meds          The patient is being Prophylaxed for:  Venous Thromboembolism with: Chemical  Stress Ulcer with: H2B  Ventilator Pneumonia with: chlorhexidine oral care    Activity Orders            Elevate HOB Elevate (30-45 degrees) Elevate HOB to 30 - 45 degrees during feeding unless otherwise stated starting at 01/12 1011    Turn patient starting at 01/12 0724    Elevate HOB starting at 01/12 0008    Diet NPO: NPO starting at 01/12 0008        CRC 33 min  DNR    Reece Johnson MD  Neurocritical Care  [facility] Neuro [facility]

## 2024-01-14 NOTE — ASSESSMENT & PLAN NOTE
S/p CPR with ROSC now on TTM, now rewarming  Per Pulm/Crit  Cardiac arrest: Due to hypoxemia. TTM completed. Maintaining normothermia. Reflexes intact. MRI brain on Monday.     Unchanged, MRI on Monday, if evidence of severe anoxic brain injury family will likely transition to comfort care  If not possible transfer to [facility]  MRI brain on 01/08 with concern for vasogenic edema of the caudate head and basal ganglia bilateral

## 2024-01-14 NOTE — NURSING
ALLEY Mora notified of Upward gaze with right pupil. Neuro exam remain unchanged. No new order, push button activated. Plan of care ongoing.

## 2024-01-14 NOTE — ASSESSMENT & PLAN NOTE
Patient with Hypoxic Respiratory failure which is Acute.  he is not on home oxygen. Supplemental oxygen was provided and noted- Vent Mode: A/C  Oxygen Concentration (%):  [50] 50  Resp Rate Total:  [18 br/min-26 br/min] 18 br/min  Vt Set:  [400 mL] 400 mL  PEEP/CPAP:  [5 cmH20] 5 cmH20  Mean Airway Pressure:  [8.9 fkR68-70 cmH20] 8.9 cmH20    .   Signs/symptoms of respiratory failure include- tachypnea, increased work of breathing, and respiratory distress. Contributing diagnoses includes -  unkonwn  Labs and images were reviewed. Patient Has recent ABG, which has been reviewed. Will treat underlying causes and adjust management of respiratory failure as follows-   Continue home Singulair  H/o severe asthma and COPD  Steroids, nebs, added ceftriaxone and azithromycin (CXR clear)- discontinued

## 2024-01-14 NOTE — PROGRESS NOTES
EEG Hook up  AM Check Electrodes had to be fixed.Yes  Cz,c3  Skin Integrity: Normal     Giovanni Herrera   01/14/2024 10:05 AM

## 2024-01-14 NOTE — ASSESSMENT & PLAN NOTE
54 y/o M 12 days post respiratory failure c/b cardiac arrest (Epi x 3, CPR x 3, Shock x 1) with RSE in setting of propofol wean despite continued keppra, vimpat, depacon administration. He arrived to [facility] on 80 mcg/kg/min of non-titrating propofol from [facility] which was dropped to 50 mcg/kg/min, followed by the immediate addition of 25 mcg/kg/min of ketamine. EEG cap placed and epilepsy notified.    -Admit to NCC  -Epilepsy consult, appreciate recs  -Q1h neurochecks, vital checks  -Daily CBC, CMP, Mag, Phos  -MRI x 2 with T2 BG hyperintensity bilaterally  -cEEG  -Continue keppra 1500 mg bid, Vimpat 200 mg bid, Depakote 360 mg tid  -Maintain propofol at 50 mcg/kg/min and ketamine at 25 mcg/kg/min overnight unless patient confirmed to be in NCSE, will increase ketamine in 25 mcg/kg/min increments  1/13: maintain ketamine at 50mcg, wean down propofol to 30mcg and further to 15mcg if EEG unchanged, cont clobazam as well as prior AEDs  1/14: off propofol, ketamine at 30mcg, clobazam at 20mg daily, keppra, vimpat

## 2024-01-14 NOTE — PLAN OF CARE
"[facility] Care Plan    POC reviewed with Aaron Pruitt and family at 0350. Pt unable to verbalized understanding. Questions and concerns addressed with family. No acute events overnight. Plan of care ongoing. See below and flowsheets for full assessment and VS info.     -cEGG in place; push button x1  -Propofol d/c  -ketamine gtt continue  -Fentanyl gtt started  -tube feed bag change; tube feed at goal 50cc/hr  -CHG bed bath and linen change completed      Is this a stroke patient? no    Neuro:  [place] Coma Scale  Best Eye Response: 1-->(E1) none  Best Motor Response: 1-->(M1) none  Best Verbal Response: 1-->(V1) none  [place] Coma Scale Score: 3  Assessment Qualifiers: no eye obstruction present, patient chemically sedated or paralyzed, patient intubated  Pupil PERRLA: yes     24hr Temp:  [98.2 °F (36.8 °C)-100.2 °F (37.9 °C)]     CV:   Rhythm: sinus tachycardia  BP goals:   SBP < 180  MAP > 65    Resp:      Vent Mode: A/C  Set Rate: 18 BPM  Oxygen Concentration (%): 50  Vt Set: 400 mL  PEEP/CPAP: 5 cmH20    Plan: wean to extubate    GI/:     Diet/Nutrition Received: NPO, tube feeding  Last Bowel Movement: 01/08/24  Voiding Characteristics: external catheter, incontinence    Intake/Output Summary (Last 24 hours) at 1/14/2024 0350  Last data filed at 1/14/2024 0305  Gross per 24 hour   Intake 2555.94 ml   Output 950 ml   Net 1605.94 ml     Unmeasured Output  Urine Occurrence: 2  Stool Occurrence: 0  Pad Count: 2    Labs/Accuchecks:  Recent Labs   Lab 01/14/24  0248   WBC 16.04*   RBC 3.12*   HGB 9.8*   HCT 29.1*         Recent Labs   Lab 01/14/24  0248      K 4.6   CO2 26      BUN 30*   CREATININE 0.8   ALKPHOS 130   *   *   BILITOT 1.0    No results for input(s): "PROTIME", "INR", "APTT", "HEPANTIXA" in the last 168 hours. No results for input(s): "CPK", "CPKMB", "TROPONINI", "MB" in the last 168 hours.    Electrolytes: N/A - electrolytes WDL  Accuchecks: none    Gtts:   fentanyl 100 " mcg/hr (01/14/24 0228)    ketamine 2,500 mg in sodium chloride 0.9% 250 mL infusion 50 mcg/kg/min (01/14/24 0228)       LDA/Wounds:  Lines/Drains/Airways       Peripherally Inserted Central Catheter Line  Duration             PICC Triple Lumen 01/03/24 1845 right basilic 10 days              Drain  Duration                  NG/OG Tube 01/01/24 1722 [place] sump 16 Fr. Left nostril 12 days    Male External Urinary Catheter 01/14/24 <1 day              Airway  Duration                  Airway - Non-Surgical Endotracheal Tube -- days              Peripheral Intravenous Line  Duration                  Peripheral IV - Single Lumen 01/12/24 0215 20 G Distal;Left;Posterior Forearm 2 days                  Wounds: Yes  Wound care consulted: Yes

## 2024-01-14 NOTE — PLAN OF CARE
"[facility] Care Plan    POC reviewed with Aaron Pruitt and family at 1400. Pt unable to verbalize understanding. Questions and concerns addressed. No acute events today. Pt progressing toward goals. Will continue to monitor. See below and flowsheets for full assessment and VS info.     Ketamine discontinued  PICC discontinued  Plan to culture if temp spikes to 101 or greater  Plan of care reviewed with mother and mother in law      Is this a stroke patient? no    Neuro:  [place] Coma Scale  Best Eye Response: 1-->(E1) none  Best Motor Response: 1-->(M1) none  Best Verbal Response: 1-->(V1) none  [place] Coma Scale Score: 3  Assessment Qualifiers: patient chemically sedated or paralyzed  Pupil PERRLA: yes     24 hr Temp:  [96.4 °F (35.8 °C)-100.2 °F (37.9 °C)]     CV:   Rhythm: normal sinus rhythm  BP goals:   SBP < 180  MAP > 65    Resp:      Vent Mode: A/C  Set Rate: 18 BPM  Oxygen Concentration (%): 50  Vt Set: 400 mL  PEEP/CPAP: 5 cmH20    Plan: wean to extubate    GI/:     Diet/Nutrition Received: tube feeding  Last Bowel Movement: 01/08/24  Voiding Characteristics: due to void    Intake/Output Summary (Last 24 hours) at 1/14/2024 1726  Last data filed at 1/14/2024 1705  Gross per 24 hour   Intake 2571.45 ml   Output 700 ml   Net 1871.45 ml     Unmeasured Output  Urine Occurrence: 2  Stool Occurrence: 0  Pad Count: 2    Labs/Accuchecks:  Recent Labs   Lab 01/14/24  0248   WBC 16.04*   RBC 3.12*   HGB 9.8*   HCT 29.1*         Recent Labs   Lab 01/14/24  0248      K 4.6   CO2 26      BUN 30*   CREATININE 0.8   ALKPHOS 130   *   *   BILITOT 1.0    No results for input(s): "PROTIME", "INR", "APTT", "HEPANTIXA" in the last 168 hours. No results for input(s): "CPK", "CPKMB", "TROPONINI", "MB" in the last 168 hours.    Electrolytes: N/A - electrolytes WDL  Accuchecks: none    Gtts:   fentanyl 112.5 mcg/hr (01/14/24 6100)       LDA/Wounds:  Lines/Drains/Airways       Drain  Duration          "         NG/OG Tube 01/01/24 1722 [place] sump 16 Fr. Left nostril 13 days    Male External Urinary Catheter 01/14/24 <1 day              Airway  Duration                  Airway - Non-Surgical Endotracheal Tube -- days              Peripheral Intravenous Line  Duration                  Peripheral IV - Single Lumen 01/12/24 0215 20 G Distal;Left;Posterior Forearm 2 days         Peripheral IV - Single Lumen 01/14/24 1510 22 G Anterior;Left Forearm <1 day                  Wounds: Yes  Wound care consulted: Yes

## 2024-01-14 NOTE — PLAN OF CARE
"[facility] Care Plan    POC reviewed with Aaron Pruitt and family at 1400. Pt unable to verbalize understanding. Questions and concerns addressed. No acute events today. Pt progressing toward goals. Will continue to monitor. See below and flowsheets for full assessment and VS info.     Propofol gtt weaned to 30  Ketamine gtt cont  Push button activated for hippus pupils and upward gaze        Is this a stroke patient? no    Neuro:  [place] Coma Scale  Best Eye Response: 1-->(E1) none  Best Motor Response: 1-->(M1) none  Best Verbal Response: 1-->(V1) none  [place] Coma Scale Score: 3  Assessment Qualifiers: patient chemically sedated or paralyzed  Pupil PERRLA: yes     24 hr Temp:  [97 °F (36.1 °C)-100 °F (37.8 °C)]     CV:   Rhythm: normal sinus rhythm  BP goals:   SBP < 180  MAP > 65    Resp:      Vent Mode: A/C  Set Rate: 18 BPM  Oxygen Concentration (%): 50  Vt Set: 400 mL  PEEP/CPAP: 5 cmH20    Plan: status epilepticus    GI/:     Diet/Nutrition Received: tube feeding  Last Bowel Movement: 01/08/24  Voiding Characteristics: due to void    Intake/Output Summary (Last 24 hours) at 1/13/2024 1927  Last data filed at 1/13/2024 1805  Gross per 24 hour   Intake 2350.07 ml   Output 1075 ml   Net 1275.07 ml     Unmeasured Output  Urine Occurrence: 2  Stool Occurrence: 0  Pad Count: 2    Labs/Accuchecks:  Recent Labs   Lab 01/13/24  0000   WBC 7.65   RBC 3.09*   HGB 9.5*   HCT 28.5*         Recent Labs   Lab 01/13/24  0000      K 4.2   CO2 26      BUN 24*   CREATININE 0.8   ALKPHOS 63   ALT 24   AST 36   BILITOT 0.5    No results for input(s): "PROTIME", "INR", "APTT", "HEPANTIXA" in the last 168 hours. No results for input(s): "CPK", "CPKMB", "TROPONINI", "MB" in the last 168 hours.    Electrolytes: N/A - electrolytes WDL  Accuchecks: none    Gtts:   ketamine 2,500 mg in sodium chloride 0.9% 250 mL infusion 50 mcg/kg/min (01/13/24 1805)    propofol 30 mcg/kg/min (01/13/24 1805) "       LDA/Wounds:  Lines/Drains/Airways       Peripherally Inserted Central Catheter Line  Duration             PICC Triple Lumen 01/03/24 1845 right basilic 10 days              Drain  Duration                  NG/OG Tube 01/01/24 1722 [place] sump 16 Fr. Left nostril 12 days              Airway  Duration                  Airway - Non-Surgical Endotracheal Tube -- days              Peripheral Intravenous Line  Duration                  Peripheral IV - Single Lumen 01/12/24 0215 20 G Distal;Left;Posterior Forearm 1 day                  Wounds: Yes  Wound care consulted: Yes

## 2024-01-14 NOTE — PROCEDURES
[facility] EEG/VIDEO MONITORING REPORT  Aaron Pruitt  [id number]  1970    DATE OF SERVICE:  01/13/2024  DATE OF ADMISSION: 1/12/2024 12:02 AM    ADMITTING/REQUESTING PROVIDER: Sebastien Vega MD    REASON FOR CONSULT:  53-year-old man admitted after cardiopulmonary arrest with prolonged decreased responsiveness.  Evaluate for evidence of epileptiform activity.    METHODOLOGY   Electroencephalographic (EEG) recording is with electrodes placed according to the International 10-20 placement system.  Thirty two (32) channels of digital signal (sampling rate of 512/sec) including T1 and T2 was simultaneously recorded from the scalp and may include  EKG, EMG, and/or eye monitors.  Recording band pass was 0.1 to 512 hz.  Digital video recording of the patient is simultaneously recorded with the EEG.  The patient is instructed report clinical symptoms which may occur during the recording session.  EEG and video recording is stored and archived in digital format.  Activation procedures which include photic stimulation, hyperventilation and instructing patients to perform simple task are done in selected patients.   The EEG is displayed on a monitor screen and can be reviewed using different montages.  Computer assisted analysis is employed to detect spike and electrographic seizure activity.   The entire record is submitted for computer analysis.  The entire recording is visually reviewed and the times identified by computer analysis as being spikes or seizures are reviewed again.  Compresses spectral analysis (CSA) is also performed on the activity recorded from each individual channel.  This is displayed as a power display of frequencies from 0 to 30 Hz over time.   The CSA is reviewed looking for asymmetries in power between homologous areas of the scalp and then compared with the original EEG recording.     [employer] software is also utilized in the review of this study.  This software suite analyzes the EEG recording in  multiple domains.  Coherence and rhythmicity is computed to identify EEG sections which may contain organized seizures.  Each channel undergoes analysis to detect presence of spike and sharp waves which have special and morphological characteristic of epileptic activity.  The routine EEG recording is converted from spacial into frequency domain.  This is then displayed comparing homologous areas to identify areas of significant asymmetry.  Algorithm to identify non-cortically generated artifact is used to separate eye movement, EMG and other artifact from the EEG.      RECORDING TIMES  Start on 01/13/2024 at 07:00 a.m.  Stop on 01/14/2024 at 07:00 a.m.  A total of 23 hours and 59 minutes of EEG recording is obtained.    EEG FINDINGS  Background activity:   During this study, the IV propofol infusion goes from a rate of 50 mcg/kg/min in the morning to off by midnight. The background is continuous, generally symmetric, predominantly delta activity with some admixed higher frequencies.  There are abundant embedded generalized discharges occurring every several seconds which become more frequent and prominent with stimulation.  As the study advances and the propofol is weaned, the background becomes slightly higher voltage with more rhythmic appearing delta activity up until the end of the recording session.    There is a pushbutton activation at 03:04 a.m. when the patient's nurse observes the patient to have a dysconjugate gaze with an upward deviated right eye.  There is a pushbutton activation at 05:15 a.m. when the patient has upward deviation of both eyes.  There are no significant changes noted on the electrographic record at these times.  There is another pushbutton activation at 12:36 p.m. for reasons that are not stated.  On video, the patient is lying quietly with no abnormal movements or vocalizations apparent.  There are no significant changes on the electrographic record at this time.    Sleep:  There is  no evidence of state changes.  There is rudimentary sleep architecture.    Activation procedures:   Hyperventilation is not performed  Photic stimulation is not performed    Cardiac Monitor:   Heart rate appears generally regular on a single lead EKG.    Impression:   This is an abnormal continuous EEG monitoring study because of diffuse suppression of the background with abundant embedded sharp features consistent with diffuse cortical dysfunction and a severe encephalopathy with evidence of cortical irritation which becomes more prominent with stimulation.  There are two pushbutton activations when the patient has dysconjugate or upward gaze deviation with no EEG correlate.  There is a third pushbutton activation at 12:36 p.m. for reasons that are not clear, when the patient appears to be lying quietly with no abnormal movements or vocalizations, also without EEG correlate.  During this study, the IV propofol infusion goes from 50 mcg/kg/min to off with no electrographic seizures.    Shante Trejo MD PhD [facility]  Neurology-Epilepsy  [facility].

## 2024-01-14 NOTE — SUBJECTIVE & OBJECTIVE
Interval History:      Review of Systems   Unable to perform ROS: Intubated       Objective:     Vitals:  Temp: 96.4 °F (35.8 °C)  Pulse: 80  Rhythm: normal sinus rhythm  BP: 129/77  MAP (mmHg): 97  Resp: 18  SpO2: 100 %  Oxygen Concentration (%): 50  Vent Mode: A/C  Set Rate: 18 BPM  Vt Set: 400 mL  PEEP/CPAP: 5 cmH20  Peak Airway Pressure: 24 cmH20  Mean Airway Pressure: 10 cmH20  Plateau Pressure: 0 cmH20    Temp  Min: 96.4 °F (35.8 °C)  Max: 100.2 °F (37.9 °C)  Pulse  Min: 80  Max: 111  BP  Min: 117/70  Max: 180/92  MAP (mmHg)  Min: 87  Max: 132  Resp  Min: 18  Max: 39  SpO2  Min: 98 %  Max: 100 %  Oxygen Concentration (%)  Min: 50  Max: 50    01/13 0701 - 01/14 0700  In: 2606.3 [I.V.:833]  Out: 700 [Urine:700]   Unmeasured Output  Urine Occurrence: 2  Stool Occurrence: 0  Pad Count: 2        Physical Exam  Constitutional:       Comments: Coma GCS 3   HENT:      Head: Normocephalic.   Eyes:      Extraocular Movements: Extraocular movements intact.      Pupils: Pupils are equal, round, and reactive to light.      Comments: Sluggish lateral eye movements   Cardiovascular:      Rate and Rhythm: Normal rate.      Heart sounds: Normal heart sounds.   Pulmonary:      Breath sounds: Normal breath sounds.   Abdominal:      Palpations: Abdomen is soft.   Musculoskeletal:      Cervical back: Neck supple.   Skin:     General: Skin is warm.   Neurological:      Comments: No movement in extremities to noxious, no myoclonus or tonic/clonic movements  Positive gag and cough  No corneal response              Medications:  Continuousfentanyl, Last Rate: 100 mcg/hr (01/14/24 0805)  ketamine 2,500 mg in sodium chloride 0.9% 250 mL infusion, Last Rate: 30 mcg/kg/min (01/14/24 1003)    Scheduledalbuterol-ipratropium, 3 mL, Q6H  bisacodyL, 10 mg, Daily  budesonide, 0.25 mg, Q12H  [START ON 1/15/2024] cloBAZam, 20 mg, Daily  enoxparin, 40 mg, Q24H (prophylaxis, 1700)  famotidine, 20 mg, BID  fentaNYL, 25 mcg, Once  lacosamide (VIMPAT)  IVPB, 200 mg, Q12H  levETIRAcetam (Keppra) IV (PEDS and ADULTS), 1,500 mg, Q12H  montelukast, 10 mg, Daily  mupirocin, , BID  polyethylene glycol, 17 g, Daily  senna-docusate 8.6-50 mg, 1 tablet, Daily  silodosin, 4 mg, Daily    PRNacetaminophen, 650 mg, Q6H PRN  fentanyl, 50 mcg, Q1H PRN  hydrALAZINE, 10 mg, Q4H PRN  labetalol, 10 mg, Q4H PRN  magnesium oxide, 800 mg, PRN  magnesium oxide, 800 mg, PRN  potassium bicarbonate, 35 mEq, PRN  potassium bicarbonate, 50 mEq, PRN  potassium bicarbonate, 60 mEq, PRN  potassium, sodium phosphates, 2 packet, PRN  potassium, sodium phosphates, 2 packet, PRN  potassium, sodium phosphates, 2 packet, PRN      Today I personally reviewed pertinent medications, lines/drains/airways, imaging, laboratory results, notably:    Diet  Diet NPO  Diet NPO

## 2024-01-14 NOTE — ASSESSMENT & PLAN NOTE
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  Planning repeat EEG on 01/09 and weaning propofol  Pending transfer to neuro critical Care with bed availability  Appreciate pulmonary crit: Discussion with family.  Plan future family meeting after repeat EEG

## 2024-01-15 PROBLEM — R74.01 TRANSAMINITIS: Status: ACTIVE | Noted: 2024-01-15

## 2024-01-15 LAB
ALBUMIN SERPL BCP-MCNC: 2.5 G/DL (ref 3.5–5.2)
ALLENS TEST: ABNORMAL
ALP SERPL-CCNC: 102 U/L (ref 55–135)
ALT SERPL W/O P-5'-P-CCNC: 156 U/L (ref 10–44)
ANION GAP SERPL CALC-SCNC: 5 MMOL/L (ref 8–16)
AST SERPL-CCNC: 90 U/L (ref 10–40)
BASOPHILS # BLD AUTO: 0.02 K/UL (ref 0–0.2)
BASOPHILS NFR BLD: 0.2 % (ref 0–1.9)
BILIRUB SERPL-MCNC: 0.7 MG/DL (ref 0.1–1)
BUN SERPL-MCNC: 33 MG/DL (ref 6–20)
CALCIUM SERPL-MCNC: 9.2 MG/DL (ref 8.7–10.5)
CHLORIDE SERPL-SCNC: 110 MMOL/L (ref 95–110)
CO2 SERPL-SCNC: 29 MMOL/L (ref 23–29)
CREAT SERPL-MCNC: 0.7 MG/DL (ref 0.5–1.4)
DELSYS: ABNORMAL
DIFFERENTIAL METHOD BLD: ABNORMAL
EOSINOPHIL # BLD AUTO: 0 K/UL (ref 0–0.5)
EOSINOPHIL NFR BLD: 0.5 % (ref 0–8)
ERYTHROCYTE [DISTWIDTH] IN BLOOD BY AUTOMATED COUNT: 13.2 % (ref 11.5–14.5)
ERYTHROCYTE [SEDIMENTATION RATE] IN BLOOD BY WESTERGREN METHOD: 18 MM/H
EST. GFR  (NO RACE VARIABLE): >60 ML/MIN/1.73 M^2
FIO2: 50
GLUCOSE SERPL-MCNC: 108 MG/DL (ref 70–110)
HCO3 UR-SCNC: 31 MMOL/L (ref 24–28)
HCT VFR BLD AUTO: 28.1 % (ref 40–54)
HGB BLD-MCNC: 8.8 G/DL (ref 14–18)
IMM GRANULOCYTES # BLD AUTO: 0.03 K/UL (ref 0–0.04)
IMM GRANULOCYTES NFR BLD AUTO: 0.4 % (ref 0–0.5)
LYMPHOCYTES # BLD AUTO: 1 K/UL (ref 1–4.8)
LYMPHOCYTES NFR BLD: 11.8 % (ref 18–48)
MAGNESIUM SERPL-MCNC: 2.3 MG/DL (ref 1.6–2.6)
MCH RBC QN AUTO: 29.9 PG (ref 27–31)
MCHC RBC AUTO-ENTMCNC: 31.3 G/DL (ref 32–36)
MCV RBC AUTO: 96 FL (ref 82–98)
MODE: ABNORMAL
MONOCYTES # BLD AUTO: 1.1 K/UL (ref 0.3–1)
MONOCYTES NFR BLD: 14.1 % (ref 4–15)
NEUTROPHILS # BLD AUTO: 5.9 K/UL (ref 1.8–7.7)
NEUTROPHILS NFR BLD: 73 % (ref 38–73)
NRBC BLD-RTO: 0 /100 WBC
PCO2 BLDA: 46.5 MMHG (ref 35–45)
PEEP: 5
PH SMN: 7.43 [PH] (ref 7.35–7.45)
PHOSPHATE SERPL-MCNC: 2.1 MG/DL (ref 2.7–4.5)
PLATELET # BLD AUTO: 337 K/UL (ref 150–450)
PMV BLD AUTO: 8.4 FL (ref 9.2–12.9)
PO2 BLDA: 196 MMHG (ref 80–100)
POC BE: 7 MMOL/L
POC SATURATED O2: 100 % (ref 95–100)
POC TCO2: 32 MMOL/L (ref 23–27)
POTASSIUM SERPL-SCNC: 4.1 MMOL/L (ref 3.5–5.1)
PROT SERPL-MCNC: 6.4 G/DL (ref 6–8.4)
RBC # BLD AUTO: 2.94 M/UL (ref 4.6–6.2)
SAMPLE: ABNORMAL
SITE: ABNORMAL
SODIUM SERPL-SCNC: 144 MMOL/L (ref 136–145)
TRIGL SERPL-MCNC: 101 MG/DL (ref 30–150)
VT: 400
WBC # BLD AUTO: 8.08 K/UL (ref 3.9–12.7)

## 2024-01-15 PROCEDURE — 27200966 HC CLOSED SUCTION SYSTEM

## 2024-01-15 PROCEDURE — 82803 BLOOD GASES ANY COMBINATION: CPT

## 2024-01-15 PROCEDURE — 84478 ASSAY OF TRIGLYCERIDES: CPT

## 2024-01-15 PROCEDURE — 94640 AIRWAY INHALATION TREATMENT: CPT

## 2024-01-15 PROCEDURE — 63600175 PHARM REV CODE 636 W HCPCS

## 2024-01-15 PROCEDURE — 85025 COMPLETE CBC W/AUTO DIFF WBC: CPT

## 2024-01-15 PROCEDURE — 95714 VEEG EA 12-26 HR UNMNTR: CPT

## 2024-01-15 PROCEDURE — 99900026 HC AIRWAY MAINTENANCE (STAT)

## 2024-01-15 PROCEDURE — 99900035 HC TECH TIME PER 15 MIN (STAT)

## 2024-01-15 PROCEDURE — 25000003 PHARM REV CODE 250

## 2024-01-15 PROCEDURE — 95720 EEG PHY/QHP EA INCR W/VEEG: CPT | Mod: ,,, | Performed by: PSYCHIATRY & NEUROLOGY

## 2024-01-15 PROCEDURE — 36600 WITHDRAWAL OF ARTERIAL BLOOD: CPT

## 2024-01-15 PROCEDURE — 25000003 PHARM REV CODE 250: Performed by: NURSE PRACTITIONER

## 2024-01-15 PROCEDURE — C9254 INJECTION, LACOSAMIDE: HCPCS

## 2024-01-15 PROCEDURE — 84100 ASSAY OF PHOSPHORUS: CPT | Performed by: PHYSICIAN ASSISTANT

## 2024-01-15 PROCEDURE — 25000003 PHARM REV CODE 250: Performed by: PSYCHIATRY & NEUROLOGY

## 2024-01-15 PROCEDURE — 80053 COMPREHEN METABOLIC PANEL: CPT

## 2024-01-15 PROCEDURE — 25000242 PHARM REV CODE 250 ALT 637 W/ HCPCS

## 2024-01-15 PROCEDURE — 99291 CRITICAL CARE FIRST HOUR: CPT | Mod: ,,, | Performed by: PSYCHIATRY & NEUROLOGY

## 2024-01-15 PROCEDURE — 94003 VENT MGMT INPAT SUBQ DAY: CPT

## 2024-01-15 PROCEDURE — 83735 ASSAY OF MAGNESIUM: CPT | Performed by: PHYSICIAN ASSISTANT

## 2024-01-15 PROCEDURE — 27100171 HC OXYGEN HIGH FLOW UP TO 24 HOURS

## 2024-01-15 PROCEDURE — 94668 MNPJ CHEST WALL SBSQ: CPT

## 2024-01-15 PROCEDURE — 20000000 HC ICU ROOM

## 2024-01-15 PROCEDURE — 94761 N-INVAS EAR/PLS OXIMETRY MLT: CPT

## 2024-01-15 RX ORDER — CLOBAZAM 10 MG/1
20 TABLET ORAL 2 TIMES DAILY
Status: DISCONTINUED | OUTPATIENT
Start: 2024-01-15 | End: 2024-01-23

## 2024-01-15 RX ADMIN — MONTELUKAST 10 MG: 10 TABLET, FILM COATED ORAL at 09:01

## 2024-01-15 RX ADMIN — BUDESONIDE INHALATION 0.25 MG: 0.5 SUSPENSION RESPIRATORY (INHALATION) at 07:01

## 2024-01-15 RX ADMIN — IPRATROPIUM BROMIDE AND ALBUTEROL SULFATE 3 ML: .5; 3 SOLUTION RESPIRATORY (INHALATION) at 11:01

## 2024-01-15 RX ADMIN — POLYETHYLENE GLYCOL 3350 17 G: 17 POWDER, FOR SOLUTION ORAL at 09:01

## 2024-01-15 RX ADMIN — IPRATROPIUM BROMIDE AND ALBUTEROL SULFATE 3 ML: .5; 3 SOLUTION RESPIRATORY (INHALATION) at 12:01

## 2024-01-15 RX ADMIN — Medication 100 MCG/HR: at 09:01

## 2024-01-15 RX ADMIN — LACOSAMIDE 200 MG: 10 INJECTION INTRAVENOUS at 01:01

## 2024-01-15 RX ADMIN — MUPIROCIN: 20 OINTMENT TOPICAL at 09:01

## 2024-01-15 RX ADMIN — SENNOSIDES AND DOCUSATE SODIUM 1 TABLET: 8.6; 5 TABLET ORAL at 09:01

## 2024-01-15 RX ADMIN — ENOXAPARIN SODIUM 40 MG: 40 INJECTION SUBCUTANEOUS at 05:01

## 2024-01-15 RX ADMIN — CLOBAZAM 20 MG: 10 TABLET ORAL at 08:01

## 2024-01-15 RX ADMIN — CLOBAZAM 20 MG: 10 TABLET ORAL at 09:01

## 2024-01-15 RX ADMIN — BISACODYL 10 MG: 10 SUPPOSITORY RECTAL at 09:01

## 2024-01-15 RX ADMIN — POTASSIUM & SODIUM PHOSPHATES POWDER PACK 280-160-250 MG 2 PACKET: 280-160-250 PACK at 03:01

## 2024-01-15 RX ADMIN — Medication 50 MCG/HR: at 07:01

## 2024-01-15 RX ADMIN — POTASSIUM & SODIUM PHOSPHATES POWDER PACK 280-160-250 MG 2 PACKET: 280-160-250 PACK at 09:01

## 2024-01-15 RX ADMIN — SILODOSIN 4 MG: 4 CAPSULE ORAL at 09:01

## 2024-01-15 RX ADMIN — IPRATROPIUM BROMIDE AND ALBUTEROL SULFATE 3 ML: .5; 3 SOLUTION RESPIRATORY (INHALATION) at 07:01

## 2024-01-15 RX ADMIN — LEVETIRACETAM 1500 MG: 100 INJECTION INTRAVENOUS at 08:01

## 2024-01-15 RX ADMIN — FAMOTIDINE 20 MG: 20 TABLET, FILM COATED ORAL at 09:01

## 2024-01-15 RX ADMIN — LEVETIRACETAM 1500 MG: 100 INJECTION INTRAVENOUS at 09:01

## 2024-01-15 RX ADMIN — FAMOTIDINE 20 MG: 20 TABLET, FILM COATED ORAL at 08:01

## 2024-01-15 NOTE — PLAN OF CARE
"[facility] Care Plan    POC reviewed with Aaron Pruitt and family at 1400. Family verbalized understanding. Questions and concerns addressed. No acute events today. Pt not progressing toward goals. Will continue to monitor. See below and flowsheets for full assessment and VS info.     Plan of care discussed with mother and sister at bedside  TF cont  Fentanyl gtt stopped  Plan is to make decisions on next steps Friday 1/19  UO 1.6 L    Is this a stroke patient? no    Neuro:  [place] Coma Scale  Best Eye Response: 3-->(E3) to speech  Best Motor Response: 1-->(M1) none  Best Verbal Response: 1-->(V1) none  [place] Coma Scale Score: 5  Assessment Qualifiers: patient chemically sedated or paralyzed  Pupil PERRLA: yes     24 hr Temp:  [97 °F (36.1 °C)-99.3 °F (37.4 °C)]     CV:   Rhythm: sinus tachycardia  BP goals:   SBP < 180  MAP > 65    Resp:      Vent Mode: A/C  Set Rate: 16 BPM  Oxygen Concentration (%): 40  Vt Set: 400 mL  PEEP/CPAP: 5 cmH20    Plan: trach/PEG discussions    GI/:     Diet/Nutrition Received: tube feeding  Last Bowel Movement: 01/08/24  Voiding Characteristics: due to void    Intake/Output Summary (Last 24 hours) at 1/15/2024 1759  Last data filed at 1/15/2024 1705  Gross per 24 hour   Intake 1505.04 ml   Output 1515 ml   Net -9.96 ml     Unmeasured Output  Urine Occurrence: 2  Stool Occurrence: 0  Pad Count: 2    Labs/Accuchecks:  Recent Labs   Lab 01/15/24  0013   WBC 8.08   RBC 2.94*   HGB 8.8*   HCT 28.1*         Recent Labs   Lab 01/15/24  0013      K 4.1   CO2 29      BUN 33*   CREATININE 0.7   ALKPHOS 102   *   AST 90*   BILITOT 0.7    No results for input(s): "PROTIME", "INR", "APTT", "HEPANTIXA" in the last 168 hours. No results for input(s): "CPK", "CPKMB", "TROPONINI", "MB" in the last 168 hours.    Electrolytes: Phos replaced  Accuchecks: none    Gtts:   fentanyl Stopped (01/15/24 1505)       LDA/Wounds:  Lines/Drains/Airways       Drain  Duration                  " NG/OG Tube 01/01/24 1722 [place] sump 16 Fr. Left nostril 14 days    Male External Urinary Catheter 01/14/24 1 day              Airway  Duration                  Airway - Non-Surgical Endotracheal Tube -- days              Peripheral Intravenous Line  Duration                  Peripheral IV - Single Lumen 01/12/24 0215 20 G Distal;Left;Posterior Forearm 3 days         Peripheral IV - Single Lumen 01/14/24 1510 22 G Anterior;Left Forearm 1 day                  Wounds: Yes  Wound care consulted: Yes

## 2024-01-15 NOTE — ASSESSMENT & PLAN NOTE
Known hx of asthma with acute onset SOB/hypoxia while shoveling dirt leading to respiratory/cardiac arrest  C/w home singulair  Continue duonebs/ inhaled steroids  Ceftriaxone/Azithromycin initiated by [facility] then d/c prior to transfer in setting of clear CXR, presently off emperic abx

## 2024-01-15 NOTE — ASSESSMENT & PLAN NOTE
Patient with Hypercapnic and Hypoxic Respiratory failure which is Acute. He is not on home oxygen. Supplemental oxygen was provided and noted-   Vent Mode: A/C  Oxygen Concentration (%):  [40-50] 40  Resp Rate Total:  [16 br/min-19 br/min] 16 br/min  Vt Set:  [400 mL] 400 mL  PEEP/CPAP:  [5 cmH20] 5 cmH20  Mean Airway Pressure:  [8.9 aoL10-52 cmH20] 10 cmH20    Daily CXR, ABG  See asthma/COPD  Likely not 2/2 infectious source  - s/p Azithromycin 1/2-1/3  - s/p Ceftriaxone 1/2-1/3  - s/p Pip-Tazo 1/3-1/6  - s/p Vanc 1/3-1/6

## 2024-01-15 NOTE — PLAN OF CARE
"[facility] Care Plan    POC reviewed with Aaron Pruitt and family at 0335. Pt unable to verbalized understanding. Questions and concerns addressed with family . No acute events overnight.  See below and flowsheets for full assessment and VS info.     -Push button x1 for upward gaze  -fentanyl bolus x3  -fentanyl gtt continue  -CHG bed bath and linen change completed        Is this a stroke patient? no    Neuro:  [place] Coma Scale  Best Eye Response: 3-->(E3) to speech  Best Motor Response: 1-->(M1) none  Best Verbal Response: 1-->(V1) none  [place] Coma Scale Score: 5  Assessment Qualifiers: no eye obstruction present, patient chemically sedated or paralyzed, patient intubated  Pupil PERRLA: yes     24hr Temp:  [96.4 °F (35.8 °C)-99.3 °F (37.4 °C)]     CV:   Rhythm: normal sinus rhythm  BP goals:   SBP < 180  MAP > 65    Resp:      Vent Mode: A/C  Set Rate: 18 BPM  Oxygen Concentration (%): 50  Vt Set: 400 mL  PEEP/CPAP: 5 cmH20    Plan: wean to extubate    GI/:     Diet/Nutrition Received: NPO, tube feeding  Last Bowel Movement: 01/08/24  Voiding Characteristics: external catheter, incontinence    Intake/Output Summary (Last 24 hours) at 1/15/2024 0335  Last data filed at 1/15/2024 0327  Gross per 24 hour   Intake 2095.5 ml   Output 1600 ml   Net 495.5 ml     Unmeasured Output  Urine Occurrence: 2  Stool Occurrence: 0  Pad Count: 2    Labs/Accuchecks:  Recent Labs   Lab 01/15/24  0013   WBC 8.08   RBC 2.94*   HGB 8.8*   HCT 28.1*         Recent Labs   Lab 01/15/24  0013      K 4.1   CO2 29      BUN 33*   CREATININE 0.7   ALKPHOS 102   *   AST 90*   BILITOT 0.7    No results for input(s): "PROTIME", "INR", "APTT", "HEPANTIXA" in the last 168 hours. No results for input(s): "CPK", "CPKMB", "TROPONINI", "MB" in the last 168 hours.    Electrolytes: Electrolytes replaced  Accuchecks: none    Gtts:   fentanyl 100 mcg/hr (01/15/24 0327)       LDA/Wounds:  Lines/Drains/Airways       Drain  Duration "                  NG/OG Tube 01/01/24 1722 [place] sump 16 Fr. Left nostril 13 days    Male External Urinary Catheter 01/14/24 1 day              Airway  Duration                  Airway - Non-Surgical Endotracheal Tube -- days              Peripheral Intravenous Line  Duration                  Peripheral IV - Single Lumen 01/12/24 0215 20 G Distal;Left;Posterior Forearm 3 days         Peripheral IV - Single Lumen 01/14/24 1510 22 G Anterior;Left Forearm <1 day                  Wounds: Yes  Wound care consulted: Yes

## 2024-01-15 NOTE — ASSESSMENT & PLAN NOTE
Cardiac arrest on 01/01 requiring 3 rounds of CPR, Epi x 3, and 1 shock to obtain ROSC  Likely respiratory etiology of arrest 2/2 asthma exacerbation   S/p TTM, maintain normothermia  MRI x 2 concerning for anoxic injury

## 2024-01-15 NOTE — PROCEDURES
ICU EEG/VIDEO MONITORING REPORT    DATE OF SERVICE: 1/5/24  EEG NUMBER: [id number]  [facility] SERVICE[facility]    METHODOLOGY   Electroencephalographic (EEG) recording is with electrodes placed according to the International 10-20 placement system.  Thirty two (32) channels of digital signal are simultaneously recorded from the scalp and may include EKG, EMG, and/or eye monitors.   Recording band pass was 0.1 to 512 hz.  Digital video recording of the patient is simultaneously recorded with the EEG.  The nursing staff report clinical symptoms and may press an event button when the patient has symptoms of clinical interest to the treating physicians.  EEG and video recording is stored and archived in digital format.  The entire recording is visually reviewed and the times identified by computer analysis as being spikes or seizures are reviewed again.  Activation procedures which include photic stimulation, hyperventilation and instructing patients to perform simple task are done in selected patients.   Compresses spectral analysis (CSA) is also performed on the activity recorded from each individual channel.  This is displayed as a power display of frequencies from 0 to 30 Hz over time.   The CSA analysis is done and displayed continuously.  This is reviewed for asymmetries in power between homologous areas of the scalp and for presence of changes in power which canbe seen when seizures occur.  Sections of suspected abnormalities on the CSA is then compared with the original EEG recording.     Sum[employer]ision software was also utilized in the review of this study.  This software suite analyzes the EEG recording in multiple domains.  Coherence and rhythmicity is computed to identify EEG sections which may contain organized seizures.  Each channel undergoes analysis to detect presence of spike and sharp waves which have special and morphological characteristic of epileptic activity.  The routine EEG recording is  converted from spacial into frequency domain.  This is then displayed comparing homologous areas to identify areas of significant asymmetry.  Algorithm to identify non-cortically generated artifact is used to separate eye movement, EMG and other artifact from the EEG.      Recording Times  Start on 1/4/24 at 07:00  Stop on 1/5//24 at 07:00  A total of 24 hours of EEG was recorded.    This EEG study is performed in the ICU with the patient on the following sedative medications: propofol discontinued, fentanyl at 10 ml/hr    Indication: 54 yo male s/p cardiac arrest with prolonged encephalopathy; EEG to evaluate for subclinical seizures.     State of Consciousness:   Stupor    Background:   The background is poorly organized, lacking an AP gradient or posterior dominant rhythm.  It is continuous and symmetric. Reactivity is present.  The record predominantly consists of polymorphic delta range slowing with some over-riding faster frequencies.       Sleep:   Transition to a quiet state occurs without clear sleep architecture appreciated.    Epileptiform Abnormalities  Intermittent generalized epileptiform discharges are seen throughout the record, with variable degrees of frequency, typically becoming faster and more prominent with stimulation.  At times, the discharges become periodic and can occur at a frequency of 1-2.5 hz, sometimes in prolonged runs.   This periodic pattern becomes more and more frequent as the record progresses, and is nearly continuous at the end of the record.    EKG:   Regular    Events:   One push button event at 12:56 occurs for unclear reasons; the record displays delta range slowing without epileptiform discharges at that time.  One push button event occurs at 02:21 for upwards gaze deviation.  The record at this time shows generalized epileptiform discharges at a frequency of 2-2.5 hz during this period.  However, the epileptiform activity does persist after the patient stops having  upwards gaze deviation.        Notes   Two periods of rhythmic activity occur at the start of the record which corresponds to chest PT; this activity is therefore artifactual in nature.     Impression:   This is an abnormal study in an ICU patient not on sedation.  The presence of intermittent generalized epileptiform discharges is indicative of generalized cortical irritability, while the generalized slowing and disorganization is indicative of a severe diffuse encephalopathy.  As the record progresses this activity becomes more prominent and starts to occur in a periodic fashion.  At times, the activity can reach up to 1-2.5 hz, placing it on the ictal/interictal continuum and sometimes more on the ictal side.  Evolution into discrete electrographic seizures is not observed during this portion of the record, however the amount of epileptiform activity has increased since prior record.        Daira Leggett MD  [facility]   Department of Neurology/Epilepsy

## 2024-01-15 NOTE — PROGRESS NOTES
EEG Normal  PM Check Electrodes had to be fixed.Yes  Cz,grd,c3  Skin Integrity: Normal     Giovanni Herrera   01/15/2024 12:46 PM

## 2024-01-15 NOTE — ASSESSMENT & PLAN NOTE
See primary problem, known NCSE post arrest  CT head unremarkable on admission to [facility]  MRI x 2 with T2 BG hyperintensity bilaterally -> c/w anoxic injury   Daily ABG, pH WNL  As the majority of this patient's metabolic derangements were corrected at [facility], suspect encephalopathy 2/2 anoxic injury/NCSE

## 2024-01-15 NOTE — PROGRESS NOTES
"[facility]  Neurocritical Care  Progress Note    Admit Date: 1/12/2024  Service Date: 01/15/2024  Length of Stay: 3    Subjective:     Chief Complaint: New onset refractory status epilepticus    History of Present Illness: Mr. Aaron Pruitt is a 53 year old M with PMHx significant for COPD/Asthma c/b smoking and HTN who presents to [facility] for NCSE. He initially presented to [facility] ER on 01/01 after being intubated in the field following acute respiratory failure post cardiac arrest s/p resuscitation and shock x1. Per chart review, he was shoveling some dirt in his yard when he got severely short of breath and suddenly had difficulty breathing. EMS was activated by his neighbors. Upon EMS arrival, he reportedly still had a pulse, though with severe respiratory distress, and subsequently became pulseless. He was given narcan x2 without improvement. CPR and Epi x3. ROSC after 3rd round of CPR with shock x 1. CT head on arrival to [facility] without acute intracranial abnormality, and CT chest without acute pulmonary process.TTM was initiated on 01/03 with fentanyl and propofol gtts for sedation, and rewarming was initiated on 01/04 with subsequent weaning of sedation. During this period, myoclonus was clinically noted, and propofol was resumed. EEG showed NCSE, prompting the initiation of keppra and vimpat with continuation of propofol. On 01/06, there was slight improvement in EEG, with NCSE noted to be "partially treated," at which time, the patient was then loaded with depacon. EEG was improved leading to weaning of propofol with eventual return to [facility]. MRI completed 01/08 and 01/11 completed with with GoC conversation held at [facility]. Family opted to forgo comfort measures at this point and transfer to [facility] for trial of 4th AED, per chart review. He arrived to [facility] on 80 mcg/kg/min of non-titrating propofol from [facility] which was dropped to 50 mcg/kg/min, followed by the immediate addition of 25 mcg/kg/min of " ketamine. EEG cap placed.     He will be admitted to [facility] for hourly neuromonitoring and a higher level of care.    Hospital Course: 01/13: no electrographic seizures reported, triglicerides increased into low 500s, propofol decreased to 30mcg, decrease further in 4 hrs if EEG unchanged, cont ketamine at 50mcg/kg and scheduled AEDs  01/14: EEG mostly suppressed, off propofol, decrease keppra to 30mcg today with potential to turn off in am  Escalate clobazam to 20mg qday and dc depakote, cont keppra and vimpat at current doses, plan to update family today, although improvement in seizure control, no change in neuro exam  01/15/2024 Increased GPDs s/p discontinuing ketamine gtt, onfi increased to BID. Awaiting arrival of family for [facility] conversation    Interval History:  Please see hospital course above for full details     Review of Systems   Unable to perform ROS: Intubated     Objective:     Vitals:  Temp: 97.2 °F (36.2 °C)  Pulse: 86  Rhythm: sinus tachycardia  BP: 116/71  MAP (mmHg): 99  Resp: 16  SpO2: 98 %  Oxygen Concentration (%): 40  Vent Mode: A/C  Set Rate: 16 BPM  Vt Set: 400 mL  PEEP/CPAP: 5 cmH20  Peak Airway Pressure: 38 cmH20  Mean Airway Pressure: 10 cmH20  Plateau Pressure: 0 cmH20    Temp  Min: 97 °F (36.1 °C)  Max: 99.3 °F (37.4 °C)  Pulse  Min: 80  Max: 107  BP  Min: 104/64  Max: 137/89  MAP (mmHg)  Min: 77  Max: 108  Resp  Min: 16  Max: 30  SpO2  Min: 97 %  Max: 100 %  Oxygen Concentration (%)  Min: 40  Max: 50    01/14 0701 - 01/15 0700  In: 1835.5 [I.V.:404.5]  Out: 1225 [Urine:1225]   Unmeasured Output  Urine Occurrence: 1  Stool Occurrence: 0  Pad Count: 2        Physical Exam  General Appearance: Middle aged gentleman lying in bed, appears older than stated age. Not in acute distress, on fent @ 100 mcg/hr for vent synchrony. Not overbreathing vent   Mental Status Exam: No response to verbal or noxious stimuli. Not tracking or regarding, not following commands  Cranial Nerves: Pupils 4->2 mm  and sluggishly reactive b/l, gaze midline. +corneals to saline b/l, weak OCRs. Absent gag, +cough   Motor: no movement in extremities x4. Normal tone.  Sensory: No response to noxious x4 extremities   Coordination: Unable to assess   Vascular: S1/S2 of normal intensity, no S3/S4 appreciated, no murmurs appreciated  Lungs: Coarse breath sounds b/l, no wheezing  Abdomen: Soft, non-distended, non-tender, BS +         Medications:  Continuousfentanyl, Last Rate: 97.5 mcg/hr (01/15/24 1105)    Scheduledalbuterol-ipratropium, 3 mL, Q6H  bisacodyL, 10 mg, Daily  budesonide, 0.25 mg, Q12H  cloBAZam, 20 mg, BID  enoxparin, 40 mg, Q24H (prophylaxis, 1700)  famotidine, 20 mg, BID  lacosamide (VIMPAT) IVPB, 200 mg, Q12H  levETIRAcetam (Keppra) IV (PEDS and ADULTS), 1,500 mg, Q12H  montelukast, 10 mg, Daily  mupirocin, , BID  polyethylene glycol, 17 g, Daily  senna-docusate 8.6-50 mg, 1 tablet, Daily  silodosin, 4 mg, Daily    PRNacetaminophen, 650 mg, Q6H PRN  fentanyl, 50 mcg, Q1H PRN  hydrALAZINE, 10 mg, Q4H PRN  labetalol, 10 mg, Q4H PRN  magnesium oxide, 800 mg, PRN  magnesium oxide, 800 mg, PRN  potassium bicarbonate, 35 mEq, PRN  potassium bicarbonate, 50 mEq, PRN  potassium bicarbonate, 60 mEq, PRN  potassium, sodium phosphates, 2 packet, PRN  potassium, sodium phosphates, 2 packet, PRN  potassium, sodium phosphates, 2 packet, PRN      Today I personally reviewed pertinent imaging, laboratory results, microbiology results, notably: MRI brain w/ evidence of anoxic injury to b/l subcortical structures. cEEG w/ increased GPDs s/p discontinuing ketamine gtt, on ictal interictal spectrum w/o clear NCSE. CBC w/o leukocytosis, Hb slight downtrend to 8.8/platelets stable. CMP w/ Na 144, BUN stable at 33, AST/ALT downtrending 295/294 -> 90/156     Diet  Diet NPO  Diet NPO  TF at goal    Assessment/Plan:     Neuro  * New onset refractory status epilepticus  52 y/o M 12 days post respiratory failure c/b cardiac arrest (Epi x 3, CPR  x 3, Shock x 1) with RSE in setting of propofol wean despite continued keppra, vimpat, depacon administration. He arrived to [facility] on 80 mcg/kg/min of non-titrating propofol from [facility] which was dropped to 50 mcg/kg/min, followed by the immediate addition of 25 mcg/kg/min of ketamine. EEG cap placed and epilepsy notified.    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  No improvement in exam with improvement in seizures  Ongoing GOC with family  Prognosis poor     Acute encephalopathy  See primary problem, known NCSE post arrest  CT head unremarkable on admission to [facility]  MRI x 2 with T2 BG hyperintensity bilaterally -> c/w anoxic injury   Daily ABG, pH WNL  As the majority of this patient's metabolic derangements were corrected at [facility], suspect encephalopathy 2/2 anoxic injury/NCSE    Seizure disorder, nonconvulsive, with status epilepticus  See primary problem     Pulmonary  COPD (chronic obstructive pulmonary disease)  Known history of smoking  C/w duonebs  Daily CXR, ABG while intubated  O2 Sat goal 88%-92%    Severe asthma  Known hx of asthma with acute onset SOB/hypoxia while shoveling dirt leading to respiratory/cardiac arrest  C/w home singulair  Continue duonebs/ inhaled steroids  Ceftriaxone/Azithromycin initiated by [facility] then d/c prior to transfer in setting of clear CXR, presently off emperic abx      Acute respiratory failure with hypoxia and hypercarbia  Patient with Hypercapnic and Hypoxic Respiratory failure which is Acute. He is not on home oxygen. Supplemental oxygen was provided and noted-   Vent Mode:  A/C  Oxygen Concentration (%):  [40-50] 40  Resp Rate Total:  [16 br/min-19 br/min] 16 br/min  Vt Set:  [400 mL] 400 mL  PEEP/CPAP:  [5 cmH20] 5 cmH20  Mean Airway Pressure:  [8.9 huF20-31 cmH20] 10 cmH20    Daily CXR, ABG  See asthma/COPD  Likely not 2/2 infectious source  - s/p Azithromycin 1/2-1/3  - s/p Ceftriaxone 1/2-1/3  - s/p Pip-Tazo 1/3-1/6  - s/p Vanc 1/3-1/6       Cardiac/Vascular  Hypertriglyceridemia  , continue fenofibrate started by [facility]  Trend TG q48 hours while on propofol -> off prop, triglycerides now normalized     Essential hypertension  Hx of     SBP < 180  PRN labetalol, hydralazine  Presently normotensive on no meds    Cardiorespiratory arrest  Cardiac arrest on 01/01 requiring 3 rounds of CPR, Epi x 3, and 1 shock to obtain ROSC  Likely respiratory etiology of arrest 2/2 asthma exacerbation   S/p TTM, maintain normothermia  MRI x 2 concerning for anoxic injury          The patient is being Prophylaxed for:  Venous Thromboembolism with: Mechanical or Chemical  Stress Ulcer with: H2B  Ventilator Pneumonia with: chlorhexidine oral care    Activity Orders            Elevate HOB Elevate (30-45 degrees) Elevate HOB to 30 - 45 degrees during feeding unless otherwise stated starting at 01/12 1011    Turn patient starting at 01/12 0724    Elevate HOB starting at 01/12 0008    Diet NPO: NPO starting at 01/12 0008          DNR    Uninterrupted Critical Care/Counseling Time (not including procedures): 40 minutes  There is high probability for acute neurological change leading to clinical and possibly life-threatening deterioration requiring highest level of physician preparedness for urgent intervention. Critical care time was spent personally by me on the following activities: development of treatment plan with patient or surrogate and bedside caregivers, discussions with consultants, evaluation of patient's response to treatment, examination of patient, ordering and performing treatments and  interventions, ordering and review of laboratory studies, ordering and review of radiographic studies, pulse oximetry, antibiotic titration if applicable, vasopressor titration if applicable, re-evaluation of patient's condition. I spent greater than 50% of the documented critical care time assessing and making medical decisions for this patient.       Angelica Garrison MD  Neurocritical Care  [facility]

## 2024-01-15 NOTE — ASSESSMENT & PLAN NOTE
54 y/o M 12 days post respiratory failure c/b cardiac arrest (Epi x 3, CPR x 3, Shock x 1) with RSE in setting of propofol wean despite continued keppra, vimpat, depacon administration. [facility] 80 mcg/kg/min of non-titrating propofol from [facility] which was dropped to 50 mcg/kg/min, followed by the immediate addition of 25 mcg/kg/min of ketamine. EEG cap placed and epilepsy notified.    -Propofol d/c'd 1/13, ketamine d/c'd 1/14  -Worsening GPDs overnight, on ictal interictal spectrum w/o ellis NCSE -> restart ketamine gtt if worsens further   -Epilepsy consult, appreciate recs  -Q1h neurochecks, vital checks  -Daily CBC, CMP, Mag, Phos  -MRI x 2 with T2 BG hyperintensity bilaterally  -cEEG  -Continue keppra 1500 mg bid, Vimpat 200 mg bid  -Increase onfi 20 mg qday -> BID

## 2024-01-15 NOTE — SUBJECTIVE & OBJECTIVE
Interval History:  Please see hospital course above for full details     Review of Systems   Unable to perform ROS: Intubated     Objective:     Vitals:  Temp: 97.2 °F (36.2 °C)  Pulse: 86  Rhythm: sinus tachycardia  BP: 116/71  MAP (mmHg): 99  Resp: 16  SpO2: 98 %  Oxygen Concentration (%): 40  Vent Mode: A/C  Set Rate: 16 BPM  Vt Set: 400 mL  PEEP/CPAP: 5 cmH20  Peak Airway Pressure: 38 cmH20  Mean Airway Pressure: 10 cmH20  Plateau Pressure: 0 cmH20    Temp  Min: 97 °F (36.1 °C)  Max: 99.3 °F (37.4 °C)  Pulse  Min: 80  Max: 107  BP  Min: 104/64  Max: 137/89  MAP (mmHg)  Min: 77  Max: 108  Resp  Min: 16  Max: 30  SpO2  Min: 97 %  Max: 100 %  Oxygen Concentration (%)  Min: 40  Max: 50    01/14 0701 - 01/15 0700  In: 1835.5 [I.V.:404.5]  Out: 1225 [Urine:1225]   Unmeasured Output  Urine Occurrence: 1  Stool Occurrence: 0  Pad Count: 2        Physical Exam  General Appearance: Middle aged gentleman lying in bed, appears older than stated age. Not in acute distress, on fent @ 100 mcg/hr for vent synchrony. Not overbreathing vent   Mental Status Exam: No response to verbal or noxious stimuli. Not tracking or regarding, not following commands  Cranial Nerves: Pupils 4->2 mm and sluggishly reactive b/l, gaze midline. +corneals to saline b/l, weak OCRs. Absent gag, +cough   Motor: no movement in extremities x4. Normal tone.  Sensory: No response to noxious x4 extremities   Coordination: Unable to assess   Vascular: S1/S2 of normal intensity, no S3/S4 appreciated, no murmurs appreciated  Lungs: Coarse breath sounds b/l, no wheezing  Abdomen: Soft, non-distended, non-tender, BS +         Medications:  Continuousfentanyl, Last Rate: 97.5 mcg/hr (01/15/24 1105)    Scheduledalbuterol-ipratropium, 3 mL, Q6H  bisacodyL, 10 mg, Daily  budesonide, 0.25 mg, Q12H  cloBAZam, 20 mg, BID  enoxparin, 40 mg, Q24H (prophylaxis, 1700)  famotidine, 20 mg, BID  lacosamide (VIMPAT) IVPB, 200 mg, Q12H  levETIRAcetam (Keppra) IV (PEDS and  ADULTS), 1,500 mg, Q12H  montelukast, 10 mg, Daily  mupirocin, , BID  polyethylene glycol, 17 g, Daily  senna-docusate 8.6-50 mg, 1 tablet, Daily  silodosin, 4 mg, Daily    PRNacetaminophen, 650 mg, Q6H PRN  fentanyl, 50 mcg, Q1H PRN  hydrALAZINE, 10 mg, Q4H PRN  labetalol, 10 mg, Q4H PRN  magnesium oxide, 800 mg, PRN  magnesium oxide, 800 mg, PRN  potassium bicarbonate, 35 mEq, PRN  potassium bicarbonate, 50 mEq, PRN  potassium bicarbonate, 60 mEq, PRN  potassium, sodium phosphates, 2 packet, PRN  potassium, sodium phosphates, 2 packet, PRN  potassium, sodium phosphates, 2 packet, PRN      Today I personally reviewed pertinent imaging, laboratory results, microbiology results, notably: MRI brain w/ evidence of anoxic injury to b/l subcortical structures. cEEG w/ increased GPDs s/p discontinuing ketamine gtt, on ictal interictal spectrum w/o clear NCSE. CBC w/o leukocytosis, Hb slight downtrend to 8.8/platelets stable. CMP w/ Na 144, BUN stable at 33, AST/ALT downtrending 295/294 -> 90/156     Diet  Diet NPO  Diet NPO  TF at goal

## 2024-01-15 NOTE — ASSESSMENT & PLAN NOTE
, continue fenofibrate started by [facility]  Trend TG q48 hours while on propofol -> off prop, triglycerides now normalized

## 2024-01-16 PROBLEM — E78.1 HYPERTRIGLYCERIDEMIA: Status: RESOLVED | Noted: 2024-01-09 | Resolved: 2024-01-16

## 2024-01-16 LAB
ABO + RH BLD: NORMAL
ALBUMIN SERPL BCP-MCNC: 2.6 G/DL (ref 3.5–5.2)
ALLENS TEST: ABNORMAL
ALP SERPL-CCNC: 94 U/L (ref 55–135)
ALT SERPL W/O P-5'-P-CCNC: 107 U/L (ref 10–44)
ANION GAP SERPL CALC-SCNC: 8 MMOL/L (ref 8–16)
AST SERPL-CCNC: 51 U/L (ref 10–40)
BASOPHILS # BLD AUTO: 0.02 K/UL (ref 0–0.2)
BASOPHILS NFR BLD: 0.2 % (ref 0–1.9)
BILIRUB SERPL-MCNC: 0.5 MG/DL (ref 0.1–1)
BLD GP AB SCN CELLS X3 SERPL QL: NORMAL
BUN SERPL-MCNC: 33 MG/DL (ref 6–20)
CALCIUM SERPL-MCNC: 9.6 MG/DL (ref 8.7–10.5)
CHLORIDE SERPL-SCNC: 108 MMOL/L (ref 95–110)
CO2 SERPL-SCNC: 28 MMOL/L (ref 23–29)
CREAT SERPL-MCNC: 0.7 MG/DL (ref 0.5–1.4)
DELSYS: ABNORMAL
DIFFERENTIAL METHOD BLD: ABNORMAL
EOSINOPHIL # BLD AUTO: 0 K/UL (ref 0–0.5)
EOSINOPHIL NFR BLD: 0.3 % (ref 0–8)
ERYTHROCYTE [DISTWIDTH] IN BLOOD BY AUTOMATED COUNT: 13.2 % (ref 11.5–14.5)
ERYTHROCYTE [SEDIMENTATION RATE] IN BLOOD BY WESTERGREN METHOD: 16 MM/H
EST. GFR  (NO RACE VARIABLE): >60 ML/MIN/1.73 M^2
FIO2: 40
GLUCOSE SERPL-MCNC: 106 MG/DL (ref 70–110)
HCO3 UR-SCNC: 31.2 MMOL/L (ref 24–28)
HCT VFR BLD AUTO: 29.1 % (ref 40–54)
HGB BLD-MCNC: 9.2 G/DL (ref 14–18)
IMM GRANULOCYTES # BLD AUTO: 0.05 K/UL (ref 0–0.04)
IMM GRANULOCYTES NFR BLD AUTO: 0.6 % (ref 0–0.5)
LYMPHOCYTES # BLD AUTO: 1 K/UL (ref 1–4.8)
LYMPHOCYTES NFR BLD: 11.1 % (ref 18–48)
MAGNESIUM SERPL-MCNC: 2.3 MG/DL (ref 1.6–2.6)
MCH RBC QN AUTO: 30.6 PG (ref 27–31)
MCHC RBC AUTO-ENTMCNC: 31.6 G/DL (ref 32–36)
MCV RBC AUTO: 97 FL (ref 82–98)
MODE: ABNORMAL
MONOCYTES # BLD AUTO: 1.1 K/UL (ref 0.3–1)
MONOCYTES NFR BLD: 12.4 % (ref 4–15)
NEUTROPHILS # BLD AUTO: 6.8 K/UL (ref 1.8–7.7)
NEUTROPHILS NFR BLD: 75.4 % (ref 38–73)
NRBC BLD-RTO: 0 /100 WBC
PCO2 BLDA: 48.6 MMHG (ref 35–45)
PEEP: 5
PH SMN: 7.42 [PH] (ref 7.35–7.45)
PHOSPHATE SERPL-MCNC: 3.1 MG/DL (ref 2.7–4.5)
PLATELET # BLD AUTO: 391 K/UL (ref 150–450)
PMV BLD AUTO: 8.6 FL (ref 9.2–12.9)
PO2 BLDA: 107 MMHG (ref 80–100)
POC BE: 7 MMOL/L
POC SATURATED O2: 98 % (ref 95–100)
POC TCO2: 33 MMOL/L (ref 23–27)
POTASSIUM SERPL-SCNC: 4.4 MMOL/L (ref 3.5–5.1)
PROT SERPL-MCNC: 6.7 G/DL (ref 6–8.4)
RBC # BLD AUTO: 3.01 M/UL (ref 4.6–6.2)
SAMPLE: ABNORMAL
SITE: ABNORMAL
SODIUM SERPL-SCNC: 144 MMOL/L (ref 136–145)
SPECIMEN OUTDATE: NORMAL
VT: 400
WBC # BLD AUTO: 9.04 K/UL (ref 3.9–12.7)

## 2024-01-16 PROCEDURE — 82803 BLOOD GASES ANY COMBINATION: CPT

## 2024-01-16 PROCEDURE — 86850 RBC ANTIBODY SCREEN: CPT | Performed by: PHYSICIAN ASSISTANT

## 2024-01-16 PROCEDURE — 80053 COMPREHEN METABOLIC PANEL: CPT

## 2024-01-16 PROCEDURE — 20000000 HC ICU ROOM

## 2024-01-16 PROCEDURE — 25000003 PHARM REV CODE 250: Performed by: NURSE PRACTITIONER

## 2024-01-16 PROCEDURE — 94003 VENT MGMT INPAT SUBQ DAY: CPT

## 2024-01-16 PROCEDURE — 27100171 HC OXYGEN HIGH FLOW UP TO 24 HOURS

## 2024-01-16 PROCEDURE — 94668 MNPJ CHEST WALL SBSQ: CPT

## 2024-01-16 PROCEDURE — 99900035 HC TECH TIME PER 15 MIN (STAT)

## 2024-01-16 PROCEDURE — 95718 EEG PHYS/QHP 2-12 HR W/VEEG: CPT | Mod: ,,, | Performed by: PSYCHIATRY & NEUROLOGY

## 2024-01-16 PROCEDURE — 25000003 PHARM REV CODE 250

## 2024-01-16 PROCEDURE — 25000242 PHARM REV CODE 250 ALT 637 W/ HCPCS

## 2024-01-16 PROCEDURE — 85025 COMPLETE CBC W/AUTO DIFF WBC: CPT

## 2024-01-16 PROCEDURE — 99900026 HC AIRWAY MAINTENANCE (STAT)

## 2024-01-16 PROCEDURE — 63600175 PHARM REV CODE 636 W HCPCS

## 2024-01-16 PROCEDURE — 27200966 HC CLOSED SUCTION SYSTEM

## 2024-01-16 PROCEDURE — 25000003 PHARM REV CODE 250: Performed by: PSYCHIATRY & NEUROLOGY

## 2024-01-16 PROCEDURE — 82800 BLOOD PH: CPT

## 2024-01-16 PROCEDURE — 99291 CRITICAL CARE FIRST HOUR: CPT | Mod: ,,, | Performed by: PSYCHIATRY & NEUROLOGY

## 2024-01-16 PROCEDURE — 84100 ASSAY OF PHOSPHORUS: CPT | Performed by: PHYSICIAN ASSISTANT

## 2024-01-16 PROCEDURE — 83735 ASSAY OF MAGNESIUM: CPT | Performed by: PHYSICIAN ASSISTANT

## 2024-01-16 PROCEDURE — 94640 AIRWAY INHALATION TREATMENT: CPT

## 2024-01-16 PROCEDURE — 94761 N-INVAS EAR/PLS OXIMETRY MLT: CPT

## 2024-01-16 PROCEDURE — 95711 VEEG 2-12 HR UNMONITORED: CPT

## 2024-01-16 PROCEDURE — C9254 INJECTION, LACOSAMIDE: HCPCS

## 2024-01-16 PROCEDURE — 36600 WITHDRAWAL OF ARTERIAL BLOOD: CPT

## 2024-01-16 RX ADMIN — CLOBAZAM 20 MG: 10 TABLET ORAL at 08:01

## 2024-01-16 RX ADMIN — ENOXAPARIN SODIUM 40 MG: 40 INJECTION SUBCUTANEOUS at 05:01

## 2024-01-16 RX ADMIN — BISACODYL 10 MG: 10 SUPPOSITORY RECTAL at 08:01

## 2024-01-16 RX ADMIN — LACOSAMIDE 200 MG: 10 INJECTION INTRAVENOUS at 01:01

## 2024-01-16 RX ADMIN — MUPIROCIN: 20 OINTMENT TOPICAL at 08:01

## 2024-01-16 RX ADMIN — LEVETIRACETAM 1500 MG: 100 INJECTION INTRAVENOUS at 08:01

## 2024-01-16 RX ADMIN — POLYETHYLENE GLYCOL 3350 17 G: 17 POWDER, FOR SOLUTION ORAL at 08:01

## 2024-01-16 RX ADMIN — BUDESONIDE INHALATION 0.25 MG: 0.5 SUSPENSION RESPIRATORY (INHALATION) at 07:01

## 2024-01-16 RX ADMIN — IPRATROPIUM BROMIDE AND ALBUTEROL SULFATE 3 ML: .5; 3 SOLUTION RESPIRATORY (INHALATION) at 07:01

## 2024-01-16 RX ADMIN — SENNOSIDES AND DOCUSATE SODIUM 1 TABLET: 8.6; 5 TABLET ORAL at 08:01

## 2024-01-16 RX ADMIN — SILODOSIN 4 MG: 4 CAPSULE ORAL at 08:01

## 2024-01-16 RX ADMIN — FAMOTIDINE 20 MG: 20 TABLET, FILM COATED ORAL at 08:01

## 2024-01-16 RX ADMIN — MONTELUKAST 10 MG: 10 TABLET, FILM COATED ORAL at 08:01

## 2024-01-16 RX ADMIN — IPRATROPIUM BROMIDE AND ALBUTEROL SULFATE 3 ML: .5; 3 SOLUTION RESPIRATORY (INHALATION) at 12:01

## 2024-01-16 NOTE — ASSESSMENT & PLAN NOTE
Cardiac arrest on 01/01 requiring 3 rounds of CPR, Epi x 3, and 1 shock to obtain ROSC  Likely respiratory etiology of arrest 2/2 asthma exacerbation   S/p TTM, maintain normothermia  MRI x 2 concerning for anoxic injury  Long term prognosis poor given imaging findings, persistent malignant patterns on cEEG>2 wks out from arrest

## 2024-01-16 NOTE — ASSESSMENT & PLAN NOTE
Patient made DNR at [facility]  Family chose to transfer patient for initiation of 4th AED trial, forgoing comfort measures at this time per chart review  1/13: family aware of prognosis if this trial of anaesthetics and additional scheduled AED is not helpful  1/15: Discussed with mother and sister at bedside, prognosis poor, see ACP note for details. Per family, requesting until end of week to make decision re trach/PEG vs comfort

## 2024-01-16 NOTE — SUBJECTIVE & OBJECTIVE
Interval History:  Please see hospital course above for full details    Review of Systems   Unable to perform ROS: Intubated       Objective:     Vitals:  Temp: 99.5 °F (37.5 °C)  Pulse: 90  Rhythm: normal sinus rhythm  BP: (!) 109/59  MAP (mmHg): 80  Resp: 18  SpO2: 100 %  Oxygen Concentration (%): 40  Vent Mode: A/C  Set Rate: 16 BPM  Vt Set: 400 mL  PEEP/CPAP: 5 cmH20  Peak Airway Pressure: 26 cmH20  Mean Airway Pressure: 8.5 cmH20  Plateau Pressure: 0 cmH20    Temp  Min: 97.7 °F (36.5 °C)  Max: 100.4 °F (38 °C)  Pulse  Min: 89  Max: 115  BP  Min: 100/58  Max: 154/90  MAP (mmHg)  Min: 71  Max: 115  Resp  Min: 16  Max: 40  SpO2  Min: 96 %  Max: 100 %  Oxygen Concentration (%)  Min: 40  Max: 40    01/15 0701 - 01/16 0700  In: 1465.3 [I.V.:209]  Out: 2315 [Urine:2315]   Unmeasured Output  Urine Occurrence: 2  Stool Occurrence: 1  Pad Count: 2        Physical Exam  General Appearance: Middle aged gentleman lying in bed, appears older than stated age. Not in acute distress, on fent @ 50 mcg/hr for vent synchrony. Not overbreathing vent. Intermittently noted to cough and spontaneously move lower extremities, however no purposeful/goal directed movement noted   Mental Status Exam: No response to verbal or noxious stimuli. Not tracking or regarding, not following commands  Cranial Nerves: Pupils 4->2 mm and sluggishly reactive b/l, gaze midline. +corneals to saline b/l, weak OCRs. Absent gag, +cough   Motor: No movement in b/l upper extremities. B/l lower extremities intermittently moving spontaneously within plane of bed, LLE>RLE, however no purposeful movement noted, moves towards proximal noxious stimuli on testing  Sensory: No response to noxious x4 extremities   Coordination: Unable to assess   Vascular: S1/S2 of normal intensity, no S3/S4 appreciated, no murmurs appreciated  Lungs: Coarse breath sounds b/l, no wheezing  Abdomen: Soft, non-distended, non-tender, BS +       Medications:  Continuousfentanyl, Last  Rate: 75 mcg/hr (01/16/24 1601)    Scheduledalbuterol-ipratropium, 3 mL, Q6H  bisacodyL, 10 mg, Daily  budesonide, 0.25 mg, Q12H  cloBAZam, 20 mg, BID  enoxparin, 40 mg, Q24H (prophylaxis, 1700)  famotidine, 20 mg, BID  lacosamide (VIMPAT) IVPB, 200 mg, Q12H  levETIRAcetam (Keppra) IV (PEDS and ADULTS), 1,500 mg, Q12H  montelukast, 10 mg, Daily  mupirocin, , BID  polyethylene glycol, 17 g, Daily  senna-docusate 8.6-50 mg, 1 tablet, Daily  silodosin, 4 mg, Daily    PRNacetaminophen, 650 mg, Q6H PRN  fentanyl, 50 mcg, Q1H PRN  hydrALAZINE, 10 mg, Q4H PRN  labetalol, 10 mg, Q4H PRN  magnesium oxide, 800 mg, PRN  magnesium oxide, 800 mg, PRN  potassium bicarbonate, 35 mEq, PRN  potassium bicarbonate, 50 mEq, PRN  potassium bicarbonate, 60 mEq, PRN  potassium, sodium phosphates, 2 packet, PRN  potassium, sodium phosphates, 2 packet, PRN  potassium, sodium phosphates, 2 packet, PRN      Today I personally reviewed pertinent imaging, laboratory results, notably: cEEG unchanged from prior, remains w/ frequent GPDs on malignant background, increasing in frequency w/ stimulation w/o meeting criteria for NCSE. CBC unremarkable, no leukocytosis, Hb/platelets stable. CMP w/ downtrending transaminitis, AST/ALT 51/107, otherwise unremarkable    Diet  Diet NPO  Diet NPO  TF at goal

## 2024-01-16 NOTE — PROGRESS NOTES
EEG Hook up  AM Check Electrodes had to be fixed.Yes  Fz,cz,c3,loc  Skin Integrity: Normal     Giovanni Herrera   01/16/2024 9:51 AM

## 2024-01-16 NOTE — ASSESSMENT & PLAN NOTE
52 y/o M 12 days post respiratory failure c/b cardiac arrest (Epi x 3, CPR x 3, Shock x 1) with RSE in setting of propofol wean despite continued keppra, vimpat, depacon administration. He arrived to [facility] on 80 mcg/kg/min of non-titrating propofol [facility] which was dropped to 50 mcg/kg/min, followed by the immediate addition of 25 mcg/kg/min of ketamine. EEG cap placed and epilepsy notified.    -Propofol d/c'd 1/13, ketamine d/c'd 1/14  -Unchanged frequent GPDs overnight, on ictal interictal spectrum w/o ellis NCSE -> d/c EEG  -Epilepsy consult, appreciate recs  -Q1h neurochecks, vital checks  -Daily CBC, CMP, Mag, Phos  -MRI x 2 with T2 BG hyperintensity bilaterally  -Continue keppra 1500 mg bid, Vimpat 200 mg bid, onfi 20 mg BID

## 2024-01-16 NOTE — ASSESSMENT & PLAN NOTE
Patient with Hypercapnic and Hypoxic Respiratory failure which is Acute. He is not on home oxygen. Supplemental oxygen was provided and noted-   Vent Mode: A/C  Oxygen Concentration (%):  [40] 40  Resp Rate Total:  [16 br/min-17 br/min] 16 br/min  Vt Set:  [400 mL] 400 mL  PEEP/CPAP:  [5 cmH20] 5 cmH20  Mean Airway Pressure:  [8.2 cmH20-9.7 cmH20] 8.5 cmH20    Daily CXR, ABG  Will need trach for airway protection if family electing to proceed w/ aggressive care  See asthma/COPD  Likely not 2/2 infectious source  - s/p Azithromycin 1/2-1/3  - s/p Ceftriaxone 1/2-1/3  - s/p Pip-Tazo 1/3-1/6  - s/p Vanc 1/3-1/6

## 2024-01-16 NOTE — PROGRESS NOTES
"[facility]  Neurocritical Care  Progress Note    Admit Date: 1/12/2024  Service Date: 01/16/2024  Length of Stay: 4    Subjective:     Chief Complaint: New onset refractory status epilepticus    History of Present Illness: Mr. Aaron Pruitt is a 53 year old M with PMHx significant for COPD/Asthma c/b smoking and HTN who presents to [facility] for NCSE. He initially presented to [facility] ER on 01/01 after being intubated in the field following acute respiratory failure post cardiac arrest s/p resuscitation and shock x1. Per chart review, he was shoveling some dirt in his yard when he got severely short of breath and suddenly had difficulty breathing. EMS was activated by his neighbors. Upon EMS arrival, he reportedly still had a pulse, though with severe respiratory distress, and subsequently became pulseless. He was given narcan x2 without improvement. CPR and Epi x3. ROSC after 3rd round of CPR with shock x 1. CT head on arrival to [facility] without acute intracranial abnormality, and CT chest without acute pulmonary process.TTM was initiated on 01/03 with fentanyl and propofol gtts for sedation, and rewarming was initiated on 01/04 with subsequent weaning of sedation. During this period, myoclonus was clinically noted, and propofol was resumed. EEG showed NCSE, prompting the initiation of keppra and vimpat with continuation of propofol. On 01/06, there was slight improvement in EEG, with NCSE noted to be "partially treated," at which time, the patient was then loaded with depacon. EEG was improved leading to weaning of propofol with eventual return to [facility]. MRI completed 01/08 and 01/11 completed with with GoC conversation held at [facility]. Family opted to forgo comfort measures at this point and transfer to [facility] for trial of 4th AED, per chart review. He arrived to [facility] on 80 mcg/kg/min of non-titrating propofol from [facility] which was dropped to 50 mcg/kg/min, followed by the immediate addition of 25 mcg/kg/min of " ketamine. EEG cap placed.     He will be admitted to [facility] for hourly neuromonitoring and a higher level of care.    Hospital Course: 01/13: no electrographic seizures reported, triglicerides increased into low 500s, propofol decreased to 30mcg, decrease further in 4 hrs if EEG unchanged, cont ketamine at 50mcg/kg and scheduled AEDs  01/14: EEG mostly suppressed, off propofol, decrease keppra to 30mcg today with potential to turn off in am  Escalate clobazam to 20mg qday and dc depakote, cont keppra and vimpat at current doses, plan to update family today, although improvement in seizure control, no change in neuro exam  01/15/2024 Increased GPDs s/p discontinuing ketamine gtt, onfi increased to BID. Awaiting arrival of family for GOC conversation  01/16/2024 cEEG unchanged overnight, remains on ictal-interictal spectrum, d/c'd. Neuro exam unchanged, remains w/ brainstem reflexes intact, intermittent non-purposeful movements of extremities. Ongoing GOC discussions with family     Interval History:  Please see hospital course above for full details    Review of Systems   Unable to perform ROS: Intubated       Objective:     Vitals:  Temp: 99.5 °F (37.5 °C)  Pulse: 90  Rhythm: normal sinus rhythm  BP: (!) 109/59  MAP (mmHg): 80  Resp: 18  SpO2: 100 %  Oxygen Concentration (%): 40  Vent Mode: A/C  Set Rate: 16 BPM  Vt Set: 400 mL  PEEP/CPAP: 5 cmH20  Peak Airway Pressure: 26 cmH20  Mean Airway Pressure: 8.5 cmH20  Plateau Pressure: 0 cmH20    Temp  Min: 97.7 °F (36.5 °C)  Max: 100.4 °F (38 °C)  Pulse  Min: 89  Max: 115  BP  Min: 100/58  Max: 154/90  MAP (mmHg)  Min: 71  Max: 115  Resp  Min: 16  Max: 40  SpO2  Min: 96 %  Max: 100 %  Oxygen Concentration (%)  Min: 40  Max: 40    01/15 0701 - 01/16 0700  In: 1465.3 [I.V.:209]  Out: 2315 [Urine:2315]   Unmeasured Output  Urine Occurrence: 2  Stool Occurrence: 1  Pad Count: 2        Physical Exam  General Appearance: Middle aged gentleman lying in bed, appears older than  stated age. Not in acute distress, on fent @ 50 mcg/hr for vent synchrony. Not overbreathing vent. Intermittently noted to cough and spontaneously move lower extremities, however no purposeful/goal directed movement noted   Mental Status Exam: No response to verbal or noxious stimuli. Not tracking or regarding, not following commands  Cranial Nerves: Pupils 4->2 mm and sluggishly reactive b/l, gaze midline. +corneals to saline b/l, weak OCRs. Absent gag, +cough   Motor: No movement in b/l upper extremities. B/l lower extremities intermittently moving spontaneously within plane of bed, LLE>RLE, however no purposeful movement noted, moves towards proximal noxious stimuli on testing  Sensory: No response to noxious x4 extremities   Coordination: Unable to assess   Vascular: S1/S2 of normal intensity, no S3/S4 appreciated, no murmurs appreciated  Lungs: Coarse breath sounds b/l, no wheezing  Abdomen: Soft, non-distended, non-tender, BS +       Medications:  Continuousfentanyl, Last Rate: 75 mcg/hr (01/16/24 1601)    Scheduledalbuterol-ipratropium, 3 mL, Q6H  bisacodyL, 10 mg, Daily  budesonide, 0.25 mg, Q12H  cloBAZam, 20 mg, BID  enoxparin, 40 mg, Q24H (prophylaxis, 1700)  famotidine, 20 mg, BID  lacosamide (VIMPAT) IVPB, 200 mg, Q12H  levETIRAcetam (Keppra) IV (PEDS and ADULTS), 1,500 mg, Q12H  montelukast, 10 mg, Daily  mupirocin, , BID  polyethylene glycol, 17 g, Daily  senna-docusate 8.6-50 mg, 1 tablet, Daily  silodosin, 4 mg, Daily    PRNacetaminophen, 650 mg, Q6H PRN  fentanyl, 50 mcg, Q1H PRN  hydrALAZINE, 10 mg, Q4H PRN  labetalol, 10 mg, Q4H PRN  magnesium oxide, 800 mg, PRN  magnesium oxide, 800 mg, PRN  potassium bicarbonate, 35 mEq, PRN  potassium bicarbonate, 50 mEq, PRN  potassium bicarbonate, 60 mEq, PRN  potassium, sodium phosphates, 2 packet, PRN  potassium, sodium phosphates, 2 packet, PRN  potassium, sodium phosphates, 2 packet, PRN      Today I personally reviewed pertinent imaging, laboratory  results, notably: cEEG unchanged from prior, remains w/ frequent GPDs on malignant background, increasing in frequency w/ stimulation w/o meeting criteria for NCSE. CBC unremarkable, no leukocytosis, Hb/platelets stable. CMP w/ downtrending transaminitis, AST/ALT 51/107, otherwise unremarkable    Diet  Diet NPO  Diet NPO  TF at goal     Assessment/Plan:     Neuro  * New onset refractory status epilepticus  52 y/o M 12 days post respiratory failure c/b cardiac arrest (Epi x 3, CPR x 3, Shock x 1) with RSE in setting of propofol wean despite continued keppra, vimpat, depacon administration. [place] [facility] [place] 80 mcg/kg/min of non-titrating propofol from [facility] which was dropped to 50 mcg/kg/min, followed by the immediate addition of 25 mcg/kg/min of ketamine. EEG cap placed and epilepsy notified.    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  No improvement in exam with improvement in seizures  Ongoing GOC with family  Prognosis poor     Acute encephalopathy  See primary problem, known NCSE post arrest  CT head unremarkable on admission to [facility]  MRI x 2 with T2 BG hyperintensity bilaterally -> c/w anoxic injury   Daily ABG, pH WNL  As the majority of this patient's metabolic derangements were corrected at [facility], suspect encephalopathy 2/2 anoxic injury/NCSE    Seizure disorder, nonconvulsive, with status epilepticus  See primary problem     Pulmonary  COPD (chronic obstructive pulmonary disease)  Known history of smoking  C/w duonebs  Daily CXR, ABG while intubated  O2 Sat goal 88%-92%    Severe asthma  Known hx of asthma with acute onset SOB/hypoxia while shoveling dirt  leading to respiratory/cardiac arrest  C/w home singulair  Continue duonebs/ inhaled steroids  Ceftriaxone/Azithromycin initiated by [facility] then d/c prior to transfer in setting of clear CXR, presently off emperic abx      Acute respiratory failure with hypoxia and hypercarbia  Patient with Hypercapnic and Hypoxic Respiratory failure which is Acute. He is not on home oxygen. Supplemental oxygen was provided and noted-   Vent Mode: A/C  Oxygen Concentration (%):  [40] 40  Resp Rate Total:  [16 br/min-17 br/min] 16 br/min  Vt Set:  [400 mL] 400 mL  PEEP/CPAP:  [5 cmH20] 5 cmH20  Mean Airway Pressure:  [8.2 cmH20-9.7 cmH20] 8.5 cmH20    Daily CXR, ABG  Will need trach for airway protection if family electing to proceed w/ aggressive care  See asthma/COPD  Likely not 2/2 infectious source  - s/p Azithromycin 1/2-1/3  - s/p Ceftriaxone 1/2-1/3  - s/p Pip-Tazo 1/3-1/6  - s/p Vanc 1/3-1/6       Cardiac/Vascular  Essential hypertension  Hx of     SBP < 180  PRN labetalol, hydralazine  Presently normotensive on no meds    Cardiorespiratory arrest  Cardiac arrest on 01/01 requiring 3 rounds of CPR, Epi x 3, and 1 shock to obtain ROSC  Likely respiratory etiology of arrest 2/2 asthma exacerbation   S/p TTM, maintain normothermia  MRI x 2 concerning for anoxic injury  Long term prognosis poor given imaging findings, persistent malignant patterns on cEEG>2 wks out from arrest    GI  Transaminitis  Likely DILI vs shock liver    - Avoid hepatotoxic medications  - Trend     Palliative Care  ACP (advance care planning)  Patient made DNR at [facility]  Family chose to transfer patient for initiation of 4th AED trial, forgoing comfort measures at this time per chart review  1/13: family aware of prognosis if this trial of anaesthetics and additional scheduled AED is not helpful  1/15: Discussed with mother and sister at bedside, prognosis poor, see ACP note for details. Per family, requesting until end of week to make decision re trach/PEG vs  comfort          The patient is being Prophylaxed for:  Venous Thromboembolism with: Mechanical or Chemical  Stress Ulcer with: H2B  Ventilator Pneumonia with: chlorhexidine oral care    Activity Orders            Elevate HOB Elevate (30-45 degrees) Elevate HOB to 30 - 45 degrees during feeding unless otherwise stated starting at 01/12 1011    Turn patient starting at 01/12 0724    Elevate HOB starting at 01/12 0008    Diet NPO: NPO starting at 01/12 0008          DNR    Uninterrupted Critical Care/Counseling Time (not including procedures): 45 minutes  There is high probability for acute neurological change leading to clinical and possibly life-threatening deterioration requiring highest level of physician preparedness for urgent intervention. Critical care time was spent personally by me on the following activities: development of treatment plan with patient or surrogate and bedside caregivers, discussions with consultants, evaluation of patient's response to treatment, examination of patient, ordering and performing treatments and interventions, ordering and review of laboratory studies, ordering and review of radiographic studies, pulse oximetry, antibiotic titration if applicable, vasopressor titration if applicable, re-evaluation of patient's condition. I spent greater than 50% of the documented critical care time assessing and making medical decisions for this patient.       Angelica Garrison MD  Neurocritical Care  [facility]

## 2024-01-16 NOTE — ASSESSMENT & PLAN NOTE
See primary problem, known NCSE post arrest  CT head unremarkable on admission to [facility]  MRI x 2 with T2 BG hyperintensity bilaterally -> c/w anoxic injury   Daily ABG, pH WNL  As the majority of this patient's metabolic derangements were corrected at [facility], suspect encephalopathy 2/2 anoxic injury/NCSE

## 2024-01-16 NOTE — PLAN OF CARE
".[facility] Care Plan    POC reviewed with Aaron Pruitt and family at 0400. Pt unable to verbalized understanding due do ett and sedation. Questions and concerns addressed with fiance at bedside. No acute events overnight. Pt progressing toward goals. Will continue to monitor. See below and flowsheets for full assessment and VS info.     -Fentanyl gtt  -Fentanyl PRN bolus x2  -CHG bed bath and linen change complete  -tube feed at goal. 50cc/hr      Is this a stroke patient? no    Neuro:  [place] Coma Scale  Best Eye Response: 2-->(E2) to pain  Best Motor Response: 1-->(M1) none  Best Verbal Response: 1-->(V1) none  [place] Coma Scale Score: 4  Assessment Qualifiers: no eye obstruction present, patient chemically sedated or paralyzed, patient intubated  Pupil PERRLA: yes     24hr Temp:  [97.2 °F (36.2 °C)-99.9 °F (37.7 °C)]     CV:   Rhythm: normal sinus rhythm  BP goals:   SBP < 180  MAP > 65    Resp:      Vent Mode: A/C  Set Rate: 16 BPM  Oxygen Concentration (%): 40  Vt Set: 400 mL  PEEP/CPAP: 5 cmH20    Plan: wean to extubate    GI/:     Diet/Nutrition Received: NPO, tube feeding  Last Bowel Movement: 01/15/24  Voiding Characteristics: external catheter, incontinence    Intake/Output Summary (Last 24 hours) at 1/16/2024 0422  Last data filed at 1/16/2024 0302  Gross per 24 hour   Intake 1443.17 ml   Output 2315 ml   Net -871.83 ml     Unmeasured Output  Urine Occurrence: 2  Stool Occurrence: 1  Pad Count: 2    Labs/Accuchecks:  Recent Labs   Lab 01/16/24  0058   WBC 9.04   RBC 3.01*   HGB 9.2*   HCT 29.1*         Recent Labs   Lab 01/16/24  0058      K 4.4   CO2 28      BUN 33*   CREATININE 0.7   ALKPHOS 94   *   AST 51*   BILITOT 0.5    No results for input(s): "PROTIME", "INR", "APTT", "HEPANTIXA" in the last 168 hours. No results for input(s): "CPK", "CPKMB", "TROPONINI", "MB" in the last 168 hours.    Electrolytes: N/A - electrolytes WDL  Accuchecks: none    Gtts:   fentanyl 75 mcg/hr " (01/16/24 0301)       LDA/Wounds:  Lines/Drains/Airways       Drain  Duration                  NG/OG Tube 01/01/24 1722 Medon sump 16 Fr. Left nostril 14 days    Male External Urinary Catheter 01/14/24 2 days              Airway  Duration                  Airway - Non-Surgical Endotracheal Tube -- days              Peripheral Intravenous Line  Duration                  Peripheral IV - Single Lumen 01/12/24 0215 20 G Distal;Left;Posterior Forearm 4 days         Peripheral IV - Single Lumen 01/14/24 1510 22 G Anterior;Left Forearm 1 day         Peripheral IV - Single Lumen 01/15/24 2100 20 G Posterior;Right Forearm <1 day                  Wounds: Yes  Wound care consulted: Yes

## 2024-01-16 NOTE — PLAN OF CARE
[facility]  Discharge Reassessment    Primary Care Provider: Gogo Vivar NP    Expected Discharge Date: 1/19/2024    Reassessment (most recent)       Discharge Reassessment - 01/16/24 1750          Discharge Reassessment    Assessment Type Discharge Planning Assessment     Did the patient's condition or plan change since previous assessment? No     Discharge Plan discussed with: Patient     Name(s) and Number(s) Amanda Tobias (Mother) [phone]     Communicated MARQUISE with patient/caregiver Date not available/Unable to determine     Discharge Plan A Home Health     Discharge Plan B Home Health     DME Needed Upon Discharge  other (see comments)     Transition of Care Barriers None     Why the patient remains in the hospital Requires continued medical care        Post-Acute Status    Coverage [place] [employer]     Discharge Delays None known at this time                     Pt is not medically ready to dc. Pt's discharge plan is tentative and will be updated as POC and GOC changes. Per MD: Pt's neuro exam remains unchanged at this time.    No other discharge needs identified at this time.    Discharge Plan A and Plan B have been determined by review of patient's clinical status, future medical and therapeutic needs, and coverage/benefits for post-acute care in coordination with multidisciplinary team members.     Fely Regan LMSW  Case Management [facility]  [phone]

## 2024-01-16 NOTE — PROCEDURES
EEG REPORT      Aaron Pruitt  [id number]  1970    DATE OF SERVICE: 1/15/2024    [facility] -4    METHODOLOGY      Extended electroencephalographic recording is made while the patient is ambulatory and continuing normal daily activities.  Electrodes are placed according to the International 10-20 placement system and included T1 and T2 electrode placement.  Twenty four (24) channels of digital signal (sampling rate of 512/sec) was simultaneously recorded from the scalp including EKG and eye monitors.  Recording band pass was 0.1 to 100 hz and all data was stored digitally on the recorder.  The patient is instructed to press an event button when clinical symptoms occur and write the symptoms into a diary. Activation procedures which include photic stimulation, hyperventilation and instructing patients to perform simple task are done in selected patients.        The EEG is displayed on a monitor screen and can be reformatted into different montages for evaluation.  The entire recoding is submitted for computer assisted analysis to detect spike and electrographic seizure activity.  The entire recording is visually reviewed and the times identified by computer analysis as being spikes or seizures are reviewed again.  Compresses spectral analysis (CSA) is also performed on the activity recorded from each individual channel.  This is displayed as a power display of frequencies from 0 to 30 Hz over time.   The CSA analysis is done and displayed continuously.  This is reviewed for asymmetries in power between homologous areas of the scalp and for presence of changes in power which canbe seen when seizures occur.  Sections of suspected abnormalities on the CSA is then compared with the original EEG recording.  .     [employer] software was also utilized in the review of this study.  This software suite analyzes the EEG recording in multiple domains.  Coherence and rhythmicity is computed to identify EEG sections which may  contain organized seizures.  Each channel undergoes analysis to detect presence of spike and sharp waves which have special and morphological characteristic of epileptic activity.  The routine EEG recording is converted from spacial into frequency domain.  This is then displayed comparing homologous areas to identify areas of significant asymmetry.  Algorithm to identify non-cortically generated artifact is used to separate eye movement, EMG and other artifact from the EEG     Recording Times  Start on 1/15/2024  Stop on 1/16/2024    A total of 23:56:11 hours of EEG was recorded.      EEG FINDINGS:    The patient is maintained on levetiracetam 1500mg bid, lacosamide 200mg bid, and clobazam 20mg bid throughout the recording.    Background activity:   The background rhythm was characterized by approximately 2Hz rhythmic delta activity.   Symmetry and continuity: the background was continuous and symmetric     Sleep:   No sleep transients although arousal noted.    Activation procedures:   NA    Abnormal activity:   There are continuous generalized periodic discharges waxing and waning from 0.5 to 2Hz with increased frequency noted with arousal.    There are event marks at 20:43 and 20:49 for chest thrusting with no EEG change    IMPRESSION:   Abnormal EEG due to moderate generalized cerebral dysfunction with ongoing generalized cortical irritability on the ictal-interictal continuum with risk of conversion to non-convulsive status epilepticus      Jamie Amin MD  Neurology-Epilepsy.  [facility].

## 2024-01-17 PROBLEM — M62.82 RHABDOMYOLYSIS: Status: RESOLVED | Noted: 2024-01-02 | Resolved: 2024-01-17

## 2024-01-17 PROBLEM — E87.0 HYPERNATREMIA: Status: ACTIVE | Noted: 2024-01-17

## 2024-01-17 LAB
ALBUMIN SERPL BCP-MCNC: 2.6 G/DL (ref 3.5–5.2)
ALLENS TEST: ABNORMAL
ALLENS TEST: ABNORMAL
ALP SERPL-CCNC: 98 U/L (ref 55–135)
ALT SERPL W/O P-5'-P-CCNC: 142 U/L (ref 10–44)
ANION GAP SERPL CALC-SCNC: 10 MMOL/L (ref 8–16)
AST SERPL-CCNC: 94 U/L (ref 10–40)
BASOPHILS # BLD AUTO: 0.02 K/UL (ref 0–0.2)
BASOPHILS NFR BLD: 0.3 % (ref 0–1.9)
BILIRUB SERPL-MCNC: 0.5 MG/DL (ref 0.1–1)
BUN SERPL-MCNC: 32 MG/DL (ref 6–20)
CALCIUM SERPL-MCNC: 9.8 MG/DL (ref 8.7–10.5)
CHLORIDE SERPL-SCNC: 110 MMOL/L (ref 95–110)
CO2 SERPL-SCNC: 26 MMOL/L (ref 23–29)
CREAT SERPL-MCNC: 0.7 MG/DL (ref 0.5–1.4)
DELSYS: ABNORMAL
DELSYS: ABNORMAL
DIFFERENTIAL METHOD BLD: ABNORMAL
EOSINOPHIL # BLD AUTO: 0 K/UL (ref 0–0.5)
EOSINOPHIL NFR BLD: 0.6 % (ref 0–8)
ERYTHROCYTE [DISTWIDTH] IN BLOOD BY AUTOMATED COUNT: 13.2 % (ref 11.5–14.5)
ERYTHROCYTE [SEDIMENTATION RATE] IN BLOOD BY WESTERGREN METHOD: 16 MM/H
ERYTHROCYTE [SEDIMENTATION RATE] IN BLOOD BY WESTERGREN METHOD: 18 MM/H
EST. GFR  (NO RACE VARIABLE): >60 ML/MIN/1.73 M^2
FIO2: 40
FIO2: 40
GLUCOSE SERPL-MCNC: 111 MG/DL (ref 70–110)
HCO3 UR-SCNC: 30.9 MMOL/L (ref 24–28)
HCO3 UR-SCNC: 32 MMOL/L (ref 24–28)
HCT VFR BLD AUTO: 28.5 % (ref 40–54)
HGB BLD-MCNC: 9.4 G/DL (ref 14–18)
IMM GRANULOCYTES # BLD AUTO: 0.02 K/UL (ref 0–0.04)
IMM GRANULOCYTES NFR BLD AUTO: 0.3 % (ref 0–0.5)
LYMPHOCYTES # BLD AUTO: 0.9 K/UL (ref 1–4.8)
LYMPHOCYTES NFR BLD: 13 % (ref 18–48)
MAGNESIUM SERPL-MCNC: 2.3 MG/DL (ref 1.6–2.6)
MCH RBC QN AUTO: 31.2 PG (ref 27–31)
MCHC RBC AUTO-ENTMCNC: 33 G/DL (ref 32–36)
MCV RBC AUTO: 95 FL (ref 82–98)
MIN VOL: 10.1
MODE: ABNORMAL
MODE: ABNORMAL
MONOCYTES # BLD AUTO: 0.8 K/UL (ref 0.3–1)
MONOCYTES NFR BLD: 12.6 % (ref 4–15)
NEUTROPHILS # BLD AUTO: 4.9 K/UL (ref 1.8–7.7)
NEUTROPHILS NFR BLD: 73.2 % (ref 38–73)
NRBC BLD-RTO: 0 /100 WBC
PCO2 BLDA: 50.2 MMHG (ref 35–45)
PCO2 BLDA: 50.8 MMHG (ref 35–45)
PEEP: 5
PEEP: 5
PH SMN: 7.4 [PH] (ref 7.35–7.45)
PH SMN: 7.41 [PH] (ref 7.35–7.45)
PHOSPHATE SERPL-MCNC: 3.4 MG/DL (ref 2.7–4.5)
PIP: 23
PLATELET # BLD AUTO: 386 K/UL (ref 150–450)
PMV BLD AUTO: 8.9 FL (ref 9.2–12.9)
PO2 BLDA: 147 MMHG (ref 80–100)
PO2 BLDA: 183 MMHG (ref 80–100)
POC BE: 6 MMOL/L
POC BE: 7 MMOL/L
POC SATURATED O2: 100 % (ref 95–100)
POC SATURATED O2: 99 % (ref 95–100)
POC TCO2: 32 MMOL/L (ref 23–27)
POC TCO2: 34 MMOL/L (ref 23–27)
POCT GLUCOSE: 122 MG/DL (ref 70–110)
POTASSIUM SERPL-SCNC: 4.7 MMOL/L (ref 3.5–5.1)
PROT SERPL-MCNC: 7 G/DL (ref 6–8.4)
RBC # BLD AUTO: 3.01 M/UL (ref 4.6–6.2)
SAMPLE: ABNORMAL
SAMPLE: ABNORMAL
SITE: ABNORMAL
SITE: ABNORMAL
SODIUM SERPL-SCNC: 146 MMOL/L (ref 136–145)
SP02: 99
SP02: 99
VT: 400
VT: 480
WBC # BLD AUTO: 6.69 K/UL (ref 3.9–12.7)

## 2024-01-17 PROCEDURE — 25000003 PHARM REV CODE 250: Performed by: PSYCHIATRY & NEUROLOGY

## 2024-01-17 PROCEDURE — 83735 ASSAY OF MAGNESIUM: CPT | Performed by: REGISTERED NURSE

## 2024-01-17 PROCEDURE — 84100 ASSAY OF PHOSPHORUS: CPT | Performed by: REGISTERED NURSE

## 2024-01-17 PROCEDURE — 99291 CRITICAL CARE FIRST HOUR: CPT | Mod: ,,, | Performed by: PSYCHIATRY & NEUROLOGY

## 2024-01-17 PROCEDURE — 36600 WITHDRAWAL OF ARTERIAL BLOOD: CPT

## 2024-01-17 PROCEDURE — 94640 AIRWAY INHALATION TREATMENT: CPT

## 2024-01-17 PROCEDURE — 99900035 HC TECH TIME PER 15 MIN (STAT)

## 2024-01-17 PROCEDURE — 25000003 PHARM REV CODE 250

## 2024-01-17 PROCEDURE — 82803 BLOOD GASES ANY COMBINATION: CPT

## 2024-01-17 PROCEDURE — 94003 VENT MGMT INPAT SUBQ DAY: CPT

## 2024-01-17 PROCEDURE — 20000000 HC ICU ROOM

## 2024-01-17 PROCEDURE — 27200966 HC CLOSED SUCTION SYSTEM

## 2024-01-17 PROCEDURE — 99900026 HC AIRWAY MAINTENANCE (STAT)

## 2024-01-17 PROCEDURE — C9254 INJECTION, LACOSAMIDE: HCPCS

## 2024-01-17 PROCEDURE — 94668 MNPJ CHEST WALL SBSQ: CPT

## 2024-01-17 PROCEDURE — 94761 N-INVAS EAR/PLS OXIMETRY MLT: CPT | Mod: XB

## 2024-01-17 PROCEDURE — 80053 COMPREHEN METABOLIC PANEL: CPT

## 2024-01-17 PROCEDURE — 25000003 PHARM REV CODE 250: Performed by: NURSE PRACTITIONER

## 2024-01-17 PROCEDURE — 63600175 PHARM REV CODE 636 W HCPCS

## 2024-01-17 PROCEDURE — 27100171 HC OXYGEN HIGH FLOW UP TO 24 HOURS

## 2024-01-17 PROCEDURE — 25000242 PHARM REV CODE 250 ALT 637 W/ HCPCS

## 2024-01-17 PROCEDURE — 85025 COMPLETE CBC W/AUTO DIFF WBC: CPT

## 2024-01-17 RX ORDER — IPRATROPIUM BROMIDE AND ALBUTEROL SULFATE 2.5; .5 MG/3ML; MG/3ML
3 SOLUTION RESPIRATORY (INHALATION) EVERY 6 HOURS PRN
Status: DISCONTINUED | OUTPATIENT
Start: 2024-01-17 | End: 2024-01-25 | Stop reason: HOSPADM

## 2024-01-17 RX ORDER — LORAZEPAM 2 MG/ML
INJECTION INTRAMUSCULAR
Status: DISPENSED
Start: 2024-01-17 | End: 2024-01-18

## 2024-01-17 RX ORDER — ACETAMINOPHEN 325 MG/1
325 TABLET ORAL EVERY 6 HOURS PRN
Status: DISCONTINUED | OUTPATIENT
Start: 2024-01-17 | End: 2024-01-25 | Stop reason: HOSPADM

## 2024-01-17 RX ORDER — OXYCODONE HYDROCHLORIDE 5 MG/1
5 TABLET ORAL EVERY 6 HOURS
Status: DISCONTINUED | OUTPATIENT
Start: 2024-01-17 | End: 2024-01-19

## 2024-01-17 RX ADMIN — CLOBAZAM 20 MG: 10 TABLET ORAL at 08:01

## 2024-01-17 RX ADMIN — IPRATROPIUM BROMIDE AND ALBUTEROL SULFATE 3 ML: .5; 3 SOLUTION RESPIRATORY (INHALATION) at 07:01

## 2024-01-17 RX ADMIN — MONTELUKAST 10 MG: 10 TABLET, FILM COATED ORAL at 08:01

## 2024-01-17 RX ADMIN — FAMOTIDINE 20 MG: 20 TABLET, FILM COATED ORAL at 09:01

## 2024-01-17 RX ADMIN — OXYCODONE HYDROCHLORIDE 5 MG: 5 TABLET ORAL at 07:01

## 2024-01-17 RX ADMIN — BISACODYL 10 MG: 10 SUPPOSITORY RECTAL at 08:01

## 2024-01-17 RX ADMIN — SENNOSIDES AND DOCUSATE SODIUM 1 TABLET: 8.6; 5 TABLET ORAL at 08:01

## 2024-01-17 RX ADMIN — BUDESONIDE INHALATION 0.25 MG: 0.5 SUSPENSION RESPIRATORY (INHALATION) at 07:01

## 2024-01-17 RX ADMIN — MICONAZOLE NITRATE: 20 OINTMENT TOPICAL at 09:01

## 2024-01-17 RX ADMIN — BUDESONIDE INHALATION 0.25 MG: 0.5 SUSPENSION RESPIRATORY (INHALATION) at 08:01

## 2024-01-17 RX ADMIN — LACOSAMIDE 200 MG: 10 INJECTION INTRAVENOUS at 12:01

## 2024-01-17 RX ADMIN — LEVETIRACETAM 1500 MG: 100 INJECTION INTRAVENOUS at 09:01

## 2024-01-17 RX ADMIN — FAMOTIDINE 20 MG: 20 TABLET, FILM COATED ORAL at 08:01

## 2024-01-17 RX ADMIN — IPRATROPIUM BROMIDE AND ALBUTEROL SULFATE 3 ML: .5; 3 SOLUTION RESPIRATORY (INHALATION) at 12:01

## 2024-01-17 RX ADMIN — ACETAMINOPHEN 325 MG: 325 TABLET ORAL at 07:01

## 2024-01-17 RX ADMIN — ENOXAPARIN SODIUM 40 MG: 40 INJECTION SUBCUTANEOUS at 04:01

## 2024-01-17 RX ADMIN — POLYETHYLENE GLYCOL 3350 17 G: 17 POWDER, FOR SOLUTION ORAL at 08:01

## 2024-01-17 RX ADMIN — CLOBAZAM 20 MG: 10 TABLET ORAL at 09:01

## 2024-01-17 RX ADMIN — LACOSAMIDE 200 MG: 10 INJECTION INTRAVENOUS at 02:01

## 2024-01-17 RX ADMIN — Medication 75 MCG/HR: at 01:01

## 2024-01-17 RX ADMIN — LEVETIRACETAM 1500 MG: 100 INJECTION INTRAVENOUS at 08:01

## 2024-01-17 RX ADMIN — SILODOSIN 4 MG: 4 CAPSULE ORAL at 08:01

## 2024-01-17 NOTE — PROGRESS NOTES
"[facility]  Neurocritical Care  Progress Note    Admit Date: 1/12/2024  Service Date: 01/17/2024  Length of Stay: 5    Subjective:     Chief Complaint: New onset refractory status epilepticus    History of Present Illness: Mr. Aaron Pruitt is a 53 year old M with PMHx significant for COPD/Asthma c/b smoking and HTN who presents to [facility] for NCSE. He initially presented to [facility] ER on 01/01 after being intubated in the field following acute respiratory failure post cardiac arrest s/p resuscitation and shock x1. Per chart review, he was shoveling some dirt in his yard when he got severely short of breath and suddenly had difficulty breathing. EMS was activated by his neighbors. Upon EMS arrival, he reportedly still had a pulse, though with severe respiratory distress, and subsequently became pulseless. He was given narcan x2 without improvement. CPR and Epi x3. ROSC after 3rd round of CPR with shock x 1. CT head on arrival to [facility] without acute intracranial abnormality, and CT chest without acute pulmonary process.TTM was initiated on 01/03 with fentanyl and propofol gtts for sedation, and rewarming was initiated on 01/04 with subsequent weaning of sedation. During this period, myoclonus was clinically noted, and propofol was resumed. EEG showed NCSE, prompting the initiation of keppra and vimpat with continuation of propofol. On 01/06, there was slight improvement in EEG, with NCSE noted to be "partially treated," at which time, the patient was then loaded with depacon. EEG was improved leading to weaning of propofol with eventual return to [facility]. MRI completed 01/08 and 01/11 completed with with GoC conversation held at [facility]. Family opted to forgo comfort measures at this point and transfer to [facility] for trial of 4th AED, per chart review. He arrived to [facility] on 80 mcg/kg/min of non-titrating propofol from [facility] which was dropped to 50 mcg/kg/min, followed by the immediate addition of 25 mcg/kg/min of " ketamine. EEG cap placed.     He will be admitted to [facility] for hourly neuromonitoring and a higher level of care.    Hospital Course: 01/13: no electrographic seizures reported, triglicerides increased into low 500s, propofol decreased to 30mcg, decrease further in 4 hrs if EEG unchanged, cont ketamine at 50mcg/kg and scheduled AEDs  01/14: EEG mostly suppressed, off propofol, decrease keppra to 30mcg today with potential to turn off in am  Escalate clobazam to 20mg qday and dc depakote, cont keppra and vimpat at current doses, plan to update family today, although improvement in seizure control, no change in neuro exam  01/15/2024 Increased GPDs s/p discontinuing ketamine gtt, onfi increased to BID. Awaiting arrival of family for GOC conversation  01/16/2024 cEEG unchanged overnight, remains on ictal-interictal spectrum, d/c'd. Neuro exam unchanged, remains w/ brainstem reflexes intact, intermittent non-purposeful movements of extremities. Ongoing GOC discussions with family   01/17/2024 NAEON. Mildly hypercarbic on AM ABG, increased rate/TV for eucarbia. Starting FWF for mild hypernatremia. Ongoing GOC discussions w/ family, see ACP note for full details       Interval History:  Please see hospital course above for full details     Review of Systems   Unable to perform ROS: Intubated       Objective:     Vitals:  Temp: 99.1 °F (37.3 °C)  Pulse: 79  Rhythm: normal sinus rhythm  BP: 103/67  MAP (mmHg): 79  Resp: 18  SpO2: 100 %  Oxygen Concentration (%): 40  Vent Mode: A/C  Set Rate: 18 BPM  Vt Set: 480 mL  PEEP/CPAP: 5 cmH20  Peak Airway Pressure: 20 cmH20  Mean Airway Pressure: 9.8 cmH20  Plateau Pressure: 0 cmH20    Temp  Min: 98.6 °F (37 °C)  Max: 99.9 °F (37.7 °C)  Pulse  Min: 74  Max: 95  BP  Min: 99/64  Max: 155/96  MAP (mmHg)  Min: 77  Max: 117  Resp  Min: 16  Max: 35  SpO2  Min: 97 %  Max: 100 %  Oxygen Concentration (%)  Min: 40  Max: 40    01/16 0701 - 01/17 0700  In: 1561.3 [I.V.:163.1]  Out: 1580  [Urine:1580]   Unmeasured Output  Urine Occurrence: 2  Stool Occurrence: 1  Pad Count: 2        Physical Exam  General Appearance: Middle aged gentleman lying in bed, appears older than stated age. Not in acute distress, off fentanyl at time of exam. Not overbreathing vent. Intermittently noted to cough and spontaneously move lower extremities, however no purposeful/goal directed movement noted. Exam overall continues to fluctuate between minimal movement and then periods of non-purposeful flailing of b/l lower extremities, LLE>RLE   Mental Status Exam: No response to verbal or noxious stimuli. Not tracking or regarding, not following commands  Cranial Nerves: Pupils 4->2 mm and sluggishly reactive b/l, gaze midline. +corneals to saline b/l, weak OCRs. Absent gag, +cough   Motor: No movement in b/l upper extremities. B/l lower extremities intermittently moving spontaneously within plane of bed, LLE>RLE, however no purposeful movement noted; at other times pt with no movement noted either spontaneously or to noxious   Sensory: No response to noxious x4 extremities   Coordination: Unable to assess   Vascular: S1/S2 of normal intensity, no S3/S4 appreciated, no murmurs appreciated  Lungs: Coarse breath sounds b/l, no wheezing  Abdomen: Soft, non-distended, non-tender, BS +       Medications:  Continuousfentanyl, Last Rate: 125 mcg/hr (01/17/24 1701)    ScheduledbisacodyL, 10 mg, Daily  budesonide, 0.25 mg, Q12H  cloBAZam, 20 mg, BID  enoxparin, 40 mg, Q24H (prophylaxis, 1700)  famotidine, 20 mg, BID  lacosamide (VIMPAT) IVPB, 200 mg, Q12H  levETIRAcetam (Keppra) IV (PEDS and ADULTS), 1,500 mg, Q12H  LORazepam, ,   miconazole nitrate 2%, , BID  montelukast, 10 mg, Daily  polyethylene glycol, 17 g, Daily  senna-docusate 8.6-50 mg, 1 tablet, Daily  silodosin, 4 mg, Daily    PRNalbuterol-ipratropium, 3 mL, Q6H PRN  fentanyl, 50 mcg, Q1H PRN  hydrALAZINE, 10 mg, Q4H PRN  labetalol, 10 mg, Q4H PRN  LORazepam, ,   magnesium  oxide, 800 mg, PRN  magnesium oxide, 800 mg, PRN  potassium bicarbonate, 35 mEq, PRN  potassium bicarbonate, 50 mEq, PRN  potassium bicarbonate, 60 mEq, PRN  potassium, sodium phosphates, 2 packet, PRN  potassium, sodium phosphates, 2 packet, PRN  potassium, sodium phosphates, 2 packet, PRN      Today I personally reviewed pertinent imaging, cardiology results, laboratory results, notably: CBC w/o leukocytosis, Hb/platelets stable. CMP w/ slight uptrend in Na to 146, BUN/creatinine stable, AST/ALT slight uptrend to 94/142    Diet  No diet orders on file  No diet orders on file  TF at goal     Assessment/Plan:     Neuro  * New onset refractory status epilepticus  54 y/o M 12 days post respiratory failure c/b cardiac arrest (Epi x 3, CPR x 3, Shock x 1) with RSE in setting of propofol wean despite continued keppra, vimpat, depacon administration. He arrived to [facility] on 80 mcg/kg/min of non-titrating propofol from [facility] which was dropped to 50 mcg/kg/min, followed by the immediate addition of 25 mcg/kg/min of ketamine. EEG cap placed and epilepsy notified.    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  No improvement in exam with improvement in seizures  Start oxycodone 5 mg q6hrs for suspected neuro storming  Ongoing GOC with family  Prognosis poor     Acute encephalopathy  See primary problem, known NCSE post arrest  CT head unremarkable on admission to [facility]  MRI x 2 with T2 BG hyperintensity bilaterally -> c/w anoxic injury   Daily ABG, pH WNL  As the majority of this patient's metabolic derangements were  corrected at [facility], suspect encephalopathy 2/2 anoxic injury/NCSE    Seizure disorder, nonconvulsive, with status epilepticus  See primary problem     Pulmonary  COPD (chronic obstructive pulmonary disease)  Known history of smoking  C/w duonebs  Daily CXR, ABG while intubated  O2 Sat goal 88%-92%    Severe asthma  Known hx of asthma with acute onset SOB/hypoxia while shoveling dirt leading to respiratory/cardiac arrest  C/w home singulair  Continue duonebs/ inhaled steroids  Ceftriaxone/Azithromycin initiated by [facility] then d/c prior to transfer in setting of clear CXR, presently off emperic abx      Acute respiratory failure with hypoxia and hypercarbia  Patient with Hypercapnic and Hypoxic Respiratory failure which is Acute. He is not on home oxygen. Supplemental oxygen was provided and noted-   Vent Mode: A/C  Oxygen Concentration (%):  [40] 40  Resp Rate Total:  [16 br/min-54 br/min] 18 br/min  Vt Set:  [400 mL-480 mL] 480 mL  PEEP/CPAP:  [5 cmH20] 5 cmH20  Mean Airway Pressure:  [8.2 biY48-88 cmH20] 9.8 cmH20    Daily CXR, ABG  Will need trach for airway protection if family electing to proceed w/ aggressive care  Mildly hypercarbic this AM, increase TV to 6 cc/kg  See asthma/COPD  Likely not 2/2 infectious source  - s/p Azithromycin 1/2-1/3  - s/p Ceftriaxone 1/2-1/3  - s/p Pip-Tazo 1/3-1/6  - s/p Vanc 1/3-1/6       Cardiac/Vascular  Essential hypertension  Hx of     SBP < 180  PRN labetalol, hydralazine  Presently normotensive on no meds    Cardiorespiratory arrest  Cardiac arrest on 01/01 requiring 3 rounds of CPR, Epi x 3, and 1 shock to obtain ROSC  Likely respiratory etiology of arrest 2/2 asthma exacerbation   S/p TTM, maintain normothermia  MRI x 2 concerning for anoxic injury  Long term prognosis poor given imaging findings, persistent malignant patterns on cEEG>2 wks out from arrest  Start oxycodone for periods of flailing movements, concern for neuro storming. BP unlikely to tolerate clonidine, hesitant  to start propranolol given hx of asthma     Renal/  Hypernatremia  Na 146 on 1/17    - Start FWF  - Goal eunatremia, monitor daily    GI  Transaminitis  Likely DILI     - Decrease acetaminophen to 350 mg PRN fever   - Avoid hepatotoxic medications  - Trend     Palliative Care  ACP (advance care planning)  Patient made DNR at [facility]  Family chose to transfer patient for initiation of 4th AED trial, forgoing comfort measures at this time per chart review  1/13: family aware of prognosis if this trial of anaesthetics and additional scheduled AED is not helpful  1/15: Discussed with mother and sister at bedside, prognosis poor, see ACP note for details. Per family, requesting until end of week to make decision re trach/PEG vs comfort  1/17: Met with mother, father and fiancee at bedside, see ACP note for full details. Family converting pt to FULL CODE, to make decision re trach/PEG vs comfort by end of week     Other  Smoker  Per chart review   Cessation counseling not appropriate at this time given mental status            The patient is being Prophylaxed for:  Venous Thromboembolism with: Mechanical or Chemical  Stress Ulcer with: H2B  Ventilator Pneumonia with: chlorhexidine oral care    Activity Orders            Elevate HOB Elevate (30-45 degrees) Elevate HOB to 30 - 45 degrees during feeding unless otherwise stated starting at 01/12 1011    Turn patient starting at 01/12 0724    Elevate HOB starting at 01/12 0008          Full Code    Uninterrupted Critical Care/Counseling Time (not including procedures): 65 minutes  There is high probability for acute neurological change leading to clinical and possibly life-threatening deterioration requiring highest level of physician preparedness for urgent intervention. Critical care time was spent personally by me on the following activities: development of treatment plan with patient or surrogate and bedside caregivers, discussions with consultants, evaluation of patient's  response to treatment, examination of patient, ordering and performing treatments and interventions, ordering and review of laboratory studies, ordering and review of radiographic studies, pulse oximetry, antibiotic titration if applicable, vasopressor titration if applicable, re-evaluation of patient's condition. I spent greater than 50% of the documented critical care time assessing and making medical decisions for this patient.     Angelica Garrison MD  Neurocritical Care  [facility] - Neuro Critical Care

## 2024-01-17 NOTE — ASSESSMENT & PLAN NOTE
52 y/o M 12 days post respiratory failure c/b cardiac arrest (Epi x 3, CPR x 3, Shock x 1) with RSE in setting of propofol wean despite continued keppra, vimpat, depacon administration. He arrived to [facility] on 80 mcg/kg/min of non-titrating propofol from [facility] which was dropped to 50 mcg/kg/min, followed by the immediate addition of 25 mcg/kg/min of ketamine. EEG cap placed and epilepsy notified.    -Propofol d/c'd 1/13, ketamine d/c'd 1/14  -Unchanged frequent GPDs on 1/16, on ictal interictal spectrum w/o ellis NCSE -> EEG d/c'd for scalp rest, consider repeat 24 hr spot check this weekend pending ongoing GOC conversations   -Q1h neurochecks, vital checks  -Daily CBC, CMP, Mag, Phos  -MRI x 2 with T2 BG hyperintensity bilaterally  -Continue keppra 1500 mg bid, Vimpat 200 mg bid, onfi 20 mg BID

## 2024-01-17 NOTE — ASSESSMENT & PLAN NOTE
No improvement in exam with improvement in seizures  Start oxycodone 5 mg q6hrs for suspected neuro storming  Ongoing GOC with family  Prognosis poor

## 2024-01-17 NOTE — SUBJECTIVE & OBJECTIVE
Interval History:  Please see hospital course above for full details     Review of Systems   Unable to perform ROS: Intubated       Objective:     Vitals:  Temp: 99.1 °F (37.3 °C)  Pulse: 79  Rhythm: normal sinus rhythm  BP: 103/67  MAP (mmHg): 79  Resp: 18  SpO2: 100 %  Oxygen Concentration (%): 40  Vent Mode: A/C  Set Rate: 18 BPM  Vt Set: 480 mL  PEEP/CPAP: 5 cmH20  Peak Airway Pressure: 20 cmH20  Mean Airway Pressure: 9.8 cmH20  Plateau Pressure: 0 cmH20    Temp  Min: 98.6 °F (37 °C)  Max: 99.9 °F (37.7 °C)  Pulse  Min: 74  Max: 95  BP  Min: 99/64  Max: 155/96  MAP (mmHg)  Min: 77  Max: 117  Resp  Min: 16  Max: 35  SpO2  Min: 97 %  Max: 100 %  Oxygen Concentration (%)  Min: 40  Max: 40    01/16 0701 - 01/17 0700  In: 1561.3 [I.V.:163.1]  Out: 1580 [Urine:1580]   Unmeasured Output  Urine Occurrence: 2  Stool Occurrence: 1  Pad Count: 2        Physical Exam  General Appearance: Middle aged gentleman lying in bed, appears older than stated age. Not in acute distress, off fentanyl at time of exam. Not overbreathing vent. Intermittently noted to cough and spontaneously move lower extremities, however no purposeful/goal directed movement noted. Exam overall continues to fluctuate between minimal movement and then periods of non-purposeful flailing of b/l lower extremities, LLE>RLE   Mental Status Exam: No response to verbal or noxious stimuli. Not tracking or regarding, not following commands  Cranial Nerves: Pupils 4->2 mm and sluggishly reactive b/l, gaze midline. +corneals to saline b/l, weak OCRs. Absent gag, +cough   Motor: No movement in b/l upper extremities. B/l lower extremities intermittently moving spontaneously within plane of bed, LLE>RLE, however no purposeful movement noted; at other times pt with no movement noted either spontaneously or to noxious   Sensory: No response to noxious x4 extremities   Coordination: Unable to assess   Vascular: S1/S2 of normal intensity, no S3/S4 appreciated, no murmurs  appreciated  Lungs: Coarse breath sounds b/l, no wheezing  Abdomen: Soft, non-distended, non-tender, BS +       Medications:  Continuousfentanyl, Last Rate: 125 mcg/hr (01/17/24 1701)    ScheduledbisacodyL, 10 mg, Daily  budesonide, 0.25 mg, Q12H  cloBAZam, 20 mg, BID  enoxparin, 40 mg, Q24H (prophylaxis, 1700)  famotidine, 20 mg, BID  lacosamide (VIMPAT) IVPB, 200 mg, Q12H  levETIRAcetam (Keppra) IV (PEDS and ADULTS), 1,500 mg, Q12H  LORazepam, ,   miconazole nitrate 2%, , BID  montelukast, 10 mg, Daily  polyethylene glycol, 17 g, Daily  senna-docusate 8.6-50 mg, 1 tablet, Daily  silodosin, 4 mg, Daily    PRNalbuterol-ipratropium, 3 mL, Q6H PRN  fentanyl, 50 mcg, Q1H PRN  hydrALAZINE, 10 mg, Q4H PRN  labetalol, 10 mg, Q4H PRN  LORazepam, ,   magnesium oxide, 800 mg, PRN  magnesium oxide, 800 mg, PRN  potassium bicarbonate, 35 mEq, PRN  potassium bicarbonate, 50 mEq, PRN  potassium bicarbonate, 60 mEq, PRN  potassium, sodium phosphates, 2 packet, PRN  potassium, sodium phosphates, 2 packet, PRN  potassium, sodium phosphates, 2 packet, PRN      Today I personally reviewed pertinent imaging, cardiology results, laboratory results, notably: CBC w/o leukocytosis, Hb/platelets stable. CMP w/ slight uptrend in Na to 146, BUN/creatinine stable, AST/ALT slight uptrend to 94/142    Diet  No diet orders on file  No diet orders on file  TF at goal

## 2024-01-17 NOTE — ASSESSMENT & PLAN NOTE
Cardiac arrest on 01/01 requiring 3 rounds of CPR, Epi x 3, and 1 shock to obtain ROSC  Likely respiratory etiology of arrest 2/2 asthma exacerbation   S/p TTM, maintain normothermia  MRI x 2 concerning for anoxic injury  Long term prognosis poor given imaging findings, persistent malignant patterns on cEEG>2 wks out from arrest  Start oxycodone for periods of flailing movements, concern for neuro storming. BP unlikely to tolerate clonidine, hesitant to start propranolol given hx of asthma

## 2024-01-17 NOTE — ASSESSMENT & PLAN NOTE
Do not see Lipitor on the list Likely DILI     - Decrease acetaminophen to 350 mg PRN fever   - Avoid hepatotoxic medications  - Trend

## 2024-01-17 NOTE — ACP (ADVANCE CARE PLANNING)
[facility]  I engaged the family in a voluntary conversation about advance care planning and we specifically addressed what the goals of care would be moving forward, in light of the patient's change in clinical status, specifically pt with anoxic event on 1/1 w/ persistently poor exam, evidence of HIE on MRI brain, persistently malignant cEEG.  We did specifically address the patient's likely prognosis, which is poor.  We specifically discussed given evidence of multifocal areas of diffusion restriction on imaging (MRI brain directly reviewed with family by myself), there is likely additional areas of anoxia that cannot be visualized with current MRI technology. We discussed that at this patient likely to end up in either a persistent vegetative state or minimally conscious state, with very minimal hope of regaining consciousness long term/a functional neurologic recovery. Discussed that patient is currently either not moving w/ intact brainstem reflexes or is flailing lower extremities, however movements likely reflect a component of neuro storming, on serial neurologic exams patient without consistent responses to any external stimuli, no evidence of purposeful or goal directed movement. We explored the patient's values and preferences for future care.  We discussed that at this time, there are no further medical or surgical interventions that can treat patient's hypoxic anoxic injury. Discussed extensively with family that at this time, and notably given patient has already been intubated for 17 days, the two care options moving forwards would be either trach/PEG w/ LTACH/SNF placement if family wants to continue aggressive medical therapy/give patient additional time to see if he is able to make any degree of recovery, vs transitioning to comfort care/hospice. Did discuss that with a trach/PEG patient would need to go to an LTACH/SNF for vent wean, would not be able to go home with a fresh trach, additionally  discussed that patient would be at risk for skin breakdown and infections, notably PNA, w/ continuation of care given pt's depressed LOC and bedbound status. We did also discuss the comfort care pathway, which would include compassionate extubation with monitoring in ICU for 24-48 hrs, followed by potential discharge to inpatient hospice/home hospice if pt stable from hemodynamic and respiratory standpoint. Discussed that patient may pass quickly, or may take several days to ~1 wk to pass, given pt young age and current hemodynamic stability, and that during that time goal would be to keep patient as comfortable as possible and support patient's family as able. Discussed that given pt already intubated for prolonged period of time, would request that family make a decision regarding GOC by the end of this week to reduce risk of complications from prolonged ET intubation.    Accordingly, we have decided that the best plan to meet the patient's goals includes continuing with treatment. Family also expressing that they did not feel that patient's code status was adequately explained to them at OSH, stating that they were told that team would not perform CPR/ACLS in the event of another arrest. Discussed with family that the code status is ultimately their choice, however emphasized that a recurrent arrest in the setting of known HIE would not be to patient's benefit and that I would recommend against CPR/ACLS in that setting. Family stating that they feel strongly that patient should be full code despite the above, code status changed in system per family preferences.     I did explain the role for hospice care at this stage of the patient's illness, including its ability to help the patient live with the best quality of life possible.  We will not be making a hospice referral.    I spent a total of 65 minutes engaging the family in this advance care planning discussion. Patient unable to participate in conversation  2/2 intubated and unresponsive state.     Angelica Garrison MD, MPH  Neurocritical Care Physician

## 2024-01-17 NOTE — PROCEDURES
EEG REPORT      Aaron Pruitt  [id number]  1970    DATE OF SERVICE: 1/16/2024    [facility] -5    METHODOLOGY      Extended electroencephalographic recording is made while the patient is ambulatory and continuing normal daily activities.  Electrodes are placed according to the International 10-20 placement system and included T1 and T2 electrode placement.  Twenty four (24) channels of digital signal (sampling rate of 512/sec) was simultaneously recorded from the scalp including EKG and eye monitors.  Recording band pass was 0.1 to 100 hz and all data was stored digitally on the recorder.  The patient is instructed to press an event button when clinical symptoms occur and write the symptoms into a diary. Activation procedures which include photic stimulation, hyperventilation and instructing patients to perform simple task are done in selected patients.        The EEG is displayed on a monitor screen and can be reformatted into different montages for evaluation.  The entire recoding is submitted for computer assisted analysis to detect spike and electrographic seizure activity.  The entire recording is visually reviewed and the times identified by computer analysis as being spikes or seizures are reviewed again.  Compresses spectral analysis (CSA) is also performed on the activity recorded from each individual channel.  This is displayed as a power display of frequencies from 0 to 30 Hz over time.   The CSA analysis is done and displayed continuously.  This is reviewed for asymmetries in power between homologous areas of the scalp and for presence of changes in power which canbe seen when seizures occur.  Sections of suspected abnormalities on the CSA is then compared with the original EEG recording.  .     [employer] software was also utilized in the review of this study.  This software suite analyzes the EEG recording in multiple domains.  Coherence and rhythmicity is computed to identify EEG sections which may  contain organized seizures.  Each channel undergoes analysis to detect presence of spike and sharp waves which have special and morphological characteristic of epileptic activity.  The routine EEG recording is converted from spacial into frequency domain.  This is then displayed comparing homologous areas to identify areas of significant asymmetry.  Algorithm to identify non-cortically generated artifact is used to separate eye movement, EMG and other artifact from the EEG     Recording Times    A total of 10:11:07 hours of EEG was recorded.      EEG FINDINGS:    The patient is maintained on levetiracetam 1500mg bid, lacosamide 200mg bid, and clobazam 20mg bid throughout the recording.    Background activity:   The background rhythm was characterized by approximately 2Hz rhythmic delta activity.   Symmetry and continuity: the background was continuous and symmetric     Sleep:   No sleep transients although arousal noted.    Activation procedures:   NA    Abnormal activity:   There are continuous generalized and occasional bilateral independent lateralized periodic discharges waxing and waning from 0.5 to 2Hz with increased frequency noted with arousal.    There are event marks at 08:04 and 08:08 for chest thrusting and asynchronous leg movements with no EEG change    IMPRESSION:   Abnormal EEG due to moderate generalized cerebral dysfunction with ongoing generalized and multifocal cortical irritability on the ictal-interictal continuum with risk of conversion to non-convulsive status epilepticus      Jamie Amin MD  Neurology-Epilepsy.  [facility].

## 2024-01-17 NOTE — PLAN OF CARE
"[facility] Care Plan    POC reviewed with Aaron Pruitt and family at 0300. Pt unable to verbalized understanding. Questions and concerns addressed with family. No acute events overnight. See below and flowsheets for full assessment and VS info.     -fentanyl gtt titrated  -fentanyl prn bolus x2  -CHG bed bath and linen change complete  -tube feed at goal, 50cc/hr          Is this a stroke patient? no    Neuro:  Ellie Coma Scale  Best Eye Response: 3-->(E3) to speech  Best Motor Response: 1-->(M1) none  Best Verbal Response: 1-->(V1) none  Ellie Coma Scale Score: 5  Assessment Qualifiers: no eye obstruction present, patient chemically sedated or paralyzed, patient intubated  Pupil PERRLA: yes     24hr Temp:  [99.1 °F (37.3 °C)-100.4 °F (38 °C)]     CV:   Rhythm: normal sinus rhythm  BP goals:   SBP < 180  MAP > 65    Resp:      Vent Mode: A/C  Set Rate: 16 BPM  Oxygen Concentration (%): 40  Vt Set: 400 mL  PEEP/CPAP: 5 cmH20    Plan: trach and peg dicussion    GI/:     Diet/Nutrition Received: NPO, tube feeding  Last Bowel Movement: 01/16/24  Voiding Characteristics: external catheter, incontinence    Intake/Output Summary (Last 24 hours) at 1/17/2024 0316  Last data filed at 1/17/2024 0312  Gross per 24 hour   Intake 1509.4 ml   Output 580 ml   Net 929.4 ml     Unmeasured Output  Urine Occurrence: 2  Stool Occurrence: 1  Pad Count: 2    Labs/Accuchecks:  Recent Labs   Lab 01/17/24  0018   WBC 6.69   RBC 3.01*   HGB 9.4*   HCT 28.5*         Recent Labs   Lab 01/17/24  0018   *   K 4.7   CO2 26      BUN 32*   CREATININE 0.7   ALKPHOS 98   *   AST 94*   BILITOT 0.5    No results for input(s): "PROTIME", "INR", "APTT", "HEPANTIXA" in the last 168 hours. No results for input(s): "CPK", "CPKMB", "TROPONINI", "MB" in the last 168 hours.    Electrolytes: N/A - electrolytes WDL  Accuchecks: none    Gtts:   fentanyl 75 mcg/hr (01/17/24 0312)       LDA/Wounds:  Lines/Drains/Airways       Drain  " Duration                  NG/OG Tube 01/01/24 1722 [place] sump 16 Fr. Left nostril 15 days    Male External Urinary Catheter 01/14/24 3 days              Airway  Duration                  Airway - Non-Surgical Endotracheal Tube -- days              Peripheral Intravenous Line  Duration                  Peripheral IV - Single Lumen 01/12/24 0215 20 G Distal;Left;Posterior Forearm 5 days         Peripheral IV - Single Lumen 01/14/24 1510 22 G Anterior;Left Forearm 2 days         Peripheral IV - Single Lumen 01/15/24 2100 20 G Posterior;Right Forearm 1 day                  Wounds: Yes  Wound care consulted: Yes

## 2024-01-17 NOTE — ASSESSMENT & PLAN NOTE
See primary problem, known NCSE post arrest  CT head unremarkable on admission to [facility]  MRI x 2 with T2 BG hyperintensity bilaterally -> c/w anoxic injury   Daily ABG, pH WNL  As the majority of this patient's metabolic derangements were corrected at [facility], suspect encephalopathy 2/2 anoxic injury/NCSE

## 2024-01-17 NOTE — CONSULTS
[facility] - Neuro Critical Care  Wound Care    Patient Name:  Aaron Pruitt   MRN:  [id number]  Date: 1/17/2024  Diagnosis: New onset refractory status epilepticus    History:     Past Medical History:   Diagnosis Date    Bronchitis     Bronchitis        Social History     Socioeconomic History    Marital status:    Tobacco Use    Smoking status: Every Day     Social Determinants of Health     Financial Resource Strain: Patient Unable To Answer (1/13/2024)    Overall Financial Resource Strain (CARDIA)     Difficulty of Paying Living Expenses: Patient unable to answer   Recent Concern: Financial Resource Strain - High Risk (1/2/2024)    Overall Financial Resource Strain (CARDIA)     Difficulty of Paying Living Expenses: Hard   Food Insecurity: Patient Unable To Answer (1/13/2024)    Hunger Vital Sign     Worried About Running Out of Food in the Last Year: Patient unable to answer     Ran Out of Food in the Last Year: Patient unable to answer   Transportation Needs: Patient Unable To Answer (1/13/2024)    PRAPARE - Transportation     Lack of Transportation (Medical): Patient unable to answer     Lack of Transportation (Non-Medical): Patient unable to answer   Physical Activity: Patient Unable To Answer (1/2/2024)    Exercise Vital Sign     Days of Exercise per Week: Patient unable to answer     Minutes of Exercise per Session: Patient unable to answer   Stress: Patient Unable To Answer (1/13/2024)    [employer] - Occupational Stress Questionnaire     Feeling of Stress : Patient unable to answer   Recent Concern: Stress - Stress Concern Present (1/2/2024)    [employer] [place] [employer] - Occupational Stress Questionnaire     Feeling of Stress : To some extent   Social Connections: Moderately Isolated (1/2/2024)    Social Connection and Isolation Panel [NHANES]     Frequency of Communication with Friends and Family: More than three times a week     Frequency of Social  Gatherings with Friends and Family: More than three times a week     Attends Yazidi Services: More than 4 times per year     Active Member of Clubs or Organizations: No     Attends Club or Organization Meetings: Never     Marital Status:    Housing Stability: Patient Unable To Answer (1/13/2024)    Housing Stability Vital Sign     Unable to Pay for Housing in the Last Year: Patient unable to answer     Unstable Housing in the Last Year: Patient unable to answer       Precautions:     Allergies as of 01/11/2024 - Reviewed 01/02/2024   Allergen Reaction Noted    Sulfa (sulfonamide antibiotics)  04/24/2023       WO Assessment Details/Treatment     Patient seen for wound care consultation for L back, groin and lip.   Reviewed chart for this encounter.   See Flow Sheet for findings.    Pt found lying in bed, OK for care at this time. On assessment, pts groin is intact, however, very moist w/ moist epidermal sloughing noted. Cleansed area gently w/ no rinse bath wipes and applied antifungal ointment. No wounds of not to lips, family member at bedside stated that his lips were previously chapped but she has been cleaning them up and applying chapstick for moisturization. Pt turned into lateral position for assessment of back, foam dressing to L upper back removed to reveal pink and dry intact scar tissue - foam dressing reapplied for protection.    RECOMMENDATIONS:  BID/PRN - groin - cleanse area gently w/ soap and water, pat dry and apply Miconazole ointment to affected area.    Discussed POC with patient and primary nurse.   See EMR for orders & patient education.    Bedside nursing to continue care & monitoring.  Bedside nursing to maintain pressure injury prevention interventions.         01/17/24 1314   WOCN Assessment   WOCN Total Time (mins) 30   Visit Date 01/17/24   Visit Time 1314   Consult Type New   WOCN Speciality Wound   Intervention assessed;changed;applied;chart review;coordination of  care;orders   Teaching on-going        Altered Skin Integrity 01/13/24 1805 Groin   Date First Assessed/Time First Assessed: 01/13/24 1805   Altered Skin Integrity Present on Admission - Did Patient arrive to the hospital with altered skin?: yes  Location: Groin   Dressing Appearance Open to air   Drainage Amount None   Drainage Characteristics/Odor No odor   Appearance Intact;Moist;Other (see comments)  (moist epidermal sloughing)   Periwound Area Intact;Moist   Care Cleansed with:;Soap and water;Applied:;Other (see comments)  (Antifungal ointment)

## 2024-01-17 NOTE — ASSESSMENT & PLAN NOTE
Patient made DNR at [facility]  Family chose to transfer patient for initiation of 4th AED trial, forgoing comfort measures at this time per chart review  1/13: family aware of prognosis if this trial of anaesthetics and additional scheduled AED is not helpful  1/15: Discussed with mother and sister at bedside, prognosis poor, see ACP note for details. Per family, requesting until end of week to make decision re trach/PEG vs comfort  1/17: Met with mother, father and fiancee at bedside, see ACP note for full details. Family converting pt to FULL CODE, to make decision re trach/PEG vs comfort by end of week

## 2024-01-17 NOTE — PLAN OF CARE
Problem: Adult Inpatient Plan of Care  Goal: Plan of Care Review  Outcome: Ongoing, Not Progressing  Goal: Patient-Specific Goal (Individualized)  Outcome: Ongoing, Not Progressing  Goal: Absence of Hospital-Acquired Illness or Injury  Outcome: Ongoing, Not Progressing  Goal: Optimal Comfort and Wellbeing  Outcome: Ongoing, Not Progressing  Goal: Readiness for Transition of Care  Outcome: Ongoing, Not Progressing     Problem: Infection  Goal: Absence of Infection Signs and Symptoms  Outcome: Ongoing, Not Progressing     Problem: Adjustment to Illness (Stroke, Hemorrhagic)  Goal: Optimal Coping  Outcome: Ongoing, Not Progressing     Problem: Bowel Elimination Impaired (Stroke, Hemorrhagic)  Goal: Effective Bowel Elimination  Outcome: Ongoing, Not Progressing     Problem: Cerebral Tissue Perfusion (Stroke, Hemorrhagic)  Goal: Optimal Cerebral Tissue Perfusion  Outcome: Ongoing, Not Progressing     Problem: Cognitive Impairment (Stroke, Hemorrhagic)  Goal: Optimal Cognitive Function  Outcome: Ongoing, Not Progressing     Problem: Communication Impairment (Stroke, Hemorrhagic)  Goal: Effective Communication Skills  Outcome: Ongoing, Not Progressing     Problem: Functional Ability Impaired (Stroke, Hemorrhagic)  Goal: Optimal Functional Ability  Outcome: Ongoing, Not Progressing     Problem: Pain (Stroke, Hemorrhagic)  Goal: Acceptable Pain Control  Outcome: Ongoing, Not Progressing     Problem: Respiratory Compromise (Stroke, Hemorrhagic)  Goal: Effective Oxygenation and Ventilation  Outcome: Ongoing, Not Progressing     Problem: Sensorimotor Impairment (Stroke, Hemorrhagic)  Goal: Improved Sensorimotor Function  Outcome: Ongoing, Not Progressing     Problem: Swallowing Impairment (Stroke, Hemorrhagic)  Goal: Optimal Eating and Swallowing Without Aspiration  Outcome: Ongoing, Not Progressing     Problem: Urinary Elimination Impaired (Stroke, Hemorrhagic)  Goal: Effective Urinary Elimination  Outcome: Ongoing, Not  Progressing     Problem: Skin Injury Risk Increased  Goal: Skin Health and Integrity  Outcome: Ongoing, Not Progressing     Problem: Impaired Wound Healing  Goal: Optimal Wound Healing  Outcome: Ongoing, Not Progressing     Problem: Fall Injury Risk  Goal: Absence of Fall and Fall-Related Injury  Outcome: Ongoing, Not Progressing     Problem: Restraint, Nonbehavioral (Nonviolent)  Goal: Absence of Harm or Injury  Outcome: Ongoing, Not Progressing

## 2024-01-17 NOTE — ASSESSMENT & PLAN NOTE
Patient with Hypercapnic and Hypoxic Respiratory failure which is Acute. He is not on home oxygen. Supplemental oxygen was provided and noted-   Vent Mode: A/C  Oxygen Concentration (%):  [40] 40  Resp Rate Total:  [16 br/min-54 br/min] 18 br/min  Vt Set:  [400 mL-480 mL] 480 mL  PEEP/CPAP:  [5 cmH20] 5 cmH20  Mean Airway Pressure:  [8.2 msV53-74 cmH20] 9.8 cmH20    Daily CXR, ABG  Will need trach for airway protection if family electing to proceed w/ aggressive care  Mildly hypercarbic this AM, increase TV to 6 cc/kg  See asthma/COPD  Likely not 2/2 infectious source  - s/p Azithromycin 1/2-1/3  - s/p Ceftriaxone 1/2-1/3  - s/p Pip-Tazo 1/3-1/6  - s/p Vanc 1/3-1/6

## 2024-01-17 NOTE — PLAN OF CARE
"[facility] Care Plan    POC reviewed with Aaron Pruitt and family at 1400. Pt verbalized understanding. Questions and concerns addressed. No acute events today. Pt progressing toward goals. Will continue to monitor. See below and flowsheets for full assessment and VS info.             Is this a stroke patient? no    Neuro:  Ellie Coma Scale  Best Eye Response: 3-->(E3) to speech  Best Motor Response: 4-->(M4) withdraws from pain  Best Verbal Response: 1-->(V1) none  [place] Coma Scale Score: 8  Assessment Qualifiers: patient intubated, patient chemically sedated or paralyzed  Pupil PERRLA: yes     24 hr Temp:  [97.7 °F (36.5 °C)-100.4 °F (38 °C)]     CV:   Rhythm: normal sinus rhythm  BP goals:   SBP < 180  MAP > 65    Resp:      Vent Mode: A/C  Set Rate: 16 BPM  Oxygen Concentration (%): 40  Vt Set: 400 mL  PEEP/CPAP: 5 cmH20    Plan: status epilepticus    GI/:     Diet/Nutrition Received: NPO, tube feeding  Last Bowel Movement: 01/16/24  Voiding Characteristics: external catheter    Intake/Output Summary (Last 24 hours) at 1/16/2024 1840  Last data filed at 1/16/2024 1801  Gross per 24 hour   Intake 1441.97 ml   Output 1005 ml   Net 436.97 ml     Unmeasured Output  Urine Occurrence: 2  Stool Occurrence: 1  Pad Count: 2    Labs/Accuchecks:  Recent Labs   Lab 01/16/24  0058   WBC 9.04   RBC 3.01*   HGB 9.2*   HCT 29.1*         Recent Labs   Lab 01/16/24  0058      K 4.4   CO2 28      BUN 33*   CREATININE 0.7   ALKPHOS 94   *   AST 51*   BILITOT 0.5    No results for input(s): "PROTIME", "INR", "APTT", "HEPANTIXA" in the last 168 hours. No results for input(s): "CPK", "CPKMB", "TROPONINI", "MB" in the last 168 hours.    Electrolytes: N/A - electrolytes WDL  Accuchecks: none    Gtts:   fentanyl 75 mcg/hr (01/16/24 1801)       LDA/Wounds:  Lines/Drains/Airways       Drain  Duration                  NG/OG Tube 01/01/24 1722 Melchor sherman 16 Fr. Left nostril 15 days    Male External Urinary Catheter " 01/14/24 2 days              Airway  Duration                  Airway - Non-Surgical Endotracheal Tube -- days              Peripheral Intravenous Line  Duration                  Peripheral IV - Single Lumen 01/12/24 0215 20 G Distal;Left;Posterior Forearm 4 days         Peripheral IV - Single Lumen 01/14/24 1510 22 G Anterior;Left Forearm 2 days         Peripheral IV - Single Lumen 01/15/24 2100 20 G Posterior;Right Forearm <1 day                  Wounds:        Altered Skin Integrity 01/13/24 1805 Groin-Description of Altered Skin Integrity:  (dermatitis)    Wound care consulted: No

## 2024-01-18 LAB
ALBUMIN SERPL BCP-MCNC: 2.2 G/DL (ref 3.5–5.2)
ALLENS TEST: ABNORMAL
ALP SERPL-CCNC: 91 U/L (ref 55–135)
ALT SERPL W/O P-5'-P-CCNC: 160 U/L (ref 10–44)
ANION GAP SERPL CALC-SCNC: 6 MMOL/L (ref 8–16)
AST SERPL-CCNC: 120 U/L (ref 10–40)
BASOPHILS # BLD AUTO: 0.01 K/UL (ref 0–0.2)
BASOPHILS NFR BLD: 0.2 % (ref 0–1.9)
BILIRUB SERPL-MCNC: 0.3 MG/DL (ref 0.1–1)
BUN SERPL-MCNC: 26 MG/DL (ref 6–20)
CALCIUM SERPL-MCNC: 8 MG/DL (ref 8.7–10.5)
CHLORIDE SERPL-SCNC: 114 MMOL/L (ref 95–110)
CO2 SERPL-SCNC: 22 MMOL/L (ref 23–29)
CREAT SERPL-MCNC: 0.6 MG/DL (ref 0.5–1.4)
DELSYS: ABNORMAL
DIFFERENTIAL METHOD BLD: ABNORMAL
EOSINOPHIL # BLD AUTO: 0.1 K/UL (ref 0–0.5)
EOSINOPHIL NFR BLD: 1.2 % (ref 0–8)
ERYTHROCYTE [DISTWIDTH] IN BLOOD BY AUTOMATED COUNT: 13.2 % (ref 11.5–14.5)
ERYTHROCYTE [SEDIMENTATION RATE] IN BLOOD BY WESTERGREN METHOD: 18 MM/H
EST. GFR  (NO RACE VARIABLE): >60 ML/MIN/1.73 M^2
FIO2: 40
GLUCOSE SERPL-MCNC: 103 MG/DL (ref 70–110)
HCO3 UR-SCNC: 29 MMOL/L (ref 24–28)
HCT VFR BLD AUTO: 25.1 % (ref 40–54)
HGB BLD-MCNC: 7.9 G/DL (ref 14–18)
IMM GRANULOCYTES # BLD AUTO: 0.03 K/UL (ref 0–0.04)
IMM GRANULOCYTES NFR BLD AUTO: 0.7 % (ref 0–0.5)
LYMPHOCYTES # BLD AUTO: 0.8 K/UL (ref 1–4.8)
LYMPHOCYTES NFR BLD: 17.3 % (ref 18–48)
MCH RBC QN AUTO: 30.2 PG (ref 27–31)
MCHC RBC AUTO-ENTMCNC: 31.5 G/DL (ref 32–36)
MCV RBC AUTO: 96 FL (ref 82–98)
MIN VOL: 9.96
MODE: ABNORMAL
MONOCYTES # BLD AUTO: 0.6 K/UL (ref 0.3–1)
MONOCYTES NFR BLD: 14.3 % (ref 4–15)
NEUTROPHILS # BLD AUTO: 2.9 K/UL (ref 1.8–7.7)
NEUTROPHILS NFR BLD: 66.3 % (ref 38–73)
NRBC BLD-RTO: 0 /100 WBC
PCO2 BLDA: 42.9 MMHG (ref 35–45)
PEEP: 5
PH SMN: 7.44 [PH] (ref 7.35–7.45)
PIP: 20
PLATELET # BLD AUTO: 379 K/UL (ref 150–450)
PMV BLD AUTO: 9 FL (ref 9.2–12.9)
PO2 BLDA: 152 MMHG (ref 80–100)
POC BE: 5 MMOL/L
POC SATURATED O2: 99 % (ref 95–100)
POC TCO2: 30 MMOL/L (ref 23–27)
POCT GLUCOSE: 107 MG/DL (ref 70–110)
POCT GLUCOSE: 123 MG/DL (ref 70–110)
POTASSIUM SERPL-SCNC: 4.4 MMOL/L (ref 3.5–5.1)
PROT SERPL-MCNC: 6.1 G/DL (ref 6–8.4)
RBC # BLD AUTO: 2.62 M/UL (ref 4.6–6.2)
SAMPLE: ABNORMAL
SITE: ABNORMAL
SODIUM SERPL-SCNC: 142 MMOL/L (ref 136–145)
SP02: 100
VT: 480
WBC # BLD AUTO: 4.34 K/UL (ref 3.9–12.7)

## 2024-01-18 PROCEDURE — 80053 COMPREHEN METABOLIC PANEL: CPT

## 2024-01-18 PROCEDURE — 25000003 PHARM REV CODE 250: Performed by: NURSE PRACTITIONER

## 2024-01-18 PROCEDURE — C9254 INJECTION, LACOSAMIDE: HCPCS

## 2024-01-18 PROCEDURE — 25000003 PHARM REV CODE 250

## 2024-01-18 PROCEDURE — 27100171 HC OXYGEN HIGH FLOW UP TO 24 HOURS

## 2024-01-18 PROCEDURE — 94761 N-INVAS EAR/PLS OXIMETRY MLT: CPT | Mod: XB

## 2024-01-18 PROCEDURE — 25000242 PHARM REV CODE 250 ALT 637 W/ HCPCS

## 2024-01-18 PROCEDURE — 36600 WITHDRAWAL OF ARTERIAL BLOOD: CPT

## 2024-01-18 PROCEDURE — 94640 AIRWAY INHALATION TREATMENT: CPT

## 2024-01-18 PROCEDURE — 99900035 HC TECH TIME PER 15 MIN (STAT)

## 2024-01-18 PROCEDURE — 63600175 PHARM REV CODE 636 W HCPCS

## 2024-01-18 PROCEDURE — 99900026 HC AIRWAY MAINTENANCE (STAT)

## 2024-01-18 PROCEDURE — 25000003 PHARM REV CODE 250: Performed by: PSYCHIATRY & NEUROLOGY

## 2024-01-18 PROCEDURE — 82803 BLOOD GASES ANY COMBINATION: CPT

## 2024-01-18 PROCEDURE — 20000000 HC ICU ROOM

## 2024-01-18 PROCEDURE — 99291 CRITICAL CARE FIRST HOUR: CPT | Mod: ,,, | Performed by: PSYCHIATRY & NEUROLOGY

## 2024-01-18 PROCEDURE — 94668 MNPJ CHEST WALL SBSQ: CPT

## 2024-01-18 PROCEDURE — 94003 VENT MGMT INPAT SUBQ DAY: CPT

## 2024-01-18 PROCEDURE — 27200966 HC CLOSED SUCTION SYSTEM

## 2024-01-18 PROCEDURE — 85025 COMPLETE CBC W/AUTO DIFF WBC: CPT

## 2024-01-18 RX ORDER — POLYETHYLENE GLYCOL 3350 17 G/17G
17 POWDER, FOR SOLUTION ORAL 2 TIMES DAILY
Status: DISCONTINUED | OUTPATIENT
Start: 2024-01-18 | End: 2024-01-19

## 2024-01-18 RX ORDER — AMOXICILLIN 250 MG
2 CAPSULE ORAL 2 TIMES DAILY
Status: DISCONTINUED | OUTPATIENT
Start: 2024-01-18 | End: 2024-01-24

## 2024-01-18 RX ADMIN — SENNOSIDES AND DOCUSATE SODIUM 2 TABLET: 50; 8.6 TABLET ORAL at 09:01

## 2024-01-18 RX ADMIN — BUDESONIDE INHALATION 0.25 MG: 0.5 SUSPENSION RESPIRATORY (INHALATION) at 07:01

## 2024-01-18 RX ADMIN — LEVETIRACETAM 1500 MG: 100 INJECTION INTRAVENOUS at 08:01

## 2024-01-18 RX ADMIN — OXYCODONE HYDROCHLORIDE 5 MG: 5 TABLET ORAL at 11:01

## 2024-01-18 RX ADMIN — LEVETIRACETAM 1500 MG: 100 INJECTION INTRAVENOUS at 09:01

## 2024-01-18 RX ADMIN — MICONAZOLE NITRATE: 20 OINTMENT TOPICAL at 09:01

## 2024-01-18 RX ADMIN — MONTELUKAST 10 MG: 10 TABLET, FILM COATED ORAL at 09:01

## 2024-01-18 RX ADMIN — OXYCODONE HYDROCHLORIDE 5 MG: 5 TABLET ORAL at 06:01

## 2024-01-18 RX ADMIN — POLYETHYLENE GLYCOL 3350 17 G: 17 POWDER, FOR SOLUTION ORAL at 09:01

## 2024-01-18 RX ADMIN — CLOBAZAM 20 MG: 10 TABLET ORAL at 09:01

## 2024-01-18 RX ADMIN — FAMOTIDINE 20 MG: 20 TABLET, FILM COATED ORAL at 09:01

## 2024-01-18 RX ADMIN — LACOSAMIDE 200 MG: 10 INJECTION INTRAVENOUS at 01:01

## 2024-01-18 RX ADMIN — BISACODYL 10 MG: 10 SUPPOSITORY RECTAL at 09:01

## 2024-01-18 RX ADMIN — Medication 125 MCG/HR: at 09:01

## 2024-01-18 RX ADMIN — SILODOSIN 4 MG: 4 CAPSULE ORAL at 09:01

## 2024-01-18 RX ADMIN — OXYCODONE HYDROCHLORIDE 5 MG: 5 TABLET ORAL at 12:01

## 2024-01-18 RX ADMIN — ACETAMINOPHEN 325 MG: 325 TABLET ORAL at 05:01

## 2024-01-18 RX ADMIN — OXYCODONE HYDROCHLORIDE 5 MG: 5 TABLET ORAL at 05:01

## 2024-01-18 RX ADMIN — Medication 150 MCG/HR: at 10:01

## 2024-01-18 RX ADMIN — LACOSAMIDE 200 MG: 10 INJECTION INTRAVENOUS at 12:01

## 2024-01-18 RX ADMIN — ENOXAPARIN SODIUM 40 MG: 40 INJECTION SUBCUTANEOUS at 06:01

## 2024-01-18 NOTE — PLAN OF CARE
SW met with pt's mother and sister regarding options of facilities for trach and peg tube feeding and other concerns involving decision they will make in a couple of days after talking with pt's children and family. SW also provide mother/daughter will a list of LTAC facilities and explain the differences between Skilled and rehab. Family reported they've a better understanding of the process. SW will continue to follow.        Deisy Kaplan LMSW  PRN - [facility]  [id number]

## 2024-01-18 NOTE — PROGRESS NOTES
"[facility] - Neuro Critical Care  Neurocritical Care  Progress Note    Admit Date: 1/12/2024  Service Date: 01/18/2024  Length of Stay: 6    Subjective:     Chief Complaint: New onset refractory status epilepticus    History of Present Illness: Mr. Aaron Pruitt is a 53 year old M with PMHx significant for COPD/Asthma c/b smoking and HTN who presents to [facility] for NCSE. He initially presented to [facility] on 01/01 after being intubated in the field following acute respiratory failure post cardiac arrest s/p resuscitation and shock x1. Per chart review, he was shoveling some dirt in his yard when he got severely short of breath and suddenly had difficulty breathing. EMS was activated by his neighbors. Upon EMS arrival, he reportedly still had a pulse, though with severe respiratory distress, and subsequently became pulseless. He was given narcan x2 without improvement. CPR and Epi x3. ROSC after 3rd round of CPR with shock x 1. CT head on arrival to [facility] without acute intracranial abnormality, and CT chest without acute pulmonary process.TTM was initiated on 01/03 with fentanyl and propofol gtts for sedation, and rewarming was initiated on 01/04 with subsequent weaning of sedation. During this period, myoclonus was clinically noted, and propofol was resumed. EEG showed NCSE, prompting the initiation of keppra and vimpat with continuation of propofol. On 01/06, there was slight improvement in EEG, with NCSE noted to be "partially treated," at which time, the patient was then loaded with depacon. EEG was improved leading to weaning of propofol with eventual return to [facility]. MRI completed 01/08 and 01/11 completed with with GoC conversation held at [facility]. Family opted to forgo comfort measures at this point and transfer to [facility] for trial of 4th AED, per chart review. He arrived to [facility] on 80 mcg/kg/min of non-titrating propofol from [facility] which was dropped to 50 mcg/kg/min, followed by the immediate addition of 25 mcg/kg/min of " ketamine. EEG cap placed.     He will be admitted to [facility] for hourly neuromonitoring and a higher level of care.    Hospital Course: 01/13: no electrographic seizures reported, triglicerides increased into low 500s, propofol decreased to 30mcg, decrease further in 4 hrs if EEG unchanged, cont ketamine at 50mcg/kg and scheduled AEDs  01/14: EEG mostly suppressed, off propofol, decrease keppra to 30mcg today with potential to turn off in am  Escalate clobazam to 20mg qday and dc depakote, cont keppra and vimpat at current doses, plan to update family today, although improvement in seizure control, no change in neuro exam  01/15/2024 Increased GPDs s/p discontinuing ketamine gtt, onfi increased to BID. Awaiting arrival of family for GOC conversation  01/16/2024 cEEG unchanged overnight, remains on ictal-interictal spectrum, d/c'd. Neuro exam unchanged, remains w/ brainstem reflexes intact, intermittent non-purposeful movements of extremities. Ongoing GOC discussions with family   01/17/2024 NAEON. Mildly hypercarbic on AM ABG, increased rate/TV for eucarbia. Starting FWF for mild hypernatremia. Ongoing GOC discussions w/ family, see ACP note for full details   01/18/2024 NAEON, hypercarbia resolved. Awaiting family decision re GOC    Interval History:  Please see hospital course above for full details    Review of Systems   Unable to perform ROS: Intubated       Objective:     Vitals:  Temp: 98.6 °F (37 °C)  Pulse: 73  Rhythm: normal sinus rhythm  BP: 103/66  MAP (mmHg): 78  Resp: 17  SpO2: 100 %  Oxygen Concentration (%): 40  Vent Mode: A/C  Set Rate: 18 BPM  Vt Set: 480 mL  PEEP/CPAP: 5 cmH20  Peak Airway Pressure: 22 cmH20  Mean Airway Pressure: 10 cmH20  Plateau Pressure: 0 cmH20    Temp  Min: 98.4 °F (36.9 °C)  Max: 99.9 °F (37.7 °C)  Pulse  Min: 70  Max: 94  BP  Min: 97/70  Max: 171/102  MAP (mmHg)  Min: 77  Max: 131  Resp  Min: 17  Max: 26  SpO2  Min: 99 %  Max: 100 %  Oxygen Concentration (%)  Min: 40  Max:  40    01/17 0701 - 01/18 0700  In: 1913.9 [I.V.:282.5]  Out: 1850 [Urine:1850]   Unmeasured Output  Urine Occurrence: 2  Stool Occurrence: 1  Pad Count: 2        Physical Exam  General Appearance: Middle aged gentleman lying in bed, appears older than stated age. Not in acute distress. Intermittently overbreathing vent. Intermittently noted to cough and spontaneously move LLE, however no purposeful/goal directed movement noted. Exam overall continues to fluctuate between minimal movement and then periods of non-purposeful flailing of b/l lower extremities, LLE>RLE   Mental Status Exam: No response to verbal or noxious stimuli. Not tracking or regarding, not following commands  Cranial Nerves: Pupils 4->2 mm and sluggishly reactive b/l, gaze midline. +corneals to saline b/l, weak OCRs. Absent gag, +cough   Motor: No movement in b/l upper extremities. B/l lower extremities intermittently moving spontaneously within plane of bed, LLE>RLE, however no purposeful movement noted  Sensory: No response to noxious x4 extremities   Coordination: Unable to assess   Vascular: S1/S2 of normal intensity, no S3/S4 appreciated, no murmurs appreciated  Lungs: Coarse breath sounds b/l, no wheezing  Abdomen: Soft, non-distended, non-tender, BS +       Medications:  Continuousfentanyl, Last Rate: 137.5 mcg/hr (01/18/24 1101)    ScheduledbisacodyL, 10 mg, Daily  budesonide, 0.25 mg, Q12H  cloBAZam, 20 mg, BID  enoxparin, 40 mg, Q24H (prophylaxis, 1700)  famotidine, 20 mg, BID  lacosamide (VIMPAT) IVPB, 200 mg, Q12H  levETIRAcetam (Keppra) IV (PEDS and ADULTS), 1,500 mg, Q12H  miconazole nitrate 2%, , BID  montelukast, 10 mg, Daily  oxyCODONE, 5 mg, Q6H  polyethylene glycol, 17 g, BID  senna-docusate 8.6-50 mg, 2 tablet, BID  silodosin, 4 mg, Daily    PRNacetaminophen, 325 mg, Q6H PRN  albuterol-ipratropium, 3 mL, Q6H PRN  fentanyl, 50 mcg, Q1H PRN  hydrALAZINE, 10 mg, Q4H PRN  labetalol, 10 mg, Q4H PRN  magnesium oxide, 800 mg,  PRN  magnesium oxide, 800 mg, PRN  potassium bicarbonate, 35 mEq, PRN  potassium bicarbonate, 50 mEq, PRN  potassium bicarbonate, 60 mEq, PRN  potassium, sodium phosphates, 2 packet, PRN  potassium, sodium phosphates, 2 packet, PRN  potassium, sodium phosphates, 2 packet, PRN      Today I personally reviewed pertinent imaging, laboratory results, notably: CXR stable from prior. CBC slight downtrend in Hb to 7.9, platelets stable, no leukocytosis. CMP w/ normalization of serum Na to 142, BUN downtrending to 26, creatinine wnl. AST//160, slight uptrend from prior    Diet  No diet orders on file  No diet orders on file  TF at goal    Assessment/Plan:     Neuro  * New onset refractory status epilepticus  54 y/o M 12 days post respiratory failure c/b cardiac arrest (Epi x 3, CPR x 3, Shock x 1) with RSE in setting of propofol wean despite continued keppra, vimpat, depacon administration. He arrived to [facility] on 80 mcg/kg/min of non-titrating propofol from [facility] which was dropped to 50 mcg/kg/min, followed by the immediate addition of 25 mcg/kg/min of ketamine. EEG cap placed and epilepsy notified.    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  No improvement in exam with improvement in seizures  Start oxycodone 5 mg q6hrs for suspected neuro storming  Ongoing GOC with family  Prognosis poor     Acute encephalopathy  See primary problem, known NCSE post arrest  CT head unremarkable on admission to [facility]  MRI x 2 with T2 BG hyperintensity bilaterally -> c/w anoxic injury   Daily ABG, pH WNL  As the majority of  this patient's metabolic derangements were corrected at [facility], suspect encephalopathy 2/2 anoxic injury/NCSE    Seizure disorder, nonconvulsive, with status epilepticus  See primary problem     Pulmonary  COPD (chronic obstructive pulmonary disease)  Known history of smoking  C/w duonebs  Daily CXR, ABG while intubated  O2 Sat goal 88%-92%    Severe asthma  Known hx of asthma with acute onset SOB/hypoxia while shoveling dirt leading to respiratory/cardiac arrest  C/w home singulair  Continue duonebs/ inhaled steroids  Ceftriaxone/Azithromycin initiated by [facility] then d/c prior to transfer in setting of clear CXR, presently off emperic abx      Acute respiratory failure with hypoxia and hypercarbia  Patient with Hypercapnic and Hypoxic Respiratory failure which is Acute. He is not on home oxygen. Supplemental oxygen was provided and noted-   Vent Mode: A/C  Oxygen Concentration (%):  [40] 40  Resp Rate Total:  [18 br/min-23 br/min] 18 br/min  Vt Set:  [480 mL] 480 mL  PEEP/CPAP:  [5 cmH20] 5 cmH20  Mean Airway Pressure:  [9 mpZ20-39 cmH20] 10 cmH20    Daily CXR, ABG  Will need trach for airway protection if family electing to proceed w/ aggressive care  Hypercarbia improved, continue w/ current vent settings   See asthma/COPD  Likely not 2/2 infectious source  - s/p Azithromycin 1/2-1/3  - s/p Ceftriaxone 1/2-1/3  - s/p Pip-Tazo 1/3-1/6  - s/p Vanc 1/3-1/6       Cardiac/Vascular  Essential hypertension  Hx of     SBP < 180  PRN labetalol, hydralazine  Presently normotensive on no meds    Cardiorespiratory arrest  Cardiac arrest on 01/01 requiring 3 rounds of CPR, Epi x 3, and 1 shock to obtain ROSC  Likely respiratory etiology of arrest 2/2 asthma exacerbation   S/p TTM, maintain normothermia  MRI x 2 concerning for anoxic injury  Long term prognosis poor given imaging findings, persistent malignant patterns on cEEG>2 wks out from arrest  C/w oxycodone for periods of flailing movements, concern for neuro storming.   BP  unlikely to tolerate clonidine -> cautiously start propranolol 20 mg TID, monitor BP and for recurrent wheezing    Renal/  Hypernatremia  Na 146 on 1/17    - C/w FWF  - Goal eunatremia, monitor daily    GI  Transaminitis  Likely DILI     - Decrease acetaminophen to 350 mg PRN fever   - Hesitant to reduce keppra/vimpat given refractory NCSE, will continue to monitor LFTs  - Avoid hepatotoxic medications  - Trend     Palliative Care  ACP (advance care planning)  Patient made DNR at [facility]  Family chose to transfer patient for initiation of 4th AED trial, forgoing comfort measures at this time per chart review  1/13: family aware of prognosis if this trial of anaesthetics and additional scheduled AED is not helpful  1/15: Discussed with mother and sister at bedside, prognosis poor, see ACP note for details. Per family, requesting until end of week to make decision re trach/PEG vs comfort  1/17: Met with mother, father and fiancee at bedside, see ACP note for full details. Family converting pt to FULL CODE, to make decision re trach/PEG vs comfort by end of week     Other  Smoker  Per chart review   Cessation counseling not appropriate at this time given mental status            The patient is being Prophylaxed for:  Venous Thromboembolism with: Mechanical or Chemical  Stress Ulcer with: H2B  Ventilator Pneumonia with: chlorhexidine oral care    Activity Orders            Elevate HOB Elevate (30-45 degrees) Elevate HOB to 30 - 45 degrees during feeding unless otherwise stated starting at 01/12 1011    Turn patient starting at 01/12 0724    Elevate HOB starting at 01/12 0008          Full Code    Uninterrupted Critical Care/Counseling Time (not including procedures): 45 minutes  There is high probability for acute neurological change leading to clinical and possibly life-threatening deterioration requiring highest level of physician preparedness for urgent intervention. Critical care time was spent personally by me on the  following activities: development of treatment plan with patient or surrogate and bedside caregivers, discussions with consultants, evaluation of patient's response to treatment, examination of patient, ordering and performing treatments and interventions, ordering and review of laboratory studies, ordering and review of radiographic studies, pulse oximetry, antibiotic titration if applicable, vasopressor titration if applicable, re-evaluation of patient's condition. I spent greater than 50% of the documented critical care time assessing and making medical decisions for this patient.       Angelica Garrison MD  Neurocritical Care  [facility] - Neuro Critical Care

## 2024-01-18 NOTE — RESPIRATORY THERAPY
Advanced ETT by 2 cm.  ETT placement is now 26 @ lips.  ETT is intact, patent, and secured with commercial tube lynn.  Will continue to monitor.

## 2024-01-18 NOTE — ASSESSMENT & PLAN NOTE
Patient with Hypercapnic and Hypoxic Respiratory failure which is Acute. He is not on home oxygen. Supplemental oxygen was provided and noted-   Vent Mode: A/C  Oxygen Concentration (%):  [40] 40  Resp Rate Total:  [18 br/min-23 br/min] 18 br/min  Vt Set:  [480 mL] 480 mL  PEEP/CPAP:  [5 cmH20] 5 cmH20  Mean Airway Pressure:  [9 ofR50-99 cmH20] 10 cmH20    Daily CXR, ABG  Will need trach for airway protection if family electing to proceed w/ aggressive care  Hypercarbia improved, continue w/ current vent settings   See asthma/COPD  Likely not 2/2 infectious source  - s/p Azithromycin 1/2-1/3  - s/p Ceftriaxone 1/2-1/3  - s/p Pip-Tazo 1/3-1/6  - s/p Vanc 1/3-1/6

## 2024-01-18 NOTE — PLAN OF CARE
"[facility] Care Plan    POC reviewed with Aaron Pruitt and family at 1400. Pt is unable to verbalize understanding. Questions and concerns addressed. No acute events today. Pt progressing toward goals. Will continue to monitor. See below and flowsheets for full assessment and VS info.     - Tube feeds continued at 50 (goal)  - Fentanyl gtt continued; boluses from bag administered for vent discomfort/dissynchrony or coughing   -     Is this a stroke patient? no    Neuro:  [place] Coma Scale  Best Eye Response: 2-->(E2) to pain  Best Motor Response: 3-->(M3) flexion to pain  Best Verbal Response: 1-->(V1) none  Ellie Coma Scale Score: 6  Assessment Qualifiers: patient intubated, no eye obstruction present  Pupil PERRLA: yes     24 hr Temp:  [98.2 °F (36.8 °C)-99.9 °F (37.7 °C)]     CV:   Rhythm: normal sinus rhythm  BP goals:   SBP < 180  MAP > 65    Resp:      Vent Mode: A/C  Set Rate: 18 BPM  Oxygen Concentration (%): 40  Vt Set: 480 mL  PEEP/CPAP: 5 cmH20    Plan: Trach/PEG discussions    GI/:     Diet/Nutrition Received: tube feeding  Last Bowel Movement: 01/15/24  Voiding Characteristics: external catheter    Intake/Output Summary (Last 24 hours) at 1/18/2024 1751  Last data filed at 1/18/2024 1701  Gross per 24 hour   Intake 1715.84 ml   Output 2205 ml   Net -489.16 ml     Unmeasured Output  Urine Occurrence: 2  Stool Occurrence: 1  Pad Count: 2    Labs/Accuchecks:  Recent Labs   Lab 01/18/24  0332   WBC 4.34   RBC 2.62*   HGB 7.9*   HCT 25.1*         Recent Labs   Lab 01/18/24  0332      K 4.4   CO2 22*   *   BUN 26*   CREATININE 0.6   ALKPHOS 91   *   *   BILITOT 0.3    No results for input(s): "PROTIME", "INR", "APTT", "HEPANTIXA" in the last 168 hours. No results for input(s): "CPK", "CPKMB", "TROPONINI", "MB" in the last 168 hours.    Electrolytes: N/A - electrolytes WDL  Accuchecks: Q6H    Gtts:   fentanyl 137.5 mcg/hr (01/18/24 1701)       LDA/Wounds:  Lines/Drains/Airways  "      Drain  Duration                  NG/OG Tube 01/01/24 1722 [place] sump 16 Fr. Left nostril 17 days    Male External Urinary Catheter 01/14/24 4 days              Airway  Duration                  Airway - Non-Surgical Endotracheal Tube -- days              Peripheral Intravenous Line  Duration                  Peripheral IV - Single Lumen 01/17/24 1430 18 G Distal;Left;Posterior Forearm 1 day                  Wounds:        Altered Skin Integrity 01/13/24 1805 Groin-Description of Altered Skin Integrity: Partial thickness tissue loss. Shallow open ulcer with a red or pink wound bed, without slough. Intact or Open/Ruptured Serum-filled blister.       Altered Skin Integrity 01/13/24 2000 Nose-Description of Altered Skin Integrity: Partial thickness tissue loss. Shallow open ulcer with a red or pink wound bed, without slough. Intact or Open/Ruptured Serum-filled blister.    Wound care consulted: Yes

## 2024-01-18 NOTE — SUBJECTIVE & OBJECTIVE
Interval History:  Please see hospital course above for full details    Review of Systems   Unable to perform ROS: Intubated       Objective:     Vitals:  Temp: 98.6 °F (37 °C)  Pulse: 73  Rhythm: normal sinus rhythm  BP: 103/66  MAP (mmHg): 78  Resp: 17  SpO2: 100 %  Oxygen Concentration (%): 40  Vent Mode: A/C  Set Rate: 18 BPM  Vt Set: 480 mL  PEEP/CPAP: 5 cmH20  Peak Airway Pressure: 22 cmH20  Mean Airway Pressure: 10 cmH20  Plateau Pressure: 0 cmH20    Temp  Min: 98.4 °F (36.9 °C)  Max: 99.9 °F (37.7 °C)  Pulse  Min: 70  Max: 94  BP  Min: 97/70  Max: 171/102  MAP (mmHg)  Min: 77  Max: 131  Resp  Min: 17  Max: 26  SpO2  Min: 99 %  Max: 100 %  Oxygen Concentration (%)  Min: 40  Max: 40    01/17 0701 - 01/18 0700  In: 1913.9 [I.V.:282.5]  Out: 1850 [Urine:1850]   Unmeasured Output  Urine Occurrence: 2  Stool Occurrence: 1  Pad Count: 2        Physical Exam  General Appearance: Middle aged gentleman lying in bed, appears older than stated age. Not in acute distress. Intermittently overbreathing vent. Intermittently noted to cough and spontaneously move LLE, however no purposeful/goal directed movement noted. Exam overall continues to fluctuate between minimal movement and then periods of non-purposeful flailing of b/l lower extremities, LLE>RLE   Mental Status Exam: No response to verbal or noxious stimuli. Not tracking or regarding, not following commands  Cranial Nerves: Pupils 4->2 mm and sluggishly reactive b/l, gaze midline. +corneals to saline b/l, weak OCRs. Absent gag, +cough   Motor: No movement in b/l upper extremities. B/l lower extremities intermittently moving spontaneously within plane of bed, LLE>RLE, however no purposeful movement noted  Sensory: No response to noxious x4 extremities   Coordination: Unable to assess   Vascular: S1/S2 of normal intensity, no S3/S4 appreciated, no murmurs appreciated  Lungs: Coarse breath sounds b/l, no wheezing  Abdomen: Soft, non-distended, non-tender, BS +        Medications:  Continuousfentanyl, Last Rate: 137.5 mcg/hr (01/18/24 1101)    ScheduledbisacodyL, 10 mg, Daily  budesonide, 0.25 mg, Q12H  cloBAZam, 20 mg, BID  enoxparin, 40 mg, Q24H (prophylaxis, 1700)  famotidine, 20 mg, BID  lacosamide (VIMPAT) IVPB, 200 mg, Q12H  levETIRAcetam (Keppra) IV (PEDS and ADULTS), 1,500 mg, Q12H  miconazole nitrate 2%, , BID  montelukast, 10 mg, Daily  oxyCODONE, 5 mg, Q6H  polyethylene glycol, 17 g, BID  senna-docusate 8.6-50 mg, 2 tablet, BID  silodosin, 4 mg, Daily    PRNacetaminophen, 325 mg, Q6H PRN  albuterol-ipratropium, 3 mL, Q6H PRN  fentanyl, 50 mcg, Q1H PRN  hydrALAZINE, 10 mg, Q4H PRN  labetalol, 10 mg, Q4H PRN  magnesium oxide, 800 mg, PRN  magnesium oxide, 800 mg, PRN  potassium bicarbonate, 35 mEq, PRN  potassium bicarbonate, 50 mEq, PRN  potassium bicarbonate, 60 mEq, PRN  potassium, sodium phosphates, 2 packet, PRN  potassium, sodium phosphates, 2 packet, PRN  potassium, sodium phosphates, 2 packet, PRN      Today I personally reviewed pertinent imaging, laboratory results, notably: CXR stable from prior. CBC slight downtrend in Hb to 7.9, platelets stable, no leukocytosis. CMP w/ normalization of serum Na to 142, BUN downtrending to 26, creatinine wnl. AST//160, slight uptrend from prior    Diet  No diet orders on file  No diet orders on file  TF at goal

## 2024-01-18 NOTE — ASSESSMENT & PLAN NOTE
Cardiac arrest on 01/01 requiring 3 rounds of CPR, Epi x 3, and 1 shock to obtain ROSC  Likely respiratory etiology of arrest 2/2 asthma exacerbation   S/p TTM, maintain normothermia  MRI x 2 concerning for anoxic injury  Long term prognosis poor given imaging findings, persistent malignant patterns on cEEG>2 wks out from arrest  C/w oxycodone for periods of flailing movements, concern for neuro storming.   BP unlikely to tolerate clonidine -> cautiously start propranolol 20 mg TID, monitor BP and for recurrent wheezing

## 2024-01-18 NOTE — ASSESSMENT & PLAN NOTE
54 y/o M 12 days post respiratory failure c/b cardiac arrest (Epi x 3, CPR x 3, Shock x 1) with RSE in setting of propofol wean despite continued keppra, vimpat, depacon administration. He arrived to [facility] on 80 mcg/kg/min of non-titrating propofol from [facility] which was dropped to 50 mcg/kg/min, followed by the immediate addition of 25 mcg/kg/min of ketamine. EEG cap placed and epilepsy notified.    -Propofol d/c'd 1/13, ketamine d/c'd 1/14  -Unchanged frequent GPDs on 1/16, on ictal interictal spectrum w/o ellis NCSE -> EEG d/c'd for scalp rest, consider repeat 24 hr spot check this weekend pending ongoing GOC conversations   -Q1h neurochecks, vital checks  -Daily CBC, CMP, Mag, Phos  -MRI x 2 with T2 BG hyperintensity bilaterally  -Continue keppra 1500 mg bid, Vimpat 200 mg bid, onfi 20 mg BID

## 2024-01-18 NOTE — ASSESSMENT & PLAN NOTE
See primary problem, known NCSE post arrest  CT head unremarkable on admission to [facility]  MRI x 2 with T2 BG hyperintensity bilaterally -> c/w anoxic injury   Daily ABG, pH WNL  As the majority of this patient's metabolic derangements were corrected at [facility], suspect encephalopathy 2/2 anoxic injury/NCSE

## 2024-01-18 NOTE — PLAN OF CARE
"[facility] Care Plan    POC reviewed with Aaron Pruitt and family at 0300. Pt family verbalized understanding. Questions and concerns addressed. No acute events overnight. Pt progressing toward goals. Will continue to monitor. See below and flowsheets for full assessment and VS info.           Is this a stroke patient? no    Neuro:  [place] Coma Scale  Best Eye Response: 2-->(E2) to pain  Best Motor Response: 2-->(M2) extension to pain  Best Verbal Response: 1-->(V1) none  [place] Coma Scale Score: 5  Assessment Qualifiers: patient intubated, patient chemically sedated or paralyzed  Pupil PERRLA: yes     24hr Temp:  [98.4 °F (36.9 °C)-99.9 °F (37.7 °C)]     CV:   Rhythm: normal sinus rhythm  BP goals:   SBP < 180  MAP > 65    Resp:      Vent Mode: A/C  Set Rate: 18 BPM  Oxygen Concentration (%): 40  Vt Set: 480 mL  PEEP/CPAP: 5 cmH20    Plan: wean to extubate    GI/:     Diet/Nutrition Received: tube feeding  Last Bowel Movement: 01/15/24  Voiding Characteristics: external catheter    Intake/Output Summary (Last 24 hours) at 1/18/2024 0618  Last data filed at 1/18/2024 0608  Gross per 24 hour   Intake 1937.9 ml   Output 1850 ml   Net 87.9 ml     Unmeasured Output  Urine Occurrence: 2  Stool Occurrence: 1  Pad Count: 2    Labs/Accuchecks:  Recent Labs   Lab 01/18/24  0332   WBC 4.34   RBC 2.62*   HGB 7.9*   HCT 25.1*         Recent Labs   Lab 01/18/24  0332      K 4.4   CO2 22*   *   BUN 26*   CREATININE 0.6   ALKPHOS 91   *   *   BILITOT 0.3    No results for input(s): "PROTIME", "INR", "APTT", "HEPANTIXA" in the last 168 hours. No results for input(s): "CPK", "CPKMB", "TROPONINI", "MB" in the last 168 hours.    Electrolytes: N/A - electrolytes WDL  Accuchecks: Q6H    Gtts:   fentanyl 125 mcg/hr (01/18/24 0608)       LDA/Wounds:  Lines/Drains/Airways       Drain  Duration                  NG/OG Tube 01/01/24 1722 Melchor sump 16 Fr. Left nostril 16 days    Male External Urinary Catheter " 01/14/24 4 days              Airway  Duration                  Airway - Non-Surgical Endotracheal Tube -- days              Peripheral Intravenous Line  Duration                  Peripheral IV - Single Lumen 01/17/24 1430 18 G Distal;Left;Posterior Forearm <1 day         Peripheral IV - Single Lumen 01/17/24 1556 18 G Anterior;Right Forearm <1 day                  Wounds: No  Wound care consulted: No

## 2024-01-18 NOTE — ASSESSMENT & PLAN NOTE
Likely DILI     - Decrease acetaminophen to 350 mg PRN fever   - Hesitant to reduce keppra/vimpat given refractory NCSE, will continue to monitor LFTs  - Avoid hepatotoxic medications  - Trend

## 2024-01-19 LAB
ALBUMIN SERPL BCP-MCNC: 2.5 G/DL (ref 3.5–5.2)
ALLENS TEST: ABNORMAL
ALP SERPL-CCNC: 91 U/L (ref 55–135)
ALT SERPL W/O P-5'-P-CCNC: 171 U/L (ref 10–44)
ANION GAP SERPL CALC-SCNC: 8 MMOL/L (ref 8–16)
AST SERPL-CCNC: 89 U/L (ref 10–40)
BASOPHILS # BLD AUTO: 0.01 K/UL (ref 0–0.2)
BASOPHILS NFR BLD: 0.2 % (ref 0–1.9)
BILIRUB SERPL-MCNC: 0.5 MG/DL (ref 0.1–1)
BUN SERPL-MCNC: 29 MG/DL (ref 6–20)
CALCIUM SERPL-MCNC: 9.2 MG/DL (ref 8.7–10.5)
CHLORIDE SERPL-SCNC: 110 MMOL/L (ref 95–110)
CO2 SERPL-SCNC: 23 MMOL/L (ref 23–29)
CREAT SERPL-MCNC: 0.7 MG/DL (ref 0.5–1.4)
DELSYS: ABNORMAL
DIFFERENTIAL METHOD BLD: ABNORMAL
EOSINOPHIL # BLD AUTO: 0 K/UL (ref 0–0.5)
EOSINOPHIL NFR BLD: 1 % (ref 0–8)
ERYTHROCYTE [DISTWIDTH] IN BLOOD BY AUTOMATED COUNT: 12.8 % (ref 11.5–14.5)
ERYTHROCYTE [SEDIMENTATION RATE] IN BLOOD BY WESTERGREN METHOD: 18 MM/H
EST. GFR  (NO RACE VARIABLE): >60 ML/MIN/1.73 M^2
FIO2: 40
GLUCOSE SERPL-MCNC: 107 MG/DL (ref 70–110)
HCO3 UR-SCNC: 26 MMOL/L (ref 24–28)
HCT VFR BLD AUTO: 27.7 % (ref 40–54)
HCT VFR BLD CALC: 27 %PCV (ref 36–54)
HGB BLD-MCNC: 8.9 G/DL (ref 14–18)
IMM GRANULOCYTES # BLD AUTO: 0.03 K/UL (ref 0–0.04)
IMM GRANULOCYTES NFR BLD AUTO: 0.7 % (ref 0–0.5)
LYMPHOCYTES # BLD AUTO: 1 K/UL (ref 1–4.8)
LYMPHOCYTES NFR BLD: 24.4 % (ref 18–48)
MCH RBC QN AUTO: 30.6 PG (ref 27–31)
MCHC RBC AUTO-ENTMCNC: 32.1 G/DL (ref 32–36)
MCV RBC AUTO: 95 FL (ref 82–98)
MODE: ABNORMAL
MONOCYTES # BLD AUTO: 0.6 K/UL (ref 0.3–1)
MONOCYTES NFR BLD: 15.2 % (ref 4–15)
NEUTROPHILS # BLD AUTO: 2.3 K/UL (ref 1.8–7.7)
NEUTROPHILS NFR BLD: 58.5 % (ref 38–73)
NRBC BLD-RTO: 0 /100 WBC
PCO2 BLDA: 38.3 MMHG (ref 35–45)
PEEP: 5
PH SMN: 7.44 [PH] (ref 7.35–7.45)
PLATELET # BLD AUTO: 368 K/UL (ref 150–450)
PLATELET BLD QL SMEAR: ABNORMAL
PMV BLD AUTO: 8.4 FL (ref 9.2–12.9)
PO2 BLDA: 146 MMHG (ref 80–100)
POC BE: 2 MMOL/L
POC SATURATED O2: 99 % (ref 95–100)
POC TCO2: 27 MMOL/L (ref 23–27)
POTASSIUM SERPL-SCNC: 3.7 MMOL/L (ref 3.5–5.1)
PROT SERPL-MCNC: 6.4 G/DL (ref 6–8.4)
RBC # BLD AUTO: 2.91 M/UL (ref 4.6–6.2)
SAMPLE: ABNORMAL
SITE: ABNORMAL
SODIUM SERPL-SCNC: 141 MMOL/L (ref 136–145)
VT: 480
WBC # BLD AUTO: 4.01 K/UL (ref 3.9–12.7)

## 2024-01-19 PROCEDURE — 99900035 HC TECH TIME PER 15 MIN (STAT)

## 2024-01-19 PROCEDURE — 94640 AIRWAY INHALATION TREATMENT: CPT

## 2024-01-19 PROCEDURE — 25000003 PHARM REV CODE 250

## 2024-01-19 PROCEDURE — 94761 N-INVAS EAR/PLS OXIMETRY MLT: CPT

## 2024-01-19 PROCEDURE — 25000242 PHARM REV CODE 250 ALT 637 W/ HCPCS

## 2024-01-19 PROCEDURE — 25000003 PHARM REV CODE 250: Performed by: NURSE PRACTITIONER

## 2024-01-19 PROCEDURE — 27100171 HC OXYGEN HIGH FLOW UP TO 24 HOURS

## 2024-01-19 PROCEDURE — 80053 COMPREHEN METABOLIC PANEL: CPT

## 2024-01-19 PROCEDURE — 94668 MNPJ CHEST WALL SBSQ: CPT

## 2024-01-19 PROCEDURE — 94003 VENT MGMT INPAT SUBQ DAY: CPT

## 2024-01-19 PROCEDURE — 36600 WITHDRAWAL OF ARTERIAL BLOOD: CPT

## 2024-01-19 PROCEDURE — 25000003 PHARM REV CODE 250: Performed by: PSYCHIATRY & NEUROLOGY

## 2024-01-19 PROCEDURE — 99291 CRITICAL CARE FIRST HOUR: CPT | Mod: ,,, | Performed by: PSYCHIATRY & NEUROLOGY

## 2024-01-19 PROCEDURE — 85025 COMPLETE CBC W/AUTO DIFF WBC: CPT

## 2024-01-19 PROCEDURE — 82803 BLOOD GASES ANY COMBINATION: CPT

## 2024-01-19 PROCEDURE — 63600175 PHARM REV CODE 636 W HCPCS

## 2024-01-19 PROCEDURE — 99900026 HC AIRWAY MAINTENANCE (STAT)

## 2024-01-19 PROCEDURE — C9254 INJECTION, LACOSAMIDE: HCPCS

## 2024-01-19 PROCEDURE — 20000000 HC ICU ROOM

## 2024-01-19 RX ORDER — ACETYLCYSTEINE 100 MG/ML
4 SOLUTION ORAL; RESPIRATORY (INHALATION) EVERY 6 HOURS
Status: DISCONTINUED | OUTPATIENT
Start: 2024-01-19 | End: 2024-01-23

## 2024-01-19 RX ORDER — PROPRANOLOL HYDROCHLORIDE 10 MG/1
20 TABLET ORAL 3 TIMES DAILY
Status: DISCONTINUED | OUTPATIENT
Start: 2024-01-19 | End: 2024-01-23

## 2024-01-19 RX ORDER — OXYCODONE HYDROCHLORIDE 10 MG/1
10 TABLET ORAL EVERY 6 HOURS
Status: DISCONTINUED | OUTPATIENT
Start: 2024-01-19 | End: 2024-01-23

## 2024-01-19 RX ADMIN — FAMOTIDINE 20 MG: 20 TABLET, FILM COATED ORAL at 08:01

## 2024-01-19 RX ADMIN — ENOXAPARIN SODIUM 40 MG: 40 INJECTION SUBCUTANEOUS at 05:01

## 2024-01-19 RX ADMIN — IPRATROPIUM BROMIDE AND ALBUTEROL SULFATE 3 ML: .5; 3 SOLUTION RESPIRATORY (INHALATION) at 06:01

## 2024-01-19 RX ADMIN — MICONAZOLE NITRATE: 20 OINTMENT TOPICAL at 08:01

## 2024-01-19 RX ADMIN — BUDESONIDE INHALATION 0.25 MG: 0.5 SUSPENSION RESPIRATORY (INHALATION) at 11:01

## 2024-01-19 RX ADMIN — LACOSAMIDE 200 MG: 10 INJECTION INTRAVENOUS at 12:01

## 2024-01-19 RX ADMIN — MONTELUKAST 10 MG: 10 TABLET, FILM COATED ORAL at 08:01

## 2024-01-19 RX ADMIN — PROPRANOLOL HYDROCHLORIDE 20 MG: 10 TABLET ORAL at 02:01

## 2024-01-19 RX ADMIN — OXYCODONE HYDROCHLORIDE 5 MG: 5 TABLET ORAL at 05:01

## 2024-01-19 RX ADMIN — LEVETIRACETAM 1500 MG: 100 INJECTION INTRAVENOUS at 08:01

## 2024-01-19 RX ADMIN — OXYCODONE HYDROCHLORIDE 10 MG: 10 TABLET ORAL at 11:01

## 2024-01-19 RX ADMIN — LEVETIRACETAM 1500 MG: 100 INJECTION INTRAVENOUS at 09:01

## 2024-01-19 RX ADMIN — MICONAZOLE NITRATE: 20 OINTMENT TOPICAL at 09:01

## 2024-01-19 RX ADMIN — SENNOSIDES AND DOCUSATE SODIUM 2 TABLET: 50; 8.6 TABLET ORAL at 09:01

## 2024-01-19 RX ADMIN — ACETYLCYSTEINE 4 ML: 100 INHALANT RESPIRATORY (INHALATION) at 06:01

## 2024-01-19 RX ADMIN — OXYCODONE HYDROCHLORIDE 10 MG: 10 TABLET ORAL at 05:01

## 2024-01-19 RX ADMIN — BUDESONIDE INHALATION 0.25 MG: 0.5 SUSPENSION RESPIRATORY (INHALATION) at 08:01

## 2024-01-19 RX ADMIN — SILODOSIN 4 MG: 4 CAPSULE ORAL at 08:01

## 2024-01-19 RX ADMIN — ACETYLCYSTEINE 4 ML: 100 INHALANT RESPIRATORY (INHALATION) at 11:01

## 2024-01-19 RX ADMIN — FAMOTIDINE 20 MG: 20 TABLET, FILM COATED ORAL at 09:01

## 2024-01-19 RX ADMIN — CLOBAZAM 20 MG: 10 TABLET ORAL at 08:01

## 2024-01-19 RX ADMIN — CLOBAZAM 20 MG: 10 TABLET ORAL at 09:01

## 2024-01-19 RX ADMIN — PROPRANOLOL HYDROCHLORIDE 20 MG: 10 TABLET ORAL at 09:01

## 2024-01-19 RX ADMIN — OXYCODONE HYDROCHLORIDE 5 MG: 5 TABLET ORAL at 12:01

## 2024-01-19 NOTE — ASSESSMENT & PLAN NOTE
Patient with Hypercapnic and Hypoxic Respiratory failure which is Acute. He is not on home oxygen. Supplemental oxygen was provided and noted-   Vent Mode: A/C  Oxygen Concentration (%):  [40] 40  Resp Rate Total:  [18 br/min-20 br/min] 20 br/min  Vt Set:  [480 mL] 480 mL  PEEP/CPAP:  [5 cmH20] 5 cmH20  Mean Airway Pressure:  [9.3 cmH20-10 cmH20] 9.6 cmH20    Daily CXR, ABG  Will need trach for airway protection if family electing to proceed w/ aggressive care -> gen surg consulted  Hypercarbia improved, continue w/ current vent settings   See asthma/COPD  Likely not 2/2 infectious source  - s/p Azithromycin 1/2-1/3  - s/p Ceftriaxone 1/2-1/3  - s/p Pip-Tazo 1/3-1/6  - s/p Vanc 1/3-1/6

## 2024-01-19 NOTE — ASSESSMENT & PLAN NOTE
No improvement in exam with improvement in seizures  Increase oxycodone 5 mg q6hrs -> 10 mg q6hrs and start propranolol 20 mg TID for suspected neuro storming  Ongoing GOC with family -> see ACP notes for full details

## 2024-01-19 NOTE — PLAN OF CARE
[facility]  Discharge Reassessment    Primary Care Provider: Gogo Vivar NP    Expected Discharge Date: 1/23/2024    Reassessment (most recent)       Discharge Reassessment - 01/19/24 1158          Discharge Reassessment    Assessment Type Discharge Planning Reassessment     Did the patient's condition or plan change since previous assessment? Yes     Discharge Plan discussed with: Adult children;Parent(s)     Name(s) and Number(s) Amanda Tobias (Mother) [phone]     Communicated MARQUISE with patient/caregiver Yes;Date not available/Unable to determine     Discharge Plan A Long-term acute care facility (LTAC)     Discharge Plan B Skilled Nursing Facility;Rehab;Home Health     DME Needed Upon Discharge  other (see comments)     Transition of Care Barriers None     Why the patient remains in the hospital Requires continued medical care        Post-Acute Status    Post-Acute Authorization Placement     Post-Acute Placement Status Patient List Provided     Coverage [place] [employer]     Patient choice form signed by patient/caregiver List with quality metrics by geographic area provided     Discharge Delays None known at this time                   Pt continues to need medical care.  Pt's family has been having ongoing [facility] meetings with medical team.  SW/CM will continue to follow pt.    No other discharge needs identified at this time.    Discharge Plan A and Plan B have been determined by review of patient's clinical status, future medical and therapeutic needs, and coverage/benefits for post-acute care in coordination with multidisciplinary team members.     Fely Regan LMSW  Case Management [facility] [place], [place]  [phone]

## 2024-01-19 NOTE — ASSESSMENT & PLAN NOTE
Patient made DNR at [facility]  Family chose to transfer patient for initiation of 4th AED trial, forgoing comfort measures at this time per chart review  1/13: family aware of prognosis if this trial of anaesthetics and additional scheduled AED is not helpful  1/15: Discussed with mother and sister at bedside, prognosis poor, see ACP note for details. Per family, requesting until end of week to make decision re trach/PEG vs comfort  1/17: Met with mother, father and fiancee at bedside, see ACP note for full details. Family converting pt to FULL CODE, to make decision re trach/PEG vs comfort by end of week   1/19: Family electing to proceed with trach/PEG, confirmed w/ legal next of kin (patient's mother, pt's adult son incarcerated and unable to participate in discussions). Please see ACP note for full details

## 2024-01-19 NOTE — ASSESSMENT & PLAN NOTE
Cardiac arrest on 01/01 requiring 3 rounds of CPR, Epi x 3, and 1 shock to obtain ROSC  Likely respiratory etiology of arrest 2/2 asthma exacerbation   S/p TTM, maintain normothermia  MRI x 2 concerning for anoxic injury  Long term prognosis poor given imaging findings, persistent malignant patterns on cEEG>2 wks out from arrest  C/w oxycodone for periods of flailing movements w/o epileptiform correlate, concern for neuro storming. Wean fentanyl gtt as tolerated  BP unlikely to tolerate clonidine -> cautiously start propranolol 20 mg TID, monitor BP and for recurrent wheezing

## 2024-01-19 NOTE — PROGRESS NOTES
"[facility]  Neurocritical Care  Progress Note    Admit Date: 1/12/2024  Service Date: 01/19/2024  Length of Stay: 7    Subjective:     Chief Complaint: New onset refractory status epilepticus    History of Present Illness: Mr. Aaron Pruitt is a 53 year old M with PMHx significant for COPD/Asthma c/b smoking and HTN who presents to [facility] for NCSE. He initially presented to [facility] ER on 01/01 after being intubated in the field following acute respiratory failure post cardiac arrest s/p resuscitation and shock x1. Per chart review, he was shoveling some dirt in his yard when he got severely short of breath and suddenly had difficulty breathing. EMS was activated by his neighbors. Upon EMS arrival, he reportedly still had a pulse, though with severe respiratory distress, and subsequently became pulseless. He was given narcan x2 without improvement. CPR and Epi x3. ROSC after 3rd round of CPR with shock x 1. CT head on arrival to [facility] without acute intracranial abnormality, and CT chest without acute pulmonary process.TTM was initiated on 01/03 with fentanyl and propofol gtts for sedation, and rewarming was initiated on 01/04 with subsequent weaning of sedation. During this period, myoclonus was clinically noted, and propofol was resumed. EEG showed NCSE, prompting the initiation of keppra and vimpat with continuation of propofol. On 01/06, there was slight improvement in EEG, with NCSE noted to be "partially treated," at which time, the patient was then loaded with depacon. EEG was improved leading to weaning of propofol with eventual return to [facility]. MRI completed 01/08 and 01/11 completed with with GoC conversation held at [facility]. Family opted to forgo comfort measures at this point and transfer to [facility] for trial of 4th AED, per chart review. He arrived to [facility] on 80 mcg/kg/min of non-titrating propofol from [facility] which was dropped to 50 mcg/kg/min, followed by the immediate addition of 25 mcg/kg/min of " ketamine. EEG cap placed.     He will be admitted to [facility] for hourly neuromonitoring and a higher level of care.    Hospital Course: 01/13: no electrographic seizures reported, triglicerides increased into low 500s, propofol decreased to 30mcg, decrease further in 4 hrs if EEG unchanged, cont ketamine at 50mcg/kg and scheduled AEDs  01/14: EEG mostly suppressed, off propofol, decrease keppra to 30mcg today with potential to turn off in am  Escalate clobazam to 20mg qday and dc depakote, cont keppra and vimpat at current doses, plan to update family today, although improvement in seizure control, no change in neuro exam  01/15/2024 Increased GPDs s/p discontinuing ketamine gtt, onfi increased to BID. Awaiting arrival of family for GOC conversation  01/16/2024 cEEG unchanged overnight, remains on ictal-interictal spectrum, d/c'd. Neuro exam unchanged, remains w/ brainstem reflexes intact, intermittent non-purposeful movements of extremities. Ongoing GOC discussions with family   01/17/2024 NAEON. Mildly hypercarbic on AM ABG, increased rate/TV for eucarbia. Starting FWF for mild hypernatremia. Ongoing GOC discussions w/ family, see ACP note for full details   01/18/2024 NAEON, hypercarbia resolved. Awaiting family decision re GOC  01/19/2024 Added propranolol and increased oxycodone for neuro storming/episodes of non-purposeful movement. Family electing to proceed w/ trach/PEG, please see ACP note for full details. Gen surgery consulted, CM/SW updated.     Interval History:  Please see hospital course above for full details    Review of Systems   Unable to perform ROS: Intubated       Objective:     Vitals:  Temp: 99.1 °F (37.3 °C)  Pulse: 91  Rhythm: normal sinus rhythm  BP: 121/83  MAP (mmHg): 98  Resp: 18  SpO2: 100 %  Oxygen Concentration (%): 40  Vent Mode: A/C  Set Rate: 18 BPM  Vt Set: 480 mL  PEEP/CPAP: 5 cmH20  Peak Airway Pressure: 20 cmH20  Mean Airway Pressure: 9.6 cmH20  Plateau Pressure: 0 cmH20    Temp   Min: 98.4 °F (36.9 °C)  Max: 99.1 °F (37.3 °C)  Pulse  Min: 69  Max: 91  BP  Min: 98/61  Max: 159/97  MAP (mmHg)  Min: 74  Max: 122  Resp  Min: 18  Max: 25  SpO2  Min: 99 %  Max: 100 %  Oxygen Concentration (%)  Min: 40  Max: 40    01/18 0701 - 01/19 0700  In: 1368.8 [I.V.:423.5]  Out: 1255 [Urine:1255]   Unmeasured Output  Urine Occurrence: 2  Stool Occurrence: 1  Pad Count: 2        Physical Exam  General Appearance: Middle aged gentleman lying in bed, appears older than stated age. Not in acute distress. Intermittently overbreathing vent. Moving LLE spontaneously, however no purposeful/goal directed movement noted. Exam overall continues to fluctuate between minimal movement and then periods of non-purposeful flailing of b/l lower extremities, LLE>RLE   Mental Status Exam: No response to verbal or noxious stimuli. Not tracking or regarding, not following commands  Cranial Nerves: Pupils 4->2 mm and reactive b/l, gaze midline. +corneals to saline b/l, weak OCRs. Absent gag, +cough   Motor: No movement in b/l upper extremities. Moving LLE spontaneously AG, however not consistently moving in response to stimuli, remains w/o clear evidence of purposeful movement   Sensory: No response to noxious x4 extremities   Coordination: Unable to assess   Vascular: S1/S2 of normal intensity, no S3/S4 appreciated, no murmurs appreciated  Lungs: Coarse breath sounds b/l, no wheezing  Abdomen: Soft, non-distended, non-tender, BS +       Medications:  Continuousfentanyl, Last Rate: 100 mcg/hr (01/19/24 1452)    ScheduledbisacodyL, 10 mg, Daily  budesonide, 0.25 mg, Q12H  cloBAZam, 20 mg, BID  enoxparin, 40 mg, Q24H (prophylaxis, 1700)  famotidine, 20 mg, BID  lacosamide (VIMPAT) IVPB, 200 mg, Q12H  levETIRAcetam (Keppra) IV (PEDS and ADULTS), 1,500 mg, Q12H  miconazole nitrate 2%, , BID  montelukast, 10 mg, Daily  oxyCODONE, 10 mg, Q6H  polyethylene glycol, 17 g, BID  propranoloL, 20 mg, TID  senna-docusate 8.6-50 mg, 2 tablet,  BID  silodosin, 4 mg, Daily    PRNacetaminophen, 325 mg, Q6H PRN  albuterol-ipratropium, 3 mL, Q6H PRN  fentanyl, 50 mcg, Q1H PRN  hydrALAZINE, 10 mg, Q4H PRN  labetalol, 10 mg, Q4H PRN  magnesium oxide, 800 mg, PRN  magnesium oxide, 800 mg, PRN  potassium bicarbonate, 35 mEq, PRN  potassium bicarbonate, 50 mEq, PRN  potassium bicarbonate, 60 mEq, PRN  potassium, sodium phosphates, 2 packet, PRN  potassium, sodium phosphates, 2 packet, PRN  potassium, sodium phosphates, 2 packet, PRN      Today I personally reviewed pertinent imaging, laboratory results, notably: CXR w/ ET tube still moderately high at 6.5 cm above michael, improved position from prior. CBC unremarkable, no leukocytosis, Hb uptrending to 8.9, platelets stable. CMP unremarkable. ABG w/ resolved hypercarbia, wnl this AM    Diet  No diet orders on file  No diet orders on file  TF at goal     Assessment/Plan:     Neuro  * New onset refractory status epilepticus  52 y/o M 12 days post respiratory failure c/b cardiac arrest (Epi x 3, CPR x 3, Shock x 1) with RSE in setting of propofol wean despite continued keppra, vimpat, depacon administration. He arrived to [facility] on 80 mcg/kg/min of non-titrating propofol from [facility] which was dropped to 50 mcg/kg/min, followed by the immediate addition of 25 mcg/kg/min of ketamine. EEG cap placed and epilepsy notified.    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  No improvement in exam with improvement in seizures  Increase oxycodone 5 mg q6hrs -> 10 mg q6hrs and start propranolol 20 mg TID for suspected neuro storming  Ongoing GOC with family  -> see ACP notes for full details      Acute encephalopathy  See primary problem, known NCSE post arrest  CT head unremarkable on admission to [facility]  MRI x 2 with T2 BG hyperintensity bilaterally -> c/w anoxic injury   Daily ABG, pH WNL  As the majority of this patient's metabolic derangements were corrected at [facility], suspect encephalopathy 2/2 anoxic injury/NCSE    Seizure disorder, nonconvulsive, with status epilepticus  See primary problem     Pulmonary  COPD (chronic obstructive pulmonary disease)  Known history of smoking  C/w duonebs  Daily CXR, ABG while intubated  O2 Sat goal 88%-92%    Severe asthma  Known hx of asthma with acute onset SOB/hypoxia while shoveling dirt leading to respiratory/cardiac arrest  C/w home singulair  Continue duonebs/ inhaled steroids  Ceftriaxone/Azithromycin initiated by [facility] then d/c prior to transfer in setting of clear CXR, presently off emperic abx      Acute respiratory failure with hypoxia and hypercarbia  Patient with Hypercapnic and Hypoxic Respiratory failure which is Acute. He is not on home oxygen. Supplemental oxygen was provided and noted-   Vent Mode: A/C  Oxygen Concentration (%):  [40] 40  Resp Rate Total:  [18 br/min-20 br/min] 20 br/min  Vt Set:  [480 mL] 480 mL  PEEP/CPAP:  [5 cmH20] 5 cmH20  Mean Airway Pressure:  [9.3 cmH20-10 cmH20] 9.6 cmH20    Daily CXR, ABG  Will need trach for airway protection if family electing to proceed w/ aggressive care -> gen surg consulted  Hypercarbia improved, continue w/ current vent settings   See asthma/COPD  Likely not 2/2 infectious source  - s/p Azithromycin 1/2-1/3  - s/p Ceftriaxone 1/2-1/3  - s/p Pip-Tazo 1/3-1/6  - s/p Vanc 1/3-1/6       Cardiac/Vascular  Essential hypertension  Hx of     SBP < 180  PRN labetalol, hydralazine  Presently normotensive on no meds    Cardiorespiratory arrest  Cardiac arrest on 01/01 requiring 3 rounds of CPR, Epi x 3, and 1 shock to obtain ROSC  Likely respiratory etiology of arrest 2/2 asthma  exacerbation   S/p TTM, maintain normothermia  MRI x 2 concerning for anoxic injury  Long term prognosis poor given imaging findings, persistent malignant patterns on cEEG>2 wks out from arrest  C/w oxycodone for periods of flailing movements w/o epileptiform correlate, concern for neuro storming. Wean fentanyl gtt as tolerated  BP unlikely to tolerate clonidine -> cautiously start propranolol 20 mg TID, monitor BP and for recurrent wheezing    Renal/  Hypernatremia  Na 146 on 1/17 -> RESOLVED    - C/w FWF  - Goal eunatremia, monitor daily    GI  Transaminitis  Likely DILI     - Decrease acetaminophen to 350 mg PRN fever   - Hesitant to reduce keppra/vimpat given refractory NCSE, will continue to monitor LFTs  - Avoid hepatotoxic medications  - Trend     Palliative Care  ACP (advance care planning)  Patient made DNR at [facility]  Family chose to transfer patient for initiation of 4th AED trial, forgoing comfort measures at this time per chart review  1/13: family aware of prognosis if this trial of anaesthetics and additional scheduled AED is not helpful  1/15: Discussed with mother and sister at bedside, prognosis poor, see ACP note for details. Per family, requesting until end of week to make decision re trach/PEG vs comfort  1/17: Met with mother, father and fiancee at bedside, see ACP note for full details. Family converting pt to FULL CODE, to make decision re trach/PEG vs comfort by end of week   1/19: Family electing to proceed with trach/PEG, confirmed w/ legal next of kin (patient's mother, pt's adult son incarcerated and unable to participate in discussions). Please see ACP note for full details    Other  Smoker  Per chart review   Cessation counseling not appropriate at this time given mental status            The patient is being Prophylaxed for:  Venous Thromboembolism with: Mechanical or Chemical  Stress Ulcer with: H2B  Ventilator Pneumonia with: chlorhexidine oral care    Activity Orders             Elevate HOB Elevate (30-45 degrees) Elevate HOB to 30 - 45 degrees during feeding unless otherwise stated starting at 01/12 1011    Turn patient starting at 01/12 0724    Elevate HOB starting at 01/12 0008          Full Code    Uninterrupted Critical Care/Counseling Time (not including procedures): 40 minutes  There is high probability for acute neurological change leading to clinical and possibly life-threatening deterioration requiring highest level of physician preparedness for urgent intervention. Critical care time was spent personally by me on the following activities: development of treatment plan with patient or surrogate and bedside caregivers, discussions with consultants, evaluation of patient's response to treatment, examination of patient, ordering and performing treatments and interventions, ordering and review of laboratory studies, ordering and review of radiographic studies, pulse oximetry, antibiotic titration if applicable, vasopressor titration if applicable, re-evaluation of patient's condition. I spent greater than 50% of the documented critical care time assessing and making medical decisions for this patient.       Angelica Garrison MD  Neurocritical Care  [facility]

## 2024-01-19 NOTE — PROGRESS NOTES
"[facility]  Adult Nutrition  Progress Note    SUMMARY       Recommendations    Continue Isosource 1.5 @ 50 ml/hr to provide 1800 kcals, 82 g PRO, 917 ml fluid w/ additional FWF per MD  Monitor tolerance  If alterative formula needed consider Impact Peptide substitution: Pivot 1.5 @ 50ml/hr to provide 1800 kcals, 113 g PRO, 900 ml fluid w/ additional FWF per MD  RD to monitor & follow-up.    Goals: Meet % EEN, EPN by RD f/u date  Nutrition Goal Status: progressing towards goal  Communication of RD Recs: reviewed with RN    Assessment and Plan    Nutrition Problem:  Inadequate energy intake     Related to (etiology):   Inability to consume sufficient energy      Signs and Symptoms (as evidenced by):   NPO     Interventions(treatment strategy):  Collaboration of nutrition care w/ other providers  EN     Nutrition Diagnosis Status:   Continues    Reason for Assessment    Reason For Assessment: RD follow-up  Diagnosis: other (see comments) (Refractory status epilepticus)  Relevant Medical History: COPD  Interdisciplinary Rounds: did not attend  General Information Comments: RD f/u.Sedated/ventilated. (no propofol) Continues TF via NGT. No noted intolerance. Trach/PEG being discussed in plan. RD following.  Nutrition Discharge Planning: Pending clinical course    Nutrition Risk Screen    Nutrition Risk Screen: tube feeding or parenteral nutrition    Nutrition/Diet History    Factors Affecting Nutritional Intake: on mechanical ventilation, NPO    Anthropometrics    Temp: 99.1 °F (37.3 °C)  Height: 6' 1" (185.4 cm)  Height (inches): 73 in  Weight Method: Bed Scale  Weight: 72.1 kg (158 lb 15.2 oz)  Weight (lb): 158.95 lb  Ideal Body Weight (IBW), Male: 184 lb  % Ideal Body Weight, Male (lb): 86.39 %  BMI (Calculated): 21  BMI Grade: 18.5-24.9 - normal       Lab/Procedures/Meds    Pertinent Labs Reviewed: reviewed  Pertinent Labs Comments: H/h: 8.9/27.7, bun: 29  Pertinent Medications Reviewed: " reviewed  Pertinent Medications Comments: Enoxaparin, famotidine, senna-docusate, fentanyl      Estimated/Assessed Needs    Weight Used For Calorie Calculations: 72.1 kg (158 lb 15.2 oz)  Energy Calorie Requirements (kcal): 1879 kcal/d  Energy Need Method: [facility]  Protein Requirements:  g/d (1.2-1.5 g/kg)  Weight Used For Protein Calculations: 72.1 kg (158 lb 15.2 oz)     Estimated Fluid Requirement Method:  (Per MD)  RDA Method (mL): 1879         Nutrition Prescription Ordered    Current Diet Order: NPO  Current Nutrition Support Formula Ordered: Isosource 1.5  Current Nutrition Support Rate Ordered: 50 (ml)    Evaluation of Received Nutrient/Fluid Intake    Enteral Calories (kcal): 1800  Enteral Protein (gm): 82  Enteral (Free Water) Fluid (mL): 917  Other Calories (kcal): 0 (propofol d/c at this time)  % Kcal Needs: 95  % Protein Needs: 94  I/O: +1.8 L since admit  Energy Calories Required: meeting needs  Protein Required: meeting needs  Comments: LBM 1/18  % Intake of Estimated Energy Needs: 75 - 100 %  % Meal Intake: NPO    Nutrition Risk    Level of Risk/Frequency of Follow-up:  (1x/week)     Monitor and Evaluation    Food and Nutrient Intake: enteral nutrition intake, food and beverage intake  Food and Nutrient Adminstration: diet order  Physical Activity and Function: nutrition-related ADLs and IADLs  Anthropometric Measurements: weight, weight change  Biochemical Data, Medical Tests and Procedures: glucose/endocrine profile, lipid profile, inflammatory profile, gastrointestinal profile, electrolyte and renal panel  Nutrition-Focused Physical Findings: overall appearance     Nutrition Follow-Up    RD Follow-up?: Yes    Claudia Gonzales RD, LDN

## 2024-01-19 NOTE — PLAN OF CARE
Recommendations    Continue Isosource 1.5 @ 50 ml/hr to provide 1800 kcals, 82 g PRO, 917 ml fluid w/ additional FWF per MD  Monitor tolerance  If alterative formula needed consider Impact Peptide substitution: Pivot 1.5 @ 50ml/hr to provide 1800 kcals, 113 g PRO, 900 ml fluid w/ additional FWF per MD  RD to monitor & follow-up.    Goals: Meet % EEN, EPN by RD f/u date  Nutrition Goal Status: progressing towards goal  Communication of RD Recs: reviewed with RN

## 2024-01-19 NOTE — ACP (ADVANCE CARE PLANNING)
[facility]  I engaged the family (mother, next of kin) in a voluntary conversation about advance care planning and we specifically addressed what the goals of care would be moving forward, in light of the patient's change in clinical status, specifically anoxic brain injury w/ subsequent NCSE.  We did specifically address the patient's likely prognosis, which remains poor. Specifically we discussed that patient w/ evidence of anoxic injury on imaging, persistent malignant patterns on EEG without ellis seizures, no evidence of purposeful movement on exam.  Discussed that patient may eventually recover to point of being able to respond on some level to external stimuli, however highly unlikely to improve to point of functional independence. We explored the patient's values and preferences for future care.  The family endorses that what is most important right now is to focus on extending life as long as possible, even it it means sacrificing quality. Of note family has had serial [facility] discussions both with this writer and with ICU staff at [place], please see prior ACP notes for full details. Implications of trach/PEG, notably subsequent need for placement and pt at risk for skin breakdown/PNA/etc. discussed extensively with family on several prior occasions.     Accordingly, we have decided that the best plan to meet the patient's goals includes continuing with treatment and consulting general surgery for trach/PEG placement. SW/CM already following for placement.     I spent a total of 30 minutes engaging the family in this advance care planning discussion. Patient unable to participate 2/2 intubated non-responsive state.    Angelica Garrison MD, MPH  Neurocritical Care Physician

## 2024-01-19 NOTE — PLAN OF CARE
"[facility] Care Plan    POC reviewed with Aaron Pruitt and family at 1400. Pt's family verbalized understanding. Questions and concerns addressed. No acute events today. Pt progressing toward goals. Will continue to monitor. See below and flowsheets for full assessment and VS info.             Is this a stroke patient? no    Neuro:  [place] Coma Scale  Best Eye Response: 2-->(E2) to pain  Best Motor Response: 3-->(M3) flexion to pain  Best Verbal Response: 1-->(V1) none  [place] Coma Scale Score: 6  Assessment Qualifiers: patient intubated, no eye obstruction present  Pupil PERRLA: yes     24 hr Temp:  [98.6 °F (37 °C)-99.7 °F (37.6 °C)]     CV:   Rhythm: normal sinus rhythm  BP goals:   SBP < 160  MAP > 65    Resp:      Vent Mode: A/C  Set Rate: 18 BPM  Oxygen Concentration (%): 40  Vt Set: 480 mL  PEEP/CPAP: 5 cmH20    Plan: trach/PEG discussions    GI/:     Diet/Nutrition Received: NPO, tube feeding  Last Bowel Movement: 01/18/24  Voiding Characteristics: external catheter    Intake/Output Summary (Last 24 hours) at 1/19/2024 1702  Last data filed at 1/19/2024 1549  Gross per 24 hour   Intake 1437.41 ml   Output 915 ml   Net 522.41 ml     Unmeasured Output  Urine Occurrence: 2  Stool Occurrence: 1  Pad Count: 2    Labs/Accuchecks:  Recent Labs   Lab 01/19/24  0204 01/19/24  0442   WBC 4.01  --    RBC 2.91*  --    HGB 8.9*  --    HCT 27.7* 27*     --       Recent Labs   Lab 01/19/24  0204      K 3.7   CO2 23      BUN 29*   CREATININE 0.7   ALKPHOS 91   *   AST 89*   BILITOT 0.5    No results for input(s): "PROTIME", "INR", "APTT", "HEPANTIXA" in the last 168 hours. No results for input(s): "CPK", "CPKMB", "TROPONINI", "MB" in the last 168 hours.    Electrolytes: N/A - electrolytes WDL  Accuchecks: none    Gtts:   fentanyl 100 mcg/hr (01/19/24 1549)       LDA/Wounds:  Lines/Drains/Airways       Drain  Duration                  NG/OG Tube 01/01/24 1722 Melchor sherman 16 Fr. Left nostril 17 days    " Male External Urinary Catheter 01/14/24 5 days              Airway  Duration                  Airway - Non-Surgical Endotracheal Tube -- days              Peripheral Intravenous Line  Duration                  Peripheral IV - Single Lumen 01/18/24 2148 18 G Anterior;Right Forearm <1 day         Peripheral IV - Single Lumen 01/19/24 0113 20 G Anterior;Left Upper Arm <1 day                  Wounds:        Altered Skin Integrity 01/13/24 1805 Groin-Description of Altered Skin Integrity: Partial thickness tissue loss. Shallow open ulcer with a red or pink wound bed, without slough. Intact or Open/Ruptured Serum-filled blister.       Altered Skin Integrity 01/13/24 2000 Nose-Description of Altered Skin Integrity: Partial thickness tissue loss. Shallow open ulcer with a red or pink wound bed, without slough. Intact or Open/Ruptured Serum-filled blister.    Wound care consulted: No

## 2024-01-19 NOTE — ASSESSMENT & PLAN NOTE
52 y/o M 12 days post respiratory failure c/b cardiac arrest (Epi x 3, CPR x 3, Shock x 1) with RSE in setting of propofol wean despite continued keppra, vimpat, depacon administration. He arrived to [facility] on 80 mcg/kg/min of non-titrating propofol from [facility] which was dropped to 50 mcg/kg/min, followed by the immediate addition of 25 mcg/kg/min of ketamine. EEG cap placed and epilepsy notified.    -Propofol d/c'd 1/13, ketamine d/c'd 1/14  -Unchanged frequent GPDs on 1/16, on ictal interictal spectrum w/o ellis NCSE -> EEG d/c'd for scalp rest, will get repeat 24 hr spot check this weekend   -Q1h neurochecks, vital checks  -Daily CBC, CMP, Mag, Phos  -MRI x 2 with T2 BG hyperintensity bilaterally  -Continue keppra 1500 mg bid, Vimpat 200 mg bid, onfi 20 mg BID

## 2024-01-19 NOTE — ASSESSMENT & PLAN NOTE
See primary problem, known NCSE post arrest  CT head unremarkable on admission to [facility]  MRI x 2 with T2 BG hyperintensity bilaterally -> c/w anoxic injury   Daily ABG, pH WNL  As the majority of this patient's metabolic derangements were corrected at [facility], suspect encephalopathy 2/2 anoxic injury/NCSE

## 2024-01-19 NOTE — PLAN OF CARE
"[facility] Care Plan    POC reviewed with Aaron Pruitt and family at 0300. Pt  family verbalized understanding. Questions and concerns addressed. No acute events overnight. Pt progressing toward goals. Will continue to monitor. See below and flowsheets for full assessment and VS info.     - New PIVs placed x2   - Fentanyl infusing at 150 mcg   - [facility] discussions ongoing       Is this a stroke patient? no    Neuro:  [place] Coma Scale  Best Eye Response: 2-->(E2) to pain  Best Motor Response: 2-->(M2) extension to pain  Best Verbal Response: 1-->(V1) none  Ellie Coma Scale Score: 5  Assessment Qualifiers: patient intubated, patient chemically sedated or paralyzed  Pupil PERRLA: yes     24hr Temp:  [98.2 °F (36.8 °C)-99.5 °F (37.5 °C)]     CV:   Rhythm: normal sinus rhythm  BP goals:   SBP < 180  MAP > 65    Resp:      Vent Mode: A/C  Set Rate: 18 BPM  Oxygen Concentration (%): 40  Vt Set: 480 mL  PEEP/CPAP: 5 cmH20    Plan: trach/PEG discussions    GI/:     Diet/Nutrition Received: tube feeding  Last Bowel Movement: 01/18/24  Voiding Characteristics: external catheter    Intake/Output Summary (Last 24 hours) at 1/19/2024 0529  Last data filed at 1/19/2024 0042  Gross per 24 hour   Intake 1595.12 ml   Output 1455 ml   Net 140.12 ml     Unmeasured Output  Urine Occurrence: 2  Stool Occurrence: 1  Pad Count: 2    Labs/Accuchecks:  Recent Labs   Lab 01/19/24  0204 01/19/24  0442   WBC 4.01  --    RBC 2.91*  --    HGB 8.9*  --    HCT 27.7* 27*     --       Recent Labs   Lab 01/19/24  0204      K 3.7   CO2 23      BUN 29*   CREATININE 0.7   ALKPHOS 91   *   AST 89*   BILITOT 0.5    No results for input(s): "PROTIME", "INR", "APTT", "HEPANTIXA" in the last 168 hours. No results for input(s): "CPK", "CPKMB", "TROPONINI", "MB" in the last 168 hours.    Electrolytes: N/A - electrolytes WDL  Accuchecks: none    Gtts:   fentanyl 150 mcg/hr (01/19/24 0042)       LDA/Wounds:  Lines/Drains/Airways       Drain "  Duration                  NG/OG Tube 01/01/24 1722 Amelia sump 16 Fr. Left nostril 17 days    Male External Urinary Catheter 01/14/24 5 days              Airway  Duration                  Airway - Non-Surgical Endotracheal Tube -- days              Peripheral Intravenous Line  Duration                  Peripheral IV - Single Lumen 01/14/24 0000 18 G Left Antecubital 5 days         Peripheral IV - Single Lumen 01/18/24 2148 18 G Anterior;Right Forearm <1 day         Peripheral IV - Single Lumen 01/19/24 0113 20 G Anterior;Left Upper Arm <1 day                  Wounds: Yes  Wound care consulted: Yes

## 2024-01-19 NOTE — SUBJECTIVE & OBJECTIVE
Interval History:  Please see hospital course above for full details    Review of Systems   Unable to perform ROS: Intubated       Objective:     Vitals:  Temp: 99.1 °F (37.3 °C)  Pulse: 91  Rhythm: normal sinus rhythm  BP: 121/83  MAP (mmHg): 98  Resp: 18  SpO2: 100 %  Oxygen Concentration (%): 40  Vent Mode: A/C  Set Rate: 18 BPM  Vt Set: 480 mL  PEEP/CPAP: 5 cmH20  Peak Airway Pressure: 20 cmH20  Mean Airway Pressure: 9.6 cmH20  Plateau Pressure: 0 cmH20    Temp  Min: 98.4 °F (36.9 °C)  Max: 99.1 °F (37.3 °C)  Pulse  Min: 69  Max: 91  BP  Min: 98/61  Max: 159/97  MAP (mmHg)  Min: 74  Max: 122  Resp  Min: 18  Max: 25  SpO2  Min: 99 %  Max: 100 %  Oxygen Concentration (%)  Min: 40  Max: 40    01/18 0701 - 01/19 0700  In: 1368.8 [I.V.:423.5]  Out: 1255 [Urine:1255]   Unmeasured Output  Urine Occurrence: 2  Stool Occurrence: 1  Pad Count: 2        Physical Exam  General Appearance: Middle aged gentleman lying in bed, appears older than stated age. Not in acute distress. Intermittently overbreathing vent. Moving LLE spontaneously, however no purposeful/goal directed movement noted. Exam overall continues to fluctuate between minimal movement and then periods of non-purposeful flailing of b/l lower extremities, LLE>RLE   Mental Status Exam: No response to verbal or noxious stimuli. Not tracking or regarding, not following commands  Cranial Nerves: Pupils 4->2 mm and reactive b/l, gaze midline. +corneals to saline b/l, weak OCRs. Absent gag, +cough   Motor: No movement in b/l upper extremities. Moving LLE spontaneously AG, however not consistently moving in response to stimuli, remains w/o clear evidence of purposeful movement   Sensory: No response to noxious x4 extremities   Coordination: Unable to assess   Vascular: S1/S2 of normal intensity, no S3/S4 appreciated, no murmurs appreciated  Lungs: Coarse breath sounds b/l, no wheezing  Abdomen: Soft, non-distended, non-tender, BS +        Medications:  Continuousfentanyl, Last Rate: 100 mcg/hr (01/19/24 1452)    ScheduledbisacodyL, 10 mg, Daily  budesonide, 0.25 mg, Q12H  cloBAZam, 20 mg, BID  enoxparin, 40 mg, Q24H (prophylaxis, 1700)  famotidine, 20 mg, BID  lacosamide (VIMPAT) IVPB, 200 mg, Q12H  levETIRAcetam (Keppra) IV (PEDS and ADULTS), 1,500 mg, Q12H  miconazole nitrate 2%, , BID  montelukast, 10 mg, Daily  oxyCODONE, 10 mg, Q6H  polyethylene glycol, 17 g, BID  propranoloL, 20 mg, TID  senna-docusate 8.6-50 mg, 2 tablet, BID  silodosin, 4 mg, Daily    PRNacetaminophen, 325 mg, Q6H PRN  albuterol-ipratropium, 3 mL, Q6H PRN  fentanyl, 50 mcg, Q1H PRN  hydrALAZINE, 10 mg, Q4H PRN  labetalol, 10 mg, Q4H PRN  magnesium oxide, 800 mg, PRN  magnesium oxide, 800 mg, PRN  potassium bicarbonate, 35 mEq, PRN  potassium bicarbonate, 50 mEq, PRN  potassium bicarbonate, 60 mEq, PRN  potassium, sodium phosphates, 2 packet, PRN  potassium, sodium phosphates, 2 packet, PRN  potassium, sodium phosphates, 2 packet, PRN      Today I personally reviewed pertinent imaging, laboratory results, notably: CXR w/ ET tube still moderately high at 6.5 cm above michael, improved position from prior. CBC unremarkable, no leukocytosis, Hb uptrending to 8.9, platelets stable. CMP unremarkable. ABG w/ resolved hypercarbia, wnl this AM    Diet  No diet orders on file  No diet orders on file  TF at goal

## 2024-01-20 ENCOUNTER — DOCUMENTATION ONLY (OUTPATIENT)
Dept: NEUROLOGY | Facility: CLINIC | Age: 54
End: 2024-01-20
Payer: MEDICAID

## 2024-01-20 PROBLEM — E87.0 HYPERNATREMIA: Status: RESOLVED | Noted: 2024-01-17 | Resolved: 2024-01-20

## 2024-01-20 PROBLEM — R25.9 ABNORMAL MOVEMENT: Status: ACTIVE | Noted: 2024-01-20

## 2024-01-20 LAB
ALBUMIN SERPL BCP-MCNC: 2.6 G/DL (ref 3.5–5.2)
ALLENS TEST: ABNORMAL
ALP SERPL-CCNC: 91 U/L (ref 55–135)
ALT SERPL W/O P-5'-P-CCNC: 128 U/L (ref 10–44)
ANION GAP SERPL CALC-SCNC: 9 MMOL/L (ref 8–16)
AST SERPL-CCNC: 57 U/L (ref 10–40)
BASOPHILS # BLD AUTO: 0.02 K/UL (ref 0–0.2)
BASOPHILS NFR BLD: 0.4 % (ref 0–1.9)
BILIRUB SERPL-MCNC: 0.6 MG/DL (ref 0.1–1)
BUN SERPL-MCNC: 28 MG/DL (ref 6–20)
CALCIUM SERPL-MCNC: 9.4 MG/DL (ref 8.7–10.5)
CHLORIDE SERPL-SCNC: 107 MMOL/L (ref 95–110)
CO2 SERPL-SCNC: 22 MMOL/L (ref 23–29)
CREAT SERPL-MCNC: 0.7 MG/DL (ref 0.5–1.4)
DELSYS: ABNORMAL
DIFFERENTIAL METHOD BLD: ABNORMAL
EOSINOPHIL # BLD AUTO: 0 K/UL (ref 0–0.5)
EOSINOPHIL NFR BLD: 0.8 % (ref 0–8)
ERYTHROCYTE [DISTWIDTH] IN BLOOD BY AUTOMATED COUNT: 12.7 % (ref 11.5–14.5)
ERYTHROCYTE [SEDIMENTATION RATE] IN BLOOD BY WESTERGREN METHOD: 18 MM/H
EST. GFR  (NO RACE VARIABLE): >60 ML/MIN/1.73 M^2
FIO2: 40
GIANT PLATELETS BLD QL SMEAR: PRESENT
GLUCOSE SERPL-MCNC: 92 MG/DL (ref 70–110)
HCO3 UR-SCNC: 24.5 MMOL/L (ref 24–28)
HCT VFR BLD AUTO: 27.8 % (ref 40–54)
HGB BLD-MCNC: 9.2 G/DL (ref 14–18)
IMM GRANULOCYTES # BLD AUTO: 0.02 K/UL (ref 0–0.04)
IMM GRANULOCYTES NFR BLD AUTO: 0.4 % (ref 0–0.5)
LYMPHOCYTES # BLD AUTO: 1.1 K/UL (ref 1–4.8)
LYMPHOCYTES NFR BLD: 22.1 % (ref 18–48)
MAGNESIUM SERPL-MCNC: 2.3 MG/DL (ref 1.6–2.6)
MCH RBC QN AUTO: 30.9 PG (ref 27–31)
MCHC RBC AUTO-ENTMCNC: 33.1 G/DL (ref 32–36)
MCV RBC AUTO: 93 FL (ref 82–98)
MODE: ABNORMAL
MONOCYTES # BLD AUTO: 0.6 K/UL (ref 0.3–1)
MONOCYTES NFR BLD: 11.9 % (ref 4–15)
NEUTROPHILS # BLD AUTO: 3.2 K/UL (ref 1.8–7.7)
NEUTROPHILS NFR BLD: 64.4 % (ref 38–73)
NRBC BLD-RTO: 0 /100 WBC
PCO2 BLDA: 33.4 MMHG (ref 35–45)
PEEP: 5
PH SMN: 7.47 [PH] (ref 7.35–7.45)
PHOSPHATE SERPL-MCNC: 3.7 MG/DL (ref 2.7–4.5)
PLATELET # BLD AUTO: 387 K/UL (ref 150–450)
PLATELET BLD QL SMEAR: ABNORMAL
PMV BLD AUTO: 8.4 FL (ref 9.2–12.9)
PO2 BLDA: 143 MMHG (ref 80–100)
POC BE: 1 MMOL/L
POC SATURATED O2: 99 % (ref 95–100)
POC TCO2: 25 MMOL/L (ref 23–27)
POTASSIUM SERPL-SCNC: 4 MMOL/L (ref 3.5–5.1)
PROT SERPL-MCNC: 6.7 G/DL (ref 6–8.4)
RBC # BLD AUTO: 2.98 M/UL (ref 4.6–6.2)
SAMPLE: ABNORMAL
SITE: ABNORMAL
SODIUM SERPL-SCNC: 138 MMOL/L (ref 136–145)
SP02: 100
SPHEROCYTES BLD QL SMEAR: ABNORMAL
VT: 480
WBC # BLD AUTO: 4.97 K/UL (ref 3.9–12.7)

## 2024-01-20 PROCEDURE — C9254 INJECTION, LACOSAMIDE: HCPCS

## 2024-01-20 PROCEDURE — 25000242 PHARM REV CODE 250 ALT 637 W/ HCPCS

## 2024-01-20 PROCEDURE — 94668 MNPJ CHEST WALL SBSQ: CPT

## 2024-01-20 PROCEDURE — 84100 ASSAY OF PHOSPHORUS: CPT

## 2024-01-20 PROCEDURE — 85025 COMPLETE CBC W/AUTO DIFF WBC: CPT

## 2024-01-20 PROCEDURE — 95720 EEG PHY/QHP EA INCR W/VEEG: CPT | Mod: ,,, | Performed by: PSYCHIATRY & NEUROLOGY

## 2024-01-20 PROCEDURE — 99900026 HC AIRWAY MAINTENANCE (STAT)

## 2024-01-20 PROCEDURE — 95700 EEG CONT REC W/VID EEG TECH: CPT

## 2024-01-20 PROCEDURE — 25000003 PHARM REV CODE 250: Performed by: PSYCHIATRY & NEUROLOGY

## 2024-01-20 PROCEDURE — 82803 BLOOD GASES ANY COMBINATION: CPT

## 2024-01-20 PROCEDURE — 94761 N-INVAS EAR/PLS OXIMETRY MLT: CPT

## 2024-01-20 PROCEDURE — 36600 WITHDRAWAL OF ARTERIAL BLOOD: CPT

## 2024-01-20 PROCEDURE — 25000003 PHARM REV CODE 250

## 2024-01-20 PROCEDURE — 80053 COMPREHEN METABOLIC PANEL: CPT

## 2024-01-20 PROCEDURE — 25000003 PHARM REV CODE 250: Performed by: NURSE PRACTITIONER

## 2024-01-20 PROCEDURE — 99900035 HC TECH TIME PER 15 MIN (STAT)

## 2024-01-20 PROCEDURE — 83735 ASSAY OF MAGNESIUM: CPT

## 2024-01-20 PROCEDURE — 95714 VEEG EA 12-26 HR UNMNTR: CPT

## 2024-01-20 PROCEDURE — 94003 VENT MGMT INPAT SUBQ DAY: CPT

## 2024-01-20 PROCEDURE — 63600175 PHARM REV CODE 636 W HCPCS

## 2024-01-20 PROCEDURE — 99233 SBSQ HOSP IP/OBS HIGH 50: CPT | Mod: ,,, | Performed by: PSYCHIATRY & NEUROLOGY

## 2024-01-20 PROCEDURE — 20000000 HC ICU ROOM

## 2024-01-20 PROCEDURE — 27100171 HC OXYGEN HIGH FLOW UP TO 24 HOURS

## 2024-01-20 PROCEDURE — 94640 AIRWAY INHALATION TREATMENT: CPT

## 2024-01-20 RX ORDER — FENTANYL CITRATE-0.9 % NACL/PF 10 MCG/ML
0-200 PLASTIC BAG, INJECTION (ML) INTRAVENOUS CONTINUOUS
Status: DISCONTINUED | OUTPATIENT
Start: 2024-01-20 | End: 2024-01-22

## 2024-01-20 RX ADMIN — PROPRANOLOL HYDROCHLORIDE 20 MG: 10 TABLET ORAL at 09:01

## 2024-01-20 RX ADMIN — IPRATROPIUM BROMIDE AND ALBUTEROL SULFATE 3 ML: .5; 3 SOLUTION RESPIRATORY (INHALATION) at 07:01

## 2024-01-20 RX ADMIN — PROPRANOLOL HYDROCHLORIDE 20 MG: 10 TABLET ORAL at 04:01

## 2024-01-20 RX ADMIN — Medication 150 MCG/HR: at 04:01

## 2024-01-20 RX ADMIN — FAMOTIDINE 20 MG: 20 TABLET, FILM COATED ORAL at 09:01

## 2024-01-20 RX ADMIN — BUDESONIDE INHALATION 0.25 MG: 0.5 SUSPENSION RESPIRATORY (INHALATION) at 07:01

## 2024-01-20 RX ADMIN — OXYCODONE HYDROCHLORIDE 10 MG: 10 TABLET ORAL at 01:01

## 2024-01-20 RX ADMIN — ACETYLCYSTEINE 4 ML: 100 INHALANT RESPIRATORY (INHALATION) at 07:01

## 2024-01-20 RX ADMIN — SILODOSIN 4 MG: 4 CAPSULE ORAL at 09:01

## 2024-01-20 RX ADMIN — ENOXAPARIN SODIUM 40 MG: 40 INJECTION SUBCUTANEOUS at 04:01

## 2024-01-20 RX ADMIN — CLOBAZAM 20 MG: 10 TABLET ORAL at 09:01

## 2024-01-20 RX ADMIN — LACOSAMIDE 200 MG: 10 INJECTION INTRAVENOUS at 12:01

## 2024-01-20 RX ADMIN — LEVETIRACETAM 1500 MG: 100 INJECTION INTRAVENOUS at 09:01

## 2024-01-20 RX ADMIN — MICONAZOLE NITRATE: 20 OINTMENT TOPICAL at 09:01

## 2024-01-20 RX ADMIN — OXYCODONE HYDROCHLORIDE 10 MG: 10 TABLET ORAL at 06:01

## 2024-01-20 RX ADMIN — ACETYLCYSTEINE 4 ML: 100 INHALANT RESPIRATORY (INHALATION) at 12:01

## 2024-01-20 RX ADMIN — Medication 200 MCG/HR: at 04:01

## 2024-01-20 RX ADMIN — OXYCODONE HYDROCHLORIDE 10 MG: 10 TABLET ORAL at 05:01

## 2024-01-20 RX ADMIN — Medication 200 MCG/HR: at 09:01

## 2024-01-20 RX ADMIN — IPRATROPIUM BROMIDE AND ALBUTEROL SULFATE 3 ML: .5; 3 SOLUTION RESPIRATORY (INHALATION) at 12:01

## 2024-01-20 RX ADMIN — MONTELUKAST 10 MG: 10 TABLET, FILM COATED ORAL at 09:01

## 2024-01-20 RX ADMIN — LACOSAMIDE 200 MG: 10 INJECTION INTRAVENOUS at 01:01

## 2024-01-20 NOTE — HPI
Aaron Pruitt is a 53 y.o. male with a history of COPD/Asthma c/b smoking and HTN who presents to [facility] for NCSE. He initially presented to [facility] ER on 01/01 after being intubated in the field following acute respiratory failure post cardiac arrest s/p resuscitation and shock x1. He was subsequently found to have new onset refractory status epilepticus secondary to anoxic brain injury. [place] [facility] [place]. The patient has required prolonged intubation secondary to his neurological status. The patient's prognosis for recovery is poor. The primary team has had extensive goals of care discussions with the family who have reflected that it would be the patient's wishes to continue with treatment and move forward with trach/PEG. General surgery consulted for consideration of trach/PEG.

## 2024-01-20 NOTE — CONSULTS
[facility] - Neuro Critical Care  General Surgery  Consult Note    Patient Name: Aaron Pruitt  MRN: [id number]  Code Status: Full Code  Admission Date: 1/12/2024  Hospital Length of Stay: 8 days  Attending Physician: Angelica Garrison MD  Primary Care Provider: Gogo Vivar NP    Patient information was obtained from past medical records.     Inpatient consult to General Surgery  Consult performed by: Payton Cosby MD  Consult ordered by: Fletcher Lott PA-C        Subjective:     Principal Problem: New onset refractory status epilepticus    History of Present Illness: Aaron Pruitt is a 53 y.o. male with a history of COPD/Asthma c/b smoking and HTN who presents to [facility] for NCSE. He initially presented to [facility] ER on 01/01 after being intubated in the field following acute respiratory failure post cardiac arrest s/p resuscitation and shock x1. He was subsequently found to have new onset refractory status epilepticus secondary to anoxic brain injury. Patient was eventually transferred to [facility] for trial of AED. The patient has required prolonged intubation secondary to his neurological status. The patient's prognosis for recovery is poor. The primary team has had extensive goals of care discussions with the family who have reflected that it would be the patient's wishes to continue with treatment and move forward with trach/PEG. General surgery consulted for consideration of trach/PEG.     No current facility-administered medications on file prior to encounter.     Current Outpatient Medications on File Prior to Encounter   Medication Sig    fluticasone propionate (FLONASE) 50 mcg/actuation nasal spray 1 spray by Each Nostril route once daily.    albuterol 90 mcg/actuation inhaler Inhale 1-2 puffs into the lungs every 6 (six) hours as needed for Wheezing.    MEPOLIZUMAB 100 mg/mL autoinjector Inject into the skin.    mometasone-formoterol (DULERA) 200-5 mcg/actuation inhaler Dulera 200 mcg-5 mcg/actuation HFA  aerosol inhaler   INHALE 2 PUFFS BY MOUTH TWICE DAILY. RINSE MOUTH AND THROAT AFTER USE    nicotine (NICODERM CQ) 14 mg/24 hr 1 patch.    varenicline (CHANTIX) 1 mg Tab Take by mouth.       Review of patient's allergies indicates:   Allergen Reactions    Sulfa (sulfonamide antibiotics)        Past Medical History:   Diagnosis Date    Bronchitis     Bronchitis      Past Surgical History:   Procedure Laterality Date    NECK SURGERY      [place] EEG, W/VIDEO, CONT RECORD, I&R, >12<26 HRS  1/13/2024    [place] EEG, W/VIDEO, CONT RECORD, I&R, >12<26 HRS  1/14/2024     Family History    None       Tobacco Use    Smoking status: Every Day    Smokeless tobacco: Not on file   Substance and Sexual Activity    Alcohol use: Not on file    Drug use: Not on file    Sexual activity: Not on file     Review of Systems   Reason unable to perform ROS: intubated.     Objective:     Vital Signs (Most Recent):  Temp: 99.1 °F (37.3 °C) (01/20/24 0549)  Pulse: 74 (01/20/24 0549)  Resp: 18 (01/20/24 0549)  BP: 132/76 (01/20/24 0501)  SpO2: 100 % (01/20/24 0549) Vital Signs (24h Range):  Temp:  [98.4 °F (36.9 °C)-99.9 °F (37.7 °C)] 99.1 °F (37.3 °C)  Pulse:  [66-93] 74  Resp:  [18-41] 18  SpO2:  [98 %-100 %] 100 %  BP: ()/(54-85) 132/76     Weight: 72.1 kg (158 lb 15.2 oz)  Body mass index is 20.97 kg/m².     Physical Exam  Vitals reviewed.   Constitutional:       Comments: Intubated    HENT:      Nose:      Comments: NGT in place     Mouth/Throat:      Comments: ETT in place  Cardiovascular:      Rate and Rhythm: Normal rate.      Pulses: Normal pulses.   Pulmonary:      Comments: Intubated, mechanically ventilated  Vent Mode: A/C  Oxygen Concentration (%):  [40] 40  Resp Rate Total:  [18 br/min-21 br/min] 18 br/min  Vt Set:  [480 mL] 480 mL  PEEP/CPAP:  [5 cmH20] 5 cmH20  Mean Airway Pressure:  [8.7 cmH20-10 cmH20] 8.9 cmH20  Abdominal:      General: Abdomen is flat.      Palpations: Abdomen is soft.   Musculoskeletal:      Comments: LLE moves  spontaneously, not in reaction to stimuli    Neurological:      Comments: Intubated            I have reviewed all pertinent lab results within the past 24 hours.  CBC:   Recent Labs   Lab 01/20/24  0053   WBC 4.97   RBC 2.98*   HGB 9.2*   HCT 27.8*      MCV 93   MCH 30.9   MCHC 33.1     CMP:   Recent Labs   Lab 01/19/24  0204      CALCIUM 9.2   ALBUMIN 2.5*   PROT 6.4      K 3.7   CO2 23      BUN 29*   CREATININE 0.7   ALKPHOS 91   *   AST 89*   BILITOT 0.5       Significant Diagnostics:  I have reviewed all pertinent imaging results/findings within the past 24 hours.    Assessment/Plan:     Acute respiratory failure with hypoxia and hypercarbia  53M with new onset refractory status epilepticus secondary to anoxic brain injury. General surgery consulted for consideration of trach/PEG. The patient is on minimal vent settings. Tube feeds are at goal.     -- will plan for trach/PEG sometime next week, timing to be determined   -- consent to be obtained   -- remainder of care per primary team      Payton Cosby MD  General Surgery  [facility] - [facility]

## 2024-01-20 NOTE — ASSESSMENT & PLAN NOTE
No improvement in exam with improvement in seizures  Increase in non-purposeful movements of LLE>LUE over last week, no evidence of purposeful movements/return of consciousness to date   C/w oxycodone 10 mg q6hrs and propranolol 20 mg TID for suspected neuro storming  Ongoing GOC with family -> see ACP notes for full details

## 2024-01-20 NOTE — SUBJECTIVE & OBJECTIVE
No current facility-administered medications on file prior to encounter.     Current Outpatient Medications on File Prior to Encounter   Medication Sig    fluticasone propionate (FLONASE) 50 mcg/actuation nasal spray 1 spray by Each Nostril route once daily.    albuterol 90 mcg/actuation inhaler Inhale 1-2 puffs into the lungs every 6 (six) hours as needed for Wheezing.    MEPOLIZUMAB 100 mg/mL autoinjector Inject into the skin.    mometasone-formoterol (DULERA) 200-5 mcg/actuation inhaler Dulera 200 mcg-5 mcg/actuation HFA aerosol inhaler   INHALE 2 PUFFS BY MOUTH TWICE DAILY. RINSE MOUTH AND THROAT AFTER USE    nicotine (NICODERM CQ) 14 mg/24 hr 1 patch.    varenicline (CHANTIX) 1 mg Tab Take by mouth.       Review of patient's allergies indicates:   Allergen Reactions    Sulfa (sulfonamide antibiotics)        Past Medical History:   Diagnosis Date    Bronchitis     Bronchitis      Past Surgical History:   Procedure Laterality Date    NECK SURGERY      [place] EEG, W/VIDEO, CONT RECORD, I&R, >12<26 HRS  1/13/2024    [place] EEG, W/VIDEO, CONT RECORD, I&R, >12<26 HRS  1/14/2024     Family History    None       Tobacco Use    Smoking status: Every Day    Smokeless tobacco: Not on file   Substance and Sexual Activity    Alcohol use: Not on file    Drug use: Not on file    Sexual activity: Not on file     Review of Systems   Reason unable to perform ROS: intubated.     Objective:     Vital Signs (Most Recent):  Temp: 99.1 °F (37.3 °C) (01/20/24 0549)  Pulse: 74 (01/20/24 0549)  Resp: 18 (01/20/24 0549)  BP: 132/76 (01/20/24 0501)  SpO2: 100 % (01/20/24 0549) Vital Signs (24h Range):  Temp:  [98.4 °F (36.9 °C)-99.9 °F (37.7 °C)] 99.1 °F (37.3 °C)  Pulse:  [66-93] 74  Resp:  [18-41] 18  SpO2:  [98 %-100 %] 100 %  BP: ()/(54-85) 132/76     Weight: 72.1 kg (158 lb 15.2 oz)  Body mass index is 20.97 kg/m².     Physical Exam  Vitals reviewed.   Constitutional:       Comments: Intubated    HENT:      Nose:      Comments: NGT in  place     Mouth/Throat:      Comments: ETT in place  Cardiovascular:      Rate and Rhythm: Normal rate.      Pulses: Normal pulses.   Pulmonary:      Comments: Intubated, mechanically ventilated  Vent Mode: A/C  Oxygen Concentration (%):  [40] 40  Resp Rate Total:  [18 br/min-21 br/min] 18 br/min  Vt Set:  [480 mL] 480 mL  PEEP/CPAP:  [5 cmH20] 5 cmH20  Mean Airway Pressure:  [8.7 cmH20-10 cmH20] 8.9 cmH20  Abdominal:      General: Abdomen is flat.      Palpations: Abdomen is soft.   Musculoskeletal:      Comments: LLE moves spontaneously, not in reaction to stimuli    Neurological:      Comments: Intubated            I have reviewed all pertinent lab results within the past 24 hours.  CBC:   Recent Labs   Lab 01/20/24  0053   WBC 4.97   RBC 2.98*   HGB 9.2*   HCT 27.8*      MCV 93   MCH 30.9   MCHC 33.1     CMP:   Recent Labs   Lab 01/19/24  0204      CALCIUM 9.2   ALBUMIN 2.5*   PROT 6.4      K 3.7   CO2 23      BUN 29*   CREATININE 0.7   ALKPHOS 91   *   AST 89*   BILITOT 0.5       Significant Diagnostics:  I have reviewed all pertinent imaging results/findings within the past 24 hours.

## 2024-01-20 NOTE — PROCEDURES
[facility]     PRELIMINARY C-EEG REPORT:  Review: 1/20/2024, 14:27 (start) - 16:13     The study was done without sedation.    Generalized delta (2-4 Hz) activity is noted throughout the period of review.  Frequent to abundant bilateral (right > left) low amplitude epileptiform (spike/wave) periodic discharges without evolution, were noted throughout the period of review.    Findings are suggestive of severe diffuse cerebral dysfunction with bilateral underlying epileptiform potential     Full report to follow.  Will continue to monitor.     Thank you for involving us in the care of this patient.    Tami Hernandez MD, JIMMIE(), FACNS, FAES.  Neurology-Epilepsy.  [facility].

## 2024-01-20 NOTE — PLAN OF CARE
"[facility] Care Plan    POC reviewed with Aaron Pruitt and family at 0300. Pt verbalized understanding. Questions and concerns addressed. No acute events overnight. Pt progressing toward goals. Will continue to monitor. See below and flowsheets for full assessment and VS info.     Bath completed  Fent gtt @ 150  Multiple fent bolus overnight for vent dys/ agitation  TREG discussions: Gen surg consulted    Is this a stroke patient? no    Neuro:  Ellie Coma Scale  Best Eye Response: 4-->(E4) spontaneous  Best Motor Response: 3-->(M3) flexion to pain  Best Verbal Response: 1-->(V1) none  [place] Coma Scale Score: 8  Assessment Qualifiers: patient intubated, no eye obstruction present  Pupil PERRLA: yes     24hr Temp:  [98.4 °F (36.9 °C)-99.9 °F (37.7 °C)]     CV:   Rhythm: normal sinus rhythm  BP goals:   SBP < 180  MAP > 65    Resp:      Vent Mode: A/C  Set Rate: 18 BPM  Oxygen Concentration (%): 40  Vt Set: 480 mL  PEEP/CPAP: 5 cmH20    Plan: trach/PEG discussions    GI/:     Diet/Nutrition Received: tube feeding  Last Bowel Movement: 01/19/24  Voiding Characteristics: external catheter    Intake/Output Summary (Last 24 hours) at 1/20/2024 0558  Last data filed at 1/20/2024 0501  Gross per 24 hour   Intake 1965.49 ml   Output 750 ml   Net 1215.49 ml     Unmeasured Output  Urine Occurrence: 2  Stool Occurrence: 1  Pad Count: 1    Labs/Accuchecks:  Recent Labs   Lab 01/20/24  0053   WBC 4.97   RBC 2.98*   HGB 9.2*   HCT 27.8*         Recent Labs   Lab 01/19/24  0204      K 3.7   CO2 23      BUN 29*   CREATININE 0.7   ALKPHOS 91   *   AST 89*   BILITOT 0.5    No results for input(s): "PROTIME", "INR", "APTT", "HEPANTIXA" in the last 168 hours. No results for input(s): "CPK", "CPKMB", "TROPONINI", "MB" in the last 168 hours.    Electrolytes: N/A - electrolytes WDL  Accuchecks: none    Gtts:   fentanyl 150 mcg/hr (01/20/24 0441)       LDA/Wounds:  Lines/Drains/Airways       Drain  Duration          "         NG/OG Tube 01/01/24 1722 Schleicher sump 16 Fr. Left nostril 18 days    Male External Urinary Catheter 01/14/24 6 days              Airway  Duration                  Airway - Non-Surgical Endotracheal Tube -- days              Peripheral Intravenous Line  Duration                  Peripheral IV - Single Lumen 01/18/24 2148 18 G Anterior;Right Forearm 1 day         Peripheral IV - Single Lumen 01/19/24 0113 20 G Anterior;Left Upper Arm 1 day                  Wounds: Yes  Wound care consulted: Yes

## 2024-01-20 NOTE — ASSESSMENT & PLAN NOTE
See primary problem, known NCSE post arrest  CT head unremarkable on admission to [facility]  MRI x 2 with T2 BG hyperintensity bilaterally -> c/w anoxic injury   Daily ABG, pH WNL  As the majority of this patient's metabolic derangements were corrected at [facility], suspect encephalopathy 2/2 anoxic injury/NCSE

## 2024-01-20 NOTE — SUBJECTIVE & OBJECTIVE
Interval History:  Please see hospital course above for full details     Review of Systems   Unable to perform ROS: Intubated       Objective:     Vitals:  Temp: 98.8 °F (37.1 °C)  Pulse: 93  Rhythm: normal sinus rhythm  BP: (!) 102/59  MAP (mmHg): 75  Resp: 20  SpO2: 100 %  Oxygen Concentration (%): 35  Vent Mode: A/C  Set Rate: 18 BPM  Vt Set: 480 mL  PEEP/CPAP: 5 cmH20  Peak Airway Pressure: 20 cmH20  Mean Airway Pressure: 9.5 cmH20  Plateau Pressure: 0 cmH20    Temp  Min: 98.4 °F (36.9 °C)  Max: 99.9 °F (37.7 °C)  Pulse  Min: 66  Max: 93  BP  Min: 85/54  Max: 135/85  MAP (mmHg)  Min: 64  Max: 104  Resp  Min: 18  Max: 41  SpO2  Min: 98 %  Max: 100 %  Oxygen Concentration (%)  Min: 35  Max: 40    01/19 0701 - 01/20 0700  In: 1881.4 [I.V.:413.9]  Out: 1600 [Urine:1600]   Unmeasured Output  Urine Occurrence: 2  Stool Occurrence: 1  Pad Count: 1        Physical Exam  General Appearance: Middle aged gentleman lying in bed, appears older than stated age. Not in acute distress. Intermittently overbreathing vent. Moving LLE spontaneously, however no purposeful/goal directed movement noted.   Mental Status Exam: No response to verbal or noxious stimuli. Not tracking or regarding, not following commands  Cranial Nerves: Pupils 4->2 mm and reactive b/l, gaze midline. +corneals to saline b/l, weak OCRs. Absent gag, +cough. Holding eyes shut against attempts to open them   Motor: No movement in RUE. Intermittent spontaneous movement w/ increased tone in LUE, not localizing. Moving LLE spontaneously AG, however not consistently moving in response to stimuli, remains w/o clear evidence of purposeful movement   Sensory: Intermittent increase in HR/BP and movement on L side to noxious x4 extremities; at other times remains w/o clear response to noxious  Coordination: Unable to assess   Vascular: S1/S2 of normal intensity, no S3/S4 appreciated, no murmurs appreciated  Lungs: Coarse breath sounds b/l, no wheezing  Abdomen: Soft,  non-distended, non-tender, BS +       Medications:  Continuousfentanyl, Last Rate: 200 mcg/hr (01/20/24 0948)    Scheduledacetylcysteine 100 mg/ml (10%), 4 mL, Q6H  bisacodyL, 10 mg, Daily  budesonide, 0.25 mg, Q12H  cloBAZam, 20 mg, BID  enoxparin, 40 mg, Q24H (prophylaxis, 1700)  famotidine, 20 mg, BID  lacosamide (VIMPAT) IVPB, 200 mg, Q12H  levETIRAcetam (Keppra) IV (PEDS and ADULTS), 1,500 mg, Q12H  miconazole nitrate 2%, , BID  montelukast, 10 mg, Daily  oxyCODONE, 10 mg, Q6H  propranoloL, 20 mg, TID  senna-docusate 8.6-50 mg, 2 tablet, BID  silodosin, 4 mg, Daily    PRNacetaminophen, 325 mg, Q6H PRN  albuterol-ipratropium, 3 mL, Q6H PRN  fentanyl, 50 mcg, Q1H PRN  hydrALAZINE, 10 mg, Q4H PRN  labetalol, 10 mg, Q4H PRN  magnesium oxide, 800 mg, PRN  magnesium oxide, 800 mg, PRN  potassium bicarbonate, 35 mEq, PRN  potassium bicarbonate, 50 mEq, PRN  potassium bicarbonate, 60 mEq, PRN  potassium, sodium phosphates, 2 packet, PRN  potassium, sodium phosphates, 2 packet, PRN  potassium, sodium phosphates, 2 packet, PRN      Today I personally reviewed pertinent imaging, laboratory results, notably: CXR stable from prior. CBC unremarkable, no leukocytosis, Hb/platelets stable. CMP w/ improved transaminitis, AST/ALT 57/128, otherwise unremarkable    Diet  No diet orders on file  No diet orders on file  TF at goal

## 2024-01-20 NOTE — ASSESSMENT & PLAN NOTE
52 y/o M 12 days post respiratory failure c/b cardiac arrest (Epi x 3, CPR x 3, Shock x 1) with RSE in setting of propofol wean despite continued keppra, vimpat, depacon administration. He arrived to [facility] on 80 mcg/kg/min of non-titrating propofol from [facility] which was dropped to 50 mcg/kg/min, followed by the immediate addition of 25 mcg/kg/min of ketamine. EEG cap placed and epilepsy notified.    -Propofol d/c'd 1/13, ketamine d/c'd 1/14  -Unchanged frequent GPDs on 1/16, on ictal interictal spectrum w/o ellis NCSE -> EEG d/c'd  -Repeat cEEG for spot check this weekend  -Q1h neurochecks, vital checks  -Daily CBC, CMP, Mag, Phos  -MRI x 2 with T2 BG hyperintensity bilaterally  -Continue keppra 1500 mg bid, Vimpat 200 mg bid, onfi 20 mg BID

## 2024-01-20 NOTE — PROGRESS NOTES
EEG   PM Check Electrodes had to be fixed.No    Skin Integrity: Normal     Shante Miranda   01/20/2024 4:29 PM

## 2024-01-20 NOTE — PROGRESS NOTES
"[facility] - Neuro Critical Care  Neurocritical Care  Progress Note    Admit Date: 1/12/2024  Service Date: 01/20/2024  Length of Stay: 8    Subjective:     Chief Complaint: New onset refractory status epilepticus    History of Present Illness: Mr. Aaron Pruitt is a 53 year old M with PMHx significant for COPD/Asthma c/b smoking and HTN who presents to [facility] for NCSE. He initially presented to [facility] ER on 01/01 after being intubated in the field following acute respiratory failure post cardiac arrest s/p resuscitation and shock x1. Per chart review, he was shoveling some dirt in his yard when he got severely short of breath and suddenly had difficulty breathing. EMS was activated by his neighbors. Upon EMS arrival, he reportedly still had a pulse, though with severe respiratory distress, and subsequently became pulseless. He was given narcan x2 without improvement. CPR and Epi x3. ROSC after 3rd round of CPR with shock x 1. CT head on arrival to [facility] without acute intracranial abnormality, and CT chest without acute pulmonary process.TTM was initiated on 01/03 with fentanyl and propofol gtts for sedation, and rewarming was initiated on 01/04 with subsequent weaning of sedation. During this period, myoclonus was clinically noted, and propofol was resumed. EEG showed NCSE, prompting the initiation of keppra and vimpat with continuation of propofol. On 01/06, there was slight improvement in EEG, with NCSE noted to be "partially treated," at which time, the patient was then loaded with depacon. EEG was improved leading to weaning of propofol with eventual return to [facility]. MRI completed 01/08 and 01/11 completed with with GoC conversation held at [facility]. Family opted to forgo comfort measures at this point and [place] [facility] [place] of 4th AED, per chart review. He arrived to [facility] on 80 mcg/kg/min of non-titrating propofol from [facility] which was dropped to 50 mcg/kg/min, followed by the immediate addition of 25 mcg/kg/min of " ketamine. EEG cap placed.     He will be admitted to [facility] for hourly neuromonitoring and a higher level of care.    Hospital Course: 01/13: no electrographic seizures reported, triglicerides increased into low 500s, propofol decreased to 30mcg, decrease further in 4 hrs if EEG unchanged, cont ketamine at 50mcg/kg and scheduled AEDs  01/14: EEG mostly suppressed, off propofol, decrease keppra to 30mcg today with potential to turn off in am  Escalate clobazam to 20mg qday and dc depakote, cont keppra and vimpat at current doses, plan to update family today, although improvement in seizure control, no change in neuro exam  01/15/2024 Increased GPDs s/p discontinuing ketamine gtt, onfi increased to BID. Awaiting arrival of family for GOC conversation  01/16/2024 cEEG unchanged overnight, remains on ictal-interictal spectrum, d/c'd. Neuro exam unchanged, remains w/ brainstem reflexes intact, intermittent non-purposeful movements of extremities. Ongoing GOC discussions with family   01/17/2024 NAEON. Mildly hypercarbic on AM ABG, increased rate/TV for eucarbia. Starting FWF for mild hypernatremia. Ongoing GOC discussions w/ family, see ACP note for full details   01/18/2024 NAEON, hypercarbia resolved. Awaiting family decision re GOC  01/19/2024 Added propranolol and increased oxycodone for neuro storming/episodes of non-purposeful movement. Family electing to proceed w/ trach/PEG, please see ACP note for full details. Gen surgery consulted, CM/SW updated.   01/20/2024 NAEON, continued non-purposeful flailing movements of LUE/LLE. Trach/PEG next week, timing TBD    Interval History:  Please see hospital course above for full details     Review of Systems   Unable to perform ROS: Intubated       Objective:     Vitals:  Temp: 98.8 °F (37.1 °C)  Pulse: 93  Rhythm: normal sinus rhythm  BP: (!) 102/59  MAP (mmHg): 75  Resp: 20  SpO2: 100 %  Oxygen Concentration (%): 35  Vent Mode: A/C  Set Rate: 18 BPM  Vt Set: 480  mL  PEEP/CPAP: 5 cmH20  Peak Airway Pressure: 20 cmH20  Mean Airway Pressure: 9.5 cmH20  Plateau Pressure: 0 cmH20    Temp  Min: 98.4 °F (36.9 °C)  Max: 99.9 °F (37.7 °C)  Pulse  Min: 66  Max: 93  BP  Min: 85/54  Max: 135/85  MAP (mmHg)  Min: 64  Max: 104  Resp  Min: 18  Max: 41  SpO2  Min: 98 %  Max: 100 %  Oxygen Concentration (%)  Min: 35  Max: 40    01/19 0701 - 01/20 0700  In: 1881.4 [I.V.:413.9]  Out: 1600 [Urine:1600]   Unmeasured Output  Urine Occurrence: 2  Stool Occurrence: 1  Pad Count: 1        Physical Exam  General Appearance: Middle aged gentleman lying in bed, appears older than stated age. Not in acute distress. Intermittently overbreathing vent. Moving LLE spontaneously, however no purposeful/goal directed movement noted.   Mental Status Exam: No response to verbal or noxious stimuli. Not tracking or regarding, not following commands  Cranial Nerves: Pupils 4->2 mm and reactive b/l, gaze midline. +corneals to saline b/l, weak OCRs. Absent gag, +cough. Holding eyes shut against attempts to open them   Motor: No movement in RUE. Intermittent spontaneous movement w/ increased tone in LUE, not localizing. Moving LLE spontaneously AG, however not consistently moving in response to stimuli, remains w/o clear evidence of purposeful movement   Sensory: Intermittent increase in HR/BP and movement on L side to noxious x4 extremities; at other times remains w/o clear response to noxious  Coordination: Unable to assess   Vascular: S1/S2 of normal intensity, no S3/S4 appreciated, no murmurs appreciated  Lungs: Coarse breath sounds b/l, no wheezing  Abdomen: Soft, non-distended, non-tender, BS +       Medications:  Continuousfentanyl, Last Rate: 200 mcg/hr (01/20/24 0948)    Scheduledacetylcysteine 100 mg/ml (10%), 4 mL, Q6H  bisacodyL, 10 mg, Daily  budesonide, 0.25 mg, Q12H  cloBAZam, 20 mg, BID  enoxparin, 40 mg, Q24H (prophylaxis, 1700)  famotidine, 20 mg, BID  lacosamide (VIMPAT) IVPB, 200 mg,  Q12H  levETIRAcetam (Keppra) IV (PEDS and ADULTS), 1,500 mg, Q12H  miconazole nitrate 2%, , BID  montelukast, 10 mg, Daily  oxyCODONE, 10 mg, Q6H  propranoloL, 20 mg, TID  senna-docusate 8.6-50 mg, 2 tablet, BID  silodosin, 4 mg, Daily    PRNacetaminophen, 325 mg, Q6H PRN  albuterol-ipratropium, 3 mL, Q6H PRN  fentanyl, 50 mcg, Q1H PRN  hydrALAZINE, 10 mg, Q4H PRN  labetalol, 10 mg, Q4H PRN  magnesium oxide, 800 mg, PRN  magnesium oxide, 800 mg, PRN  potassium bicarbonate, 35 mEq, PRN  potassium bicarbonate, 50 mEq, PRN  potassium bicarbonate, 60 mEq, PRN  potassium, sodium phosphates, 2 packet, PRN  potassium, sodium phosphates, 2 packet, PRN  potassium, sodium phosphates, 2 packet, PRN      Today I personally reviewed pertinent imaging, laboratory results, notably: CXR stable from prior. CBC unremarkable, no leukocytosis, Hb/platelets stable. CMP w/ improved transaminitis, AST/ALT 57/128, otherwise unremarkable    Diet  No diet orders on file  No diet orders on file  TF at goal     Assessment/Plan:     Neuro  * New onset refractory status epilepticus  54 y/o M 12 days post respiratory failure c/b cardiac arrest (Epi x 3, CPR x 3, Shock x 1) with RSE in setting of propofol wean despite continued keppra, vimpat, depacon administration. [place] [facility] [place]cg/kg/min of non-titrating propofol from [facility] which was dropped to 50 mcg/kg/min, followed by the immediate addition of 25 mcg/kg/min of ketamine. EEG cap placed and epilepsy notified.    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  No improvement in exam with improvement in seizures  Increase in non-purposeful movements of LLE>LUE over last week, no evidence of purposeful movements/return of consciousness to date   C/w oxycodone 10 mg q6hrs and propranolol 20 mg TID for suspected neuro storming  Ongoing GOC with family -> see ACP notes for full details      Acute encephalopathy  See primary problem, known NCSE post arrest  CT head unremarkable on admission to [facility]  MRI x 2 with T2 BG hyperintensity bilaterally -> c/w anoxic injury   Daily ABG, pH WNL  As the majority of this patient's metabolic derangements were corrected at [facility], suspect encephalopathy 2/2 anoxic injury/NCSE    Seizure disorder, nonconvulsive, with status epilepticus  See primary problem     Pulmonary  COPD (chronic obstructive pulmonary disease)  Known history of smoking  C/w duonebs PRN  Daily CXR, ABG while intubated  O2 Sat goal 88%-92%    Severe asthma  Known hx of asthma with acute onset SOB/hypoxia while shoveling dirt leading to respiratory/cardiac arrest  C/w home singulair  Continue duonebs/ inhaled steroids  Ceftriaxone/Azithromycin initiated by [facility] then d/c prior to transfer in setting of clear CXR, presently off emperic abx      Acute respiratory failure with hypoxia and hypercarbia  Patient with Hypercapnic and Hypoxic Respiratory failure which is Acute. He is not on home oxygen. Supplemental oxygen was provided and noted-   Vent Mode: A/C  Oxygen Concentration (%):  [35-40] 35  Resp Rate Total:  [18 br/min-23 br/min] 19 br/min  Vt Set:  [480 mL] 480 mL  PEEP/CPAP:  [5 cmH20] 5 cmH20  Mean Airway Pressure:  [8.7 cmH20-10 cmH20] 9.5 cmH20    Daily CXR, ABG  Will need trach for airway protection if family electing to proceed w/ aggressive care -> gen surg consulted, timing of trach/peg TBD  See asthma/COPD  Likely not 2/2 infectious source  -  s/p Azithromycin 1/2-1/3  - s/p Ceftriaxone 1/2-1/3  - s/p Pip-Tazo 1/3-1/6  - s/p Vanc 1/3-1/6       Cardiac/Vascular  Essential hypertension  Hx of     SBP < 180  PRN labetalol, hydralazine  Presently normotensive on no meds    Cardiorespiratory arrest  Cardiac arrest on 01/01 requiring 3 rounds of CPR, Epi x 3, and 1 shock to obtain ROSC  Likely respiratory etiology of arrest 2/2 asthma exacerbation   S/p TTM, maintain normothermia  MRI x 2 concerning for anoxic injury  Long term prognosis poor given imaging findings, persistent malignant patterns on cEEG>2 wks out from arrest  C/w oxycodone for periods of flailing movements w/o epileptiform correlate, concern for neuro storming. Wean fentanyl gtt as tolerated  BP unlikely to tolerate clonidine -> cautiously c/w propranolol 20 mg TID, monitor BP and for recurrent wheezing    GI  Transaminitis  Likely DILI     - Decrease acetaminophen to 350 mg PRN fever   - Hesitant to reduce keppra/vimpat given refractory NCSE, will continue to monitor LFTs  - Avoid hepatotoxic medications  - Trend     Palliative Care  ACP (advance care planning)  Patient made DNR at [facility]  Family chose to transfer patient for initiation of 4th AED trial, forgoing comfort measures at this time per chart review  1/13: family aware of prognosis if this trial of anaesthetics and additional scheduled AED is not helpful  1/15: Discussed with mother and sister at bedside, prognosis poor, see ACP note for details. Per family, requesting until end of week to make decision re trach/PEG vs comfort  1/17: Met with mother, father and fiancee at bedside, see ACP note for full details. Family converting pt to FULL CODE, to make decision re trach/PEG vs comfort by end of week   1/19: Family electing to proceed with trach/PEG, confirmed w/ legal next of kin (patient's mother, pt's adult son incarcerated and unable to participate in discussions). Please see ACP note for full details    Other  Smoker  Per chart  review   Cessation counseling not appropriate at this time given mental status            The patient is being Prophylaxed for:  Venous Thromboembolism with: Mechanical or Chemical  Stress Ulcer with: H2B  Ventilator Pneumonia with: chlorhexidine oral care    Activity Orders            Elevate HOB Elevate (30-45 degrees) Elevate HOB to 30 - 45 degrees during feeding unless otherwise stated starting at 01/12 1011    Turn patient starting at 01/12 0724    Elevate HOB starting at 01/12 0008          Full Code    Level III visit    Angelica Garrison MD  Neurocritical Care  [facility] - Neuro Critical Care

## 2024-01-20 NOTE — ASSESSMENT & PLAN NOTE
53M with new onset refractory status epilepticus secondary to anoxic brain injury. General surgery consulted for consideration of trach/PEG. The patient is on minimal vent settings. Tube feeds are at goal.     -- will plan for trach/PEG sometime next week, timing to be determined   -- consent to be obtained   -- remainder of care per primary team

## 2024-01-20 NOTE — ASSESSMENT & PLAN NOTE
Non-purposeful movement of L side noted, LLE>>LUE    - No EEG correlate, not clearly purposeful   - Likely 2/2 anoxic injury  - Fentanyl gtt and standing oxy for vent synchrony

## 2024-01-20 NOTE — ASSESSMENT & PLAN NOTE
Cardiac arrest on 01/01 requiring 3 rounds of CPR, Epi x 3, and 1 shock to obtain ROSC  Likely respiratory etiology of arrest 2/2 asthma exacerbation   S/p TTM, maintain normothermia  MRI x 2 concerning for anoxic injury  Long term prognosis poor given imaging findings, persistent malignant patterns on cEEG>2 wks out from arrest  C/w oxycodone for periods of flailing movements w/o epileptiform correlate, concern for neuro storming. Wean fentanyl gtt as tolerated  BP unlikely to tolerate clonidine -> cautiously c/w propranolol 20 mg TID, monitor BP and for recurrent wheezing

## 2024-01-21 ENCOUNTER — ANESTHESIA EVENT (OUTPATIENT)
Dept: SURGERY | Facility: HOSPITAL | Age: 54
DRG: 004 | End: 2024-01-21
Payer: MEDICAID

## 2024-01-21 ENCOUNTER — DOCUMENTATION ONLY (OUTPATIENT)
Dept: NEUROLOGY | Facility: CLINIC | Age: 54
End: 2024-01-21
Payer: MEDICAID

## 2024-01-21 LAB
ABO + RH BLD: NORMAL
ALBUMIN SERPL BCP-MCNC: 2.6 G/DL (ref 3.5–5.2)
ALLENS TEST: ABNORMAL
ALP SERPL-CCNC: 87 U/L (ref 55–135)
ALT SERPL W/O P-5'-P-CCNC: 89 U/L (ref 10–44)
ANION GAP SERPL CALC-SCNC: 6 MMOL/L (ref 8–16)
AST SERPL-CCNC: 42 U/L (ref 10–40)
BASOPHILS # BLD AUTO: 0.02 K/UL (ref 0–0.2)
BASOPHILS NFR BLD: 0.4 % (ref 0–1.9)
BILIRUB SERPL-MCNC: 0.4 MG/DL (ref 0.1–1)
BLD GP AB SCN CELLS X3 SERPL QL: NORMAL
BUN SERPL-MCNC: 22 MG/DL (ref 6–20)
CALCIUM SERPL-MCNC: 9.1 MG/DL (ref 8.7–10.5)
CHLORIDE SERPL-SCNC: 110 MMOL/L (ref 95–110)
CO2 SERPL-SCNC: 23 MMOL/L (ref 23–29)
CREAT SERPL-MCNC: 0.7 MG/DL (ref 0.5–1.4)
DIFFERENTIAL METHOD BLD: ABNORMAL
EOSINOPHIL # BLD AUTO: 0 K/UL (ref 0–0.5)
EOSINOPHIL NFR BLD: 0.7 % (ref 0–8)
ERYTHROCYTE [DISTWIDTH] IN BLOOD BY AUTOMATED COUNT: 12.6 % (ref 11.5–14.5)
EST. GFR  (NO RACE VARIABLE): >60 ML/MIN/1.73 M^2
GLUCOSE SERPL-MCNC: 112 MG/DL (ref 70–110)
HCO3 UR-SCNC: 22.5 MMOL/L (ref 24–28)
HCT VFR BLD AUTO: 25.7 % (ref 40–54)
HGB BLD-MCNC: 8.6 G/DL (ref 14–18)
IMM GRANULOCYTES # BLD AUTO: 0.03 K/UL (ref 0–0.04)
IMM GRANULOCYTES NFR BLD AUTO: 0.5 % (ref 0–0.5)
INR PPP: 1.1 (ref 0.8–1.2)
LYMPHOCYTES # BLD AUTO: 1.5 K/UL (ref 1–4.8)
LYMPHOCYTES NFR BLD: 27.3 % (ref 18–48)
MAGNESIUM SERPL-MCNC: 2.2 MG/DL (ref 1.6–2.6)
MCH RBC QN AUTO: 30.3 PG (ref 27–31)
MCHC RBC AUTO-ENTMCNC: 33.5 G/DL (ref 32–36)
MCV RBC AUTO: 91 FL (ref 82–98)
MONOCYTES # BLD AUTO: 0.6 K/UL (ref 0.3–1)
MONOCYTES NFR BLD: 11.1 % (ref 4–15)
NEUTROPHILS # BLD AUTO: 3.4 K/UL (ref 1.8–7.7)
NEUTROPHILS NFR BLD: 60 % (ref 38–73)
NRBC BLD-RTO: 0 /100 WBC
PCO2 BLDA: 30.8 MMHG (ref 35–45)
PH SMN: 7.47 [PH] (ref 7.35–7.45)
PHOSPHATE SERPL-MCNC: 3.9 MG/DL (ref 2.7–4.5)
PLATELET # BLD AUTO: 396 K/UL (ref 150–450)
PMV BLD AUTO: 8.9 FL (ref 9.2–12.9)
PO2 BLDA: 149 MMHG (ref 80–100)
POC BE: -1 MMOL/L
POC SATURATED O2: 99 % (ref 95–100)
POC TCO2: 23 MMOL/L (ref 23–27)
POTASSIUM SERPL-SCNC: 4.1 MMOL/L (ref 3.5–5.1)
PROT SERPL-MCNC: 6.6 G/DL (ref 6–8.4)
PROTHROMBIN TIME: 11.6 SEC (ref 9–12.5)
RBC # BLD AUTO: 2.84 M/UL (ref 4.6–6.2)
SAMPLE: ABNORMAL
SITE: ABNORMAL
SODIUM SERPL-SCNC: 139 MMOL/L (ref 136–145)
SPECIMEN OUTDATE: NORMAL
WBC # BLD AUTO: 5.6 K/UL (ref 3.9–12.7)

## 2024-01-21 PROCEDURE — 25000003 PHARM REV CODE 250: Performed by: PSYCHIATRY & NEUROLOGY

## 2024-01-21 PROCEDURE — 94640 AIRWAY INHALATION TREATMENT: CPT

## 2024-01-21 PROCEDURE — 94003 VENT MGMT INPAT SUBQ DAY: CPT

## 2024-01-21 PROCEDURE — 63600175 PHARM REV CODE 636 W HCPCS

## 2024-01-21 PROCEDURE — 25000003 PHARM REV CODE 250

## 2024-01-21 PROCEDURE — 83735 ASSAY OF MAGNESIUM: CPT

## 2024-01-21 PROCEDURE — 99900026 HC AIRWAY MAINTENANCE (STAT)

## 2024-01-21 PROCEDURE — 20000000 HC ICU ROOM

## 2024-01-21 PROCEDURE — 25000242 PHARM REV CODE 250 ALT 637 W/ HCPCS

## 2024-01-21 PROCEDURE — 27100171 HC OXYGEN HIGH FLOW UP TO 24 HOURS

## 2024-01-21 PROCEDURE — 80053 COMPREHEN METABOLIC PANEL: CPT

## 2024-01-21 PROCEDURE — C9254 INJECTION, LACOSAMIDE: HCPCS

## 2024-01-21 PROCEDURE — 99233 SBSQ HOSP IP/OBS HIGH 50: CPT | Mod: ,,, | Performed by: PSYCHIATRY & NEUROLOGY

## 2024-01-21 PROCEDURE — 85025 COMPLETE CBC W/AUTO DIFF WBC: CPT

## 2024-01-21 PROCEDURE — 94761 N-INVAS EAR/PLS OXIMETRY MLT: CPT

## 2024-01-21 PROCEDURE — 94668 MNPJ CHEST WALL SBSQ: CPT

## 2024-01-21 PROCEDURE — 99900035 HC TECH TIME PER 15 MIN (STAT)

## 2024-01-21 PROCEDURE — 82803 BLOOD GASES ANY COMBINATION: CPT

## 2024-01-21 PROCEDURE — 85610 PROTHROMBIN TIME: CPT | Performed by: NURSE PRACTITIONER

## 2024-01-21 PROCEDURE — 25000003 PHARM REV CODE 250: Performed by: NURSE PRACTITIONER

## 2024-01-21 PROCEDURE — 84100 ASSAY OF PHOSPHORUS: CPT

## 2024-01-21 PROCEDURE — 36600 WITHDRAWAL OF ARTERIAL BLOOD: CPT

## 2024-01-21 PROCEDURE — 86850 RBC ANTIBODY SCREEN: CPT | Performed by: NURSE PRACTITIONER

## 2024-01-21 RX ADMIN — ACETYLCYSTEINE 4 ML: 100 INHALANT RESPIRATORY (INHALATION) at 05:01

## 2024-01-21 RX ADMIN — ACETYLCYSTEINE 4 ML: 100 INHALANT RESPIRATORY (INHALATION) at 07:01

## 2024-01-21 RX ADMIN — SENNOSIDES AND DOCUSATE SODIUM 2 TABLET: 50; 8.6 TABLET ORAL at 09:01

## 2024-01-21 RX ADMIN — OXYCODONE HYDROCHLORIDE 10 MG: 10 TABLET ORAL at 12:01

## 2024-01-21 RX ADMIN — Medication 200 MCG/HR: at 05:01

## 2024-01-21 RX ADMIN — Medication 125 MCG/HR: at 05:01

## 2024-01-21 RX ADMIN — FAMOTIDINE 20 MG: 20 TABLET, FILM COATED ORAL at 09:01

## 2024-01-21 RX ADMIN — FAMOTIDINE 20 MG: 20 TABLET, FILM COATED ORAL at 08:01

## 2024-01-21 RX ADMIN — LACOSAMIDE 200 MG: 10 INJECTION INTRAVENOUS at 12:01

## 2024-01-21 RX ADMIN — ACETYLCYSTEINE 4 ML: 100 INHALANT RESPIRATORY (INHALATION) at 12:01

## 2024-01-21 RX ADMIN — PROPRANOLOL HYDROCHLORIDE 20 MG: 10 TABLET ORAL at 08:01

## 2024-01-21 RX ADMIN — OXYCODONE HYDROCHLORIDE 10 MG: 10 TABLET ORAL at 05:01

## 2024-01-21 RX ADMIN — MICONAZOLE NITRATE: 20 OINTMENT TOPICAL at 11:01

## 2024-01-21 RX ADMIN — BUDESONIDE INHALATION 0.25 MG: 0.5 SUSPENSION RESPIRATORY (INHALATION) at 07:01

## 2024-01-21 RX ADMIN — PROPRANOLOL HYDROCHLORIDE 20 MG: 10 TABLET ORAL at 09:01

## 2024-01-21 RX ADMIN — CLOBAZAM 20 MG: 10 TABLET ORAL at 08:01

## 2024-01-21 RX ADMIN — PROPRANOLOL HYDROCHLORIDE 20 MG: 10 TABLET ORAL at 03:01

## 2024-01-21 RX ADMIN — MONTELUKAST 10 MG: 10 TABLET, FILM COATED ORAL at 09:01

## 2024-01-21 RX ADMIN — CLOBAZAM 20 MG: 10 TABLET ORAL at 09:01

## 2024-01-21 RX ADMIN — LEVETIRACETAM 1500 MG: 100 INJECTION INTRAVENOUS at 09:01

## 2024-01-21 RX ADMIN — MICONAZOLE NITRATE: 20 OINTMENT TOPICAL at 09:01

## 2024-01-21 RX ADMIN — IPRATROPIUM BROMIDE AND ALBUTEROL SULFATE 3 ML: .5; 3 SOLUTION RESPIRATORY (INHALATION) at 05:01

## 2024-01-21 RX ADMIN — IPRATROPIUM BROMIDE AND ALBUTEROL SULFATE 3 ML: .5; 3 SOLUTION RESPIRATORY (INHALATION) at 12:01

## 2024-01-21 RX ADMIN — LACOSAMIDE 200 MG: 10 INJECTION INTRAVENOUS at 01:01

## 2024-01-21 RX ADMIN — SILODOSIN 4 MG: 4 CAPSULE ORAL at 09:01

## 2024-01-21 RX ADMIN — ACETYLCYSTEINE 4 ML: 100 INHALANT RESPIRATORY (INHALATION) at 11:01

## 2024-01-21 RX ADMIN — OXYCODONE HYDROCHLORIDE 10 MG: 10 TABLET ORAL at 11:01

## 2024-01-21 RX ADMIN — ENOXAPARIN SODIUM 40 MG: 40 INJECTION SUBCUTANEOUS at 05:01

## 2024-01-21 RX ADMIN — IPRATROPIUM BROMIDE AND ALBUTEROL SULFATE 3 ML: .5; 3 SOLUTION RESPIRATORY (INHALATION) at 11:01

## 2024-01-21 RX ADMIN — SENNOSIDES AND DOCUSATE SODIUM 2 TABLET: 50; 8.6 TABLET ORAL at 08:01

## 2024-01-21 RX ADMIN — LEVETIRACETAM 1500 MG: 100 INJECTION INTRAVENOUS at 08:01

## 2024-01-21 NOTE — PROGRESS NOTES
EE  AM Check Electrodes had to be fixed.No    Skin Integrity: Normal     Shante Miranda   01/21/2024 9:29 AM

## 2024-01-21 NOTE — PROGRESS NOTES
[facility]  General Surgery  Progress Note    Subjective:     History of Present Illness:  Aaron Pruitt is a 53 y.o. male with a history of COPD/Asthma c/b smoking and HTN who presents to [facility] for NCSE. He initially presented to [facility] ER on 01/01 after being intubated in the field following acute respiratory failure post cardiac arrest s/p resuscitation and shock x1. He was subsequently found to have new onset refractory status epilepticus secondary to anoxic brain injury. Patient was eventually transferred to [facility] [place] [facility] for trial of AED. The patient has required prolonged intubation secondary to his neurological status. The patient's prognosis for recovery is poor. The primary team has had extensive goals of care discussions with the family who have reflected that it would be the patient's wishes to continue with treatment and move forward with trach/PEG. General surgery consulted for consideration of trach/PEG.     Post-Op Info:  Procedure(s) (LRB):  CREATION, TRACHEOSTOMY (N/A)  EGD, WITH PEG TUBE INSERTION (N/A)         Interval History: No acute events. Minimal vent settings and tolerating tube feeds.     Medications:  Continuous Infusions:   fentanyl 125 mcg/hr (01/21/24 0550)     Scheduled Meds:   acetylcysteine 100 mg/ml (10%)  4 mL Nebulization Q6H    bisacodyL  10 mg Rectal Daily    budesonide  0.25 mg Nebulization Q12H    cloBAZam  20 mg Per NG tube BID    enoxparin  40 mg Subcutaneous Q24H (prophylaxis, 1700)    famotidine  20 mg Per NG tube BID    lacosamide (VIMPAT) IVPB  200 mg Intravenous Q12H    levETIRAcetam (Keppra) IV (PEDS and ADULTS)  1,500 mg Intravenous Q12H    miconazole nitrate 2%   Topical (Top) BID    montelukast  10 mg Per NG tube Daily    oxyCODONE  10 mg Per OG tube Q6H    propranoloL  20 mg Per NG tube TID    senna-docusate 8.6-50 mg  2 tablet Per NG tube BID    silodosin  4 mg Per NG tube Daily     PRN Meds:acetaminophen, albuterol-ipratropium, fentanyl, hydrALAZINE,  labetalol, magnesium oxide, magnesium oxide, potassium bicarbonate, potassium bicarbonate, potassium bicarbonate, potassium, sodium phosphates, potassium, sodium phosphates, potassium, sodium phosphates     Review of patient's allergies indicates:   Allergen Reactions    Sulfa (sulfonamide antibiotics)      Objective:     Vital Signs (Most Recent):  Temp: 99.5 °F (37.5 °C) (01/21/24 0601)  Pulse: 77 (01/21/24 1113)  Resp: 18 (01/21/24 1208)  BP: 109/70 (01/21/24 0601)  SpO2: 100 % (01/21/24 1113) Vital Signs (24h Range):  Temp:  [98.6 °F (37 °C)-99.5 °F (37.5 °C)] 99.5 °F (37.5 °C)  Pulse:  [66-86] 77  Resp:  [16-27] 18  SpO2:  [98 %-100 %] 100 %  BP: ()/() 109/70     Weight: 72.1 kg (158 lb 15.2 oz)  Body mass index is 20.97 kg/m².    Intake/Output - Last 3 Shifts         01/19 0700  01/20 0659 01/20 0700  01/21 0659 01/21 0700  01/22 0659    I.V. (mL/kg) 413.9 (5.7) 504.4 (7)     NG/GT 1280 1530 50    IV Piggyback 187.5 199.8     Total Intake(mL/kg) 1881.4 (26.1) 2234.2 (31) 50 (0.7)    Urine (mL/kg/hr) 1600 (0.9) 1350 (0.8)     Other 0 0     Stool 0 0     Total Output 1600 1350     Net +281.4 +884.2 +50           Urine Occurrence  1 x     Stool Occurrence 1 x 1 x              Physical Exam  Vitals reviewed.   Constitutional:       Comments: Intubated    HENT:      Nose:      Comments: NGT in place     Mouth/Throat:      Comments: ETT in place  Cardiovascular:      Rate and Rhythm: Normal rate.      Pulses: Normal pulses.   Pulmonary:      Comments: Intubated, mechanically ventilated  Vent Mode: A/C  Oxygen Concentration (%):  [30-35] 30  Resp Rate Total:  [16 br/min-25 br/min] 19 br/min  Vt Set:  [480 mL] 480 mL  PEEP/CPAP:  [5 cmH20] 5 cmH20  Mean Airway Pressure:  [8.1 zeJ67-24 cmH20] 8.1 cmH20  Abdominal:      General: Abdomen is flat.      Palpations: Abdomen is soft.   Neurological:      Comments: Intubated          Significant Labs:  I have reviewed all pertinent lab results within the past 24  hours.  CBC:   Recent Labs   Lab 01/21/24  0339   WBC 5.60   RBC 2.84*   HGB 8.6*   HCT 25.7*      MCV 91   MCH 30.3   MCHC 33.5     CMP:   Recent Labs   Lab 01/21/24  0339   *   CALCIUM 9.1   ALBUMIN 2.6*   PROT 6.6      K 4.1   CO2 23      BUN 22*   CREATININE 0.7   ALKPHOS 87   ALT 89*   AST 42*   BILITOT 0.4       Significant Diagnostics:  I have reviewed all pertinent imaging results/findings within the past 24 hours.  Assessment/Plan:     Acute respiratory failure with hypoxia and hypercarbia  53M with new onset refractory status epilepticus secondary to anoxic brain injury. General surgery consulted for consideration of trach/PEG. The patient is on minimal vent settings. Tube feeds are at goal.     - Plan for trach/PEG tomorrow 21/22/24  - Consent obtained from mother  Charleen Blanchard for lovenox  - Hold tube feeds at midnight        Jojo Bautista MD  General Surgery  [facility]

## 2024-01-21 NOTE — PROCEDURES
ICU EEG/VIDEO MONITORING REPORT    Aaron Pruitt  [id number]  1970    DATE OF SERVICE: 1/20-21/2024    DATE OF ADMISSION: 1/12/2024 12:02 AM    ADMITTING PROVIDER: Sebastien Vega MD    METHODOLOGY   Electroencephalographic (EEG) recording is with electrodes placed according to the International 10-20 placement system.  Thirty two (32) channels of digital signal are simultaneously recorded from the scalp and may include EKG, EMG, and/or eye monitors.   Recording band pass was 0.1 to 512 hz.  Digital video recording of the patient is simultaneously recorded with the EEG.  The nursing staff report clinical symptoms and may press an event button when the patient has symptoms of clinical interest to the treating physicians.  EEG and video recording is stored and archived in digital format.  The entire recording is visually reviewed and the times identified by computer analysis as being spikes or seizures are reviewed again.  Activation procedures which include photic stimulation, hyperventilation and instructing patients to perform simple task are done in selected patients.   Compresses spectral analysis (CSA) is also performed on the activity recorded from each individual channel.  This is displayed as a power display of frequencies from 0 to 30 Hz over time.   The CSA analysis is done and displayed continuously.  This is reviewed for asymmetries in power between homologous areas of the scalp and for presence of changes in power which canbe seen when seizures occur.  Sections of suspected abnormalities on the CSA is then compared with the original EEG recording.     [employer] software was also utilized in the review of this study.  This software suite analyzes the EEG recording in multiple domains.  Coherence and rhythmicity is computed to identify EEG sections which may contain organized seizures.  Each channel undergoes analysis to detect presence of spike and sharp waves which have special and morphological  characteristic of epileptic activity.  The routine EEG recording is converted from spacial into frequency domain.  This is then displayed comparing homologous areas to identify areas of significant asymmetry.  Algorithm to identify non-cortically generated artifact is used to separate eye movement, EMG and other artifact from the EEG.      Recording Times  Start on 1/20/2024, 14:27  Stop on 1/21/2024, 13:29    A total of 23 hours and 2 minutes of EEG was recorded.    EEG FINDINGS - the study was done without sedation.   Background activity:   The background rhythm was characterized by delta (2-4 Hz) activity   No posterior dominant rhythm seen.   Symmetry and continuity: the background was continuous and symmetric    Sleep:   Not seen.    Activation procedures:   Reactivity is noted with stimulation.    Abnormal activity:   Frequent to abundant bilateral (right > left) low amplitude epileptiform (spike/wave) periodic discharges without evolution. At times, these become more prominent with stimulation. No associated clinical correlate seen.    EKG:   Regular rhythm seen when interpretable.    IMPRESSION:   This C-EEG done without sedation is abnormal due to:  1) frequent to abundant bilateral (maximal right) epileptiform activity, suggestive of underlying epileptiform potential  2) severe generalized slowing seen, suggestive of severe diffuse cerebral dysfunction.    CLINICAL CORRELATION IS RECOMMENDED.    Tami Hernandez MD, JIMMIE(), ADELE, RAFAEL.  Neurology-Epilepsy.  [facility].

## 2024-01-21 NOTE — ASSESSMENT & PLAN NOTE
53M with new onset refractory status epilepticus secondary to anoxic brain injury. General surgery consulted for consideration of trach/PEG. The patient is on minimal vent settings. Tube feeds are at goal.     - Plan for trach/PEG tomorrow 21/22/24  - Consent obtained from mother  - [place] for lovenox  - Hold tube feeds at midnight

## 2024-01-21 NOTE — SUBJECTIVE & OBJECTIVE
Interval History: No acute events. Minimal vent settings and tolerating tube feeds.     Medications:  Continuous Infusions:   fentanyl 125 mcg/hr (01/21/24 0550)     Scheduled Meds:   acetylcysteine 100 mg/ml (10%)  4 mL Nebulization Q6H    bisacodyL  10 mg Rectal Daily    budesonide  0.25 mg Nebulization Q12H    cloBAZam  20 mg Per NG tube BID    enoxparin  40 mg Subcutaneous Q24H (prophylaxis, 1700)    famotidine  20 mg Per NG tube BID    lacosamide (VIMPAT) IVPB  200 mg Intravenous Q12H    levETIRAcetam (Keppra) IV (PEDS and ADULTS)  1,500 mg Intravenous Q12H    miconazole nitrate 2%   Topical (Top) BID    montelukast  10 mg Per NG tube Daily    oxyCODONE  10 mg Per OG tube Q6H    propranoloL  20 mg Per NG tube TID    senna-docusate 8.6-50 mg  2 tablet Per NG tube BID    silodosin  4 mg Per NG tube Daily     PRN Meds:acetaminophen, albuterol-ipratropium, fentanyl, hydrALAZINE, labetalol, magnesium oxide, magnesium oxide, potassium bicarbonate, potassium bicarbonate, potassium bicarbonate, potassium, sodium phosphates, potassium, sodium phosphates, potassium, sodium phosphates     Review of patient's allergies indicates:   Allergen Reactions    Sulfa (sulfonamide antibiotics)      Objective:     Vital Signs (Most Recent):  Temp: 99.5 °F (37.5 °C) (01/21/24 0601)  Pulse: 77 (01/21/24 1113)  Resp: 18 (01/21/24 1208)  BP: 109/70 (01/21/24 0601)  SpO2: 100 % (01/21/24 1113) Vital Signs (24h Range):  Temp:  [98.6 °F (37 °C)-99.5 °F (37.5 °C)] 99.5 °F (37.5 °C)  Pulse:  [66-86] 77  Resp:  [16-27] 18  SpO2:  [98 %-100 %] 100 %  BP: ()/() 109/70     Weight: 72.1 kg (158 lb 15.2 oz)  Body mass index is 20.97 kg/m².    Intake/Output - Last 3 Shifts         01/19 0700 01/20 0659 01/20 0700 01/21 0659 01/21 0700 01/22 0659    I.V. (mL/kg) 413.9 (5.7) 504.4 (7)     NG/GT 1280 1530 50    IV Piggyback 187.5 199.8     Total Intake(mL/kg) 1881.4 (26.1) 2234.2 (31) 50 (0.7)    Urine (mL/kg/hr) 1600 (0.9) 1350 (0.8)      Other 0 0     Stool 0 0     Total Output 1600 1350     Net +281.4 +884.2 +50           Urine Occurrence  1 x     Stool Occurrence 1 x 1 x              Physical Exam  Vitals reviewed.   Constitutional:       Comments: Intubated    HENT:      Nose:      Comments: NGT in place     Mouth/Throat:      Comments: ETT in place  Cardiovascular:      Rate and Rhythm: Normal rate.      Pulses: Normal pulses.   Pulmonary:      Comments: Intubated, mechanically ventilated  Vent Mode: A/C  Oxygen Concentration (%):  [30-35] 30  Resp Rate Total:  [16 br/min-25 br/min] 19 br/min  Vt Set:  [480 mL] 480 mL  PEEP/CPAP:  [5 cmH20] 5 cmH20  Mean Airway Pressure:  [8.1 phE81-51 cmH20] 8.1 cmH20  Abdominal:      General: Abdomen is flat.      Palpations: Abdomen is soft.   Neurological:      Comments: Intubated          Significant Labs:  I have reviewed all pertinent lab results within the past 24 hours.  CBC:   Recent Labs   Lab 01/21/24  0339   WBC 5.60   RBC 2.84*   HGB 8.6*   HCT 25.7*      MCV 91   MCH 30.3   MCHC 33.5     CMP:   Recent Labs   Lab 01/21/24  0339   *   CALCIUM 9.1   ALBUMIN 2.6*   PROT 6.6      K 4.1   CO2 23      BUN 22*   CREATININE 0.7   ALKPHOS 87   ALT 89*   AST 42*   BILITOT 0.4       Significant Diagnostics:  I have reviewed all pertinent imaging results/findings within the past 24 hours.

## 2024-01-21 NOTE — ANESTHESIA PREPROCEDURE EVALUATION
[facility]  Anesthesia Pre-Operative Evaluation         Patient Name: Aaorn Pruitt  YOB: 1970  MRN: [id number]    SUBJECTIVE:     Pre-operative evaluation for Procedure(s) (LRB):  CREATION, TRACHEOSTOMY (N/A)  EGD, WITH PEG TUBE INSERTION (N/A)     01/21/2024    Aaron Pruitt is a 53 y.o. male w/ a significant PMHx of COPD/Asthma c/b smoking and HTN who presents to [facility] for NCSE. He initially presented to [facility] ER on 01/01 after being intubated in the field following acute respiratory failure post cardiac arrest s/p resuscitation and shock x1. Per chart review, he was shoveling some dirt in his yard when he got severely short of breath and suddenly had difficulty breathing. EMS was activated by his neighbors. Upon EMS arrival, he reportedly still had a pulse, though with severe respiratory distress, and subsequently became pulseless. He was given narcan x2 without improvement. CPR and Epi x3. ROSC after 3rd round of CPR with shock x 1. CT head on arrival to [facility] without acute intracranial abnormality, and CT chest without acute pulmonary process. Hospital stay complicated by seizure disorder with status epilepticus and hypoxic ischemic encephalopathy 2/2 cardiac arrest.    Patient now presents for the above procedure(s).      LDA:        Peripheral IV - Single Lumen 01/18/24 2148 18 G Anterior;Right Forearm (Active)   Site Assessment Clean;Dry;Intact 01/21/24 0705   Extremity Assessment Distal to IV No abnormal discoloration 01/21/24 0705   Line Status Flushed 01/21/24 0705   Dressing Status Clean;Dry;Intact 01/21/24 0705   Dressing Intervention Integrity maintained 01/21/24 0705   Dressing Change Due 01/22/24 01/21/24 0705   Site Change Due 01/22/24 01/20/24 1901   Reason Not Rotated Not due 01/21/24 0705   Number of days: 2            Peripheral IV - Single Lumen 01/19/24 0113 20 G Anterior;Left Upper Arm (Active)   Site Assessment Clean;Dry;Intact 01/21/24 0705   Extremity Assessment  Distal to IV No abnormal discoloration 01/21/24 0705   Line Status Infusing 01/21/24 0705   Dressing Status Clean;Dry;Intact 01/21/24 0705   Dressing Intervention Integrity maintained 01/21/24 0705   Dressing Change Due 01/23/24 01/21/24 0705   Site Change Due 01/23/24 01/20/24 1901   Reason Not Rotated Not due 01/21/24 0705   Number of days: 2            NG/OG Tube 01/01/24 1722 [place] sump 16 Fr. Left nostril (Active)   Placement Check placement verified by x-ray 01/21/24 0705   Tolerance no signs/symptoms of discomfort 01/21/24 0705   Securement secured to nostril center w/ adhesive device 01/21/24 0705   Clamp Status/Tolerance unclamped 01/21/24 0705   Suction Setting/Drainage Method suction at the bedside 01/21/24 0705   Insertion Site Appearance no redness, warmth, tenderness, skin breakdown, drainage 01/21/24 0705   Drainage None 01/21/24 0705   Flush/Irrigation flushed w/;water 01/21/24 0705   Feeding Type continuous;by pump 01/21/24 0705   Feeding Action feeding continued 01/21/24 0705   Current Rate (mL/hr) 50 mL/hr 01/20/24 1901   Goal Rate (mL/hr) 50 mL/hr 01/20/24 1901   Intake (mL) 60 mL 01/20/24 1844   Water Bolus (mL) 50 mL 01/21/24 0601   Tube Output(mL)(Include Discarded Residual) 0 mL 01/02/24 0314   Rate Formula Tube Feeding (mL/hr) 50 mL/hr 01/21/24 0705   Formula Name Isosource 01/21/24 0705   Intake (mL) - Formula Tube Feeding 50 01/21/24 0705   Residual Amount (ml) 20 ml 01/11/24 1105   Number of days: 19       Male External Urinary Catheter 01/14/24 (Active)   Collection Container Urimeter 01/21/24 0705   Securement Method secured to top of thigh w/ adhesive device 01/21/24 0705   Skin no redness;no breakdown 01/21/24 0705   Tolerance no signs/symptoms of discomfort 01/21/24 0705   Output (mL) 700 mL 01/21/24 0601   Catheter Change Date 01/19/24 01/21/24 0501   Catheter Change Time 1900 01/21/24 0501   Number of days: 7       Prev airway:   Currently intubated  Vent Mode: A/C  Oxygen  Concentration (%):  [30-35] 30  Resp Rate Total:  [16 br/min-25 br/min] 19 br/min  Vt Set:  [480 mL] 480 mL  PEEP/CPAP:  [5 cmH20] 5 cmH20  Mean Airway Pressure:  [8.1 wmL21-82 cmH20] 8.1 cmH20      Drips:    fentanyl 125 mcg/hr (01/21/24 0550)       Patient Active Problem List   Diagnosis    Acute respiratory failure with hypoxia and hypercarbia    Cardiorespiratory arrest    Seizure disorder, nonconvulsive, with status epilepticus    Acute encephalopathy    Essential hypertension    ACP (advance care planning)    New onset refractory status epilepticus    Severe asthma    COPD (chronic obstructive pulmonary disease)    Smoker    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    Transaminitis    Abnormal movement       Review of patient's allergies indicates:   Allergen Reactions    Sulfa (sulfonamide antibiotics)        Current Inpatient Medications:   acetylcysteine 100 mg/ml (10%)  4 mL Nebulization Q6H    bisacodyL  10 mg Rectal Daily    budesonide  0.25 mg Nebulization Q12H    cloBAZam  20 mg Per NG tube BID    enoxparin  40 mg Subcutaneous Q24H (prophylaxis, 1700)    famotidine  20 mg Per NG tube BID    lacosamide (VIMPAT) IVPB  200 mg Intravenous Q12H    levETIRAcetam (Keppra) IV (PEDS and ADULTS)  1,500 mg Intravenous Q12H    miconazole nitrate 2%   Topical (Top) BID    montelukast  10 mg Per NG tube Daily    oxyCODONE  10 mg Per OG tube Q6H    propranoloL  20 mg Per NG tube TID    senna-docusate 8.6-50 mg  2 tablet Per NG tube BID    silodosin  4 mg Per NG tube Daily       No current facility-administered medications on file prior to encounter.     Current Outpatient Medications on File Prior to Encounter   Medication Sig Dispense Refill    fluticasone propionate (FLONASE) 50 mcg/actuation nasal spray 1 spray by Each Nostril route once daily.      albuterol 90 mcg/actuation inhaler Inhale 1-2 puffs into the lungs every 6 (six) hours as needed for Wheezing. 1 Inhaler 0    MEPOLIZUMAB 100 mg/mL  autoinjector Inject into the skin.      mometasone-formoterol (DULERA) 200-5 mcg/actuation inhaler Dulera 200 mcg-5 mcg/actuation HFA aerosol inhaler   INHALE 2 PUFFS BY MOUTH TWICE DAILY. RINSE MOUTH AND THROAT AFTER USE      nicotine (NICODERM CQ) 14 mg/24 hr 1 patch.      varenicline (CHANTIX) 1 mg Tab Take by mouth.         Past Surgical History:   Procedure Laterality Date    NECK SURGERY      ME EEG, W/VIDEO, CONT RECORD, I&R, >12<26 HRS  1/13/2024    ME EEG, W/VIDEO, CONT RECORD, I&R, >12<26 HRS  1/14/2024       Social History     Socioeconomic History    Marital status:    Tobacco Use    Smoking status: Every Day     Social Determinants of Health     Financial Resource Strain: Patient Unable To Answer (1/13/2024)    Overall Financial Resource Strain (CARDIA)     Difficulty of Paying Living Expenses: Patient unable to answer   Recent Concern: Financial Resource Strain - High Risk (1/2/2024)    Overall Financial Resource Strain (CARDIA)     Difficulty of Paying Living Expenses: Hard   Food Insecurity: Patient Unable To Answer (1/13/2024)    Hunger Vital Sign     Worried About Running Out of Food in the Last Year: Patient unable to answer     Ran Out of Food in the Last Year: Patient unable to answer   Transportation Needs: Patient Unable To Answer (1/13/2024)    PRAPARE - Transportation     Lack of Transportation (Medical): Patient unable to answer     Lack of Transportation (Non-Medical): Patient unable to answer   Physical Activity: Patient Unable To Answer (1/2/2024)    Exercise Vital Sign     Days of Exercise per Week: Patient unable to answer     Minutes of Exercise per Session: Patient unable to answer   Stress: Patient Unable To Answer (1/13/2024)    [place] [employer] - Occupational Stress Questionnaire     Feeling of Stress : Patient unable to answer   Recent Concern: Stress - Stress Concern Present (1/2/2024)    [place] [employer] - Occupational  Stress Questionnaire     Feeling of Stress : To some extent   Social Connections: Moderately Isolated (1/2/2024)    Social Connection and Isolation Panel [NHANES]     Frequency of Communication with Friends and Family: More than three times a week     Frequency of Social Gatherings with Friends and Family: More than three times a week     Attends [employer] Services: More than 4 times per year     Active Member of Clubs or Organizations: No     Attends Club or Organization Meetings: Never     Marital Status:    Housing Stability: Patient Unable To Answer (1/13/2024)    Housing Stability Vital Sign     Unable to Pay for Housing in the Last Year: Patient unable to answer     Unstable Housing in the Last Year: Patient unable to answer       OBJECTIVE:     Vital Signs Range (Last 24H):  Temp:  [37 °C (98.6 °F)-37.5 °C (99.5 °F)]   Pulse:  [66-86]   Resp:  [16-27]   BP: ()/()   SpO2:  [98 %-100 %]       Significant Labs:  Lab Results   Component Value Date    WBC 5.60 01/21/2024    HGB 8.6 (L) 01/21/2024    HCT 25.7 (L) 01/21/2024     01/21/2024    TRIG 101 01/15/2024    ALT 89 (H) 01/21/2024    AST 42 (H) 01/21/2024     01/21/2024    K 4.1 01/21/2024     01/21/2024    CREATININE 0.7 01/21/2024    BUN 22 (H) 01/21/2024    CO2 23 01/21/2024    INR 1.0 01/03/2024    HGBA1C 4.6 01/12/2024       Diagnostic Studies: No relevant studies.    EKG:   Results for orders placed or performed during the hospital encounter of 01/01/24   EKG 12-lead    Collection Time: 01/02/24  2:47 PM    Narrative    Test Reason : R79.89,    Vent. Rate : 095 BPM     Atrial Rate : 095 BPM     P-R Int : 120 ms          QRS Dur : 072 ms      QT Int : 366 ms       P-R-T Axes : 054 091 078 degrees     QTc Int : 459 ms    Normal sinus rhythm  Rightward axis  Nonspecific ST abnormality  Abnormal ECG  When compared with ECG of 01-JAN-2024 17:24,  Right bundle branch block is no longer Present  Confirmed by Yousef MD,  Ramon NINA ([id number]) on 1/2/2024 4:17:24 PM    Referred By: [email or web address]   SELF           Confirmed By:Ramon Healy MD       2D ECHO:  TTE:  Results for orders placed or performed during the hospital encounter of 01/01/24   Echo   Result Value Ref Range    BSA 1.88 m2    High's Biplane MOD Ejection Fraction 65 %    LVOT stroke volume 100.22 cm3    LVIDd 4.79 3.5 - 6.0 cm    LV Systolic Volume 49.90 mL    LV Systolic Volume Index 25.7 mL/m2    LVIDs 3.47 2.1 - 4.0 cm    LV Diastolic Volume 106.78 mL    LV Diastolic Volume Index 55.04 mL/m2    IVS 1.02 0.6 - 1.1 cm    LVOT diameter 2.42 cm    LVOT area 4.6 cm2    FS 28 28 - 44 %    Left Ventricle Relative Wall Thickness 0.47 cm    Posterior Wall 1.13 (A) 0.6 - 1.1 cm    LV mass 187.26 g    LV Mass Index 97 g/m2    MV Peak E Lewis 0.76 m/s    TDI LATERAL 0.12 m/s    TDI SEPTAL 0.11 m/s    E/E' ratio 6.61 m/s    MV Peak A Lewis 0.76 m/s    TR Max Lewis 3.41 m/s    E/A ratio 1.00     IVRT 79.92 msec    E wave deceleration time 219.39 msec    LV SEPTAL E/E' RATIO 6.91 m/s    LV LATERAL E/E' RATIO 6.33 m/s    LVOT peak lewis 1.16 m/s    Left Ventricular Outflow Tract Mean Velocity 0.94 cm/s    Left Ventricular Outflow Tract Mean Gradient 3.77 mmHg    RVDD 3.05 cm    RV S' 20.19 cm/s    TAPSE 2.27 cm    LA size 3.32 cm    Left Atrium Minor Axis 5.63 cm    Left Atrium Major Axis 5.08 cm    LA volume (mod) 57.00 cm3    LA Volume Index (Mod) 29.4 mL/m2    RA Major Axis 3.80 cm    RA Width 3.38 cm    AV mean gradient 6 mmHg    AV peak gradient 10 mmHg    Ao peak lewis 1.62 m/s    Ao VTI 24.80 cm    LVOT peak VTI 21.80 cm    AV valve area 4.04 cm²    AV Velocity Ratio 0.72     AV index (prosthetic) 0.88     KOLBY by Velocity Ratio 3.29 cm²    MV stenosis pressure 1/2 time 63.62 ms    MV valve area p 1/2 method 3.46 cm2    Triscuspid Valve Regurgitation Peak Gradient 47 mmHg    Sinus 3.22 cm    STJ 2.93 cm    Ascending aorta 3.24 cm    Mean e' 0.12 m/s    ZLVIDS 0.18     ZLVIDD -1.40      Narrative      Left Ventricle: The left ventricle is normal in size. Normal wall   thickness. There is concentric remodeling. Normal wall motion. There is   normal systolic function. Biplane (2D) method of discs ejection fraction   is 65%. There is normal diastolic function.    Right Ventricle: Normal right ventricular cavity size. Wall thickness   is normal. Right ventricle wall motion  is normal. Systolic function is   normal.    IVC/SVC: Patient is ventilated, cannot use IVC diameter to estimate   right atrial pressure.         ZAK:  No results found for this or any previous visit.    ASSESSMENT/PLAN:                                                                                                                  01/21/2024  Aaron Pruitt is a 53 y.o., male.      Pre-op Assessment    I have reviewed the Patient Summary Reports.     I have reviewed the Nursing Notes. I have reviewed the NPO Status.   I have reviewed the Medications.     Review of Systems  Anesthesia Hx:   History of prior surgery of interest to airway management or planning:          Denies Family Hx of Anesthesia complications.    Denies Personal Hx of Anesthesia complications.                    Cardiovascular:     Hypertension               Cardiac arrest                   Hypertension         Pulmonary:   COPD Asthma       Asthma:   Chronic Obstructive Pulmonary Disease (COPD):                      Neurological:       Seizures    Hypoxic ischemic encaphlopathy       Seizure Disorder                              Physical Exam    Airway:  Pre-Existing Airway: Oral Endotracheal tube        Anesthesia Plan  Type of Anesthesia, risks & benefits discussed:    Anesthesia Type: Gen ETT  Intra-op Monitoring Plan: Standard ASA Monitors  Post Op Pain Control Plan: multimodal analgesia  Induction:  IV  Informed Consent: Informed consent signed with the Patient representative and all parties understand the risks and agree with anesthesia plan.  All questions  answered.   ASA Score: 4  Day of Surgery Review of History & Physical: H&P Update referred to the surgeon/provider.    Ready For Surgery From Anesthesia Perspective.     .

## 2024-01-22 ENCOUNTER — ANESTHESIA (OUTPATIENT)
Dept: SURGERY | Facility: HOSPITAL | Age: 54
DRG: 004 | End: 2024-01-22
Payer: MEDICAID

## 2024-01-22 PROBLEM — G93.1 ANOXIC BRAIN INJURY: Status: ACTIVE | Noted: 2024-01-22

## 2024-01-22 LAB
ALBUMIN SERPL BCP-MCNC: 2.8 G/DL (ref 3.5–5.2)
ALP SERPL-CCNC: 94 U/L (ref 55–135)
ALT SERPL W/O P-5'-P-CCNC: 80 U/L (ref 10–44)
ANION GAP SERPL CALC-SCNC: 8 MMOL/L (ref 8–16)
AST SERPL-CCNC: 43 U/L (ref 10–40)
BASOPHILS # BLD AUTO: 0.01 K/UL (ref 0–0.2)
BASOPHILS NFR BLD: 0.1 % (ref 0–1.9)
BILIRUB SERPL-MCNC: 0.4 MG/DL (ref 0.1–1)
BUN SERPL-MCNC: 19 MG/DL (ref 6–20)
CALCIUM SERPL-MCNC: 9.4 MG/DL (ref 8.7–10.5)
CHLORIDE SERPL-SCNC: 108 MMOL/L (ref 95–110)
CO2 SERPL-SCNC: 22 MMOL/L (ref 23–29)
CREAT SERPL-MCNC: 0.7 MG/DL (ref 0.5–1.4)
DIFFERENTIAL METHOD BLD: ABNORMAL
EOSINOPHIL # BLD AUTO: 0 K/UL (ref 0–0.5)
EOSINOPHIL NFR BLD: 0.3 % (ref 0–8)
ERYTHROCYTE [DISTWIDTH] IN BLOOD BY AUTOMATED COUNT: 12.7 % (ref 11.5–14.5)
EST. GFR  (NO RACE VARIABLE): >60 ML/MIN/1.73 M^2
GLUCOSE SERPL-MCNC: 114 MG/DL (ref 70–110)
HCT VFR BLD AUTO: 30 % (ref 40–54)
HGB BLD-MCNC: 10.3 G/DL (ref 14–18)
IMM GRANULOCYTES # BLD AUTO: 0.03 K/UL (ref 0–0.04)
IMM GRANULOCYTES NFR BLD AUTO: 0.3 % (ref 0–0.5)
LYMPHOCYTES # BLD AUTO: 1.2 K/UL (ref 1–4.8)
LYMPHOCYTES NFR BLD: 12.8 % (ref 18–48)
MAGNESIUM SERPL-MCNC: 2.1 MG/DL (ref 1.6–2.6)
MCH RBC QN AUTO: 31.6 PG (ref 27–31)
MCHC RBC AUTO-ENTMCNC: 34.3 G/DL (ref 32–36)
MCV RBC AUTO: 92 FL (ref 82–98)
MONOCYTES # BLD AUTO: 0.8 K/UL (ref 0.3–1)
MONOCYTES NFR BLD: 8.1 % (ref 4–15)
NEUTROPHILS # BLD AUTO: 7.5 K/UL (ref 1.8–7.7)
NEUTROPHILS NFR BLD: 78.4 % (ref 38–73)
NRBC BLD-RTO: 0 /100 WBC
PHOSPHATE SERPL-MCNC: 4.6 MG/DL (ref 2.7–4.5)
PLATELET # BLD AUTO: 431 K/UL (ref 150–450)
PMV BLD AUTO: 8.7 FL (ref 9.2–12.9)
POTASSIUM SERPL-SCNC: 4.6 MMOL/L (ref 3.5–5.1)
PROT SERPL-MCNC: 7 G/DL (ref 6–8.4)
RBC # BLD AUTO: 3.26 M/UL (ref 4.6–6.2)
SODIUM SERPL-SCNC: 138 MMOL/L (ref 136–145)
WBC # BLD AUTO: 9.56 K/UL (ref 3.9–12.7)

## 2024-01-22 PROCEDURE — 36000707: Performed by: STUDENT IN AN ORGANIZED HEALTH CARE EDUCATION/TRAINING PROGRAM

## 2024-01-22 PROCEDURE — 94761 N-INVAS EAR/PLS OXIMETRY MLT: CPT

## 2024-01-22 PROCEDURE — 63600175 PHARM REV CODE 636 W HCPCS

## 2024-01-22 PROCEDURE — 25000003 PHARM REV CODE 250

## 2024-01-22 PROCEDURE — 99900035 HC TECH TIME PER 15 MIN (STAT)

## 2024-01-22 PROCEDURE — 36000706: Performed by: STUDENT IN AN ORGANIZED HEALTH CARE EDUCATION/TRAINING PROGRAM

## 2024-01-22 PROCEDURE — 27201423 OPTIME MED/SURG SUP & DEVICES STERILE SUPPLY: Performed by: STUDENT IN AN ORGANIZED HEALTH CARE EDUCATION/TRAINING PROGRAM

## 2024-01-22 PROCEDURE — 25000003 PHARM REV CODE 250: Performed by: PSYCHIATRY & NEUROLOGY

## 2024-01-22 PROCEDURE — 94003 VENT MGMT INPAT SUBQ DAY: CPT

## 2024-01-22 PROCEDURE — 94640 AIRWAY INHALATION TREATMENT: CPT

## 2024-01-22 PROCEDURE — 94668 MNPJ CHEST WALL SBSQ: CPT

## 2024-01-22 PROCEDURE — D9220A PRA ANESTHESIA: Mod: ,,, | Performed by: ANESTHESIOLOGY

## 2024-01-22 PROCEDURE — 99499 UNLISTED E&M SERVICE: CPT | Mod: ,,, | Performed by: REGISTERED NURSE

## 2024-01-22 PROCEDURE — 0B110F4 BYPASS TRACHEA TO CUTANEOUS WITH TRACHEOSTOMY DEVICE, OPEN APPROACH: ICD-10-PCS | Performed by: STUDENT IN AN ORGANIZED HEALTH CARE EDUCATION/TRAINING PROGRAM

## 2024-01-22 PROCEDURE — 25000242 PHARM REV CODE 250 ALT 637 W/ HCPCS

## 2024-01-22 PROCEDURE — 85025 COMPLETE CBC W/AUTO DIFF WBC: CPT

## 2024-01-22 PROCEDURE — 37000008 HC ANESTHESIA 1ST 15 MINUTES: Performed by: STUDENT IN AN ORGANIZED HEALTH CARE EDUCATION/TRAINING PROGRAM

## 2024-01-22 PROCEDURE — 25000003 PHARM REV CODE 250: Performed by: NURSE PRACTITIONER

## 2024-01-22 PROCEDURE — 31600 PLANNED TRACHEOSTOMY: CPT | Mod: ,,, | Performed by: STUDENT IN AN ORGANIZED HEALTH CARE EDUCATION/TRAINING PROGRAM

## 2024-01-22 PROCEDURE — 99900026 HC AIRWAY MAINTENANCE (STAT)

## 2024-01-22 PROCEDURE — 20000000 HC ICU ROOM

## 2024-01-22 PROCEDURE — C9254 INJECTION, LACOSAMIDE: HCPCS

## 2024-01-22 PROCEDURE — 37000009 HC ANESTHESIA EA ADD 15 MINS: Performed by: STUDENT IN AN ORGANIZED HEALTH CARE EDUCATION/TRAINING PROGRAM

## 2024-01-22 PROCEDURE — 84100 ASSAY OF PHOSPHORUS: CPT

## 2024-01-22 PROCEDURE — 0DH68UZ INSERTION OF FEEDING DEVICE INTO STOMACH, VIA NATURAL OR ARTIFICIAL OPENING ENDOSCOPIC: ICD-10-PCS | Performed by: STUDENT IN AN ORGANIZED HEALTH CARE EDUCATION/TRAINING PROGRAM

## 2024-01-22 PROCEDURE — 80053 COMPREHEN METABOLIC PANEL: CPT

## 2024-01-22 PROCEDURE — 43246 EGD PLACE GASTROSTOMY TUBE: CPT | Mod: 51,,, | Performed by: STUDENT IN AN ORGANIZED HEALTH CARE EDUCATION/TRAINING PROGRAM

## 2024-01-22 PROCEDURE — 25000003 PHARM REV CODE 250: Performed by: STUDENT IN AN ORGANIZED HEALTH CARE EDUCATION/TRAINING PROGRAM

## 2024-01-22 PROCEDURE — 99233 SBSQ HOSP IP/OBS HIGH 50: CPT | Mod: FS,,, | Performed by: PSYCHIATRY & NEUROLOGY

## 2024-01-22 PROCEDURE — 27100171 HC OXYGEN HIGH FLOW UP TO 24 HOURS

## 2024-01-22 PROCEDURE — 83735 ASSAY OF MAGNESIUM: CPT

## 2024-01-22 RX ORDER — LIDOCAINE HYDROCHLORIDE 10 MG/ML
INJECTION INFILTRATION; PERINEURAL
Status: DISCONTINUED | OUTPATIENT
Start: 2024-01-22 | End: 2024-01-22 | Stop reason: HOSPADM

## 2024-01-22 RX ORDER — MIDAZOLAM HYDROCHLORIDE 1 MG/ML
2 INJECTION INTRAMUSCULAR; INTRAVENOUS ONCE
Status: COMPLETED | OUTPATIENT
Start: 2024-01-22 | End: 2024-01-22

## 2024-01-22 RX ORDER — FENTANYL CITRATE 50 UG/ML
INJECTION, SOLUTION INTRAMUSCULAR; INTRAVENOUS
Status: DISCONTINUED | OUTPATIENT
Start: 2024-01-22 | End: 2024-01-22

## 2024-01-22 RX ORDER — MIDAZOLAM HYDROCHLORIDE 1 MG/ML
INJECTION INTRAMUSCULAR; INTRAVENOUS
Status: COMPLETED
Start: 2024-01-22 | End: 2024-01-22

## 2024-01-22 RX ORDER — FENTANYL CITRATE-0.9 % NACL/PF 10 MCG/ML
0-250 PLASTIC BAG, INJECTION (ML) INTRAVENOUS CONTINUOUS
Status: DISCONTINUED | OUTPATIENT
Start: 2024-01-22 | End: 2024-01-23

## 2024-01-22 RX ORDER — PROPOFOL 10 MG/ML
VIAL (ML) INTRAVENOUS
Status: DISCONTINUED | OUTPATIENT
Start: 2024-01-22 | End: 2024-01-22

## 2024-01-22 RX ORDER — ROCURONIUM BROMIDE 10 MG/ML
INJECTION, SOLUTION INTRAVENOUS
Status: DISCONTINUED | OUTPATIENT
Start: 2024-01-22 | End: 2024-01-22

## 2024-01-22 RX ORDER — CEFAZOLIN SODIUM 1 G/3ML
INJECTION, POWDER, FOR SOLUTION INTRAMUSCULAR; INTRAVENOUS
Status: DISCONTINUED | OUTPATIENT
Start: 2024-01-22 | End: 2024-01-22

## 2024-01-22 RX ADMIN — ACETYLCYSTEINE 4 ML: 100 INHALANT RESPIRATORY (INHALATION) at 07:01

## 2024-01-22 RX ADMIN — Medication 200 MCG/HR: at 10:01

## 2024-01-22 RX ADMIN — MICONAZOLE NITRATE: 20 OINTMENT TOPICAL at 11:01

## 2024-01-22 RX ADMIN — ACETYLCYSTEINE 4 ML: 100 INHALANT RESPIRATORY (INHALATION) at 12:01

## 2024-01-22 RX ADMIN — FENTANYL CITRATE 100 MCG: 50 INJECTION, SOLUTION INTRAMUSCULAR; INTRAVENOUS at 03:01

## 2024-01-22 RX ADMIN — SENNOSIDES AND DOCUSATE SODIUM 2 TABLET: 50; 8.6 TABLET ORAL at 09:01

## 2024-01-22 RX ADMIN — LACOSAMIDE 200 MG: 10 INJECTION INTRAVENOUS at 01:01

## 2024-01-22 RX ADMIN — MIDAZOLAM HYDROCHLORIDE 2 MG: 2 INJECTION, SOLUTION INTRAMUSCULAR; INTRAVENOUS at 11:01

## 2024-01-22 RX ADMIN — ROCURONIUM BROMIDE 40 MG: 10 INJECTION INTRAVENOUS at 02:01

## 2024-01-22 RX ADMIN — MONTELUKAST 10 MG: 10 TABLET, FILM COATED ORAL at 09:01

## 2024-01-22 RX ADMIN — OXYCODONE HYDROCHLORIDE 10 MG: 10 TABLET ORAL at 11:01

## 2024-01-22 RX ADMIN — OXYCODONE HYDROCHLORIDE 10 MG: 10 TABLET ORAL at 12:01

## 2024-01-22 RX ADMIN — MIDAZOLAM 2 MG: 1 INJECTION INTRAMUSCULAR; INTRAVENOUS at 11:01

## 2024-01-22 RX ADMIN — PROPOFOL 50 MG: 10 INJECTION, EMULSION INTRAVENOUS at 01:01

## 2024-01-22 RX ADMIN — IPRATROPIUM BROMIDE AND ALBUTEROL SULFATE 3 ML: .5; 3 SOLUTION RESPIRATORY (INHALATION) at 07:01

## 2024-01-22 RX ADMIN — Medication 200 MCG/HR: at 07:01

## 2024-01-22 RX ADMIN — PROPRANOLOL HYDROCHLORIDE 20 MG: 10 TABLET ORAL at 09:01

## 2024-01-22 RX ADMIN — IPRATROPIUM BROMIDE AND ALBUTEROL SULFATE 3 ML: .5; 3 SOLUTION RESPIRATORY (INHALATION) at 12:01

## 2024-01-22 RX ADMIN — BUDESONIDE INHALATION 0.25 MG: 0.5 SUSPENSION RESPIRATORY (INHALATION) at 07:01

## 2024-01-22 RX ADMIN — CLOBAZAM 20 MG: 10 TABLET ORAL at 09:01

## 2024-01-22 RX ADMIN — MICONAZOLE NITRATE: 20 OINTMENT TOPICAL at 09:01

## 2024-01-22 RX ADMIN — BUDESONIDE INHALATION 0.25 MG: 0.5 SUSPENSION RESPIRATORY (INHALATION) at 12:01

## 2024-01-22 RX ADMIN — LEVETIRACETAM 1500 MG: 100 INJECTION INTRAVENOUS at 09:01

## 2024-01-22 RX ADMIN — CEFAZOLIN 2 G: 330 INJECTION, POWDER, FOR SOLUTION INTRAMUSCULAR; INTRAVENOUS at 02:01

## 2024-01-22 RX ADMIN — ROCURONIUM BROMIDE 20 MG: 10 INJECTION INTRAVENOUS at 02:01

## 2024-01-22 RX ADMIN — ACETYLCYSTEINE 4 ML: 100 INHALANT RESPIRATORY (INHALATION) at 11:01

## 2024-01-22 RX ADMIN — FAMOTIDINE 20 MG: 20 TABLET, FILM COATED ORAL at 09:01

## 2024-01-22 RX ADMIN — SODIUM CHLORIDE, SODIUM GLUCONATE, SODIUM ACETATE, POTASSIUM CHLORIDE, MAGNESIUM CHLORIDE, SODIUM PHOSPHATE, DIBASIC, AND POTASSIUM PHOSPHATE: .53; .5; .37; .037; .03; .012; .00082 INJECTION, SOLUTION INTRAVENOUS at 01:01

## 2024-01-22 RX ADMIN — FAMOTIDINE 20 MG: 20 TABLET, FILM COATED ORAL at 11:01

## 2024-01-22 RX ADMIN — SILODOSIN 4 MG: 4 CAPSULE ORAL at 09:01

## 2024-01-22 RX ADMIN — CLOBAZAM 20 MG: 10 TABLET ORAL at 11:01

## 2024-01-22 RX ADMIN — SENNOSIDES AND DOCUSATE SODIUM 2 TABLET: 50; 8.6 TABLET ORAL at 11:01

## 2024-01-22 RX ADMIN — PROPRANOLOL HYDROCHLORIDE 20 MG: 10 TABLET ORAL at 11:01

## 2024-01-22 NOTE — ASSESSMENT & PLAN NOTE
54 y/o M 12 days post respiratory failure c/b cardiac arrest (Epi x 3, CPR x 3, Shock x 1) with RSE in setting of propofol wean despite continued keppra, vimpat, depacon administration. [place] [facility] [place] 80 mcg/kg/min of non-titrating propofol from [facility] which was dropped to 50 mcg/kg/min, followed by the immediate addition of 25 mcg/kg/min of ketamine. EEG cap placed and epilepsy notified.    -cEEG discontinued  -Wean AEDs as able  -Vimpat/keppra discontinued  -Continue onfi BID

## 2024-01-22 NOTE — ASSESSMENT & PLAN NOTE
See primary problem, known NCSE post arrest  CT head unremarkable on admission to [facility]  MRI x 2 with T2 BG hyperintensity bilaterally -> c/w anoxic injury   Daily ABG, pH WNL  As the majority of this patient's metabolic derangements were corrected at [facility], suspect encephalopathy 2/2 anoxic injury/NCSE

## 2024-01-22 NOTE — TRANSFER OF CARE
"Anesthesia Transfer of Care Note    Patient: Aaron Pruitt    Procedure(s) Performed: Procedure(s) (LRB):  CREATION, TRACHEOSTOMY (N/A)  EGD, WITH PEG TUBE INSERTION (N/A)    Patient location: ICU    Anesthesia Type: general    Transport from OR: Transported from OR on 6-10 L/min O2 by face mask with adequate spontaneous ventilation. Upon arrival to PACU/ICU, patient attached to 100% O2 by T-piece with adequate spontaneous ventilation    Post pain: adequate analgesia    Post assessment: no apparent anesthetic complications    Post vital signs: stable    Level of consciousness: sedated    Nausea/Vomiting: no nausea/vomiting    Complications: none    Transfer of care protocol was followed      Last vitals: Visit Vitals  BP 99/61   Pulse 77   Temp 37.4 °C (99.3 °F)   Resp 15   Ht 6' 1" (1.854 m)   Wt 72.1 kg (158 lb 15.2 oz)   SpO2 100%   BMI 20.97 kg/m²     "

## 2024-01-22 NOTE — ASSESSMENT & PLAN NOTE
Non-purposeful movement of L side noted, LLE>>LUE    - No EEG correlate, not clearly purposeful on exam or EEG  - Likely 2/2 anoxic injury  - Fentanyl gtt and standing oxy for vent synchrony

## 2024-01-22 NOTE — PROGRESS NOTES
[facility]  General Surgery  Progress Note    Subjective:     History of Present Illness:  Aaron Pruitt is a 53 y.o. male with a history of COPD/Asthma c/b smoking and HTN who presents to [facility] for NCSE. He initially presented [facility] on [facility]01 after being intubated in the field following acute respiratory failure post cardiac arrest s/p resuscitation and shock x1. He was subsequently found to have new onset refractory status epilepticus secondary to anoxic brain injury. Patient was eventually transferred to [facility] for trial of AED. The patient has required prolonged intubation secondary to his neurological status. The patient's prognosis for recovery is poor. The primary team has had extensive goals of care discussions with the family who have reflected that it would be the patient's wishes to continue with treatment and move forward with trach/PEG. General surgery consulted for consideration of trach/PEG.     Post-Op Info:  Procedure(s) (LRB):  CREATION, TRACHEOSTOMY (N/A)  EGD, WITH PEG TUBE INSERTION (N/A)         Interval History: OR today for T/P. TF held at [facility].    Medications:  Continuous Infusions:   fentanyl 200 mcg/hr (01/21/24 1839)     Scheduled Meds:   acetylcysteine 100 mg/ml (10%)  4 mL Nebulization Q6H    bisacodyL  10 mg Rectal Daily    budesonide  0.25 mg Nebulization Q12H    cloBAZam  20 mg Per NG tube BID    enoxparin  40 mg Subcutaneous Q24H (prophylaxis, 1700)    famotidine  20 mg Per NG tube BID    lacosamide (VIMPAT) IVPB  200 mg Intravenous Q12H    levETIRAcetam (Keppra) IV (PEDS and ADULTS)  1,500 mg Intravenous Q12H    miconazole nitrate 2%   Topical (Top) BID    montelukast  10 mg Per NG tube Daily    oxyCODONE  10 mg Per OG tube Q6H    propranoloL  20 mg Per NG tube TID    senna-docusate 8.6-50 mg  2 tablet Per NG tube BID    silodosin  4 mg Per NG tube Daily     PRN Meds:acetaminophen, albuterol-ipratropium, fentanyl, hydrALAZINE, labetalol, magnesium oxide, magnesium  oxide, potassium bicarbonate, potassium bicarbonate, potassium bicarbonate, potassium, sodium phosphates, potassium, sodium phosphates, potassium, sodium phosphates     Review of patient's allergies indicates:   Allergen Reactions    Sulfa (sulfonamide antibiotics)      Objective:     Vital Signs (Most Recent):  Temp: 100 °F (37.8 °C) (01/22/24 0748)  Pulse: 76 (01/22/24 0748)  Resp: 14 (01/22/24 0748)  BP: 111/71 (01/22/24 0501)  SpO2: 100 % (01/22/24 0748) Vital Signs (24h Range):  Temp:  [98.6 °F (37 °C)-100.8 °F (38.2 °C)] 100 °F (37.8 °C)  Pulse:  [74-92] 76  Resp:  [14-24] 14  SpO2:  [96 %-100 %] 100 %  BP: ()/() 111/71     Weight: 72.1 kg (158 lb 15.2 oz)  Body mass index is 20.97 kg/m².    Intake/Output - Last 3 Shifts         01/20 0700  01/21 0659 01/21 0700 01/22 0659 01/22 0700  01/23 0659    I.V. (mL/kg) 504.4 (7) 484.7 (6.7)     NG/GT 1530 1380     IV Piggyback 199.8 196.7     Total Intake(mL/kg) 2234.2 (31) 2061.5 (28.6)     Urine (mL/kg/hr) 1350 (0.8) 600 (0.3)     Other 0      Stool 0      Total Output 1350 600     Net +884.2 +1461.5            Urine Occurrence 1 x      Stool Occurrence 1 x               Physical Exam  Vitals reviewed.   Constitutional:       Comments: Intubated    HENT:      Nose:      Comments: NGT in place     Mouth/Throat:      Comments: ETT in place  Cardiovascular:      Rate and Rhythm: Normal rate.      Pulses: Normal pulses.   Pulmonary:      Comments: Intubated, mechanically ventilated  Vent Mode: A/C  Oxygen Concentration (%):  [30] 30  Resp Rate Total:  [14 br/min-26 br/min] 14 br/min  Vt Set:  [480 mL] 480 mL  PEEP/CPAP:  [5 cmH20] 5 cmH20  Mean Airway Pressure:  [8.2 cmH20-10 cmH20] 8.9 cmH20  Abdominal:      General: Abdomen is flat.      Palpations: Abdomen is soft.   Neurological:      Comments: Intubated          Significant Labs:  I have reviewed all pertinent lab results within the past 24 hours.  CBC:   Recent Labs   Lab 01/22/24  0113   WBC 9.56    RBC 3.26*   HGB 10.3*   HCT 30.0*      MCV 92   MCH 31.6*   MCHC 34.3     CMP:   Recent Labs   Lab 01/22/24  0113   *   CALCIUM 9.4   ALBUMIN 2.8*   PROT 7.0      K 4.6   CO2 22*      BUN 19   CREATININE 0.7   ALKPHOS 94   ALT 80*   AST 43*   BILITOT 0.4       Significant Diagnostics:  I have reviewed all pertinent imaging results/findings within the past 24 hours.  Assessment/Plan:     Acute respiratory failure with hypoxia and hypercarbia  53M with new onset refractory status epilepticus secondary to anoxic brain injury. General surgery consulted for consideration of trach/PEG. The patient is on minimal vent settings. Tube feeds are at goal.     - Plan for trach/PEG today 1/22/24  - Consent obtained from mother  - Lo for lovenox  - Hold tube feeds  - Further recommendations after procedure        Ellie Gillis MD  General Surgery  [facility]

## 2024-01-22 NOTE — SUBJECTIVE & OBJECTIVE
Interval History: OR today for T/P. TF held at MN.    Medications:  Continuous Infusions:   fentanyl 200 mcg/hr (01/21/24 1839)     Scheduled Meds:   acetylcysteine 100 mg/ml (10%)  4 mL Nebulization Q6H    bisacodyL  10 mg Rectal Daily    budesonide  0.25 mg Nebulization Q12H    cloBAZam  20 mg Per NG tube BID    enoxparin  40 mg Subcutaneous Q24H (prophylaxis, 1700)    famotidine  20 mg Per NG tube BID    lacosamide (VIMPAT) IVPB  200 mg Intravenous Q12H    levETIRAcetam (Keppra) IV (PEDS and ADULTS)  1,500 mg Intravenous Q12H    miconazole nitrate 2%   Topical (Top) BID    montelukast  10 mg Per NG tube Daily    oxyCODONE  10 mg Per OG tube Q6H    propranoloL  20 mg Per NG tube TID    senna-docusate 8.6-50 mg  2 tablet Per NG tube BID    silodosin  4 mg Per NG tube Daily     PRN Meds:acetaminophen, albuterol-ipratropium, fentanyl, hydrALAZINE, labetalol, magnesium oxide, magnesium oxide, potassium bicarbonate, potassium bicarbonate, potassium bicarbonate, potassium, sodium phosphates, potassium, sodium phosphates, potassium, sodium phosphates     Review of patient's allergies indicates:   Allergen Reactions    Sulfa (sulfonamide antibiotics)      Objective:     Vital Signs (Most Recent):  Temp: 100 °F (37.8 °C) (01/22/24 0748)  Pulse: 76 (01/22/24 0748)  Resp: 14 (01/22/24 0748)  BP: 111/71 (01/22/24 0501)  SpO2: 100 % (01/22/24 0748) Vital Signs (24h Range):  Temp:  [98.6 °F (37 °C)-100.8 °F (38.2 °C)] 100 °F (37.8 °C)  Pulse:  [74-92] 76  Resp:  [14-24] 14  SpO2:  [96 %-100 %] 100 %  BP: ()/() 111/71     Weight: 72.1 kg (158 lb 15.2 oz)  Body mass index is 20.97 kg/m².    Intake/Output - Last 3 Shifts         01/20 0700 01/21 0659 01/21 0700 01/22 0659 01/22 0700 01/23 0659    I.V. (mL/kg) 504.4 (7) 484.7 (6.7)     NG/GT 1530 1380     IV Piggyback 199.8 196.7     Total Intake(mL/kg) 2234.2 (31) 2061.5 (28.6)     Urine (mL/kg/hr) 1350 (0.8) 600 (0.3)     Other 0      Stool 0      Total Output 1350  600     Net +884.2 +1461.5            Urine Occurrence 1 x      Stool Occurrence 1 x               Physical Exam  Vitals reviewed.   Constitutional:       Comments: Intubated    HENT:      Nose:      Comments: NGT in place     Mouth/Throat:      Comments: ETT in place  Cardiovascular:      Rate and Rhythm: Normal rate.      Pulses: Normal pulses.   Pulmonary:      Comments: Intubated, mechanically ventilated  Vent Mode: A/C  Oxygen Concentration (%):  [30] 30  Resp Rate Total:  [14 br/min-26 br/min] 14 br/min  Vt Set:  [480 mL] 480 mL  PEEP/CPAP:  [5 cmH20] 5 cmH20  Mean Airway Pressure:  [8.2 cmH20-10 cmH20] 8.9 cmH20  Abdominal:      General: Abdomen is flat.      Palpations: Abdomen is soft.   Neurological:      Comments: Intubated          Significant Labs:  I have reviewed all pertinent lab results within the past 24 hours.  CBC:   Recent Labs   Lab 01/22/24  0113   WBC 9.56   RBC 3.26*   HGB 10.3*   HCT 30.0*      MCV 92   MCH 31.6*   MCHC 34.3     CMP:   Recent Labs   Lab 01/22/24  0113   *   CALCIUM 9.4   ALBUMIN 2.8*   PROT 7.0      K 4.6   CO2 22*      BUN 19   CREATININE 0.7   ALKPHOS 94   ALT 80*   AST 43*   BILITOT 0.4       Significant Diagnostics:  I have reviewed all pertinent imaging results/findings within the past 24 hours.

## 2024-01-22 NOTE — ASSESSMENT & PLAN NOTE
53M with new onset refractory status epilepticus secondary to anoxic brain injury. General surgery consulted for consideration of trach/PEG. The patient is on minimal vent settings. Tube feeds are at goal.     - Plan for trach/PEG today 1/22/24  - Consent obtained from mother  - Lo for lovenox  - Hold tube feeds  - Further recommendations after procedure

## 2024-01-22 NOTE — PROGRESS NOTES
"[facility]  Neurocritical Care  Progress Note    Admit Date: 1/12/2024  Service Date: 01/21/2024  Length of Stay: 9    Subjective:     Chief Complaint: New onset refractory status epilepticus    History of Present Illness: Mr. Aaron Pruitt is a 53 year old M with PMHx significant for COPD/Asthma c/b smoking and HTN who presents to [facility] for NCSE. He initially presented to [facility] ER on 01/01 after being intubated in the field following acute respiratory failure post cardiac arrest s/p resuscitation and shock x1. Per chart review, he was shoveling some dirt in his yard when he got severely short of breath and suddenly had difficulty breathing. EMS was activated by his neighbors. Upon EMS arrival, he reportedly still had a pulse, though with severe respiratory distress, and subsequently became pulseless. He was given narcan x2 without improvement. CPR and Epi x3. ROSC after 3rd round of CPR with shock x 1. CT head on arrival to [facility] without acute intracranial abnormality, and CT chest without acute pulmonary process.TTM was initiated on 01/03 with fentanyl and propofol gtts for sedation, and rewarming was initiated on 01/04 with subsequent weaning of sedation. During this period, myoclonus was clinically noted, and propofol was resumed. EEG showed NCSE, prompting the initiation of keppra and vimpat with continuation of propofol. On 01/06, there was slight improvement in EEG, with NCSE noted to be "partially treated," at which time, the patient was then loaded with depacon. EEG was improved leading to weaning of propofol with eventual return to [facility]. MRI completed 01/08 and 01/11 completed with with GoC conversation held at [facility]. Family opted to forgo comfort measures at this point and transfer to [facility] for trial of 4th AED, per chart review. He arrived to [facility] on 80 mcg/kg/min of non-titrating propofol from [facility] which was dropped to 50 mcg/kg/min, followed by the immediate addition of 25 mcg/kg/min of " ketamine. EEG cap placed.     He will be admitted to [facility] for hourly neuromonitoring and a higher level of care.    Hospital Course: 01/13: no electrographic seizures reported, triglicerides increased into low 500s, propofol decreased to 30mcg, decrease further in 4 hrs if EEG unchanged, cont ketamine at 50mcg/kg and scheduled AEDs  01/14: EEG mostly suppressed, off propofol, decrease keppra to 30mcg today with potential to turn off in am  Escalate clobazam to 20mg qday and dc depakote, cont keppra and vimpat at current doses, plan to update family today, although improvement in seizure control, no change in neuro exam  01/15/2024 Increased GPDs s/p discontinuing ketamine gtt, onfi increased to BID. Awaiting arrival of family for GOC conversation  01/16/2024 cEEG unchanged overnight, remains on ictal-interictal spectrum, d/c'd. Neuro exam unchanged, remains w/ brainstem reflexes intact, intermittent non-purposeful movements of extremities. Ongoing GOC discussions with family   01/17/2024 NAEON. Mildly hypercarbic on AM ABG, increased rate/TV for eucarbia. Starting FWF for mild hypernatremia. Ongoing GOC discussions w/ family, see ACP note for full details   01/18/2024 NAEON, hypercarbia resolved. Awaiting family decision re GOC  01/19/2024 Added propranolol and increased oxycodone for neuro storming/episodes of non-purposeful movement. Family electing to proceed w/ trach/PEG, please see ACP note for full details. Gen surgery consulted, CM/SW updated.   01/20/2024 NAEON, continued non-purposeful flailing movements of LUE/LLE. Trach/PEG next week, timing TBD  01/21/2024 Repeat routine cEEG overnight relatively unchanged from prior, slight decrease in frequency of GPDs, remains w/o evolution/seizures, no clear background, no evidence of electrographic response to stimuli. Neuro exam stably poor, slightly decreased flailing movements 2/2 increase in fent gtt. Trach/PEG/placement pending    Interval History:  Please see  hospital course above for full details    Review of Systems   Unable to perform ROS: Intubated     Objective:     Vitals:  Temp: 99.5 °F (37.5 °C)  Pulse: 89  Rhythm: normal sinus rhythm  BP: (!) 98/59  MAP (mmHg): 73  Resp: 19  SpO2: 98 %  Oxygen Concentration (%): 30  Vent Mode: A/C  Set Rate: 14 BPM  Vt Set: 480 mL  PEEP/CPAP: 5 cmH20  Peak Airway Pressure: 20 cmH20  Mean Airway Pressure: 10 cmH20  Plateau Pressure: 0 cmH20    Temp  Min: 98.6 °F (37 °C)  Max: 99.5 °F (37.5 °C)  Pulse  Min: 67  Max: 89  BP  Min: 82/55  Max: 131/90  MAP (mmHg)  Min: 63  Max: 107  Resp  Min: 14  Max: 27  SpO2  Min: 97 %  Max: 100 %  Oxygen Concentration (%)  Min: 30  Max: 35    01/20 0701 - 01/21 0700  In: 2234.2 [I.V.:504.4]  Out: 1350 [Urine:1350]   Unmeasured Output  Urine Occurrence: 1  Stool Occurrence: 1  Pad Count: 1        Physical Exam  General Appearance: Middle aged gentleman lying in bed, appears older than stated age. Not in acute distress. Intermittently overbreathing vent. Moving LLE spontaneously, however no purposeful/goal directed movement noted. Moving slightly less today compared to prior, on higher dose of fentanyl gtt this AM (@ 200)  Mental Status Exam: No response to verbal or noxious stimuli. Not tracking or regarding, not following commands  Cranial Nerves: Pupils 4->2 mm and reactive b/l, gaze midline. +corneals to saline b/l, weak OCRs. Absent gag, +cough. Holding eyes shut against attempts to open them   Motor: No movement in RUE. Intermittent spontaneous movement w/ increased tone in LUE, not localizing. Moving LLE spontaneously AG, however not consistently moving in response to stimuli, remains w/o clear evidence of purposeful movement   Sensory: Intermittent increase in HR/BP and movement on L side to noxious x4 extremities; at other times remains w/o clear response to noxious  Coordination: Unable to assess   Vascular: S1/S2 of normal intensity, no S3/S4 appreciated, no murmurs appreciated  Lungs:  Coarse breath sounds b/l, no wheezing  Abdomen: Soft, non-distended, non-tender, BS +          Medications:  Continuousfentanyl, Last Rate: 200 mcg/hr (01/21/24 1839)    Scheduledacetylcysteine 100 mg/ml (10%), 4 mL, Q6H  bisacodyL, 10 mg, Daily  budesonide, 0.25 mg, Q12H  cloBAZam, 20 mg, BID  enoxparin, 40 mg, Q24H (prophylaxis, 1700)  famotidine, 20 mg, BID  lacosamide (VIMPAT) IVPB, 200 mg, Q12H  levETIRAcetam (Keppra) IV (PEDS and ADULTS), 1,500 mg, Q12H  miconazole nitrate 2%, , BID  montelukast, 10 mg, Daily  oxyCODONE, 10 mg, Q6H  propranoloL, 20 mg, TID  senna-docusate 8.6-50 mg, 2 tablet, BID  silodosin, 4 mg, Daily    PRNacetaminophen, 325 mg, Q6H PRN  albuterol-ipratropium, 3 mL, Q6H PRN  fentanyl, 50 mcg, Q1H PRN  hydrALAZINE, 10 mg, Q4H PRN  labetalol, 10 mg, Q4H PRN  magnesium oxide, 800 mg, PRN  magnesium oxide, 800 mg, PRN  potassium bicarbonate, 35 mEq, PRN  potassium bicarbonate, 50 mEq, PRN  potassium bicarbonate, 60 mEq, PRN  potassium, sodium phosphates, 2 packet, PRN  potassium, sodium phosphates, 2 packet, PRN  potassium, sodium phosphates, 2 packet, PRN      Today I personally reviewed pertinent imaging, laboratory results, notably: cEEG remains poor, no evidence of reactivity, persistent GPDs w/ slight decrease in frequency, no evolution, no electrographic seizures. CBC unremarkable, no leukocytosis, Hb/platelets stable. CMP w/ slight downtrend in transaminitis, AST/ALT 42/89, otherwise unremarkable    Diet  Diet NPO  Diet NPO  TF at goal  NPO after MN for OR    Assessment/Plan:     Neuro  * New onset refractory status epilepticus  54 y/o M 12 days post respiratory failure c/b cardiac arrest (Epi x 3, CPR x 3, Shock x 1) with RSE in setting of propofol wean despite continued keppra, vimpat, depacon administration. He arrived to [facility] on 80 mcg/kg/min of non-titrating propofol from [facility] which was dropped to 50 mcg/kg/min, followed by the immediate addition of 25 mcg/kg/min of ketamine. EEG cap  placed and epilepsy notified.    -Propofol d/c'd 1/13, ketamine d/c'd 1/14  -Unchanged frequent GPDs on 1/16, on ictal interictal spectrum w/o ellis NCSE -> EEG d/c'd  -Repeat cEEG w/o electrographic seizures, unchanged from prior w/ exception of slight decrease in GPD burden. 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  No improvement in exam with improvement in seizures  Increase in non-purposeful movements of LLE>LUE over last week, no evidence of purposeful movements/return of consciousness to date   C/w oxycodone 10 mg q6hrs and propranolol 20 mg TID for suspected neuro storming  Ongoing GOC with family -> see ACP notes for full details      Acute encephalopathy  See primary problem, known NCSE post arrest  CT head unremarkable on admission to [facility]  MRI x 2 with T2 BG hyperintensity bilaterally -> c/w anoxic injury   Daily ABG, pH WNL  As the majority of this patient's metabolic derangements were corrected at [facility], suspect encephalopathy 2/2 anoxic injury/NCSE    Seizure disorder, nonconvulsive, with status epilepticus  See primary problem     Pulmonary  COPD (chronic obstructive pulmonary disease)  Known history of smoking  C/w duonebs PRN  Daily CXR, ABG while intubated  O2 Sat goal 88%-92%    Severe asthma  Known hx of asthma with acute onset SOB/hypoxia while shoveling dirt leading to respiratory/cardiac arrest  C/w home singulair  Continue duonebs/ inhaled steroids  Ceftriaxone/Azithromycin initiated by [facility] then d/c prior to transfer in setting of clear CXR, presently off emperic abx      Acute respiratory  failure with hypoxia and hypercarbia  Patient with Hypercapnic and Hypoxic Respiratory failure which is Acute. He is not on home oxygen. Supplemental oxygen was provided and noted-   Vent Mode: A/C  Oxygen Concentration (%):  [30-35] 30  Resp Rate Total:  [14 br/min-26 br/min] 18 br/min  Vt Set:  [480 mL] 480 mL  PEEP/CPAP:  [5 cmH20] 5 cmH20  Mean Airway Pressure:  [8.1 cmH20-10 cmH20] 10 cmH20    Daily CXR, ABG  Will need trach for airway protection if family electing to proceed w/ aggressive care -> gen surg consulted, timing of trach/peg TBD -> tentatively scheduled for 1/22  See asthma/COPD  Likely not 2/2 infectious source  - s/p Azithromycin 1/2-1/3  - s/p Ceftriaxone 1/2-1/3  - s/p Pip-Tazo 1/3-1/6  - s/p Vanc 1/3-1/6       Cardiac/Vascular  Essential hypertension  Hx of     SBP < 180  PRN labetalol, hydralazine  Presently normotensive on no meds    Cardiorespiratory arrest  Cardiac arrest on 01/01 requiring 3 rounds of CPR, Epi x 3, and 1 shock to obtain ROSC  Likely respiratory etiology of arrest 2/2 asthma exacerbation   S/p TTM, maintain normothermia  MRI x 2 concerning for anoxic injury  Long term prognosis poor given imaging findings, persistent malignant patterns on cEEG>2 wks out from arrest  C/w oxycodone for periods of flailing movements w/o epileptiform correlate, concern for neuro storming. Wean fentanyl gtt as tolerated  BP unlikely to tolerate clonidine -> cautiously c/w propranolol 20 mg TID, monitor BP and for recurrent wheezing    GI  Transaminitis  Likely DILI     - Decrease acetaminophen to 350 mg PRN fever   - Hesitant to reduce keppra/vimpat given refractory NCSE, will continue to monitor LFTs  - Avoid hepatotoxic medications  - Trend     Palliative Care  ACP (advance care planning)  Patient made DNR at [facility]  Family chose to transfer patient for initiation of 4th AED trial, forgoing comfort measures at this time per chart review  1/13: family aware of prognosis if this trial of  anaesthetics and additional scheduled AED is not helpful  1/15: Discussed with mother and sister at bedside, prognosis poor, see ACP note for details. Per family, requesting until end of week to make decision re trach/PEG vs comfort  1/17: Met with mother, father and fiancee at bedside, see ACP note for full details. Family converting pt to FULL CODE, to make decision re trach/PEG vs comfort by end of week   1/19: Family electing to proceed with trach/PEG, confirmed w/ legal next of kin (patient's mother, pt's adult son incarcerated and unable to participate in discussions). Please see ACP note for full details    Other  Smoker  Per chart review   Cessation counseling not appropriate at this time given mental status            The patient is being Prophylaxed for:  Venous Thromboembolism with: Mechanical or Chemical  Stress Ulcer with: H2B  Ventilator Pneumonia with: chlorhexidine oral care    Activity Orders            Diet NPO: NPO starting at 01/22 0001    Elevate HOB Elevate (30-45 degrees) Elevate HOB to 30 - 45 degrees during feeding unless otherwise stated starting at 01/12 1011    Turn patient starting at 01/12 0724    Elevate HOB starting at 01/12 0008          Full Code    Level III visit    Angelica Garrison MD  Neurocritical Care  [facility]

## 2024-01-22 NOTE — BRIEF OP NOTE
[facility]  Brief Operative Note    SUMMARY     Surgery Date: 1/22/2024     Surgeon(s) and Role:     * Lisy Tran MD - Primary     * Clari Carbajal MD - Resident - Assisting    Pre-op Diagnosis:  Anoxic brain injury [G93.1]    Post-op Diagnosis:  Post-Op Diagnosis Codes:     * Anoxic brain injury [G93.1]    Procedure(s) (LRB):  CREATION, TRACHEOSTOMY (N/A)  EGD, WITH PEG TUBE INSERTION (N/A)    Anesthesia: General    Implants:  Implant Name Type Inv. Item Serial No.  Lot No. LRB No. Used Action   Shiley Trach     [id number] N/A 1 Implanted   PEG KIT     [id number] N/A 1 Implanted       Operative Findings: Open tracheostomy, EGD with PEG tube insertion at 2cm. The patient was transported to the ICU with a member of the surgical team.    Estimated Blood Loss: 5 mL    Estimated Blood Loss has been documented.         Specimens:   Specimen (24h ago, onward)      None            [id number]

## 2024-01-22 NOTE — OP NOTE
See my op note.      Lisy Tran MD, FACS  General Surgery and Surgical Critical Care  [facility]

## 2024-01-22 NOTE — ASSESSMENT & PLAN NOTE
Patient with Hypercapnic and Hypoxic Respiratory failure which is Acute. He is not on home oxygen. Supplemental oxygen was provided and noted-   Vent Mode: A/C  Oxygen Concentration (%):  [30-35] 30  Resp Rate Total:  [14 br/min-26 br/min] 18 br/min  Vt Set:  [480 mL] 480 mL  PEEP/CPAP:  [5 cmH20] 5 cmH20  Mean Airway Pressure:  [8.1 cmH20-10 cmH20] 10 cmH20    Daily CXR, ABG  Will need trach for airway protection if family electing to proceed w/ aggressive care -> gen surg consulted, timing of trach/peg TBD -> tentatively scheduled for 1/22  See asthma/COPD  Likely not 2/2 infectious source  - s/p Azithromycin 1/2-1/3  - s/p Ceftriaxone 1/2-1/3  - s/p Pip-Tazo 1/3-1/6  - s/p Vanc 1/3-1/6

## 2024-01-22 NOTE — SUBJECTIVE & OBJECTIVE
Interval History:  See hospital course.    Review of Systems   Unable to perform ROS: Intubated     Objective:     Vitals:  Temp: 99.3 °F (37.4 °C)  Pulse: 77  Rhythm: (P) normal sinus rhythm  BP: 99/61  Resp: 15  SpO2: 100 %  Oxygen Concentration (%): 30  Vent Mode: A/C  Set Rate: 14 BPM  Vt Set: 480 mL  PEEP/CPAP: 5 cmH20  Peak Airway Pressure: 17 cmH20  Mean Airway Pressure: 7.6 cmH20  Plateau Pressure: 0 cmH20    Temp  Min: 99.1 °F (37.3 °C)  Max: 100.8 °F (38.2 °C)  Pulse  Min: 69  Max: 92  BP  Min: 85/52  Max: 180/111  MAP (mmHg)  Min: 63  Max: 138  Resp  Min: 14  Max: 19  SpO2  Min: 96 %  Max: 100 %  Oxygen Concentration (%)  Min: 30  Max: 30    01/21 0701 - 01/22 0700  In: 2061.5 [I.V.:484.7]  Out: 600 [Urine:600]   Unmeasured Output  Urine Occurrence: 1  Stool Occurrence: 1  Pad Count: 1        Physical Exam  Vitals and nursing note reviewed.   Constitutional:       Interventions: He is sedated and intubated.   Eyes:      Pupils: Pupils are equal, round, and reactive to light.   Cardiovascular:      Rate and Rhythm: Normal rate and regular rhythm.      Pulses: Normal pulses.   Pulmonary:      Effort: Pulmonary effort is normal. He is intubated.   Abdominal:      Palpations: Abdomen is soft.   Skin:     General: Skin is warm and dry.      Capillary Refill: Capillary refill takes less than 2 seconds.   Neurological:      GCS: GCS eye subscore is 1. GCS verbal subscore is 1. GCS motor subscore is 3.      Comments:   -Exam completed on fentanyl @ 200  -E1 V1T M3  -PERRL  -? grimacing; tightly squeezing eyes shut  -No response to noxious stimuli w/ RUE  -Intermittent spontaneous movement w/ LUE, but not to noxious stimuli nor purposeful  -TF w/ BLE  -Corneal reflexes/cough present  -Absent gag       Unable to test orientation, language, memory, judgment, insight, fund of knowledge, hearing, shoulder shrug, tongue protrusion, coordination, gait due to level of consciousness.        Medications:  Continuousfentanyl, Last Rate: 200 mcg/hr (01/22/24 4245)    Scheduledacetylcysteine 100 mg/ml (10%), 4 mL, Q6H  bisacodyL, 10 mg, Daily  budesonide, 0.25 mg, Q12H  cloBAZam, 20 mg, BID  enoxparin, 40 mg, Q24H (prophylaxis, 1700)  famotidine, 20 mg, BID  miconazole nitrate 2%, , BID  montelukast, 10 mg, Daily  oxyCODONE, 10 mg, Q6H  propranoloL, 20 mg, TID  senna-docusate 8.6-50 mg, 2 tablet, BID  silodosin, 4 mg, Daily    PRNacetaminophen, 325 mg, Q6H PRN  albuterol-ipratropium, 3 mL, Q6H PRN  fentanyl, 50 mcg, Q1H PRN  hydrALAZINE, 10 mg, Q4H PRN  labetalol, 10 mg, Q4H PRN  magnesium oxide, 800 mg, PRN  magnesium oxide, 800 mg, PRN  potassium bicarbonate, 35 mEq, PRN  potassium bicarbonate, 50 mEq, PRN  potassium bicarbonate, 60 mEq, PRN  potassium, sodium phosphates, 2 packet, PRN  potassium, sodium phosphates, 2 packet, PRN  potassium, sodium phosphates, 2 packet, PRN      Today I personally reviewed pertinent medications, lines/drains/airways, imaging, cardiology results, laboratory results, notably: CBC, CMP, ABGs, CXR    Diet  Diet NPO  Diet NPO

## 2024-01-22 NOTE — PLAN OF CARE
Routine Post-Op PEG Care:   Please keep tube to gravity for the next 24 hours.  May administer meds after 6 hours and clamp for 30 minutes after medication administration.  We will give further recs about when to restart tube feeds.   Please notify General Surgery with any significant changes in patient's vital signs within the first 24 hours after PEG placement.  Please call with questions about PEG tube.    Please keep dressings around tracheostomy. General Surgery will remove the dressings in the morning.     Clari Carbajal MD-R4  General Surgery

## 2024-01-22 NOTE — SUBJECTIVE & OBJECTIVE
Interval History:  Please see hospital course above for full details    Review of Systems   Unable to perform ROS: Intubated     Objective:     Vitals:  Temp: 99.5 °F (37.5 °C)  Pulse: 89  Rhythm: normal sinus rhythm  BP: (!) 98/59  MAP (mmHg): 73  Resp: 19  SpO2: 98 %  Oxygen Concentration (%): 30  Vent Mode: A/C  Set Rate: 14 BPM  Vt Set: 480 mL  PEEP/CPAP: 5 cmH20  Peak Airway Pressure: 20 cmH20  Mean Airway Pressure: 10 cmH20  Plateau Pressure: 0 cmH20    Temp  Min: 98.6 °F (37 °C)  Max: 99.5 °F (37.5 °C)  Pulse  Min: 67  Max: 89  BP  Min: 82/55  Max: 131/90  MAP (mmHg)  Min: 63  Max: 107  Resp  Min: 14  Max: 27  SpO2  Min: 97 %  Max: 100 %  Oxygen Concentration (%)  Min: 30  Max: 35    01/20 0701 - 01/21 0700  In: 2234.2 [I.V.:504.4]  Out: 1350 [Urine:1350]   Unmeasured Output  Urine Occurrence: 1  Stool Occurrence: 1  Pad Count: 1        Physical Exam  General Appearance: Middle aged gentleman lying in bed, appears older than stated age. Not in acute distress. Intermittently overbreathing vent. Moving LLE spontaneously, however no purposeful/goal directed movement noted. Moving slightly less today compared to prior, on higher dose of fentanyl gtt this AM (@ 200)  Mental Status Exam: No response to verbal or noxious stimuli. Not tracking or regarding, not following commands  Cranial Nerves: Pupils 4->2 mm and reactive b/l, gaze midline. +corneals to saline b/l, weak OCRs. Absent gag, +cough. Holding eyes shut against attempts to open them   Motor: No movement in RUE. Intermittent spontaneous movement w/ increased tone in LUE, not localizing. Moving LLE spontaneously AG, however not consistently moving in response to stimuli, remains w/o clear evidence of purposeful movement   Sensory: Intermittent increase in HR/BP and movement on L side to noxious x4 extremities; at other times remains w/o clear response to noxious  Coordination: Unable to assess   Vascular: S1/S2 of normal intensity, no S3/S4 appreciated, no  murmurs appreciated  Lungs: Coarse breath sounds b/l, no wheezing  Abdomen: Soft, non-distended, non-tender, BS +          Medications:  Continuousfentanyl, Last Rate: 200 mcg/hr (01/21/24 1839)    Scheduledacetylcysteine 100 mg/ml (10%), 4 mL, Q6H  bisacodyL, 10 mg, Daily  budesonide, 0.25 mg, Q12H  cloBAZam, 20 mg, BID  enoxparin, 40 mg, Q24H (prophylaxis, 1700)  famotidine, 20 mg, BID  lacosamide (VIMPAT) IVPB, 200 mg, Q12H  levETIRAcetam (Keppra) IV (PEDS and ADULTS), 1,500 mg, Q12H  miconazole nitrate 2%, , BID  montelukast, 10 mg, Daily  oxyCODONE, 10 mg, Q6H  propranoloL, 20 mg, TID  senna-docusate 8.6-50 mg, 2 tablet, BID  silodosin, 4 mg, Daily    PRNacetaminophen, 325 mg, Q6H PRN  albuterol-ipratropium, 3 mL, Q6H PRN  fentanyl, 50 mcg, Q1H PRN  hydrALAZINE, 10 mg, Q4H PRN  labetalol, 10 mg, Q4H PRN  magnesium oxide, 800 mg, PRN  magnesium oxide, 800 mg, PRN  potassium bicarbonate, 35 mEq, PRN  potassium bicarbonate, 50 mEq, PRN  potassium bicarbonate, 60 mEq, PRN  potassium, sodium phosphates, 2 packet, PRN  potassium, sodium phosphates, 2 packet, PRN  potassium, sodium phosphates, 2 packet, PRN      Today I personally reviewed pertinent imaging, laboratory results, notably: cEEG remains poor, no evidence of reactivity, persistent GPDs w/ slight decrease in frequency, no evolution, no electrographic seizures. CBC unremarkable, no leukocytosis, Hb/platelets stable. CMP w/ slight downtrend in transaminitis, AST/ALT 42/89, otherwise unremarkable    Diet  Diet NPO  Diet NPO  TF at goal  NPO after MN for OR

## 2024-01-22 NOTE — OP NOTE
[facility]  General Surgery  Operative Note     DATE: 1/22/2024     PREOPERATIVE DIAGNOSES:   Anoxic brain injury   Acute respiratory failure with hypoxia and hypercarbia  New onset refractory status epilepticus   Dysphagia, unspecified     POSTOPERATIVE DIAGNOSES:   Anoxic brain injury  Acute respiratory failure with hypoxia and hypercarbia  New onset refractory status epilepticus   Dysphagia, unspecified.     PROCEDURES PERFORMED:   1. Open tracheostomy  2. Esophagogastroduodenoscopy  3. Percutaneous endoscopic gastrostomy.     ATTENDING SURGEON: Lisy Tran M.D.      HOUSESTAFF SURGEON: Clari Carbajal M.D. (PGY-4)     ANESTHESIA: General and local using 1% lidocaine     ESTIMATED BLOOD LOSS: 5 mL     FINDINGS: A #8 Shiley tracheostomy and 20-Luxembourgish percutaneous gastrostomy @ 2cm placed without apparent complication. Some oozing noted from trach site after the procedure. Packed with surgicel and gauze.     INDICATIONS: Aaron Pruitt is a 53 year-old man admitted to [facility] with anoxic brain injury and refractory seizures after a cardiac arrest from severe respiratory distress. The patient has failed attempts to wean from the ventilator. We have recommended to the family that the patient would benefit from placement of a tracheostomy tube for the anticipated prolonged need for mechanical ventilation.  We also recommended placement of a percutaneous gastrostomy tube given that the patient is expected to have prolonged dysphagia. We did obtain informed consent from the patient's family who expressed understanding of the risks and benefits and gave consent to proceed.      PROCEDURE: The patient was brought to the OR from the ICU. The patient was identified and monitored throughout. He was positioned appropriately and all pressure points were padded.  A time-out was done to identify the correct patient, procedure, and that preoperative antibiotics were given.  We noted  appropriate positioning with neck in extension and the anterior neck was prepped and draped. Approximately 5 ml of 1% lidocaine was injected in the subcutaneous tissues at the site of the tracheostomy. We identified our tracheal landmarks, made a 2 cm midline cervical incision. Subcutaneous tissue was dissected with electrocautery and hemostasis was obtained using a combination of electrocautery and 3-0 silk ties to ligate vessels. Appropriate position for the tracheostomy tube was noted between the 2nd and 3rd tracheal rings. Once we were down to the anterior trachea, we stopped using the bovie. The trachea was elevated with a tracheal hook. The trachea was incised transversely with an 11 blade scalpel. Two 2-0 Prolene stay sutures were placed on each side of the intended tracheostomy. The ET tube was pulled back and the tracheostomy was dilated with a tracheal . A #8 Shiley tracheostomy was placed into the trachea and immediately hooked up to the ventilator tubing. We did confirm appropriate end tidal CO2 as well as tidal volumes. The cuff of the tracheostomy tube was inflated and the endotracheal tube was removed and the tracheostomy tube was secured in place using 2-0 Prolene sutures and Velcro tracheostomy tape.     We then directed our attention to the left upper quadrant for gastrostomy tube placement. The patient's abdomen was prepped and draped. An upper endoscope was introduced into the oropharynx and guided down into the esophagus and stomach. The stomach was insufflated with air. We identified an appropriate position for gastrostomy tube placement 2 finger-breadths below the left subcostal margin. Palpation of the anterior abdominal wall at this point was visualized endoscopically and transillumination from the endoscope was visualized through the anterior abdominal wall. We made a 1.5 cm transverse skin incision. At this point, the stomach was cannulated with a catheter loaded on a needle. This  was grasped by a snare which had been passed through the endoscope. The needle was removed, and a guidewire was placed, and the snare was used to grasp the guidewire. The endoscope, snare, and guidewire were all withdrawn from the patient's mouth. A 20-Setswana gastrostomy tube was loaded onto the guidewire and pulled through the anterior abdominal wall via Seldinger technique. Repeat endoscopy was performed with the gastrostomy tube at the 2 cm samantha at the skin. There was no blanching of the gastric mucosa, and when the tube was twisted, the button did not grab the mucosa. We advanced the scope into the duodenum and evaluated the first and second portions. There were no abnormalities. The scope was withdrawn back into the stomach. The insufflation in the stomach was evacuated, and the endoscope was removed. The gastrostomy tube was secured in place using the supplied devices and connected to a bag for gravity drainage. All sponge, instrument, and needle counts were correct at the termination of the procedure. I was present for and directed or performed the entire procedure.     DISPOSITION: The patient remained stable throughout the procedure. He was transported back to the ICU in hemodynamically stable condition.

## 2024-01-22 NOTE — PLAN OF CARE
Transported back to room 9071 via bed. Transport monitor in place.Transported by anesthesia, Dr. Solomon Junior. Report given to Blu in NICU.

## 2024-01-22 NOTE — PLAN OF CARE
Received to OR 19 from room 9071. Pt. Is intubated, does not respond to stimuli, has condom catheter in place.Patient was transported from ICU with transport monitor, ambu bag.

## 2024-01-22 NOTE — ASSESSMENT & PLAN NOTE
-Trach today 1/22  -Current vent settings below  Vent Mode: A/C  Oxygen Concentration (%):  [30] 30  Resp Rate Total:  [14 br/min-18 br/min] 14 br/min  Vt Set:  [480 mL] 480 mL  PEEP/CPAP:  [5 cmH20] 5 cmH20  Mean Airway Pressure:  [7.6 cmH20-10 cmH20] 7.6 cmH20    -ABGs daily  -CXR daily  -Wean vent as tolerated  -Famotidine BID  -VAP prevention per protocol

## 2024-01-22 NOTE — ASSESSMENT & PLAN NOTE
52 y/o M [place] [facility] [place] post respiratory failure c/b cardiac arrest (Epi x 3, CPR x 3, Shock x 1) with RSE in setting of propofol wean despite continued keppra, vimpat, depacon administration.     -Cardiac arrest on 01/01 requiring 3 rounds of CPR, Epi x 3, and 1 shock to obtain ROSC; likely respiratory etiology of arrest 2/2 asthma exacerbation   -S/p TTM, maintain normothermia  -MRI x 2 concerning for anoxic injury  -Long term prognosis poor given imaging findings, persistent malignant patterns on cEEG>2 wks out from arrest  -Admitted to [facility]  -VS q1h  -Neuro checks q4h  -Concern for neuro storming d/t spontaneous flailing movements w/o epileptiform correlate   -Continue oxycodone/propranolol  -Wean fentanyl gtt as able

## 2024-01-22 NOTE — ASSESSMENT & PLAN NOTE
52 y/o M 12 days post respiratory failure c/b cardiac arrest (Epi x 3, CPR x 3, Shock x 1) with RSE in setting of propofol wean despite continued keppra, vimpat, depacon administration. He arrived to [facility] on 80 mcg/kg/min of non-titrating propofol from [facility] which was dropped to 50 mcg/kg/min, followed by the immediate addition of 25 mcg/kg/min of ketamine. EEG cap placed and epilepsy notified.    -Propofol d/c'd 1/13, ketamine d/c'd 1/14  -Unchanged frequent GPDs on 1/16, on ictal interictal spectrum w/o ellis NCSE -> EEG d/c'd  -Repeat cEEG w/o electrographic seizures, unchanged from prior w/ exception of slight decrease in GPD burden. D/c   -Q1h neurochecks, vital checks  -Daily CBC, CMP, Mag, Phos  -MRI x 2 with T2 BG hyperintensity bilaterally  -Continue keppra 1500 mg bid, Vimpat 200 mg bid, onfi 20 mg BID

## 2024-01-23 LAB
ALBUMIN SERPL BCP-MCNC: 2.6 G/DL (ref 3.5–5.2)
ALP SERPL-CCNC: 98 U/L (ref 55–135)
ALT SERPL W/O P-5'-P-CCNC: 57 U/L (ref 10–44)
ANION GAP SERPL CALC-SCNC: 12 MMOL/L (ref 8–16)
AST SERPL-CCNC: 45 U/L (ref 10–40)
BASOPHILS # BLD AUTO: 0.02 K/UL (ref 0–0.2)
BASOPHILS NFR BLD: 0.2 % (ref 0–1.9)
BILIRUB SERPL-MCNC: 0.6 MG/DL (ref 0.1–1)
BUN SERPL-MCNC: 18 MG/DL (ref 6–20)
CALCIUM SERPL-MCNC: 9.6 MG/DL (ref 8.7–10.5)
CHLORIDE SERPL-SCNC: 107 MMOL/L (ref 95–110)
CO2 SERPL-SCNC: 19 MMOL/L (ref 23–29)
CREAT SERPL-MCNC: 0.7 MG/DL (ref 0.5–1.4)
DIFFERENTIAL METHOD BLD: ABNORMAL
EOSINOPHIL # BLD AUTO: 0 K/UL (ref 0–0.5)
EOSINOPHIL NFR BLD: 0.4 % (ref 0–8)
ERYTHROCYTE [DISTWIDTH] IN BLOOD BY AUTOMATED COUNT: 12.8 % (ref 11.5–14.5)
EST. GFR  (NO RACE VARIABLE): >60 ML/MIN/1.73 M^2
GLUCOSE SERPL-MCNC: 93 MG/DL (ref 70–110)
HCT VFR BLD AUTO: 26.4 % (ref 40–54)
HGB BLD-MCNC: 8.5 G/DL (ref 14–18)
IMM GRANULOCYTES # BLD AUTO: 0.11 K/UL (ref 0–0.04)
IMM GRANULOCYTES NFR BLD AUTO: 1.3 % (ref 0–0.5)
LYMPHOCYTES # BLD AUTO: 1.5 K/UL (ref 1–4.8)
LYMPHOCYTES NFR BLD: 18.6 % (ref 18–48)
MAGNESIUM SERPL-MCNC: 2.1 MG/DL (ref 1.6–2.6)
MCH RBC QN AUTO: 30.6 PG (ref 27–31)
MCHC RBC AUTO-ENTMCNC: 32.2 G/DL (ref 32–36)
MCV RBC AUTO: 95 FL (ref 82–98)
MONOCYTES # BLD AUTO: 0.8 K/UL (ref 0.3–1)
MONOCYTES NFR BLD: 9.9 % (ref 4–15)
NEUTROPHILS # BLD AUTO: 5.7 K/UL (ref 1.8–7.7)
NEUTROPHILS NFR BLD: 69.6 % (ref 38–73)
NRBC BLD-RTO: 0 /100 WBC
PHOSPHATE SERPL-MCNC: 4.4 MG/DL (ref 2.7–4.5)
PLATELET # BLD AUTO: 416 K/UL (ref 150–450)
PMV BLD AUTO: 8.9 FL (ref 9.2–12.9)
POTASSIUM SERPL-SCNC: 4.7 MMOL/L (ref 3.5–5.1)
PROT SERPL-MCNC: 7.2 G/DL (ref 6–8.4)
RBC # BLD AUTO: 2.78 M/UL (ref 4.6–6.2)
SODIUM SERPL-SCNC: 138 MMOL/L (ref 136–145)
WBC # BLD AUTO: 8.21 K/UL (ref 3.9–12.7)

## 2024-01-23 PROCEDURE — 99900035 HC TECH TIME PER 15 MIN (STAT)

## 2024-01-23 PROCEDURE — 94640 AIRWAY INHALATION TREATMENT: CPT

## 2024-01-23 PROCEDURE — 25000242 PHARM REV CODE 250 ALT 637 W/ HCPCS

## 2024-01-23 PROCEDURE — 99291 CRITICAL CARE FIRST HOUR: CPT | Mod: ,,, | Performed by: REGISTERED NURSE

## 2024-01-23 PROCEDURE — 63600175 PHARM REV CODE 636 W HCPCS

## 2024-01-23 PROCEDURE — 80053 COMPREHEN METABOLIC PANEL: CPT

## 2024-01-23 PROCEDURE — 99900026 HC AIRWAY MAINTENANCE (STAT)

## 2024-01-23 PROCEDURE — 94003 VENT MGMT INPAT SUBQ DAY: CPT

## 2024-01-23 PROCEDURE — 63600175 PHARM REV CODE 636 W HCPCS: Performed by: REGISTERED NURSE

## 2024-01-23 PROCEDURE — 84100 ASSAY OF PHOSPHORUS: CPT

## 2024-01-23 PROCEDURE — 85025 COMPLETE CBC W/AUTO DIFF WBC: CPT

## 2024-01-23 PROCEDURE — 25000003 PHARM REV CODE 250

## 2024-01-23 PROCEDURE — 25000003 PHARM REV CODE 250: Performed by: REGISTERED NURSE

## 2024-01-23 PROCEDURE — 25000003 PHARM REV CODE 250: Performed by: PSYCHIATRY & NEUROLOGY

## 2024-01-23 PROCEDURE — 27100171 HC OXYGEN HIGH FLOW UP TO 24 HOURS

## 2024-01-23 PROCEDURE — 83735 ASSAY OF MAGNESIUM: CPT

## 2024-01-23 PROCEDURE — 94668 MNPJ CHEST WALL SBSQ: CPT

## 2024-01-23 PROCEDURE — 94761 N-INVAS EAR/PLS OXIMETRY MLT: CPT

## 2024-01-23 PROCEDURE — 25000003 PHARM REV CODE 250: Performed by: NURSE PRACTITIONER

## 2024-01-23 PROCEDURE — 20000000 HC ICU ROOM

## 2024-01-23 RX ORDER — PROPRANOLOL HYDROCHLORIDE 10 MG/1
20 TABLET ORAL 3 TIMES DAILY
Status: DISCONTINUED | OUTPATIENT
Start: 2024-01-23 | End: 2024-01-24

## 2024-01-23 RX ORDER — PROPRANOLOL HYDROCHLORIDE 10 MG/1
40 TABLET ORAL 3 TIMES DAILY
Status: DISCONTINUED | OUTPATIENT
Start: 2024-01-23 | End: 2024-01-23

## 2024-01-23 RX ORDER — HALOPERIDOL 5 MG/ML
5 INJECTION INTRAMUSCULAR ONCE
Status: DISCONTINUED | OUTPATIENT
Start: 2024-01-23 | End: 2024-01-23

## 2024-01-23 RX ORDER — FENTANYL CITRATE 50 UG/ML
75 INJECTION, SOLUTION INTRAMUSCULAR; INTRAVENOUS
Status: DISCONTINUED | OUTPATIENT
Start: 2024-01-23 | End: 2024-01-25

## 2024-01-23 RX ORDER — HALOPERIDOL 5 MG/ML
2.5 INJECTION INTRAMUSCULAR ONCE
Status: COMPLETED | OUTPATIENT
Start: 2024-01-23 | End: 2024-01-23

## 2024-01-23 RX ORDER — FENTANYL CITRATE-0.9 % NACL/PF 10 MCG/ML
0-250 PLASTIC BAG, INJECTION (ML) INTRAVENOUS CONTINUOUS
Status: DISCONTINUED | OUTPATIENT
Start: 2024-01-23 | End: 2024-01-23

## 2024-01-23 RX ORDER — CLOBAZAM 10 MG/1
10 TABLET ORAL 2 TIMES DAILY
Status: DISCONTINUED | OUTPATIENT
Start: 2024-01-23 | End: 2024-01-24

## 2024-01-23 RX ORDER — FENTANYL CITRATE 50 UG/ML
75 INJECTION, SOLUTION INTRAMUSCULAR; INTRAVENOUS ONCE
Status: COMPLETED | OUTPATIENT
Start: 2024-01-23 | End: 2024-01-23

## 2024-01-23 RX ORDER — DIAZEPAM 10 MG/2ML
5 INJECTION INTRAMUSCULAR ONCE
Status: COMPLETED | OUTPATIENT
Start: 2024-01-23 | End: 2024-01-23

## 2024-01-23 RX ORDER — ACETYLCYSTEINE 100 MG/ML
4 SOLUTION ORAL; RESPIRATORY (INHALATION) EVERY 6 HOURS
Status: DISCONTINUED | OUTPATIENT
Start: 2024-01-23 | End: 2024-01-25

## 2024-01-23 RX ADMIN — SENNOSIDES AND DOCUSATE SODIUM 2 TABLET: 50; 8.6 TABLET ORAL at 09:01

## 2024-01-23 RX ADMIN — MICONAZOLE NITRATE: 20 OINTMENT TOPICAL at 08:01

## 2024-01-23 RX ADMIN — DIAZEPAM 5 MG: 10 INJECTION, SOLUTION INTRAMUSCULAR; INTRAVENOUS at 05:01

## 2024-01-23 RX ADMIN — OXYCODONE HYDROCHLORIDE 15 MG: 10 TABLET ORAL at 11:01

## 2024-01-23 RX ADMIN — TOPICAL ANESTHETIC: 200 SPRAY DENTAL; PERIODONTAL at 09:01

## 2024-01-23 RX ADMIN — ACETYLCYSTEINE 4 ML: 100 INHALANT RESPIRATORY (INHALATION) at 07:01

## 2024-01-23 RX ADMIN — FENTANYL CITRATE 75 MCG: 50 INJECTION, SOLUTION INTRAMUSCULAR; INTRAVENOUS at 11:01

## 2024-01-23 RX ADMIN — CLOBAZAM 10 MG: 10 TABLET ORAL at 09:01

## 2024-01-23 RX ADMIN — PROPRANOLOL HYDROCHLORIDE 20 MG: 10 TABLET ORAL at 02:01

## 2024-01-23 RX ADMIN — PROPRANOLOL HYDROCHLORIDE 20 MG: 10 TABLET ORAL at 09:01

## 2024-01-23 RX ADMIN — HALOPERIDOL LACTATE 2.5 MG: 5 INJECTION, SOLUTION INTRAMUSCULAR at 10:01

## 2024-01-23 RX ADMIN — ACETYLCYSTEINE 4 ML: 100 INHALANT RESPIRATORY (INHALATION) at 01:01

## 2024-01-23 RX ADMIN — Medication 250 MCG/HR: at 07:01

## 2024-01-23 RX ADMIN — FENTANYL CITRATE 75 MCG: 50 INJECTION INTRAMUSCULAR; INTRAVENOUS at 04:01

## 2024-01-23 RX ADMIN — IPRATROPIUM BROMIDE AND ALBUTEROL SULFATE 3 ML: .5; 3 SOLUTION RESPIRATORY (INHALATION) at 07:01

## 2024-01-23 RX ADMIN — FAMOTIDINE 20 MG: 20 TABLET, FILM COATED ORAL at 09:01

## 2024-01-23 RX ADMIN — SENNOSIDES AND DOCUSATE SODIUM 2 TABLET: 50; 8.6 TABLET ORAL at 08:01

## 2024-01-23 RX ADMIN — MONTELUKAST 10 MG: 10 TABLET, FILM COATED ORAL at 08:01

## 2024-01-23 RX ADMIN — ENOXAPARIN SODIUM 40 MG: 40 INJECTION SUBCUTANEOUS at 04:01

## 2024-01-23 RX ADMIN — IPRATROPIUM BROMIDE AND ALBUTEROL SULFATE 3 ML: .5; 3 SOLUTION RESPIRATORY (INHALATION) at 01:01

## 2024-01-23 RX ADMIN — OXYCODONE HYDROCHLORIDE 15 MG: 10 TABLET ORAL at 04:01

## 2024-01-23 RX ADMIN — FAMOTIDINE 20 MG: 20 TABLET, FILM COATED ORAL at 08:01

## 2024-01-23 RX ADMIN — OXYCODONE HYDROCHLORIDE 10 MG: 10 TABLET ORAL at 06:01

## 2024-01-23 RX ADMIN — PROPRANOLOL HYDROCHLORIDE 20 MG: 10 TABLET ORAL at 08:01

## 2024-01-23 RX ADMIN — BUDESONIDE INHALATION 0.25 MG: 0.5 SUSPENSION RESPIRATORY (INHALATION) at 07:01

## 2024-01-23 RX ADMIN — FENTANYL CITRATE 75 MCG: 50 INJECTION INTRAMUSCULAR; INTRAVENOUS at 07:01

## 2024-01-23 RX ADMIN — BISACODYL 10 MG: 10 SUPPOSITORY RECTAL at 09:01

## 2024-01-23 RX ADMIN — FENTANYL CITRATE 75 MCG: 50 INJECTION INTRAMUSCULAR; INTRAVENOUS at 09:01

## 2024-01-23 RX ADMIN — OXYCODONE HYDROCHLORIDE 15 MG: 10 TABLET ORAL at 10:01

## 2024-01-23 RX ADMIN — CLOBAZAM 20 MG: 10 TABLET ORAL at 08:01

## 2024-01-23 RX ADMIN — MICONAZOLE NITRATE: 20 OINTMENT TOPICAL at 09:01

## 2024-01-23 RX ADMIN — FENTANYL CITRATE 75 MCG: 50 INJECTION INTRAMUSCULAR; INTRAVENOUS at 06:01

## 2024-01-23 RX ADMIN — SILODOSIN 4 MG: 4 CAPSULE ORAL at 08:01

## 2024-01-23 NOTE — ANESTHESIA POSTPROCEDURE EVALUATION
Anesthesia Post Evaluation    Patient: Aaron Pruitt    Procedure(s) Performed: Procedure(s) (LRB):  CREATION, TRACHEOSTOMY (N/A)  EGD, WITH PEG TUBE INSERTION (N/A)    Final Anesthesia Type: general      Patient participation: No - Unable to Participate, Coma/Other Inability to Communicate  Level of consciousness: obtunded/minimal responses  Post-procedure vital signs: reviewed and stable  Pain control: Pain has been treated.  Airway patency: patent    PONV status: Absent or treated.  Anesthetic complications: no      Cardiovascular status: hemodynamically stable  Respiratory status: unassisted  Hydration status: euvolemic                Vitals Value Taken Time   BP 97/59 01/23/24 0401   Temp 36.8 °C (98.2 °F) 01/23/24 0301   Pulse 78 01/23/24 0428   Resp 14 01/23/24 0428   SpO2 100 % 01/23/24 0428   Vitals shown include unvalidated device data.      No case tracking events are documented in the log.      Pain/Hilda Score: Pain Rating Prior to Med Admin: 0 (1/22/2024 11:52 PM)

## 2024-01-23 NOTE — PLAN OF CARE
CM phoned Amanda Tobias (mother) [phone] to discuss LTAC options.  Mother informed that [facility] is unable to accept patient and [facility] is out of network.  Mother informed that referrals sent to in network facilites and that the nearest accepting is in [place].  Per mother, she is on her way to the hospital and would like a list so that she can visit the facilities.  CM to provide list when patient's mother arrives.    Discharge Plan A and Plan B have been determined by review of patient's clinical status, future medical and therapeutic needs, and coverage/benefits for post-acute care in coordination with multidisciplinary team members.      Deisy Schuster RN, CCRN-K, [facility]  Neuro-Critical Care   [phone]

## 2024-01-23 NOTE — ASSESSMENT & PLAN NOTE
-Known hx of asthma with acute onset SOB/hypoxia while shoveling dirt leading to respiratory/cardiac arrest  -Continue home montelukast/budesonide/mucomyst   -Sofia PRN

## 2024-01-23 NOTE — PROGRESS NOTES
"[facility]  Neurocritical Care  Progress Note    Admit Date: 1/12/2024  Service Date: 01/22/2024  Length of Stay: 10    Subjective:     Chief Complaint: Anoxic brain injury    History of Present Illness: Mr. Aaron Pruitt is a 53 year old M with PMHx significant for COPD/Asthma c/b smoking and HTN who presents to [facility] for NCSE. He initially presented to [facility] on 01/01 after being intubated in the field following acute respiratory failure post cardiac arrest s/p resuscitation and shock x1. Per chart review, he was shoveling some dirt in his yard when he got severely short of breath and suddenly had difficulty breathing. EMS was activated by his neighbors. Upon EMS arrival, he reportedly still had a pulse, though with severe respiratory distress, and subsequently became pulseless. He was given narcan x2 without improvement. CPR and Epi x3. ROSC after 3rd round of CPR with shock x 1. CT head on arrival to [facility] without acute intracranial abnormality, and CT chest without acute pulmonary process.TTM was initiated on 01/03 with fentanyl and propofol gtts for sedation, and rewarming was initiated on 01/04 with subsequent weaning of sedation. During this period, myoclonus was clinically noted, and propofol was resumed. EEG showed NCSE, prompting the initiation of keppra and vimpat with continuation of propofol. On 01/06, there was slight improvement in EEG, with NCSE noted to be "partially treated," at which time, the patient was then loaded with depacon. EEG was improved leading to weaning of propofol with eventual return to [facility]. MRI completed 01/08 and 01/11 completed with with GoC conversation held at [facility]. Family opted to forgo comfort measures at this point and transfer to [facility] for trial of 4th AED, per chart review. He arrived to [facility] on 80 mcg/kg/min of non-titrating propofol from [facility] which was dropped to 50 mcg/kg/min, followed by the immediate addition of 25 mcg/kg/min of ketamine. EEG cap placed. "     He will be admitted to [facility] for hourly neuromonitoring and a higher level of care.    Hospital Course: 01/13: no electrographic seizures reported, triglicerides increased into low 500s, propofol decreased to 30mcg, decrease further in 4 hrs if EEG unchanged, cont ketamine at 50mcg/kg and scheduled AEDs  01/14: EEG mostly suppressed, off propofol, decrease keppra to 30mcg today with potential to turn off in am  Escalate clobazam to 20mg qday and dc depakote, cont keppra and vimpat at current doses, plan to update family today, although improvement in seizure control, no change in neuro exam  01/15/2024 Increased GPDs s/p discontinuing ketamine gtt, onfi increased to BID. Awaiting arrival of family for GOC conversation  01/16/2024 cEEG unchanged overnight, remains on ictal-interictal spectrum, d/c'd. Neuro exam unchanged, remains w/ brainstem reflexes intact, intermittent non-purposeful movements of extremities. Ongoing GOC discussions with family   01/17/2024 NAEON. Mildly hypercarbic on AM ABG, increased rate/TV for eucarbia. Starting FWF for mild hypernatremia. Ongoing GOC discussions w/ family, see ACP note for full details   01/18/2024 NAEON, hypercarbia resolved. Awaiting family decision re GOC  01/19/2024 Added propranolol and increased oxycodone for neuro storming/episodes of non-purposeful movement. Family electing to proceed w/ trach/PEG, please see ACP note for full details. Gen surgery consulted, CM/SW updated.   01/20/2024 NAEON, continued non-purposeful flailing movements of LUE/LLE. Trach/PEG next week, timing TBD  01/21/2024 Repeat routine cEEG overnight relatively unchanged from prior, slight decrease in frequency of GPDs, remains w/o evolution/seizures, no clear background, no evidence of electrographic response to stimuli. Neuro exam stably poor, slightly decreased flailing movements 2/2 increase in fent gtt. Trach/PEG/placement pending  01/22/2024 No issues overnight. Remains on fentanyl gtt d/t  continuous flailing movements. Plan for trach/PEG today. CM/SW following for LTAC placement.    Interval History:  See hospital course.    Review of Systems   Unable to perform ROS: Intubated     Objective:     Vitals:  Temp: 99.3 °F (37.4 °C)  Pulse: 77  Rhythm: (P) normal sinus rhythm  BP: 99/61  Resp: 15  SpO2: 100 %  Oxygen Concentration (%): 30  Vent Mode: A/C  Set Rate: 14 BPM  Vt Set: 480 mL  PEEP/CPAP: 5 cmH20  Peak Airway Pressure: 17 cmH20  Mean Airway Pressure: 7.6 cmH20  Plateau Pressure: 0 cmH20    Temp  Min: 99.1 °F (37.3 °C)  Max: 100.8 °F (38.2 °C)  Pulse  Min: 69  Max: 92  BP  Min: 85/52  Max: 180/111  MAP (mmHg)  Min: 63  Max: 138  Resp  Min: 14  Max: 19  SpO2  Min: 96 %  Max: 100 %  Oxygen Concentration (%)  Min: 30  Max: 30    01/21 0701 - 01/22 0700  In: 2061.5 [I.V.:484.7]  Out: 600 [Urine:600]   Unmeasured Output  Urine Occurrence: 1  Stool Occurrence: 1  Pad Count: 1        Physical Exam  Vitals and nursing note reviewed.   Constitutional:       Interventions: He is sedated and intubated.   Eyes:      Pupils: Pupils are equal, round, and reactive to light.   Cardiovascular:      Rate and Rhythm: Normal rate and regular rhythm.      Pulses: Normal pulses.   Pulmonary:      Effort: Pulmonary effort is normal. He is intubated.   Abdominal:      Palpations: Abdomen is soft.   Skin:     General: Skin is warm and dry.      Capillary Refill: Capillary refill takes less than 2 seconds.   Neurological:      GCS: GCS eye subscore is 1. GCS verbal subscore is 1. GCS motor subscore is 3.      Comments:   -Exam completed on fentanyl @ 200  -E1 V1T M3  -PERRL  -? grimacing; tightly squeezing eyes shut  -No response to noxious stimuli w/ RUE  -Intermittent spontaneous movement w/ LUE, but not to noxious stimuli nor purposeful  -TF w/ BLE  -Corneal reflexes/cough present  -Absent gag       Unable to test orientation, language, memory, judgment, insight, fund of knowledge, hearing, shoulder shrug, tongue  protrusion, coordination, gait due to level of consciousness.       Medications:  Continuousfentanyl, Last Rate: 200 mcg/hr (01/22/24 0745)    Scheduledacetylcysteine 100 mg/ml (10%), 4 mL, Q6H  bisacodyL, 10 mg, Daily  budesonide, 0.25 mg, Q12H  cloBAZam, 20 mg, BID  enoxparin, 40 mg, Q24H (prophylaxis, 1700)  famotidine, 20 mg, BID  miconazole nitrate 2%, , BID  montelukast, 10 mg, Daily  oxyCODONE, 10 mg, Q6H  propranoloL, 20 mg, TID  senna-docusate 8.6-50 mg, 2 tablet, BID  silodosin, 4 mg, Daily    PRNacetaminophen, 325 mg, Q6H PRN  albuterol-ipratropium, 3 mL, Q6H PRN  fentanyl, 50 mcg, Q1H PRN  hydrALAZINE, 10 mg, Q4H PRN  labetalol, 10 mg, Q4H PRN  magnesium oxide, 800 mg, PRN  magnesium oxide, 800 mg, PRN  potassium bicarbonate, 35 mEq, PRN  potassium bicarbonate, 50 mEq, PRN  potassium bicarbonate, 60 mEq, PRN  potassium, sodium phosphates, 2 packet, PRN  potassium, sodium phosphates, 2 packet, PRN  potassium, sodium phosphates, 2 packet, PRN      Today I personally reviewed pertinent medications, lines/drains/airways, imaging, cardiology results, laboratory results, notably: CBC, CMP, ABGs, CXR    Diet  Diet NPO  Diet NPO    Assessment/Plan:     Neuro  * Anoxic brain injury  54 y/o M transferred to [facility] 12 days post respiratory failure c/b cardiac arrest (Epi x 3, CPR x 3, Shock x 1) with RSE in setting of propofol wean despite continued keppra, vimpat, depacon administration.     -Cardiac arrest on 01/01 requiring 3 rounds of CPR, Epi x 3, and 1 shock to obtain ROSC; likely respiratory etiology of arrest 2/2 asthma exacerbation   -S/p TTM, maintain normothermia  -MRI x 2 concerning for anoxic injury  -Long term prognosis poor given imaging findings, persistent malignant patterns on cEEG>2 wks out from arrest  -Admitted to [facility]  -VS q1h  -Neuro checks q4h  -Concern for neuro storming d/t spontaneous flailing movements w/o epileptiform correlate   -Continue oxycodone/propranolol  -Wean fentanyl gtt as  able    Abnormal movement  -Non-purposeful movement of L side noted, LLE>>LUE  -No EEG correlate, not clearly purposeful on exam or EEG  -Likely 2/2 anoxic injury  -Continue propranolol and scheduled oxy  -Fentanyl gtt; wean as able    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 He arrived to [facility] on 80 mcg/kg/min of non-titrating propofol from [facility] which was dropped to 50 mcg/kg/min, followed by the immediate addition of 25 mcg/kg/min of ketamine. EEG cap placed and epilepsy notified.    -cEEG discontinued  -Wean AEDs as able  -Vimpat/keppra discontinued  -Continue onfi BID    Acute encephalopathy  -Known NCSE post arrest; suspect encephalopathy 2/2 anoxic injury/NCSE  -See Anoxic brain injury     Seizure disorder, nonconvulsive, with status epilepticus  -2/2 cardiorespiratory arrest  -See Anoxic brain injury     Pulmonary  COPD (chronic obstructive pulmonary disease)  -Known history of smoking  -Duonebs PRN  -ABGs daily  -CXR daily  -Wean vent as able    Severe asthma  -Known hx of asthma with acute onset SOB/hypoxia while shoveling dirt leading to respiratory/cardiac arrest  -Continue home montelukast/budesonide/mucomyst   -Duonebs PRN    Acute respiratory failure with hypoxia and hypercarbia  -Trach today 1/22  -Current vent settings below  Vent Mode: A/C  Oxygen Concentration (%):  [30] 30  Resp Rate Total:  [14 br/min-18 br/min] 14 br/min  Vt Set:  [480 mL] 480 mL  PEEP/CPAP:  [5 cmH20] 5 cmH20  Mean Airway Pressure:  [7.6 cmH20-10 cmH20] 7.6  cmH20    -ABGs daily  -CXR daily  -Wean vent as tolerated  -Famotidine BID  -VAP prevention per protocol    Cardiac/Vascular  Essential hypertension  -Hx of   -SBP goal  < 180  -PRN labetalol/hydralazine    Cardiorespiratory arrest  -Cardiac arrest on 01/01   -See Anoxic brain injury     GI  Transaminitis  -Likely DILI   -Acetaminophen dose decreased to 325 PRN  -Avoid hepatotoxins  -CMP daily    Palliative Care  ACP (advance care planning)  -Patient made DNR at [facility]  -See ACP notes dated 1/17 and 1/19  -Trach/PEG today          The patient is being Prophylaxed for:  Venous Thromboembolism with: Mechanical  Stress Ulcer with: H2B  Ventilator Pneumonia with: chlorhexidine oral care    Activity Orders            Diet NPO: NPO starting at 01/22 0001    Elevate HOB Elevate (30-45 degrees) Elevate HOB to 30 - 45 degrees during feeding unless otherwise stated starting at 01/12 1011    Turn patient starting at 01/12 0724    Elevate HOB starting at 01/12 0008          Full Code    Critical care time spent on the evaluation and treatment of severe organ dysfunction, review of pertinent labs and imaging studies, discussions with consulting providers and discussions with patient/family: 37 minutes.    Payton Chakraborty NP  Neurocritical Care  [facility] - Neuro Critical [facility]

## 2024-01-23 NOTE — SUBJECTIVE & OBJECTIVE
Interval History: No acute overnight events. PEG and trach sites hemostatic. Remains on minimal vent settings    Medications:  Continuous Infusions:   fentanyl 250 mcg/hr (01/23/24 0800)     Scheduled Meds:   acetylcysteine 100 mg/ml (10%)  4 mL Nebulization Q6H    bisacodyL  10 mg Rectal Daily    budesonide  0.25 mg Nebulization Q12H    cloBAZam  20 mg Per NG tube BID    enoxparin  40 mg Subcutaneous Q24H (prophylaxis, 1700)    famotidine  20 mg Per NG tube BID    miconazole nitrate 2%   Topical (Top) BID    montelukast  10 mg Per NG tube Daily    oxyCODONE  10 mg Per OG tube Q6H    propranoloL  20 mg Per NG tube TID    senna-docusate 8.6-50 mg  2 tablet Per NG tube BID    silodosin  4 mg Per NG tube Daily     PRN Meds:acetaminophen, albuterol-ipratropium, fentanyl, hydrALAZINE, labetalol, magnesium oxide, magnesium oxide, potassium bicarbonate, potassium bicarbonate, potassium bicarbonate, potassium, sodium phosphates, potassium, sodium phosphates, potassium, sodium phosphates     Review of patient's allergies indicates:   Allergen Reactions    Sulfa (sulfonamide antibiotics)      Objective:     Vital Signs (Most Recent):  Temp: 98.9 °F (37.2 °C) (01/23/24 0700)  Pulse: 108 (01/23/24 0823)  Resp: (!) 22 (01/23/24 0800)  BP: 108/64 (01/23/24 0800)  SpO2: 95 % (01/23/24 0800) Vital Signs (24h Range):  Temp:  [98.2 °F (36.8 °C)-100.2 °F (37.9 °C)] 98.9 °F (37.2 °C)  Pulse:  [] 108  Resp:  [14-35] 22  SpO2:  [95 %-100 %] 95 %  BP: ()/(53-96) 108/64     Weight: 72.1 kg (158 lb 15.2 oz)  Body mass index is 20.97 kg/m².    Intake/Output - Last 3 Shifts         01/21 0700 01/22 0659 01/22 0700 01/23 0659 01/23 0700 01/24 0659    P.O.   0    I.V. (mL/kg) 484.7 (6.7) 527.3 (7.3) 62.1 (0.9)    NG/GT 1380  0    IV Piggyback 196.7 1200     Total Intake(mL/kg) 2061.5 (28.6) 1727.3 (24) 62.1 (0.9)    Urine (mL/kg/hr) 600 (0.3) 450 (0.3) 30 (0.2)    Drains  350 20    Other  0     Stool       Total Output 600 800 50     Net +1461.5 +927.3 +12.1           Urine Occurrence  1 x              Physical Exam  Vitals reviewed.   Constitutional:       Comments: Intubated    HENT:      Mouth/Throat:      Comments: 8.0 shiley tracheostomy tube in place  Cardiovascular:      Rate and Rhythm: Normal rate.      Pulses: Normal pulses.   Pulmonary:      Comments: Intubated, mechanically ventilated  Vent Mode: A/C  Oxygen Concentration (%):  [30] 30  Resp Rate Total:  [14 br/min-26 br/min] 14 br/min  Vt Set:  [480 mL] 480 mL  PEEP/CPAP:  [5 cmH20] 5 cmH20  Mean Airway Pressure:  [8.2 cmH20-10 cmH20] 8.9 cmH20  Abdominal:      General: Abdomen is flat.      Palpations: Abdomen is soft.          Significant Labs:  I have reviewed all pertinent lab results within the past 24 hours.  CBC:   Recent Labs   Lab 01/23/24 0313   WBC 8.21   RBC 2.78*   HGB 8.5*   HCT 26.4*      MCV 95   MCH 30.6   MCHC 32.2     CMP:   Recent Labs   Lab 01/23/24  0313   GLU 93   CALCIUM 9.6   ALBUMIN 2.6*   PROT 7.2      K 4.7   CO2 19*      BUN 18   CREATININE 0.7   ALKPHOS 98   ALT 57*   AST 45*   BILITOT 0.6       Significant Diagnostics:  I have reviewed all pertinent imaging results/findings within the past 24 hours.  No new imaging

## 2024-01-23 NOTE — PLAN OF CARE
"[facility] Care Plan    POC reviewed with Aaron Pruitt and family at 0300. Pt verbalized understanding. Questions and concerns addressed. No acute events overnight. Pt progressing toward goals. Will continue to monitor. See below and flowsheets for full assessment and VS info.     -fentanyl bolus from bag x2  -feeds can start at 1645 today       Is this a stroke patient? no    Neuro:  Hadley Coma Scale  Best Eye Response: 3-->(E3) to speech  Best Motor Response: 3-->(M3) flexion to pain  Best Verbal Response: 1-->(V1) none  Ellie Coma Scale Score: 7  Assessment Qualifiers: no eye obstruction present  Pupil PERRLA: yes     24hr Temp:  [98.2 °F (36.8 °C)-100.2 °F (37.9 °C)]     CV:   Rhythm: normal sinus rhythm  BP goals:   SBP < 180  MAP > 65    Resp:      Vent Mode: A/C  Set Rate: 14 BPM  Oxygen Concentration (%): 30  Vt Set: 480 mL  PEEP/CPAP: 5 cmH20    Plan: trach in place    GI/:     Diet/Nutrition Received: NPO  Last Bowel Movement: 01/20/24  Voiding Characteristics: external catheter    Intake/Output Summary (Last 24 hours) at 1/23/2024 0637  Last data filed at 1/23/2024 0623  Gross per 24 hour   Intake 1727.3 ml   Output 800 ml   Net 927.3 ml     Unmeasured Output  Urine Occurrence: 1  Stool Occurrence: 1  Pad Count: 1    Labs/Accuchecks:  Recent Labs   Lab 01/23/24  0313   WBC 8.21   RBC 2.78*   HGB 8.5*   HCT 26.4*         Recent Labs   Lab 01/23/24  0313      K 4.7   CO2 19*      BUN 18   CREATININE 0.7   ALKPHOS 98   ALT 57*   AST 45*   BILITOT 0.6      Recent Labs   Lab 01/21/24  1801   INR 1.1    No results for input(s): "CPK", "CPKMB", "TROPONINI", "MB" in the last 168 hours.    Electrolytes: N/A - electrolytes WDL  Accuchecks: none    Gtts:   fentanyl 250 mcg/hr (01/23/24 0623)       LDA/Wounds:    Nurses Note -- 4 Eyes      1/23/2024   6:37 AM      Skin assessed during: Daily Assessment      [] No Altered Skin Integrity Present    []Prevention Measures Documented      [x] Yes- " Altered Skin Integrity Present or Discovered   [] LDA Added if Not in Epic (Describe Wound)   [] New Altered Skin Integrity was Present on Admit and Documented in LDA   [] Wound Image Taken    Wound Care Consulted? N/A    Attending Nurse:  Loyda Sky RN/Staff Member:

## 2024-01-23 NOTE — ASSESSMENT & PLAN NOTE
-No improvement in neuro exam despite improvement in seizures  -Continues to have non-purposeful movements of LLE>LUE; no evidence of purposeful movements   -Continue scheduled oxy and propranolol for suspected neuro storming

## 2024-01-23 NOTE — ASSESSMENT & PLAN NOTE
53M with new onset refractory status epilepticus secondary to anoxic brain injury. General surgery consulted for trach and PEG which was performed 1/22. Patient doing well this morning and recovering appropriately.    - Ok to start feeds today and advance as tolerated  - Continue trach care and PEG care  - Please notify General Surgery with any significant changes in patient's vital signs within the first 24 hours after PEG placement.  - General Surgery will sign off but please call with questions about PEG tube

## 2024-01-23 NOTE — PLAN OF CARE
[facility] - Neuro Critical Care  Discharge Reassessment    Primary Care Provider: Gogo Vivar NP    Expected Discharge Date: 1/25/2024    Patient s/p trach and peg POD 1  LTAC referrals sent to \[facility]\"" and [facility].  [facility] denied.  [facility] will accept.   Patient not medically ready for discharge.       Reassessment (most recent)       Discharge Reassessment - 01/23/24 0953          Discharge Reassessment    Assessment Type Discharge Planning Reassessment     Communicated MARQUISE with patient/caregiver Date not available/Unable to determine     Discharge Plan A Long-term acute care facility (LTAC)     Discharge Plan B Long-term acute care facility (LTAC)     DME Needed Upon Discharge  none     Transition of Care Barriers Underinsured     Why the patient remains in the hospital Requires continued medical care                   Discharge Plan A and Plan B have been determined by review of patient's clinical status, future medical and therapeutic needs, and coverage/benefits for post-acute care in coordination with multidisciplinary team members.      Deisy Schuster RN, CCRN-K, [facility]  Neuro-Critical Care   [phone]

## 2024-01-23 NOTE — PLAN OF CARE
CM received a call from Teresa at [facility] [phone].  Per Teresa, [facility] is not in network with [facility].  Per Teresa, the following facilities are in network:    [facility]  [phone]  [facility] [address] [place] [phone] [id number] [facility] 1444 [facility] [address] [place] [facility] [id number] [id number] [id number] [facility] 629 [facility] [phone]  [facility] 1800 [facility] [id number] [phone]  [facility] 5130 [facility] ([facility]) [phone]  [facility] 3330 [facility], 4th Floor,  [facility] ([facility]) [phone]  [facility] 503 [facility]                       3rd Floor [facility] ([facility]) [phone]  [facility] 3[address] [facility] [address] [place] [address]-XII, LLC ([facility]) [phone]  [facility] 2315 [facility], 3rd Floor [facility] ([facility]) [id number] [phone]  [facility] 2810 [facility], 6th Floor [facility] ([facility]) [phone]  [facility] [address] [place] [address],                  4th Floor [facility] ([facility]) [phone]  [address], 6th floor [facility] [phone] [id number] Long Term Care  [facility] 9725 [facility] [id number] [phone]  [facility] 310 [facility] [id number] [id number] [facility] 1801 [facility] (eff 2/1/20) [id number] [phone]  [facility] [address] [place] 2 [facility] [id number] [phone] [id number] [facility] 2525 [facility] [phone]  [facility] [address] [facility] [phone] [phone]  [facility] 13 [facility] [id number] [id number] [facility] 8225 [facility] [id number] [id number] [facility] 719 [facility] [id number] [phone]  [facility] 309 [facility], 7th Floor [facility] [id number] [facility] 28854 [facility]) is closing.  [facility] is closing.    Referrals sent to:   [facility] [place] ([facility]) Phone: [phone]    case by case basis  * COVID-19: Positive patients under care,* COVID-19: Willing/Equipped to accept COVID-19 positive patients,* High-Risk Isolation Patients: Willing/Equipped to accept High-Risk Isolation Patients [address] [place] [address] 1/23/2024 10:52 (CT)  1/23/2024 12:50 (CT)  -  1/23/2024 10:54 (CT)  1/23/2024 10:54 (CT)  Response: Referral Received  Waiting for recipient     [facility] Phone: [phone]      * COVID-19: Willing/Equipped to accept COVID-19 positive patients,* High-Risk Isolation Patients: Willing/Equipped to accept High-Risk Isolation Patients [address] [facility] [place] [place] [address] 1/23/2024 10:52 (CT)  1/23/2024 12:50 (CT)  -  1/23/2024 10:54 (CT)  1/23/2024 10:54 (CT)  Response: Referral Received  Comments: Sent for review. Thank you !  Waiting for recipient     [facility] Phone: [phone]    COVID POS with symptoms  * COVID-19: Positive patients under care,* COVID-19: Services not specified,* COVID-19: Willing/Equipped to accept COVID-19 positive patients,* High-Risk Isolation Patients: Willing/Equipped to accept High-Risk Isolation Patients [address] [place] [address] 1/23/2024 10:52 (CT)  1/23/2024 12:50 (CT)  -  1/23/2024 10:54 (CT)  1/23/2024 10:54 (CT)  Response: Referral Received  Comments: Sent for review. Thank you !  Waiting for recipient     [facility] Phone: [phone]      * COVID-19: Willing/Equipped to accept COVID-19 positive patients,* High-Risk Isolation Patients: Willing/Equipped to accept High-Risk Isolation Patients [address] [place] [address] 1/23/2024 10:52 (CT)  1/23/2024 12:50 (CT)  -  1/23/2024 11:08 (CT)  1/23/2024 11:08 (CT)  Response: Referral Received  Comments: SENDING FOR REVIEW  Waiting for recipient     [facility] Phone: [phone]      * COVID-19: NOT able to accept COVID-19 positive patients [address] [place] [address] 1/23/2024 10:52 (CT)  1/23/2024 12:50 (CT)  -  -  -  -  Waiting for recipient     [facility] Phone: [phone]    case by case based on symptoms  * COVID-19: NOT able to accept COVID-19 positive patients,* COVID-19: Services not specified [address], 4th [place] [address] 1/23/2024  10:52 (CT)  1/23/2024 12:50 (CT)  -  -  -  -  Waiting for recipient     [facility] Phone: [phone]    case by case  * COVID-19: Willing/Equipped to accept COVID-19 positive patients,* High-Risk Isolation Patients: Willing/Equipped to accept High-Risk Isolation Patients [address] [place] [address] 1/23/2024 10:52 (CT)  1/23/2024 12:50 (CT)  -  -  -  -  Waiting for recipient     [facility] Phone: [phone]      * COVID-19: Positive patients under care,* COVID-19: Willing/Equipped to accept COVID-19 positive patients,* High-Risk Isolation Patients: Willing/Equipped to accept High-Risk Isolation Patients [address] [facility], 6th [place] [address] 1/23/2024 10:52 (CT)  1/23/2024 12:50 (CT)  -  -  -  -  Waiting for recipient     [facility] Phone: [phone]      * COVID-19: NOT able to accept COVID-19 positive patients,* COVID-19: Services not specified [address] [facility] [place] [address] 1/23/2024 10:52 (CT)  1/23/2024 12:50 (CT)  -  -  -  -  Waiting for recipient    Response Notes   [facility] Phone: [phone]      * COVID-19: Positive patients under care,* COVID-19: Willing/Equipped to accept COVID-19 positive patients [address] [place] [address] 1/23/2024 10:52 (CT)  1/23/2024 12:50 (CT)  -  -  -  -  Waiting for recipient    Response Notes   [facility] Phone: [phone]    1 Negative COVID-19 test upon admission  * COVID-19: Services not specified,* COVID-19: Willing/Equipped to accept COVID-19 positive patients [address] [place] [address] 1/23/2024 10:52 (CT)  1/23/2024 12:50 (CT)  -  -         Recipient Location First Sent On Respond By Date and Time Provider Can Take Patient First Response Received Last Response Received Last Response     [facility] Phone: [phone]     case by case basis  * COVID-19: Willing/Equipped to accept COVID-19 positive patients,* High-Risk Isolation Patients: Willing/Equipped to accept High-Risk Isolation Patients [address] [place] [address] 1/23/2024 10:52 (CT)  1/23/2024 12:50 (CT)  -  1/23/2024 10:55 (CT)  1/23/2024 10:55 (CT)  Response: Yes, willing to accept patient  Waiting for you     [facility] Phone: [phone]    We do not require a covid test prior to admit unless patient has Covid -19 signs and symptoms present.  * COVID-19: Willing/Equipped to accept COVID-19 positive patients,* High-Risk Isolation Patients: Willing/Equipped to accept High-Risk Isolation Patients [address] [address] 1/19/2024 14:01 (CT)  1/19/2024 15:57 (CT)  -  1/19/2024 14:38 (CT)  1/19/2024 14:38 (CT)  Response: Yes, willing to accept patient  Comments: Reviewing, thank you  Waiting for you     [facility] Phone: [phone]    1 negative test  * COVID-19: Willing/Equipped to accept COVID-19 positive patients,* High-Risk Isolation Patients: Willing/Equipped to accept High-Risk Isolation Patients 1101 [facility] 7th [place] [address] 1/19/2024 14:01 (CT)  1/19/2024 15:57 (CT)  -  1/19/2024 14:07 (CT)  1/22/2024 9:16 (CT)  Response: No, unable to accept patient  Reason: Other (see comments)  Comments: Not accepting Medicaid vent patients at this time. [facility] [phone]  Waiting for you     [facility] Phone: [phone]      * COVID-19: Willing/Equipped to accept COVID-19 positive patients,* High-Risk Isolation Patients: Willing/Equipped to accept High-Risk Isolation Patients [address] [place] [address] 1/23/2024 10:52 (CT)  1/23/2024 12:50 (CT)  -  1/23/2024 11:53 (CT)  1/23/2024 11:53 (CT)  Response: No, unable to accept patient  Reason: Out of Network  Comments: We  are out of network and require 3 in network denials.   Waiting for you     [facility] Phone: [phone]                 Discharge Plan A and Plan B have been determined by review of patient's clinical status, future medical and therapeutic needs, and coverage/benefits for post-acute care in coordination with multidisciplinary team members.      Deisy Schuster RN, CCRN-K, [facility]  Neuro-Critical Care   [phone]

## 2024-01-23 NOTE — ASSESSMENT & PLAN NOTE
-Non-purposeful movement of L side noted, LLE>>LUE  -No EEG correlate, not clearly purposeful on exam or EEG  -Likely 2/2 anoxic injury  -Continue propranolol and scheduled oxy  -Fentanyl gtt; wean as able

## 2024-01-23 NOTE — LOPA/MORA/SWTA/AOC/AEB
[facility] [facility] ([facility])  On-Site Evaluation  [facility] Contact # [phone]        Thank you for the referral of this patient to determine suitability for organ, tissue, and eye donation.  A chart review has been conducted 01/23/2024  at  0930.  [facility] findings are as follows:    ?     ? Potential candidate for donation/ Referral Closed- Any changes in patients condition, GCS of 5 or less, discussion of withdrawing the vent, brain death exams, or family mention of donation immediately call [phone].      [facility] Representative:  Emily Abadie BS, MS           [facility] Referral Number:   [id number]

## 2024-01-23 NOTE — PROGRESS NOTES
[facility] - Neuro Critical Care  General Surgery  Progress Note    Subjective:     History of Present Illness:  Aaron Pruitt is a 53 y.o. male with a history of COPD/Asthma c/b smoking and HTN who presents to [facility] for NCSE. He initially presented to [facility] ER on 01/01 after being intubated in the field following acute respiratory failure post cardiac arrest s/p resuscitation and shock x1. He was subsequently found to have new onset refractory status epilepticus secondary to anoxic brain injury. Patient was eventually transferred to [facility] for trial of AED. The patient has required prolonged intubation secondary to his neurological status. The patient's prognosis for recovery is poor. The primary team has had extensive goals of care discussions with the family who have reflected that it would be the patient's wishes to continue with treatment and move forward with trach/PEG. General surgery consulted for consideration of trach/PEG.     Post-Op Info:  Procedure(s) (LRB):  CREATION, TRACHEOSTOMY (N/A)  EGD, WITH PEG TUBE INSERTION (N/A)   1 Day Post-Op     Interval History: No acute overnight events. PEG and trach sites hemostatic. Remains on minimal vent settings    Medications:  Continuous Infusions:   fentanyl 250 mcg/hr (01/23/24 0800)     Scheduled Meds:   acetylcysteine 100 mg/ml (10%)  4 mL Nebulization Q6H    bisacodyL  10 mg Rectal Daily    budesonide  0.25 mg Nebulization Q12H    cloBAZam  20 mg Per NG tube BID    enoxparin  40 mg Subcutaneous Q24H (prophylaxis, 1700)    famotidine  20 mg Per NG tube BID    miconazole nitrate 2%   Topical (Top) BID    montelukast  10 mg Per NG tube Daily    oxyCODONE  10 mg Per OG tube Q6H    propranoloL  20 mg Per NG tube TID    senna-docusate 8.6-50 mg  2 tablet Per NG tube BID    silodosin  4 mg Per NG tube Daily     PRN Meds:acetaminophen, albuterol-ipratropium, fentanyl, hydrALAZINE, labetalol, magnesium oxide, magnesium oxide, potassium bicarbonate, potassium bicarbonate,  potassium bicarbonate, potassium, sodium phosphates, potassium, sodium phosphates, potassium, sodium phosphates     Review of patient's allergies indicates:   Allergen Reactions    Sulfa (sulfonamide antibiotics)      Objective:     Vital Signs (Most Recent):  Temp: 98.9 °F (37.2 °C) (01/23/24 0700)  Pulse: 108 (01/23/24 0823)  Resp: (!) 22 (01/23/24 0800)  BP: 108/64 (01/23/24 0800)  SpO2: 95 % (01/23/24 0800) Vital Signs (24h Range):  Temp:  [98.2 °F (36.8 °C)-100.2 °F (37.9 °C)] 98.9 °F (37.2 °C)  Pulse:  [] 108  Resp:  [14-35] 22  SpO2:  [95 %-100 %] 95 %  BP: ()/(53-96) 108/64     Weight: 72.1 kg (158 lb 15.2 oz)  Body mass index is 20.97 kg/m².    Intake/Output - Last 3 Shifts         01/21 0700  01/22 0659 01/22 0700 01/23 0659 01/23 0700 01/24 0659    P.O.   0    I.V. (mL/kg) 484.7 (6.7) 527.3 (7.3) 62.1 (0.9)    NG/GT 1380  0    IV Piggyback 196.7 1200     Total Intake(mL/kg) 2061.5 (28.6) 1727.3 (24) 62.1 (0.9)    Urine (mL/kg/hr) 600 (0.3) 450 (0.3) 30 (0.2)    Drains  350 20    Other  0     Stool       Total Output 600 800 50    Net +1461.5 +927.3 +12.1           Urine Occurrence  1 x              Physical Exam  Vitals reviewed.   Constitutional:       Comments: Intubated    HENT:      Mouth/Throat:      Comments: 8.0 shiley tracheostomy tube in place  Cardiovascular:      Rate and Rhythm: Normal rate.      Pulses: Normal pulses.   Pulmonary:      Comments: Intubated, mechanically ventilated  Vent Mode: A/C  Oxygen Concentration (%):  [30] 30  Resp Rate Total:  [14 br/min-26 br/min] 14 br/min  Vt Set:  [480 mL] 480 mL  PEEP/CPAP:  [5 cmH20] 5 cmH20  Mean Airway Pressure:  [8.2 cmH20-10 cmH20] 8.9 cmH20  Abdominal:      General: Abdomen is flat.      Palpations: Abdomen is soft.          Significant Labs:  I have reviewed all pertinent lab results within the past 24 hours.  CBC:   Recent Labs   Lab 01/23/24  0313   WBC 8.21   RBC 2.78*   HGB 8.5*   HCT 26.4*      MCV 95   MCH 30.6    MCHC 32.2     CMP:   Recent Labs   Lab 01/23/24  0313   GLU 93   CALCIUM 9.6   ALBUMIN 2.6*   PROT 7.2      K 4.7   CO2 19*      BUN 18   CREATININE 0.7   ALKPHOS 98   ALT 57*   AST 45*   BILITOT 0.6       Significant Diagnostics:  I have reviewed all pertinent imaging results/findings within the past 24 hours.  No new imaging  Assessment/Plan:     Acute respiratory failure with hypoxia and hypercarbia  53M with new onset refractory status epilepticus secondary to anoxic brain injury. General surgery consulted for trach and PEG which was performed 1/22. Patient doing well this morning and recovering appropriately.    - Ok to start feeds today and advance as tolerated  - Continue trach care and PEG care  - Please notify General Surgery with any significant changes in patient's vital signs within the first 24 hours after PEG placement.  - General Surgery will sign off but please call with questions about PEG tube          Clari Carbajal MD  General Surgery  [facility]

## 2024-01-24 LAB
ALBUMIN SERPL BCP-MCNC: 2.5 G/DL (ref 3.5–5.2)
ALP SERPL-CCNC: 90 U/L (ref 55–135)
ALT SERPL W/O P-5'-P-CCNC: 42 U/L (ref 10–44)
ANION GAP SERPL CALC-SCNC: 10 MMOL/L (ref 8–16)
AST SERPL-CCNC: 31 U/L (ref 10–40)
BASOPHILS # BLD AUTO: 0.01 K/UL (ref 0–0.2)
BASOPHILS NFR BLD: 0.1 % (ref 0–1.9)
BILIRUB SERPL-MCNC: 0.7 MG/DL (ref 0.1–1)
BUN SERPL-MCNC: 21 MG/DL (ref 6–20)
CALCIUM SERPL-MCNC: 8.7 MG/DL (ref 8.7–10.5)
CHLORIDE SERPL-SCNC: 110 MMOL/L (ref 95–110)
CO2 SERPL-SCNC: 19 MMOL/L (ref 23–29)
CREAT SERPL-MCNC: 0.7 MG/DL (ref 0.5–1.4)
DIFFERENTIAL METHOD BLD: ABNORMAL
EOSINOPHIL # BLD AUTO: 0 K/UL (ref 0–0.5)
EOSINOPHIL NFR BLD: 0.1 % (ref 0–8)
ERYTHROCYTE [DISTWIDTH] IN BLOOD BY AUTOMATED COUNT: 12.3 % (ref 11.5–14.5)
EST. GFR  (NO RACE VARIABLE): >60 ML/MIN/1.73 M^2
GLUCOSE SERPL-MCNC: 98 MG/DL (ref 70–110)
HCT VFR BLD AUTO: 26.5 % (ref 40–54)
HGB BLD-MCNC: 8.9 G/DL (ref 14–18)
IMM GRANULOCYTES # BLD AUTO: 0.08 K/UL (ref 0–0.04)
IMM GRANULOCYTES NFR BLD AUTO: 0.8 % (ref 0–0.5)
LYMPHOCYTES # BLD AUTO: 1.3 K/UL (ref 1–4.8)
LYMPHOCYTES NFR BLD: 13.2 % (ref 18–48)
MAGNESIUM SERPL-MCNC: 1.9 MG/DL (ref 1.6–2.6)
MCH RBC QN AUTO: 30.4 PG (ref 27–31)
MCHC RBC AUTO-ENTMCNC: 33.6 G/DL (ref 32–36)
MCV RBC AUTO: 90 FL (ref 82–98)
MONOCYTES # BLD AUTO: 1.1 K/UL (ref 0.3–1)
MONOCYTES NFR BLD: 10.5 % (ref 4–15)
NEUTROPHILS # BLD AUTO: 7.6 K/UL (ref 1.8–7.7)
NEUTROPHILS NFR BLD: 75.3 % (ref 38–73)
NRBC BLD-RTO: 0 /100 WBC
PHOSPHATE SERPL-MCNC: 3.6 MG/DL (ref 2.7–4.5)
PLATELET # BLD AUTO: 389 K/UL (ref 150–450)
PMV BLD AUTO: 8.8 FL (ref 9.2–12.9)
POTASSIUM SERPL-SCNC: 3.3 MMOL/L (ref 3.5–5.1)
PROT SERPL-MCNC: 6.6 G/DL (ref 6–8.4)
RBC # BLD AUTO: 2.93 M/UL (ref 4.6–6.2)
SODIUM SERPL-SCNC: 139 MMOL/L (ref 136–145)
WBC # BLD AUTO: 10.11 K/UL (ref 3.9–12.7)

## 2024-01-24 PROCEDURE — 94640 AIRWAY INHALATION TREATMENT: CPT

## 2024-01-24 PROCEDURE — 99233 SBSQ HOSP IP/OBS HIGH 50: CPT | Mod: FS,,, | Performed by: PSYCHIATRY & NEUROLOGY

## 2024-01-24 PROCEDURE — 25000003 PHARM REV CODE 250: Performed by: PSYCHIATRY & NEUROLOGY

## 2024-01-24 PROCEDURE — 94668 MNPJ CHEST WALL SBSQ: CPT

## 2024-01-24 PROCEDURE — 83735 ASSAY OF MAGNESIUM: CPT

## 2024-01-24 PROCEDURE — 94003 VENT MGMT INPAT SUBQ DAY: CPT

## 2024-01-24 PROCEDURE — 25000003 PHARM REV CODE 250

## 2024-01-24 PROCEDURE — 20000000 HC ICU ROOM

## 2024-01-24 PROCEDURE — 84100 ASSAY OF PHOSPHORUS: CPT

## 2024-01-24 PROCEDURE — 25000242 PHARM REV CODE 250 ALT 637 W/ HCPCS

## 2024-01-24 PROCEDURE — 25000003 PHARM REV CODE 250: Performed by: REGISTERED NURSE

## 2024-01-24 PROCEDURE — 25000242 PHARM REV CODE 250 ALT 637 W/ HCPCS: Performed by: PSYCHIATRY & NEUROLOGY

## 2024-01-24 PROCEDURE — 27100171 HC OXYGEN HIGH FLOW UP TO 24 HOURS

## 2024-01-24 PROCEDURE — 99499 UNLISTED E&M SERVICE: CPT | Mod: ,,, | Performed by: REGISTERED NURSE

## 2024-01-24 PROCEDURE — 63600175 PHARM REV CODE 636 W HCPCS: Performed by: REGISTERED NURSE

## 2024-01-24 PROCEDURE — 80053 COMPREHEN METABOLIC PANEL: CPT

## 2024-01-24 PROCEDURE — 85025 COMPLETE CBC W/AUTO DIFF WBC: CPT

## 2024-01-24 PROCEDURE — 99900035 HC TECH TIME PER 15 MIN (STAT)

## 2024-01-24 PROCEDURE — 99900026 HC AIRWAY MAINTENANCE (STAT)

## 2024-01-24 PROCEDURE — 63600175 PHARM REV CODE 636 W HCPCS

## 2024-01-24 PROCEDURE — 94761 N-INVAS EAR/PLS OXIMETRY MLT: CPT

## 2024-01-24 RX ORDER — BUDESONIDE 0.25 MG/2ML
0.25 INHALANT ORAL EVERY 12 HOURS
Status: DISCONTINUED | OUTPATIENT
Start: 2024-01-24 | End: 2024-01-25 | Stop reason: HOSPADM

## 2024-01-24 RX ORDER — HALOPERIDOL 5 MG/ML
5 INJECTION INTRAMUSCULAR ONCE
Status: COMPLETED | OUTPATIENT
Start: 2024-01-24 | End: 2024-01-24

## 2024-01-24 RX ORDER — RISPERIDONE 1 MG/ML
0.5 SOLUTION ORAL 2 TIMES DAILY
Status: DISCONTINUED | OUTPATIENT
Start: 2024-01-24 | End: 2024-01-25 | Stop reason: HOSPADM

## 2024-01-24 RX ORDER — AMOXICILLIN 250 MG
2 CAPSULE ORAL 2 TIMES DAILY
Status: DISCONTINUED | OUTPATIENT
Start: 2024-01-24 | End: 2024-01-25 | Stop reason: HOSPADM

## 2024-01-24 RX ORDER — SILODOSIN 4 MG/1
4 CAPSULE ORAL DAILY
Status: DISCONTINUED | OUTPATIENT
Start: 2024-01-25 | End: 2024-01-25 | Stop reason: HOSPADM

## 2024-01-24 RX ORDER — FAMOTIDINE 20 MG/1
20 TABLET, FILM COATED ORAL 2 TIMES DAILY
Status: DISCONTINUED | OUTPATIENT
Start: 2024-01-24 | End: 2024-01-25 | Stop reason: HOSPADM

## 2024-01-24 RX ORDER — PROPRANOLOL HYDROCHLORIDE 10 MG/1
20 TABLET ORAL 3 TIMES DAILY
Status: DISCONTINUED | OUTPATIENT
Start: 2024-01-24 | End: 2024-01-25 | Stop reason: HOSPADM

## 2024-01-24 RX ORDER — OXYCODONE HYDROCHLORIDE 10 MG/1
10 TABLET ORAL EVERY 8 HOURS
Status: DISCONTINUED | OUTPATIENT
Start: 2024-01-24 | End: 2024-01-24

## 2024-01-24 RX ORDER — OXYCODONE HYDROCHLORIDE 10 MG/1
10 TABLET ORAL EVERY 8 HOURS
Status: DISCONTINUED | OUTPATIENT
Start: 2024-01-24 | End: 2024-01-25 | Stop reason: HOSPADM

## 2024-01-24 RX ORDER — MONTELUKAST SODIUM 10 MG/1
10 TABLET ORAL DAILY
Status: DISCONTINUED | OUTPATIENT
Start: 2024-01-25 | End: 2024-01-25 | Stop reason: HOSPADM

## 2024-01-24 RX ADMIN — SENNOSIDES AND DOCUSATE SODIUM 2 TABLET: 50; 8.6 TABLET ORAL at 08:01

## 2024-01-24 RX ADMIN — PROPRANOLOL HYDROCHLORIDE 20 MG: 10 TABLET ORAL at 08:01

## 2024-01-24 RX ADMIN — HALOPERIDOL LACTATE 5 MG: 5 INJECTION, SOLUTION INTRAMUSCULAR at 04:01

## 2024-01-24 RX ADMIN — MICONAZOLE NITRATE: 20 OINTMENT TOPICAL at 08:01

## 2024-01-24 RX ADMIN — CLOBAZAM 10 MG: 10 TABLET ORAL at 08:01

## 2024-01-24 RX ADMIN — FAMOTIDINE 20 MG: 20 TABLET, FILM COATED ORAL at 08:01

## 2024-01-24 RX ADMIN — ENOXAPARIN SODIUM 40 MG: 40 INJECTION SUBCUTANEOUS at 04:01

## 2024-01-24 RX ADMIN — IPRATROPIUM BROMIDE AND ALBUTEROL SULFATE 3 ML: .5; 3 SOLUTION RESPIRATORY (INHALATION) at 11:01

## 2024-01-24 RX ADMIN — ACETYLCYSTEINE 4 ML: 100 INHALANT RESPIRATORY (INHALATION) at 12:01

## 2024-01-24 RX ADMIN — PROPRANOLOL HYDROCHLORIDE 20 MG: 10 TABLET ORAL at 04:01

## 2024-01-24 RX ADMIN — ACETYLCYSTEINE 4 ML: 100 INHALANT RESPIRATORY (INHALATION) at 07:01

## 2024-01-24 RX ADMIN — ACETYLCYSTEINE 4 ML: 100 INHALANT RESPIRATORY (INHALATION) at 11:01

## 2024-01-24 RX ADMIN — RISPERIDONE 0.5 MG: 1 SOLUTION ORAL at 11:01

## 2024-01-24 RX ADMIN — FENTANYL CITRATE 75 MCG: 50 INJECTION INTRAMUSCULAR; INTRAVENOUS at 03:01

## 2024-01-24 RX ADMIN — BISACODYL 10 MG: 10 SUPPOSITORY RECTAL at 08:01

## 2024-01-24 RX ADMIN — IPRATROPIUM BROMIDE AND ALBUTEROL SULFATE 3 ML: .5; 3 SOLUTION RESPIRATORY (INHALATION) at 07:01

## 2024-01-24 RX ADMIN — IPRATROPIUM BROMIDE AND ALBUTEROL SULFATE 3 ML: .5; 3 SOLUTION RESPIRATORY (INHALATION) at 01:01

## 2024-01-24 RX ADMIN — OXYCODONE HYDROCHLORIDE 15 MG: 10 TABLET ORAL at 02:01

## 2024-01-24 RX ADMIN — RISPERIDONE 0.5 MG: 1 SOLUTION ORAL at 08:01

## 2024-01-24 RX ADMIN — BUDESONIDE 0.25 MG: 0.25 INHALANT RESPIRATORY (INHALATION) at 07:01

## 2024-01-24 RX ADMIN — OXYCODONE HYDROCHLORIDE 10 MG: 10 TABLET ORAL at 09:01

## 2024-01-24 RX ADMIN — SILODOSIN 4 MG: 4 CAPSULE ORAL at 08:01

## 2024-01-24 RX ADMIN — MONTELUKAST 10 MG: 10 TABLET, FILM COATED ORAL at 08:01

## 2024-01-24 RX ADMIN — BUDESONIDE INHALATION 0.25 MG: 0.5 SUSPENSION RESPIRATORY (INHALATION) at 07:01

## 2024-01-24 RX ADMIN — OXYCODONE HYDROCHLORIDE 10 MG: 10 TABLET ORAL at 01:01

## 2024-01-24 RX ADMIN — IPRATROPIUM BROMIDE AND ALBUTEROL SULFATE 3 ML: .5; 3 SOLUTION RESPIRATORY (INHALATION) at 12:01

## 2024-01-24 RX ADMIN — OXYCODONE HYDROCHLORIDE 15 MG: 10 TABLET ORAL at 06:01

## 2024-01-24 RX ADMIN — ACETYLCYSTEINE 4 ML: 100 INHALANT RESPIRATORY (INHALATION) at 01:01

## 2024-01-24 NOTE — ASSESSMENT & PLAN NOTE
54 y/o M 12 days post respiratory failure c/b cardiac arrest (Epi x 3, CPR x 3, Shock x 1) with RSE in setting of propofol wean despite continued keppra, vimpat, depacon administration. He arrived to [facility] on 80 mcg/kg/min of non-titrating propofol from [facility] which was dropped to 50 mcg/kg/min, followed by the immediate addition of 25 mcg/kg/min of ketamine. EEG cap placed and epilepsy notified.    -No seizures on cEEG - discontinued  -Continue weaning AEDs  -Onfi decreased to 10 BID

## 2024-01-24 NOTE — PLAN OF CARE
"[facility] Care Plan    POC reviewed with Aaron Pruitt and family at 0300. Pt family verbalized understanding. Questions and concerns addressed. No acute events overnight. Pt progressing toward goals. Will continue to monitor. See below and flowsheets for full assessment and VS info.           Is this a stroke patient? no    Neuro:  [place] Coma Scale  Best Eye Response: 4-->(E4) spontaneous  Best Motor Response: 4-->(M4) withdraws from pain  Best Verbal Response: 1-->(V1) none  [place] Coma Scale Score: 9  Assessment Qualifiers: patient intubated  Pupil PERRLA: yes     24hr Temp:  [98.9 °F (37.2 °C)-99.7 °F (37.6 °C)]     CV:   Rhythm: sinus tachycardia  BP goals:   SBP < 160  MAP > 65    Resp:      Vent Mode: A/C  Set Rate: 14 BPM  Oxygen Concentration (%): 30  Vt Set: 480 mL  PEEP/CPAP: 5 cmH20    Plan: trach in place    GI/:     Diet/Nutrition Received: NPO  Last Bowel Movement: 01/20/24  Voiding Characteristics: external catheter    Intake/Output Summary (Last 24 hours) at 1/24/2024 0628  Last data filed at 1/23/2024 1800  Gross per 24 hour   Intake 327.98 ml   Output 480 ml   Net -152.02 ml     Unmeasured Output  Urine Occurrence: 1  Stool Occurrence: 1  Pad Count: 1    Labs/Accuchecks:  Recent Labs   Lab 01/23/24  0313   WBC 8.21   RBC 2.78*   HGB 8.5*   HCT 26.4*         Recent Labs   Lab 01/23/24  0313      K 4.7   CO2 19*      BUN 18   CREATININE 0.7   ALKPHOS 98   ALT 57*   AST 45*   BILITOT 0.6      Recent Labs   Lab 01/21/24  1801   INR 1.1    No results for input(s): "CPK", "CPKMB", "TROPONINI", "MB" in the last 168 hours.    Electrolytes: N/A - electrolytes WDL  Accuchecks: none    Gtts:      LDA/Wounds:    Nurses Note -- 4 Eyes      1/24/2024   6:28 AM      Skin assessed during: Q Shift Change      [x] No Altered Skin Integrity Present    []Prevention Measures Documented      [] Yes- Altered Skin Integrity Present or Discovered   [] LDA Added if Not in Epic (Describe Wound)   [] New " Altered Skin Integrity was Present on Admit and Documented in LDA   [] Wound Image Taken    Wound Care Consulted? No    Attending Nurse:  Sakina Sky RN/Staff Member:

## 2024-01-24 NOTE — SUBJECTIVE & OBJECTIVE
Interval History:  See hospital course.    Review of Systems   Unable to perform ROS: Intubated     Objective:     Vitals:  Temp: 99.7 °F (37.6 °C)  Pulse: 104  Rhythm: sinus tachycardia  BP: (!) 123/96  MAP (mmHg): 106  Resp: (!) 25  SpO2: 97 %  Oxygen Concentration (%): 30  Vent Mode: A/C  Set Rate: 14 BPM  Vt Set: 480 mL  PEEP/CPAP: 5 cmH20  Peak Airway Pressure: 17 cmH20  Mean Airway Pressure: 5.7 cmH20  Plateau Pressure: 0 cmH20    Temp  Min: 98.2 °F (36.8 °C)  Max: 99.7 °F (37.6 °C)  Pulse  Min: 76  Max: 118  BP  Min: 87/54  Max: 156/101  MAP (mmHg)  Min: 66  Max: 120  Resp  Min: 14  Max: 54  SpO2  Min: 95 %  Max: 100 %  Oxygen Concentration (%)  Min: 30  Max: 30    01/22 0701 - 01/23 0700  In: 1749.9 [I.V.:549.9]  Out: 830 [Urine:480; Drains:350]   Unmeasured Output  Urine Occurrence: 1  Stool Occurrence: 1  Pad Count: 1        Physical Exam  Vitals and nursing note reviewed.   Constitutional:       Interventions: He is sedated and intubated.   Eyes:      Pupils: Pupils are equal, round, and reactive to light.   Cardiovascular:      Rate and Rhythm: Regular rhythm. Tachycardia present.      Pulses: Normal pulses.   Pulmonary:      Effort: Pulmonary effort is normal. He is intubated.      Comments: Excessive coughing, minimal secretions  Abdominal:      Palpations: Abdomen is soft.   Skin:     General: Skin is warm and dry.      Capillary Refill: Capillary refill takes less than 2 seconds.   Neurological:      GCS: GCS eye subscore is 4. GCS verbal subscore is 1. GCS motor subscore is 3.      Comments:   -E4 V1T M3  -PERRL  -No response to noxious stimuli w/ RUE  -Intermittent spontaneous movement w/ LUE, but not to noxious stimuli nor purposeful  -TF w/ BLE  -Corneals/cough/gag intact       Unable to test orientation, language, memory, judgment, insight, fund of knowledge, hearing, shoulder shrug, tongue protrusion, coordination, gait due to level of consciousness.       Medications:  Continuousfentanyl, Last  Rate: 0 mcg/hr (01/23/24 1450)    Scheduledacetylcysteine 100 mg/ml (10%), 4 mL, Q6H  bisacodyL, 10 mg, Daily  budesonide, 0.25 mg, Q12H  cloBAZam, 10 mg, BID  enoxparin, 40 mg, Q24H (prophylaxis, 1700)  famotidine, 20 mg, BID  miconazole nitrate 2%, , BID  montelukast, 10 mg, Daily  oxyCODONE, 15 mg, Q4H  propranoloL, 20 mg, TID  senna-docusate 8.6-50 mg, 2 tablet, BID  silodosin, 4 mg, Daily    PRNacetaminophen, 325 mg, Q6H PRN  albuterol-ipratropium, 3 mL, Q6H PRN  benzocaine, , QID PRN  fentaNYL, 75 mcg, Q1H PRN  hydrALAZINE, 10 mg, Q4H PRN  labetalol, 10 mg, Q4H PRN  magnesium oxide, 800 mg, PRN  magnesium oxide, 800 mg, PRN  potassium bicarbonate, 35 mEq, PRN  potassium bicarbonate, 50 mEq, PRN  potassium bicarbonate, 60 mEq, PRN  potassium, sodium phosphates, 2 packet, PRN  potassium, sodium phosphates, 2 packet, PRN  potassium, sodium phosphates, 2 packet, PRN      Today I personally reviewed pertinent medications, lines/drains/airways, imaging, cardiology results, laboratory results, notably: CBC, CMP, ABGs, CXR    Diet  Diet NPO  Diet NPO

## 2024-01-24 NOTE — PLAN OF CARE
CM received a voicemail from Amanda Tobias (mother) [phone].  Per patient's mother, [facility] in [place] is her choice.  JORGE LUIS left a Voicemail for Jade at [facility] [phone] informing her of family choice.      Discharge Plan A and Plan B have been determined by review of patient's clinical status, future medical and therapeutic needs, and coverage/benefits for post-acute care in coordination with multidisciplinary team members.      Deisy Schuster RN, CCRN-K, [facility]  Neuro-Critical Care   [phone]

## 2024-01-24 NOTE — ASSESSMENT & PLAN NOTE
-Non-purposeful movement of L side  -No EEG correlate  -Likely 2/2 anoxic injury  -Wean fentanyl gtt  -Continue propranolol and scheduled oxy

## 2024-01-24 NOTE — PLAN OF CARE
01/24/24 1435   Post-Acute Status   Post-Acute Authorization Placement   Post-Acute Placement Status Pending payor review/awaiting authorization (if required)   Discharge Delays None known at this time   Discharge Plan   Discharge Plan A Long-term acute care facility (LTAC)     [facility] today.    SW will follow.      Deisy Kaplan LMSW  PRN - [facility]  [id number]    2022 21:13

## 2024-01-24 NOTE — ASSESSMENT & PLAN NOTE
52 y/o M transferred to [facility] 12 days post respiratory failure c/b cardiac arrest (Epi x 3, CPR x 3, Shock x 1) with RSE in setting of propofol wean despite continued keppra, vimpat, depacon administration.     -Cardiac arrest on 1/1 requiring 3 rounds of CPR, Epi x 3, and 1 shock to obtain ROSC; likely respiratory etiology of arrest 2/2 asthma exacerbation   -S/p TTM, maintain normothermia  -MRI x 2 concerning for anoxic injury  -Long term prognosis poor given imaging findings, persistent malignant patterns on cEEG>2 wks out from arrest  -Admitted to [facility]CC  -VS q1h  -Neuro checks q4h  -Concern for neuro storming d/t spontaneous flailing movements w/o epileptiform correlate   -Continue oxycodone/propranolol  -Wean fentanyl gtt

## 2024-01-24 NOTE — ASSESSMENT & PLAN NOTE
-No improvement in neuro exam despite improvement in seizures  -Continues to have non-purposeful movements; appear to be stimulation induced, suspect neuro storming  -Wean fentanyl gtt  -Scheduled oxy increased  -Propranolol TID

## 2024-01-24 NOTE — PLAN OF CARE
[facility] Care Plan    POC reviewed with Aaronky Pruitt and family at 1400. Pt unable to verbalized understanding due to brain injury and trach in place. Bedside family able to verbalize understanding. Questions and concerns addressed. No acute events today. Pt progressing toward goals. Will continue to monitor. See below and flowsheets for full assessment and VS info.   - Pt much much improved today after treatment with haldol last night and started on risperidone .5mg today per PEG tube.   - pt rested well, no longer with aggressive jerking movements noted  - normal cough patterned noted when needing suctioning either in line or orally  - restraint have been DC and pt has not had any issues with these off.   - Pt was able to be bathed and full linen changed without issues or incident. Pt tolerated very well.   - TF have been able to be restarted and currently running at 30 with a goal of 50  - pt has had no fevers all day. Pt has not been diaphoretic at all today  - Condom cath replaced with bath and pt tolerated well. Voiding without issues  - Pt was set to spontaneous on the vent during rounds this morning and this afternoon at 1300 pt was transitioned to trach collar 10/40 and has been tolerating very well without any issues  - pt has not been tachycardic at all today. VSS with any issue all day today.   - family is aware of the changes made today and they are very happy that is pt doing better and it able to rest and be comfortable.   - trach, Peg, PIVs 2 remain in place and in good working order without any issues at this time.             Is this a stroke patient? no    Neuro:  Ellie Coma Scale  Best Eye Response: 4-->(E4) spontaneous  Best Motor Response: 4-->(M4) withdraws from pain  Best Verbal Response: 1-->(V1) none  [place] Coma Scale Score: 9  Assessment Qualifiers: patient intubated, patient chemically sedated or paralyzed  Pupil PERRLA: yes     24 hr Temp:  [98.5 °F (36.9 °C)-99.2 °F (37.3 °C)]     CV:  "  Rhythm: sinus tachycardia  BP goals:   SBP < 180  MAP > 65    Resp:      Vent Mode: Spont  Set Rate: 14 BPM  Oxygen Concentration (%): 40  Vt Set: 480 mL  PEEP/CPAP: 5 cmH20  Pressure Support: 5 cmH20    Plan: trach in place    GI/:     Diet/Nutrition Received: tube feeding  Last Bowel Movement: 01/20/24  Voiding Characteristics: external catheter    Intake/Output Summary (Last 24 hours) at 1/24/2024 1733  Last data filed at 1/24/2024 1600  Gross per 24 hour   Intake 350 ml   Output 370 ml   Net -20 ml     Unmeasured Output  Urine Occurrence: 2  Stool Occurrence: 1  Pad Count: 3    Labs/Accuchecks:  Recent Labs   Lab 01/24/24  0600   WBC 10.11   RBC 2.93*   HGB 8.9*   HCT 26.5*         Recent Labs   Lab 01/24/24  0600      K 3.3*   CO2 19*      BUN 21*   CREATININE 0.7   ALKPHOS 90   ALT 42   AST 31   BILITOT 0.7      Recent Labs   Lab 01/21/24  1801   INR 1.1    No results for input(s): "CPK", "CPKMB", "TROPONINI", "MB" in the last 168 hours.    Electrolytes: N/A - electrolytes WDL  Accuchecks: none    Gtts:      LDA/Wounds:    Nurses Note -- 4 Eyes      1/24/2024   5:33 PM      Skin assessed during: Daily Assessment      [x] No Altered Skin Integrity Present    [x]Prevention Measures Documented      [] Yes- Altered Skin Integrity Present or Discovered   [] LDA Added if Not in Epic (Describe Wound)   [] New Altered Skin Integrity was Present on Admit and Documented in LDA   [] Wound Image Taken    Wound Care Consulted? No    Attending Nurse:  Sakina Sky RN/Staff Member:   Aryan KAYE   Problem: Adult Inpatient Plan of Care  Goal: Plan of Care Review  Outcome: Ongoing, Progressing  Goal: Patient-Specific Goal (Individualized)  Outcome: Ongoing, Progressing  Goal: Absence of Hospital-Acquired Illness or Injury  Outcome: Ongoing, Progressing  Goal: Optimal Comfort and Wellbeing  Outcome: Ongoing, Progressing  Goal: Readiness for Transition of Care  Outcome: Ongoing, Progressing   "   Problem: Infection  Goal: Absence of Infection Signs and Symptoms  Outcome: Ongoing, Progressing     Problem: Adjustment to Illness (Stroke, Hemorrhagic)  Goal: Optimal Coping  Outcome: Ongoing, Progressing     Problem: Bowel Elimination Impaired (Stroke, Hemorrhagic)  Goal: Effective Bowel Elimination  Outcome: Ongoing, Progressing     Problem: Cerebral Tissue Perfusion (Stroke, Hemorrhagic)  Goal: Optimal Cerebral Tissue Perfusion  Outcome: Ongoing, Progressing     Problem: Pain (Stroke, Hemorrhagic)  Goal: Acceptable Pain Control  Outcome: Ongoing, Progressing     Problem: Respiratory Compromise (Stroke, Hemorrhagic)  Goal: Effective Oxygenation and Ventilation  Outcome: Ongoing, Progressing     Problem: Urinary Elimination Impaired (Stroke, Hemorrhagic)  Goal: Effective Urinary Elimination  Outcome: Ongoing, Progressing     Problem: Skin Injury Risk Increased  Goal: Skin Health and Integrity  Outcome: Ongoing, Progressing     Problem: Impaired Wound Healing  Goal: Optimal Wound Healing  Outcome: Ongoing, Progressing     Problem: Restraint, Nonbehavioral (Nonviolent)  Goal: Absence of Harm or Injury  Outcome: Met     Problem: Communication Impairment (Mechanical Ventilation, Invasive)  Goal: Effective Communication  Outcome: Met     Problem: Device-Related Complication Risk (Mechanical Ventilation, Invasive)  Goal: Optimal Device Function  Outcome: Met     Problem: Inability to Wean (Mechanical Ventilation, Invasive)  Goal: Mechanical Ventilation Liberation  Outcome: Met     Problem: Nutrition Impairment (Mechanical Ventilation, Invasive)  Goal: Optimal Nutrition Delivery  Outcome: Ongoing, Progressing     Problem: Skin and Tissue Injury (Mechanical Ventilation, Invasive)  Goal: Absence of Device-Related Skin and Tissue Injury  Outcome: Met     Problem: Ventilator-Induced Lung Injury (Mechanical Ventilation, Invasive)  Goal: Absence of Ventilator-Induced Lung Injury  Outcome: Met     Problem: Communication  Impairment (Artificial Airway)  Goal: Effective Communication  Outcome: Ongoing, Progressing     Problem: Device-Related Complication Risk (Artificial Airway)  Goal: Optimal Device Function  Outcome: Ongoing, Progressing     Problem: Skin and Tissue Injury (Artificial Airway)  Goal: Absence of Device-Related Skin or Tissue Injury  Outcome: Ongoing, Progressing

## 2024-01-24 NOTE — ASSESSMENT & PLAN NOTE
-Patient made DNR at [facility]  -See [facility] notes dated 1/17 and 1/19  -Family decided to pursue trach/PEG - done 1/22  -LTAC pending

## 2024-01-24 NOTE — PLAN OF CARE
[facility] Care Plan    POC reviewed with Aaron Pruitt and family at 1400. Pt not able to verbalized understanding due to trach and neuro deficits. Bedside family able to verbalize understanding. Questions and concerns addressed. No acute events today. Pt progressing toward goals. Will continue to monitor. See below and flowsheets for full assessment and VS info.   - Pt weaned off fentanyl continuous IV today to help prepare for LTAC placement  - pt with incessant continuous coughing for the last 6 hours of shift. No medications, adjustments in bed, positions, vent setting, suctioning (oral, tracheal, nasal) seems to help. Bedside family is aware of all of the efforts that have been put forth by everyone, RN, RT, NCC. We have tried, fentanyl pushes, increasing oxycodone, increasing oxycodone frequency, increasing propanolol, valium, floor stocked throat spray, ordered hurricane spray and will try that when it arrived, changing trach ties, adjusting cuff, checking cuff, GI was asked to come and eval but they stated they would not. Nothing that we have tried to do has helped. Tried spontaneous setting on vent x 2 but pt has apnea episodes.   - two new PIV placed as his other were . IV dressing changed 4 times due to not sticking due to skin being moist. Tried to use all different types of tape to keep Ivs in place but none worked. Finally last redressed with concepción, so far PIV remain in place.   - pts gown was changed x4 linens changed x 2, pillow cases changed x4. Pt continues to sweat through all linen and drooling. TMAX 99.7, fans applied and air turned down for comfort, but nothing seems to help this either.   - Condom cath is on but leaks.   - Left upper and lower restraints. Started lower restraint today as when pt coughs he bring up his left knee so tami that he is kneeing himself in the trach  - TF are ready to be restarted at any time now, have not started due to pt coughing, I have concerns for pt coughing  "off the connection and getting TF and gastric contents all in bed. Will pass on to night sift is patient calms then to restart feeds.   - Trach, PEG and two PIVs remain in place and in good working order at this time.             Is this a stroke patient? no    Neuro:  [place] Coma Scale  Best Eye Response: 4-->(E4) spontaneous  Best Motor Response: 4-->(M4) withdraws from pain  Best Verbal Response: 1-->(V1) none  [place] Coma Scale Score: 9  Assessment Qualifiers: patient intubated, patient chemically sedated or paralyzed  Pupil PERRLA: yes     24 hr Temp:  [98.2 °F (36.8 °C)-99.9 °F (37.7 °C)]     CV:   Rhythm: sinus tachycardia  BP goals:   SBP < 180  MAP > 65    Resp:      Vent Mode: A/C  Set Rate: 14 BPM  Oxygen Concentration (%): 30  Vt Set: 480 mL  PEEP/CPAP: 5 cmH20    Plan: trach in place    GI/:     Diet/Nutrition Received: NPO  Last Bowel Movement: 01/20/24  Voiding Characteristics: external catheter    Intake/Output Summary (Last 24 hours) at 1/23/2024 1815  Last data filed at 1/23/2024 1500  Gross per 24 hour   Intake 855.28 ml   Output 1280 ml   Net -424.72 ml     Unmeasured Output  Urine Occurrence: 1  Stool Occurrence: 1  Pad Count: 1    Labs/Accuchecks:  Recent Labs   Lab 01/23/24  0313   WBC 8.21   RBC 2.78*   HGB 8.5*   HCT 26.4*         Recent Labs   Lab 01/23/24  0313      K 4.7   CO2 19*      BUN 18   CREATININE 0.7   ALKPHOS 98   ALT 57*   AST 45*   BILITOT 0.6      Recent Labs   Lab 01/21/24  1801   INR 1.1    No results for input(s): "CPK", "CPKMB", "TROPONINI", "MB" in the last 168 hours.    Electrolytes: N/A - electrolytes WDL  Accuchecks: none    Gtts:   fentanyl 0 mcg/hr (01/23/24 1450)       LDA/Wounds:    Nurses Note -- 4 Eyes      1/23/2024   6:15 PM      Skin assessed during: Daily Assessment      [x] No Altered Skin Integrity Present    [x]Prevention Measures Documented      [] Yes- Altered Skin Integrity Present or Discovered   [] LDA Added if Not in Epic " (Describe Wound)   [] New Altered Skin Integrity was Present on Admit and Documented in LDA   [] Wound Image Taken    Wound Care Consulted? No    Attending Nurse:  Sakina Sky RN/Staff Member:   Steve KAYE   Problem: Adult Inpatient Plan of Care  Goal: Plan of Care Review  Outcome: Ongoing, Progressing  Goal: Patient-Specific Goal (Individualized)  Outcome: Ongoing, Progressing  Goal: Absence of Hospital-Acquired Illness or Injury  Outcome: Ongoing, Progressing  Goal: Optimal Comfort and Wellbeing  Outcome: Ongoing, Progressing     Problem: Infection  Goal: Absence of Infection Signs and Symptoms  Outcome: Ongoing, Progressing     Problem: Adjustment to Illness (Stroke, Hemorrhagic)  Goal: Optimal Coping  Outcome: Ongoing, Progressing     Problem: Bowel Elimination Impaired (Stroke, Hemorrhagic)  Goal: Effective Bowel Elimination  Outcome: Ongoing, Progressing     Problem: Pain (Stroke, Hemorrhagic)  Goal: Acceptable Pain Control  Outcome: Unable to Meet, Plan Revised     Problem: Respiratory Compromise (Stroke, Hemorrhagic)  Goal: Effective Oxygenation and Ventilation  Outcome: Unable to Meet, Plan Revised     Problem: Sensorimotor Impairment (Stroke, Hemorrhagic)  Goal: Improved Sensorimotor Function  Outcome: Ongoing, Progressing     Problem: Swallowing Impairment (Stroke, Hemorrhagic)  Goal: Optimal Eating and Swallowing Without Aspiration  Outcome: Ongoing, Progressing     Problem: Urinary Elimination Impaired (Stroke, Hemorrhagic)  Goal: Effective Urinary Elimination  Outcome: Ongoing, Progressing     Problem: Skin Injury Risk Increased  Goal: Skin Health and Integrity  Outcome: Ongoing, Progressing     Problem: Impaired Wound Healing  Goal: Optimal Wound Healing  Outcome: Ongoing, Progressing     Problem: Fall Injury Risk  Goal: Absence of Fall and Fall-Related Injury  Outcome: Ongoing, Progressing     Problem: Restraint, Nonbehavioral (Nonviolent)  Goal: Absence of Harm or Injury  Outcome:  Ongoing, Progressing     Problem: Communication Impairment (Mechanical Ventilation, Invasive)  Goal: Effective Communication  Outcome: Unable to Meet, Plan Revised     Problem: Device-Related Complication Risk (Mechanical Ventilation, Invasive)  Goal: Optimal Device Function  Outcome: Unable to Meet, Plan Revised     Problem: Inability to Wean (Mechanical Ventilation, Invasive)  Goal: Mechanical Ventilation Liberation  Outcome: Ongoing, Progressing     Problem: Nutrition Impairment (Mechanical Ventilation, Invasive)  Goal: Optimal Nutrition Delivery  Outcome: Ongoing, Progressing     Problem: Skin and Tissue Injury (Mechanical Ventilation, Invasive)  Goal: Absence of Device-Related Skin and Tissue Injury  Outcome: Ongoing, Progressing     Problem: Ventilator-Induced Lung Injury (Mechanical Ventilation, Invasive)  Goal: Absence of Ventilator-Induced Lung Injury  Outcome: Ongoing, Progressing     Problem: Communication Impairment (Artificial Airway)  Goal: Effective Communication  Outcome: Ongoing, Progressing     Problem: Device-Related Complication Risk (Artificial Airway)  Goal: Optimal Device Function  Outcome: Ongoing, Progressing     Problem: Skin and Tissue Injury (Artificial Airway)  Goal: Absence of Device-Related Skin or Tissue Injury  Outcome: Ongoing, Progressing

## 2024-01-24 NOTE — ASSESSMENT & PLAN NOTE
-S/p trach 1/22  -Current vent settings below  Vent Mode: A/C  Oxygen Concentration (%):  [30] 30  Resp Rate Total:  [14 br/min-37 br/min] 22 br/min  Vt Set:  [480 mL] 480 mL  PEEP/CPAP:  [5 cmH20] 5 cmH20  Mean Airway Pressure:  [5.7 cmH20-9.6 cmH20] 8.8 cmH20    -ABGs daily  -CXR daily  -Wean vent as tolerated  -Famotidine BID  -VAP prevention per protocol  -Excessive coughing w/ stimulation - increase scheduled oxy

## 2024-01-24 NOTE — PROGRESS NOTES
"[facility]  Neurocritical Care  Progress Note    Admit Date: 1/12/2024  Service Date: 01/23/2024  Length of Stay: 11    Subjective:     Chief Complaint: Anoxic brain injury    History of Present Illness: Mr. Aaron Pruitt is a 53 year old M with PMHx significant for COPD/Asthma c/b smoking and HTN who presents to [facility] for NCSE. He initially presented to [facility] on 01/01 after being intubated in the field following acute respiratory failure post cardiac arrest s/p resuscitation and shock x1. Per chart review, he was shoveling some dirt in his yard when he got severely short of breath and suddenly had difficulty breathing. EMS was activated by his neighbors. Upon EMS arrival, he reportedly still had a pulse, though with severe respiratory distress, and subsequently became pulseless. He was given narcan x2 without improvement. CPR and Epi x3. ROSC after 3rd round of CPR with shock x 1. CT head on arrival to [facility] without acute intracranial abnormality, and CT chest without acute pulmonary process.TTM was initiated on 01/03 with fentanyl and propofol gtts for sedation, and rewarming was initiated on 01/04 with subsequent weaning of sedation. During this period, myoclonus was clinically noted, and propofol was resumed. EEG showed NCSE, prompting the initiation of keppra and vimpat with continuation of propofol. On 01/06, there was slight improvement in EEG, with NCSE noted to be "partially treated," at which time, the patient was then loaded with depacon. EEG was improved leading to weaning of propofol with eventual [place] [facility]. MRI completed 01/08 and 01/11 completed with with GoC conversation held at [facility]. Family opted to forgo comfort measures at this point and transfer [place] [facility] [place] trial of 4th AED, per chart review. He [place] [facility] on 80 mcg/kg/min of non-titrating propofol from [facility] which was dropped to 50 mcg/kg/min, followed by the immediate addition of 25 mcg/kg/min of ketamine. EEG cap placed. "     He will be admitted to [facility] for hourly neuromonitoring and a higher level of care.    Hospital Course: 01/13: no electrographic seizures reported, triglicerides increased into low 500s, propofol decreased to 30mcg, decrease further in 4 hrs if EEG unchanged, cont ketamine at 50mcg/kg and scheduled AEDs  01/14: EEG mostly suppressed, off propofol, decrease keppra to 30mcg today with potential to turn off in am  Escalate clobazam to 20mg qday and dc depakote, cont keppra and vimpat at current doses, plan to update family today, although improvement in seizure control, no change in neuro exam  01/15/2024 Increased GPDs s/p discontinuing ketamine gtt, onfi increased to BID. Awaiting arrival of family for GOC conversation  01/16/2024 cEEG unchanged overnight, remains on ictal-interictal spectrum, d/c'd. Neuro exam unchanged, remains w/ brainstem reflexes intact, intermittent non-purposeful movements of extremities. Ongoing GOC discussions with family   01/17/2024 NAEON. Mildly hypercarbic on AM ABG, increased rate/TV for eucarbia. Starting FWF for mild hypernatremia. Ongoing GOC discussions w/ family, see ACP note for full details   01/18/2024 NAEON, hypercarbia resolved. Awaiting family decision re GOC  01/19/2024 Added propranolol and increased oxycodone for neuro storming/episodes of non-purposeful movement. Family electing to proceed w/ trach/PEG, please see ACP note for full details. Gen surgery consulted, CM/SW updated.   01/20/2024 NAEON, continued non-purposeful flailing movements of LUE/LLE. Trach/PEG next week, timing TBD  01/21/2024 Repeat routine cEEG overnight relatively unchanged from prior, slight decrease in frequency of GPDs, remains w/o evolution/seizures, no clear background, no evidence of electrographic response to stimuli. Neuro exam stably poor, slightly decreased flailing movements 2/2 increase in fent gtt. Trach/PEG/placement pending  01/22/2024 No issues overnight. Remains on fentanyl gtt d/t  continuous flailing movements. Plan for trach/PEG today. CM/SW following for LTAC placement.  01/23/2024 S/p trach/PEG yesterday. Fentanyl gtt weaned off, scheduled oxy increased. Still w/ apparent stimulation induced coughing episodes, unrelieved by PRN fentanyl/valium. Plans for DC to LTAC.    Interval History:  See hospital course.    Review of Systems   Unable to perform ROS: Intubated     Objective:     Vitals:  Temp: 99.7 °F (37.6 °C)  Pulse: 104  Rhythm: sinus tachycardia  BP: (!) 123/96  MAP (mmHg): 106  Resp: (!) 25  SpO2: 97 %  Oxygen Concentration (%): 30  Vent Mode: A/C  Set Rate: 14 BPM  Vt Set: 480 mL  PEEP/CPAP: 5 cmH20  Peak Airway Pressure: 17 cmH20  Mean Airway Pressure: 5.7 cmH20  Plateau Pressure: 0 cmH20    Temp  Min: 98.2 °F (36.8 °C)  Max: 99.7 °F (37.6 °C)  Pulse  Min: 76  Max: 118  BP  Min: 87/54  Max: 156/101  MAP (mmHg)  Min: 66  Max: 120  Resp  Min: 14  Max: 54  SpO2  Min: 95 %  Max: 100 %  Oxygen Concentration (%)  Min: 30  Max: 30    01/22 0701 - 01/23 0700  In: 1749.9 [I.V.:549.9]  Out: 830 [Urine:480; Drains:350]   Unmeasured Output  Urine Occurrence: 1  Stool Occurrence: 1  Pad Count: 1        Physical Exam  Vitals and nursing note reviewed.   Constitutional:       Interventions: He is sedated and intubated.   Eyes:      Pupils: Pupils are equal, round, and reactive to light.   Cardiovascular:      Rate and Rhythm: Regular rhythm. Tachycardia present.      Pulses: Normal pulses.   Pulmonary:      Effort: Pulmonary effort is normal. He is intubated.      Comments: Excessive coughing, minimal secretions  Abdominal:      Palpations: Abdomen is soft.   Skin:     General: Skin is warm and dry.      Capillary Refill: Capillary refill takes less than 2 seconds.   Neurological:      GCS: GCS eye subscore is 4. GCS verbal subscore is 1. GCS motor subscore is 3.      Comments:   -E4 V1T M3  -PERRL  -No response to noxious stimuli w/ RUE  -Intermittent spontaneous movement w/ LUE, but not to  noxious stimuli nor purposeful  -TF w/ BLE  -Corneals/cough/gag intact       Unable to test orientation, language, memory, judgment, insight, fund of knowledge, hearing, shoulder shrug, tongue protrusion, coordination, gait due to level of consciousness.       Medications:  Continuousfentanyl, Last Rate: 0 mcg/hr (01/23/24 1450)    Scheduledacetylcysteine 100 mg/ml (10%), 4 mL, Q6H  bisacodyL, 10 mg, Daily  budesonide, 0.25 mg, Q12H  cloBAZam, 10 mg, BID  enoxparin, 40 mg, Q24H (prophylaxis, 1700)  famotidine, 20 mg, BID  miconazole nitrate 2%, , BID  montelukast, 10 mg, Daily  oxyCODONE, 15 mg, Q4H  propranoloL, 20 mg, TID  senna-docusate 8.6-50 mg, 2 tablet, BID  silodosin, 4 mg, Daily    PRNacetaminophen, 325 mg, Q6H PRN  albuterol-ipratropium, 3 mL, Q6H PRN  benzocaine, , QID PRN  fentaNYL, 75 mcg, Q1H PRN  hydrALAZINE, 10 mg, Q4H PRN  labetalol, 10 mg, Q4H PRN  magnesium oxide, 800 mg, PRN  magnesium oxide, 800 mg, PRN  potassium bicarbonate, 35 mEq, PRN  potassium bicarbonate, 50 mEq, PRN  potassium bicarbonate, 60 mEq, PRN  potassium, sodium phosphates, 2 packet, PRN  potassium, sodium phosphates, 2 packet, PRN  potassium, sodium phosphates, 2 packet, PRN      Today I personally reviewed pertinent medications, lines/drains/airways, imaging, cardiology results, laboratory results, notably: CBC, CMP, ABGs, CXR    Diet  Diet NPO  Diet NPO    Assessment/Plan:     Neuro  * Anoxic brain injury  52 y/o M transferred [place] [facility] [place] post respiratory failure c/b cardiac arrest (Epi x 3, CPR x 3, Shock x 1) with RSE in setting of propofol wean despite continued keppra, vimpat, depacon administration.     -Cardiac arrest on 1/1 requiring 3 rounds of CPR, Epi x 3, and 1 shock to obtain ROSC; likely respiratory etiology of arrest 2/2 asthma exacerbation   -S/p TTM, maintain normothermia  -MRI x 2 concerning for anoxic injury  -Long term prognosis poor given imaging findings, persistent malignant patterns on cEEG>2 wks out  from arrest  -Admitted to [facility]  -VS q1h  -Neuro checks q4h  -Concern for neuro storming d/t spontaneous flailing movements w/o epileptiform correlate   -Continue oxycodone/propranolol  -Wean fentanyl gtt    Abnormal movement  -Non-purposeful movement of L side  -No EEG correlate  -Likely 2/2 anoxic injury  -Wean fentanyl gtt  -Continue propranolol and scheduled oxy    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 [facility] 80 mcg/kg/min of non-titrating propofol from [facility] which was dropped to 50 mcg/kg/min, followed by the immediate addition of 25 mcg/kg/min of ketamine. EEG cap placed and epilepsy notified.    -No seizures on cEEG - discontinued  -Continue weaning AEDs  -Onfi decreased to 10 BID    Acute encephalopathy  -Known NCSE post arrest; suspect encephalopathy 2/2 anoxic injury/NCSE  -See Anoxic brain injury     Seizure disorder, nonconvulsive, with status epilepticus  -2/2 cardiorespiratory arrest  -No seizures on cEEG, weaning AEDs  -See Anoxic brain injury     Pulmonary  COPD (chronic obstructive pulmonary disease)  -Known history of smoking  -Duonebs PRN  -ABGs daily  -CXR daily  -Wean vent as able    Severe asthma  -Known hx of asthma with acute onset SOB/hypoxia while shoveling dirt leading to respiratory/cardiac arrest  -Continue home montelukast/budesonide/mucomyst   -Duonebs PRN    Acute respiratory failure with hypoxia and hypercarbia  -S/p trach 1/22  -Current vent settings below  Vent Mode: A/C  Oxygen Concentration (%):  [30]  30  Resp Rate Total:  [14 br/min-37 br/min] 22 br/min  Vt Set:  [480 mL] 480 mL  PEEP/CPAP:  [5 cmH20] 5 cmH20  Mean Airway Pressure:  [5.7 cmH20-9.6 cmH20] 8.8 cmH20    -ABGs daily  -CXR daily  -Wean vent as tolerated  -Famotidine BID  -VAP prevention per protocol  -Excessive coughing w/ stimulation - increase scheduled oxy    Cardiac/Vascular  Essential hypertension  -SBP goal  < 180  -PRN labetalol/hydralazine    Cardiorespiratory arrest  -Cardiac arrest on 1/1 following asthma attack   -See Anoxic brain injury     GI  Transaminitis  -Likely DILI   -Avoid hepatotoxins  -CMP daily    Palliative Care  ACP (advance care planning)  -Patient made DNR at [facility]  -See ACP notes dated 1/17 and 1/19  -Family decided to pursue trach/PEG - done 1/22  -LTAC pending          The patient is being Prophylaxed for:  Venous Thromboembolism with: Mechanical or Chemical  Stress Ulcer with: H2B  Ventilator Pneumonia with: chlorhexidine oral care    Activity Orders            Diet NPO: NPO starting at 01/22 0001    Elevate HOB Elevate (30-45 degrees) Elevate HOB to 30 - 45 degrees during feeding unless otherwise stated starting at 01/12 1011    Turn patient starting at 01/12 0724    Elevate HOB starting at 01/12 0008          Full Code    Critical care time spent on the evaluation and treatment of severe organ dysfunction, review of pertinent labs and imaging studies, discussions with consulting providers and discussions with patient/family: 60 minutes.    Payton Chakraborty NP  Neurocritical Care  [facility] Neuro [facility]

## 2024-01-25 VITALS
BODY MASS INDEX: 21.06 KG/M2 | HEART RATE: 90 BPM | TEMPERATURE: 100 F | HEIGHT: 73 IN | WEIGHT: 158.94 LBS | OXYGEN SATURATION: 99 % | RESPIRATION RATE: 24 BRPM | SYSTOLIC BLOOD PRESSURE: 125 MMHG | DIASTOLIC BLOOD PRESSURE: 84 MMHG

## 2024-01-25 LAB
ALBUMIN SERPL BCP-MCNC: 2.6 G/DL (ref 3.5–5.2)
ALP SERPL-CCNC: 96 U/L (ref 55–135)
ALT SERPL W/O P-5'-P-CCNC: 45 U/L (ref 10–44)
ANION GAP SERPL CALC-SCNC: 10 MMOL/L (ref 8–16)
AST SERPL-CCNC: 44 U/L (ref 10–40)
BASOPHILS # BLD AUTO: 0.02 K/UL (ref 0–0.2)
BASOPHILS NFR BLD: 0.2 % (ref 0–1.9)
BILIRUB SERPL-MCNC: 0.5 MG/DL (ref 0.1–1)
BUN SERPL-MCNC: 21 MG/DL (ref 6–20)
CALCIUM SERPL-MCNC: 9.5 MG/DL (ref 8.7–10.5)
CHLORIDE SERPL-SCNC: 107 MMOL/L (ref 95–110)
CO2 SERPL-SCNC: 22 MMOL/L (ref 23–29)
CREAT SERPL-MCNC: 0.7 MG/DL (ref 0.5–1.4)
DIFFERENTIAL METHOD BLD: ABNORMAL
EOSINOPHIL # BLD AUTO: 0 K/UL (ref 0–0.5)
EOSINOPHIL NFR BLD: 0.4 % (ref 0–8)
ERYTHROCYTE [DISTWIDTH] IN BLOOD BY AUTOMATED COUNT: 12.3 % (ref 11.5–14.5)
EST. GFR  (NO RACE VARIABLE): >60 ML/MIN/1.73 M^2
GLUCOSE SERPL-MCNC: 135 MG/DL (ref 70–110)
HCT VFR BLD AUTO: 28.2 % (ref 40–54)
HGB BLD-MCNC: 9.1 G/DL (ref 14–18)
IMM GRANULOCYTES # BLD AUTO: 0.04 K/UL (ref 0–0.04)
IMM GRANULOCYTES NFR BLD AUTO: 0.4 % (ref 0–0.5)
LYMPHOCYTES # BLD AUTO: 1.3 K/UL (ref 1–4.8)
LYMPHOCYTES NFR BLD: 14.3 % (ref 18–48)
MAGNESIUM SERPL-MCNC: 2.2 MG/DL (ref 1.6–2.6)
MCH RBC QN AUTO: 29.7 PG (ref 27–31)
MCHC RBC AUTO-ENTMCNC: 32.3 G/DL (ref 32–36)
MCV RBC AUTO: 92 FL (ref 82–98)
MONOCYTES # BLD AUTO: 0.8 K/UL (ref 0.3–1)
MONOCYTES NFR BLD: 8.8 % (ref 4–15)
NEUTROPHILS # BLD AUTO: 7 K/UL (ref 1.8–7.7)
NEUTROPHILS NFR BLD: 75.9 % (ref 38–73)
NRBC BLD-RTO: 0 /100 WBC
PHOSPHATE SERPL-MCNC: 4.5 MG/DL (ref 2.7–4.5)
PLATELET # BLD AUTO: 415 K/UL (ref 150–450)
PMV BLD AUTO: 8.5 FL (ref 9.2–12.9)
POTASSIUM SERPL-SCNC: 4.2 MMOL/L (ref 3.5–5.1)
PROT SERPL-MCNC: 7.2 G/DL (ref 6–8.4)
RBC # BLD AUTO: 3.06 M/UL (ref 4.6–6.2)
SODIUM SERPL-SCNC: 139 MMOL/L (ref 136–145)
WBC # BLD AUTO: 9.19 K/UL (ref 3.9–12.7)

## 2024-01-25 PROCEDURE — 84100 ASSAY OF PHOSPHORUS: CPT

## 2024-01-25 PROCEDURE — 94668 MNPJ CHEST WALL SBSQ: CPT

## 2024-01-25 PROCEDURE — 25000242 PHARM REV CODE 250 ALT 637 W/ HCPCS

## 2024-01-25 PROCEDURE — 99900035 HC TECH TIME PER 15 MIN (STAT)

## 2024-01-25 PROCEDURE — 94761 N-INVAS EAR/PLS OXIMETRY MLT: CPT

## 2024-01-25 PROCEDURE — 83735 ASSAY OF MAGNESIUM: CPT

## 2024-01-25 PROCEDURE — 99291 CRITICAL CARE FIRST HOUR: CPT | Mod: ,,, | Performed by: INTERNAL MEDICINE

## 2024-01-25 PROCEDURE — 80053 COMPREHEN METABOLIC PANEL: CPT

## 2024-01-25 PROCEDURE — 94640 AIRWAY INHALATION TREATMENT: CPT

## 2024-01-25 PROCEDURE — 85025 COMPLETE CBC W/AUTO DIFF WBC: CPT

## 2024-01-25 PROCEDURE — 25000003 PHARM REV CODE 250

## 2024-01-25 PROCEDURE — 27100171 HC OXYGEN HIGH FLOW UP TO 24 HOURS

## 2024-01-25 PROCEDURE — 25000242 PHARM REV CODE 250 ALT 637 W/ HCPCS: Performed by: PSYCHIATRY & NEUROLOGY

## 2024-01-25 PROCEDURE — 25000003 PHARM REV CODE 250: Performed by: PSYCHIATRY & NEUROLOGY

## 2024-01-25 PROCEDURE — 25000003 PHARM REV CODE 250: Performed by: NURSE PRACTITIONER

## 2024-01-25 PROCEDURE — 99900026 HC AIRWAY MAINTENANCE (STAT)

## 2024-01-25 RX ORDER — BISACODYL 10 MG/1
10 SUPPOSITORY RECTAL DAILY
Refills: 0
Start: 2024-01-26

## 2024-01-25 RX ORDER — IPRATROPIUM BROMIDE AND ALBUTEROL SULFATE 2.5; .5 MG/3ML; MG/3ML
3 SOLUTION RESPIRATORY (INHALATION) EVERY 6 HOURS PRN
Qty: 75 ML | Refills: 0 | Status: SHIPPED | OUTPATIENT
Start: 2024-01-25 | End: 2025-01-24

## 2024-01-25 RX ORDER — POLYETHYLENE GLYCOL 3350 17 G/17G
17 POWDER, FOR SOLUTION ORAL DAILY
Refills: 0
Start: 2024-01-26

## 2024-01-25 RX ORDER — FAMOTIDINE 20 MG/1
20 TABLET, FILM COATED ORAL 2 TIMES DAILY
Qty: 60 TABLET | Refills: 11 | Status: SHIPPED | OUTPATIENT
Start: 2024-01-25 | End: 2025-01-24

## 2024-01-25 RX ORDER — AMOXICILLIN 250 MG
2 CAPSULE ORAL 2 TIMES DAILY
Start: 2024-01-25

## 2024-01-25 RX ORDER — ENOXAPARIN SODIUM 100 MG/ML
40 INJECTION SUBCUTANEOUS EVERY 24 HOURS
Start: 2024-01-25

## 2024-01-25 RX ORDER — MONTELUKAST SODIUM 10 MG/1
10 TABLET ORAL DAILY
Qty: 30 TABLET | Refills: 0 | Status: SHIPPED | OUTPATIENT
Start: 2024-01-26 | End: 2024-02-25

## 2024-01-25 RX ORDER — RISPERIDONE 1 MG/ML
0.5 SOLUTION ORAL 2 TIMES DAILY
Qty: 30 ML | Refills: 11 | Status: SHIPPED | OUTPATIENT
Start: 2024-01-25 | End: 2025-01-24

## 2024-01-25 RX ORDER — BISACODYL 10 MG/1
10 SUPPOSITORY RECTAL DAILY PRN
Status: DISCONTINUED | OUTPATIENT
Start: 2024-01-25 | End: 2024-01-25 | Stop reason: HOSPADM

## 2024-01-25 RX ORDER — POLYETHYLENE GLYCOL 3350 17 G/17G
17 POWDER, FOR SOLUTION ORAL DAILY
Status: DISCONTINUED | OUTPATIENT
Start: 2024-01-25 | End: 2024-01-25 | Stop reason: HOSPADM

## 2024-01-25 RX ORDER — SILODOSIN 4 MG/1
4 CAPSULE ORAL DAILY
Qty: 30 CAPSULE | Refills: 11
Start: 2024-01-26 | End: 2025-01-25

## 2024-01-25 RX ORDER — BUDESONIDE 0.25 MG/2ML
0.25 INHALANT ORAL EVERY 12 HOURS
Refills: 0
Start: 2024-01-25 | End: 2025-01-24

## 2024-01-25 RX ORDER — PROPRANOLOL HYDROCHLORIDE 20 MG/1
20 TABLET ORAL 3 TIMES DAILY
Qty: 90 TABLET | Refills: 11 | Status: SHIPPED | OUTPATIENT
Start: 2024-01-25 | End: 2025-01-24

## 2024-01-25 RX ADMIN — PROPRANOLOL HYDROCHLORIDE 20 MG: 10 TABLET ORAL at 08:01

## 2024-01-25 RX ADMIN — ACETYLCYSTEINE 4 ML: 100 INHALANT RESPIRATORY (INHALATION) at 07:01

## 2024-01-25 RX ADMIN — BUDESONIDE 0.25 MG: 0.25 INHALANT RESPIRATORY (INHALATION) at 07:01

## 2024-01-25 RX ADMIN — OXYCODONE HYDROCHLORIDE 10 MG: 10 TABLET ORAL at 01:01

## 2024-01-25 RX ADMIN — OXYCODONE HYDROCHLORIDE 10 MG: 10 TABLET ORAL at 05:01

## 2024-01-25 RX ADMIN — SENNOSIDES AND DOCUSATE SODIUM 2 TABLET: 50; 8.6 TABLET ORAL at 08:01

## 2024-01-25 RX ADMIN — FAMOTIDINE 20 MG: 20 TABLET, FILM COATED ORAL at 08:01

## 2024-01-25 RX ADMIN — RISPERIDONE 0.5 MG: 1 SOLUTION ORAL at 08:01

## 2024-01-25 RX ADMIN — MONTELUKAST 10 MG: 10 TABLET, FILM COATED ORAL at 08:01

## 2024-01-25 RX ADMIN — SILODOSIN 4 MG: 4 CAPSULE ORAL at 08:01

## 2024-01-25 RX ADMIN — POLYETHYLENE GLYCOL 3350 17 G: 17 POWDER, FOR SOLUTION ORAL at 08:01

## 2024-01-25 RX ADMIN — MICONAZOLE NITRATE: 20 OINTMENT TOPICAL at 08:01

## 2024-01-25 RX ADMIN — BISACODYL 10 MG: 10 SUPPOSITORY RECTAL at 08:01

## 2024-01-25 RX ADMIN — PROPRANOLOL HYDROCHLORIDE 20 MG: 10 TABLET ORAL at 02:01

## 2024-01-25 NOTE — ASSESSMENT & PLAN NOTE
-Non-purposeful movement of L side  -No EEG correlate  -Likely 2/2 anoxic injury  -Significant improvement seen w/ admin of haldol/risperidone  -Scheduled oxy decreased

## 2024-01-25 NOTE — ASSESSMENT & PLAN NOTE
-No improvement in neuro exam despite improvement in seizures  -Continues to have non-purposeful movements; appear to be stimulation induced, suspect neuro storming  -Risperidone BID for coughing/flailing  -Scheduled oxy decreased to 10 q8  -Coughing/flailing significantly improved following admin of haldol/risperidone

## 2024-01-25 NOTE — PLAN OF CARE
01/25/24 1336   Post-Acute Status   Post-Acute Authorization Placement   Post-Acute Placement Status Set-up Complete/Auth obtained   Discharge Delays None known at this time   Discharge Plan   Discharge Plan A Long-term acute care facility (LTAC)     ANYA sent all clinicals to [facility] Phone: [phone]    via [place] for review. Auth has been received. SW will continue to follow patient.    2:13 PM   SW arranged ambulance transport via [facility]. Requested  time is now.  Requested  time does not guarantee arrival time.  If transport does not arrive by 4:00 Pm  please contact assigned SW or on-call for assistance.    Patient's bedside nurse and family notified of the above.   SW spoke with mother via phone to informed pt being transported to LTAC today. Pt has no other issues pending.    Deisy Kaplan LMSW  PRN - [facility]  [phone]

## 2024-01-25 NOTE — PROGRESS NOTES
"[facility]  Neurocritical Care  Progress Note    Admit Date: 1/12/2024  Service Date: 01/24/2024  Length of Stay: 12    Subjective:     Chief Complaint: Anoxic brain injury    History of Present Illness: Mr. Aaron Pruitt is a 53 year old M with PMHx significant for COPD/Asthma c/b smoking and HTN who presents to [facility] for NCSE. He initially presented to [facility] on 01/01 after being intubated in the field following acute respiratory failure post cardiac arrest s/p resuscitation and shock x1. Per chart review, he was shoveling some dirt in his yard when he got severely short of breath and suddenly had difficulty breathing. EMS was activated by his neighbors. Upon EMS arrival, he reportedly still had a pulse, though with severe respiratory distress, and subsequently became pulseless. He was given narcan x2 without improvement. CPR and Epi x3. ROSC after 3rd round of CPR with shock x 1. CT head on arrival to [facility] without acute intracranial abnormality, and CT chest without acute pulmonary process.TTM was initiated on 01/03 with fentanyl and propofol gtts for sedation, and rewarming was initiated on 01/04 with subsequent weaning of sedation. During this period, myoclonus was clinically noted, and propofol was resumed. EEG showed NCSE, prompting the initiation of keppra and vimpat with continuation of propofol. On 01/06, there was slight improvement in EEG, with NCSE noted to be "partially treated," at which time, the patient was then loaded with depacon. EEG was improved leading to weaning of propofol with eventual return to [facility]. MRI completed 01/08 and 01/11 completed with with GoC conversation held at [facility]. Family opted to forgo comfort measures at this point and transfer to [facility] for trial of 4th AED, per chart review. He arrived to [facility] on 80 mcg/kg/min of non-titrating propofol from [facility] which was dropped to 50 mcg/kg/min, followed by the immediate addition of 25 mcg/kg/min of ketamine. EEG cap placed. "     He will be admitted to [facility] for hourly neuromonitoring and a higher level of care.    Hospital Course: 01/13: no electrographic seizures reported, triglicerides increased into low 500s, propofol decreased to 30mcg, decrease further in 4 hrs if EEG unchanged, cont ketamine at 50mcg/kg and scheduled AEDs  01/14: EEG mostly suppressed, off propofol, decrease keppra to 30mcg today with potential to turn off in am  Escalate clobazam to 20mg qday and dc depakote, cont keppra and vimpat at current doses, plan to update family today, although improvement in seizure control, no change in neuro exam  01/15/2024 Increased GPDs s/p discontinuing ketamine gtt, onfi increased to BID. Awaiting arrival of family for GOC conversation  01/16/2024 cEEG unchanged overnight, remains on ictal-interictal spectrum, d/c'd. Neuro exam unchanged, remains w/ brainstem reflexes intact, intermittent non-purposeful movements of extremities. Ongoing GOC discussions with family   01/17/2024 NAEON. Mildly hypercarbic on AM ABG, increased rate/TV for eucarbia. Starting FWF for mild hypernatremia. Ongoing GOC discussions w/ family, see ACP note for full details   01/18/2024 NAEON, hypercarbia resolved. Awaiting family decision re GOC  01/19/2024 Added propranolol and increased oxycodone for neuro storming/episodes of non-purposeful movement. Family electing to proceed w/ trach/PEG, please see ACP note for full details. Gen surgery consulted, CM/SW updated.   01/20/2024 NAEON, continued non-purposeful flailing movements of LUE/LLE. Trach/PEG next week, timing TBD  01/21/2024 Repeat routine cEEG overnight relatively unchanged from prior, slight decrease in frequency of GPDs, remains w/o evolution/seizures, no clear background, no evidence of electrographic response to stimuli. Neuro exam stably poor, slightly decreased flailing movements 2/2 increase in fent gtt. Trach/PEG/placement pending  01/22/2024 No issues overnight. Remains on fentanyl gtt d/t  continuous flailing movements. Plan for trach/PEG today. CM/SW following for LTAC placement.  01/23/2024 S/p trach/PEG yesterday. Fentanyl gtt weaned off, scheduled oxy increased. Still w/ apparent stimulation induced coughing episodes, unrelieved by PRN fentanyl/valium. Plans for DC to LTAC.  01/24/2024 Haldol given overnight, significantly improved coughing/flailing episodes. Passed SBT today, placed on trach collar. Stable for discharge to LTAC.    Interval History:  See hospital course.    Review of Systems   Unable to perform ROS: Intubated     Objective:     Vitals:  Temp: 98.7 °F (37.1 °C)  Pulse: 75  Rhythm: sinus tachycardia  BP: (!) 107/59  MAP (mmHg): 77  Resp: (!) 29  SpO2: 100 %  Oxygen Concentration (%): 40  Vent Mode: Spont  Set Rate: 14 BPM  Vt Set: 480 mL  Pressure Support: 5 cmH20  PEEP/CPAP: 5 cmH20  Peak Airway Pressure: 9.6 cmH20  Mean Airway Pressure: 7.2 cmH20  Plateau Pressure: 13 cmH20    Temp  Min: 98.5 °F (36.9 °C)  Max: 99.2 °F (37.3 °C)  Pulse  Min: 75  Max: 119  BP  Min: 99/63  Max: 143/85  MAP (mmHg)  Min: 71  Max: 108  Resp  Min: 13  Max: 42  SpO2  Min: 90 %  Max: 100 %  Oxygen Concentration (%)  Min: 30  Max: 40    01/23 0701 - 01/24 0700  In: 305.4 [I.V.:175.4]  Out: 550 [Urine:460; Drains:90]   Unmeasured Output  Urine Occurrence: 2  Stool Occurrence: 1  Pad Count: 3        Physical Exam  Vitals and nursing note reviewed.   Constitutional:       Interventions: He is sedated and intubated.   Eyes:      Pupils: Pupils are equal, round, and reactive to light.   Cardiovascular:      Rate and Rhythm: Regular rhythm. Tachycardia present.      Pulses: Normal pulses.   Pulmonary:      Effort: Pulmonary effort is normal. He is intubated.      Comments: Excessive coughing, minimal secretions  Abdominal:      Palpations: Abdomen is soft.   Skin:     General: Skin is warm and dry.      Capillary Refill: Capillary refill takes less than 2 seconds.   Neurological:      GCS: GCS eye subscore is  4. GCS verbal subscore is 1. GCS motor subscore is 3.      Comments:   -E4 V1T M3  -PERRL  -Intermittent flexion w/ RUE to painful stimuli  -Intermittent spontaneous movement w/ LUE/LLE; does not appear purposeful  -TF w/ BLE  -Corneals/cough/gag intact       Unable to test orientation, language, memory, judgment, insight, fund of knowledge, hearing, shoulder shrug, tongue protrusion, coordination, gait due to level of consciousness.       Medications:  Continuous   Scheduledacetylcysteine 100 mg/ml (10%), 4 mL, Q6H  bisacodyL, 10 mg, Daily  budesonide, 0.25 mg, Q12H  enoxparin, 40 mg, Q24H (prophylaxis, 1700)  famotidine, 20 mg, BID  miconazole nitrate 2%, , BID  [START ON 1/25/2024] montelukast, 10 mg, Daily  oxyCODONE, 10 mg, Q8H  propranoloL, 20 mg, TID  risperidone 1 mg/ml, 0.5 mg, BID  senna-docusate 8.6-50 mg, 2 tablet, BID  [START ON 1/25/2024] silodosin, 4 mg, Daily    PRNacetaminophen, 325 mg, Q6H PRN  albuterol-ipratropium, 3 mL, Q6H PRN  benzocaine, , QID PRN  fentaNYL, 75 mcg, Q1H PRN  hydrALAZINE, 10 mg, Q4H PRN  labetalol, 10 mg, Q4H PRN  magnesium oxide, 800 mg, PRN  magnesium oxide, 800 mg, PRN  potassium bicarbonate, 35 mEq, PRN  potassium bicarbonate, 50 mEq, PRN  potassium bicarbonate, 60 mEq, PRN  potassium, sodium phosphates, 2 packet, PRN  potassium, sodium phosphates, 2 packet, PRN  potassium, sodium phosphates, 2 packet, PRN      Today I personally reviewed pertinent medications, lines/drains/airways, imaging, cardiology results, laboratory results, notably: CBC, CMP    Diet  Diet NPO  Diet NPO    Assessment/Plan:     Neuro  * Anoxic brain injury  52 y/o M transferred to [facility] 12 days post respiratory failure c/b cardiac arrest (Epi x 3, CPR x 3, Shock x 1) with RSE in setting of propofol wean despite continued keppra, vimpat, depacon administration.     -Cardiac arrest on 1/1 requiring 3 rounds of CPR, Epi x 3, and 1 shock to obtain ROSC; likely respiratory etiology of arrest 2/2 asthma  exacerbation   -S/p TTM, maintain normothermia  -MRI x 2 concerning for anoxic injury  -Long term prognosis poor given imaging findings, persistent malignant patterns on cEEG>2 wks out from arrest  -Admitted to [facility]  -VS q1h  -Neuro checks q4h  -Concern for neuro storming d/t spontaneous flailing movements w/o epileptiform correlate   -Continue propranolol  -Coughing/flailing significantly improved w/ admin of haldol/risperidone  -Scheduled oxy decreased  -Fentanyl PRN  -Stable for discharge to LTAC    Abnormal movement  -Non-purposeful movement of L side  -No EEG correlate  -Likely 2/2 anoxic injury  -Significant improvement seen w/ admin of haldol/risperidone  -Scheduled oxy decreased  -Fentanyl PRN    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 He arrived to [facility] on 80 mcg/kg/min of non-titrating propofol from [facility] which was dropped to 50 mcg/kg/min, followed by the immediate addition of 25 mcg/kg/min of ketamine. EEG cap placed and epilepsy notified.    -No seizures on cEEG  -AEDs discontinued  -Stable for discharge to LTAC    Acute encephalopathy  -Known NCSE post arrest; suspect encephalopathy 2/2 anoxic injury/NCSE  -See Anoxic brain injury     Seizure disorder, nonconvulsive, with status epilepticus  -2/2 cardiorespiratory arrest  -No seizures on cEEG; AEDs discontinued  -See Anoxic brain injury     Pulmonary  COPD (chronic obstructive pulmonary  disease)  -Known history of smoking  -Duonebs PRN  -Trach collar    Severe asthma  -Known hx of asthma with acute onset SOB/hypoxia while shoveling dirt leading to respiratory/cardiac arrest  -Continue home montelukast/budesonide/mucomyst   -Duonebs PRN    Acute respiratory failure with hypoxia and hypercarbia  -S/p trach 1/22  -Passed SBT today; placed on trach collar    Cardiac/Vascular  Essential hypertension  -SBP goal  < 180  -PRN labetalol/hydralazine    Cardiorespiratory arrest  -Cardiac arrest on 1/1 following asthma attack   -See Anoxic brain injury     GI  Transaminitis  -Likely DILI   -Avoid hepatotoxins  -CMP daily    Palliative Care  ACP (advance care planning)  -Patient made DNR at [facility]  -See ACP notes dated 1/17 and 1/19  -Family decided to pursue trach/PEG - done 1/22  -LTAC pending          The patient is being Prophylaxed for:  Venous Thromboembolism with: Mechanical or Chemical  Stress Ulcer with: H2B  Ventilator Pneumonia with: chlorhexidine oral care    Activity Orders            Diet NPO: NPO starting at 01/22 0001    Elevate HOB Elevate (30-45 degrees) Elevate HOB to 30 - 45 degrees during feeding unless otherwise stated starting at 01/12 1011    Turn patient starting at 01/12 0724    Elevate HOB starting at 01/12 0008          Full Code    Critical care time spent on the evaluation and treatment of severe organ dysfunction, review of pertinent labs and imaging studies, discussions with consulting providers and discussions with patient/family: 65 minutes.    Payton Chakraborty, NP  Neurocritical Care  [facility]

## 2024-01-25 NOTE — SUBJECTIVE & OBJECTIVE
Interval History:  See hospital course.    Review of Systems   Unable to perform ROS: Intubated     Objective:     Vitals:  Temp: 98.7 °F (37.1 °C)  Pulse: 75  Rhythm: sinus tachycardia  BP: (!) 107/59  MAP (mmHg): 77  Resp: (!) 29  SpO2: 100 %  Oxygen Concentration (%): 40  Vent Mode: Spont  Set Rate: 14 BPM  Vt Set: 480 mL  Pressure Support: 5 cmH20  PEEP/CPAP: 5 cmH20  Peak Airway Pressure: 9.6 cmH20  Mean Airway Pressure: 7.2 cmH20  Plateau Pressure: 13 cmH20    Temp  Min: 98.5 °F (36.9 °C)  Max: 99.2 °F (37.3 °C)  Pulse  Min: 75  Max: 119  BP  Min: 99/63  Max: 143/85  MAP (mmHg)  Min: 71  Max: 108  Resp  Min: 13  Max: 42  SpO2  Min: 90 %  Max: 100 %  Oxygen Concentration (%)  Min: 30  Max: 40    01/23 0701 - 01/24 0700  In: 305.4 [I.V.:175.4]  Out: 550 [Urine:460; Drains:90]   Unmeasured Output  Urine Occurrence: 2  Stool Occurrence: 1  Pad Count: 3        Physical Exam  Vitals and nursing note reviewed.   Constitutional:       Interventions: He is sedated and intubated.   Eyes:      Pupils: Pupils are equal, round, and reactive to light.   Cardiovascular:      Rate and Rhythm: Regular rhythm. Tachycardia present.      Pulses: Normal pulses.   Pulmonary:      Effort: Pulmonary effort is normal. He is intubated.      Comments: Excessive coughing, minimal secretions  Abdominal:      Palpations: Abdomen is soft.   Skin:     General: Skin is warm and dry.      Capillary Refill: Capillary refill takes less than 2 seconds.   Neurological:      GCS: GCS eye subscore is 4. GCS verbal subscore is 1. GCS motor subscore is 3.      Comments:   -E4 V1T M3  -PERRL  -Intermittent flexion w/ RUE to painful stimuli  -Intermittent spontaneous movement w/ LUE/LLE; does not appear purposeful  -TF w/ BLE  -Corneals/cough/gag intact       Unable to test orientation, language, memory, judgment, insight, fund of knowledge, hearing, shoulder shrug, tongue protrusion, coordination, gait due to level of consciousness.        Medications:  Continuous   Scheduledacetylcysteine 100 mg/ml (10%), 4 mL, Q6H  bisacodyL, 10 mg, Daily  budesonide, 0.25 mg, Q12H  enoxparin, 40 mg, Q24H (prophylaxis, 1700)  famotidine, 20 mg, BID  miconazole nitrate 2%, , BID  [START ON 1/25/2024] montelukast, 10 mg, Daily  oxyCODONE, 10 mg, Q8H  propranoloL, 20 mg, TID  risperidone 1 mg/ml, 0.5 mg, BID  senna-docusate 8.6-50 mg, 2 tablet, BID  [START ON 1/25/2024] silodosin, 4 mg, Daily    PRNacetaminophen, 325 mg, Q6H PRN  albuterol-ipratropium, 3 mL, Q6H PRN  benzocaine, , QID PRN  fentaNYL, 75 mcg, Q1H PRN  hydrALAZINE, 10 mg, Q4H PRN  labetalol, 10 mg, Q4H PRN  magnesium oxide, 800 mg, PRN  magnesium oxide, 800 mg, PRN  potassium bicarbonate, 35 mEq, PRN  potassium bicarbonate, 50 mEq, PRN  potassium bicarbonate, 60 mEq, PRN  potassium, sodium phosphates, 2 packet, PRN  potassium, sodium phosphates, 2 packet, PRN  potassium, sodium phosphates, 2 packet, PRN      Today I personally reviewed pertinent medications, lines/drains/airways, imaging, cardiology results, laboratory results, notably: CBC, CMP    Diet  Diet NPO  Diet NPO

## 2024-01-25 NOTE — ASSESSMENT & PLAN NOTE
54 y/o M 12 days post respiratory failure c/b cardiac arrest (Epi x 3, CPR x 3, Shock x 1) with RSE in setting of propofol wean despite continued keppra, vimpat, depacon administration. He arrived to [facility] on 80 mcg/kg/min of non-titrating propofol from [facility] which was dropped to 50 mcg/kg/min, followed by the immediate addition of 25 mcg/kg/min of ketamine. EEG cap placed and epilepsy notified.    -No seizures on cEEG  -AEDs discontinued  -Stable for discharge to LTAC

## 2024-01-25 NOTE — PLAN OF CARE
Per Jade at [facility], patient is accepted and she has auth.  Per Jade, once the facility transfer orders are received, she will call with a time for transport and a number for report.     1:27 PM  Facility Transfer Orders sent via [facility] to [facility]    Discharge Plan A and Plan B have been determined by review of patient's clinical status, future medical and therapeutic needs, and coverage/benefits for post-acute care in coordination with multidisciplinary team members.      Deisy Schuster RN, CCRN-K, [facility]  Neuro-Critical Care   [phone]

## 2024-01-25 NOTE — DISCHARGE SUMMARY
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 [P91.60, P29.81]  Yes    Smoker [F17.200]  Yes    New onset refractory status epilepticus [G40.911]  Yes    Severe asthma [J45.909]  Yes    COPD (chronic obstructive pulmonary disease) [J44.9]  Yes    ACP (advance care planning) [Z71.89]  Not Applicable    Essential hypertension [I10]  Yes    Acute encephalopathy [G93.40]  Yes    Seizure disorder, nonconvulsive, with status epilepticus [G40.901]  Yes    Acute respiratory failure with hypoxia and hypercarbia [J96.01, J96.02]  Yes    Cardiorespiratory arrest [I46.9]  Yes      Resolved Hospital Problems    Diagnosis Date Resolved POA    Hypernatremia [E87.0] 01/20/2024 No    Hypertriglyceridemia [E78.1] 01/16/2024 Yes    Rhabdomyolysis [M62.82] 01/17/2024 Yes       Allergies:  Review of patient's allergies indicates:   Allergen Reactions    Sulfa (sulfonamide antibiotics)        Diet:  Tube feeds isisoource 1.5 50 ml/hr    Activities: Activity as tolerated    Goals of Care Treatment Preferences:  Code Status: Full Code          What is most important right now is to focus on extending life as long as possible, even it it means sacrificing quality.  Accordingly, we have decided that the best plan to meet the patient's goals includes continuing with treatment.      Nursing: per facility protocol     Labs: CBC and BMP Daily for 3 days     CONSULTS:    Physical Therapy to evaluate and treat. , Occupational Therapy to evaluate and treat.,  Speech Therapy to evaluate and treat for Language, Swallowing, and Cognition., and  to evaluate for community resources/long-range planning.    MISCELLANEOUS CARE:  PEG Care: Clean site every 24 hours.   Trach care per facility protocol  Oxygen via trach collar 40%  WOUND CARE ORDERS  None    Medications: Review discharge medications with patient and family and provide education.      Current Discharge Medication List        START taking these medications    Details   albuterol-ipratropium (DUO-NEB) 2.5 mg-0.5 mg/3 mL nebulizer solution Take 3 mLs by nebulization every 6 (six) hours as needed for Wheezing. Rescue  Qty: 75 mL, Refills: 0      bisacodyL (DULCOLAX) 10 mg Supp Place 1 suppository (10 mg total) rectally once daily.  Refills: 0      budesonide (PULMICORT) 0.25 mg/2 mL nebulizer solution Take 2 mLs (0.25 mg total) by nebulization every 12 (twelve) hours. Controller  Refills: 0      enoxaparin (LOVENOX) 40 mg/0.4 mL Syrg Inject 0.4 mLs (40 mg total) into the skin Q24H (prophylaxis, 1700).      famotidine (PEPCID) 20 MG tablet 1 tablet (20 mg total) by Per G Tube route 2 (two) times daily.  Qty: 60 tablet, Refills: 11      miconazole nitrate 2% (MICOTIN) 2 % Oint Apply topically 2 (two) times daily.  Refills: 0      montelukast (SINGULAIR) 10 mg tablet 1 tablet (10 mg total) by Per G Tube route once daily.  Qty: 30 tablet, Refills: 0      polyethylene glycol (GLYCOLAX) 17 gram PwPk 17 g by Per G Tube route once daily.  Refills: 0      propranoloL (INDERAL) 20 MG tablet 1 tablet (20 mg total) by Per G Tube route 3 (three) times daily.  Qty: 90 tablet, Refills: 11    Comments: .      risperidone 1 mg/ml (RISPERDAL) 1 mg/mL Soln 0.5 mLs (0.5 mg total) by Per G Tube route 2 (two) times daily.  Qty: 30 mL, Refills: 11      senna-docusate 8.6-50 mg (PERICOLACE) 8.6-50 mg per tablet 2 tablets by Per G Tube route 2 (two) times daily.      silodosin (RAPAFLO) 4 mg Cap capsule Take 1 capsule (4 mg total)  by mouth once daily.  Qty: 30 capsule, Refills: 11           STOP taking these medications       fluticasone propionate (FLONASE) 50 mcg/actuation nasal spray Comments:   Reason for Stopping:         albuterol 90 mcg/actuation inhaler Comments:   Reason for Stopping:         MEPOLIZUMAB 100 mg/mL autoinjector Comments:   Reason for Stopping:         mometasone-formoterol (DULERA) 200-5 mcg/actuation inhaler Comments:   Reason for Stopping:         nicotine (NICODERM CQ) 14 mg/24 hr Comments:   Reason for Stopping:         varenicline (CHANTIX) 1 mg Tab Comments:   Reason for Stopping:                  Immunizations Administered as of 1/25/2024       No immunizations on file.              Some patients may experience side effects after vaccination.  These may include fever, headache, muscle or joint aches.  Most symptoms resolve with 24-48 hours and do not require urgent medical evaluation unless they persist for more than 72 hours or symptoms are concerning for an unrelated medical condition.          _________________________________  Candi Moore, NP  01/25/2024

## 2024-01-25 NOTE — ASSESSMENT & PLAN NOTE
-Non-purposeful movement of L side  -No EEG correlate  -Likely 2/2 anoxic injury  -Significant improvement seen w/ admin of haldol/risperidone  -Scheduled oxy decreased  -Fentanyl PRN

## 2024-01-25 NOTE — ASSESSMENT & PLAN NOTE
52 y/o M 12 days post respiratory failure c/b cardiac arrest (Epi x 3, CPR x 3, Shock x 1) with RSE in setting of propofol wean despite continued keppra, vimpat, depacon administration. He arrived to [facility] on 80 mcg/kg/min of non-titrating propofol from [facility] which was dropped to 50 mcg/kg/min, followed by the immediate addition of 25 mcg/kg/min of ketamine. EEG cap placed and epilepsy notified.    -No seizures on cEEG  -AEDs discontinued  -Stable for discharge to LTAC

## 2024-01-25 NOTE — ASSESSMENT & PLAN NOTE
54 y/o M transferred to [facility] 12 days post respiratory failure c/b cardiac arrest (Epi x 3, CPR x 3, Shock x 1) with RSE in setting of propofol wean despite continued keppra, vimpat, depacon administration.     -Cardiac arrest on 1/1 requiring 3 rounds of CPR, Epi x 3, and 1 shock to obtain ROSC; likely respiratory etiology of arrest 2/2 asthma exacerbation   -S/p TTM, maintain normothermia  -MRI x 2 concerning for anoxic injury  -Long term prognosis poor given imaging findings, persistent malignant patterns on cEEG>2 wks out from arrest  -Admitted to [facility]  -VS q1h  -Neuro checks q4h  -Concern for neuro storming d/t spontaneous flailing movements w/o epileptiform correlate   -Continue propranolol  -Coughing/flailing significantly improved w/ admin of haldol/risperidone  -Scheduled oxy decreased  -Fentanyl PRN  -Stable for discharge to LTAC

## 2024-01-25 NOTE — PLAN OF CARE
"[facility] Care Plan    POC reviewed with Aaron Pruitt and family at 0315. Pt unable to verbalize understanding. Questions and concerns addressed. No acute events overnight. Pt progressing toward goals.   Will continue to monitor. See below and flowsheets for full assessment and VS info.       Is this a stroke patient? no    Neuro:  [place] Coma Scale  Best Eye Response: 4-->(E4) spontaneous  Best Motor Response: 4-->(M4) withdraws from pain  Best Verbal Response: 1-->(V1) none  [place]ty Coma Scale Score: 9  Assessment Qualifiers: other (see comments) (trach in place)  Pupil PERRLA: yes     24hr Temp:  [98.5 °F (36.9 °C)-99.5 °F (37.5 °C)]     CV:   Rhythm: normal sinus rhythm  BP goals:   SBP < 180  MAP > 65    Resp:      Vent Mode: Stand-by  Set Rate: 14 BPM  Oxygen Concentration (%): 40  Vt Set: 480 mL  PEEP/CPAP: 5 cmH20  Pressure Support: 5 cmH20    Plan: trach in place    GI/:     Diet/Nutrition Received: tube feeding  Last Bowel Movement: 01/20/24  Voiding Characteristics: external catheter    Intake/Output Summary (Last 24 hours) at 1/25/2024 0321  Last data filed at 1/25/2024 0302  Gross per 24 hour   Intake 940 ml   Output 570 ml   Net 370 ml     Unmeasured Output  Urine Occurrence: 2  Stool Occurrence: 0  Pad Count: 3    Labs/Accuchecks:  Recent Labs   Lab 01/25/24  0205   WBC 9.19   RBC 3.06*   HGB 9.1*   HCT 28.2*         Recent Labs   Lab 01/25/24  0205      K 4.2   CO2 22*      BUN 21*   CREATININE 0.7   ALKPHOS 96   ALT 45*   AST 44*   BILITOT 0.5      Recent Labs   Lab 01/21/24  1801   INR 1.1    No results for input(s): "CPK", "CPKMB", "TROPONINI", "MB" in the last 168 hours.    Electrolytes: N/A - electrolytes WDL  Accuchecks: none        Problem: Adult Inpatient Plan of Care  Goal: Plan of Care Review  Outcome: Ongoing, Progressing  Goal: Optimal Comfort and Wellbeing  Outcome: Ongoing, Progressing  Goal: Readiness for Transition of Care  Outcome: Ongoing, Progressing     "

## 2024-01-25 NOTE — PROGRESS NOTES
Report called to Liliane for patient to transfer to [facility] in [place]

## 2024-01-25 NOTE — PROGRESS NOTES
"[facility]  Neurocritical Care  Progress Note    Admit Date: 1/12/2024  Service Date: 01/25/2024  Length of Stay: 13    Subjective:     Chief Complaint: Anoxic brain injury    History of Present Illness: Mr. Aaron Pruitt is a 53 year old M with PMHx significant for COPD/Asthma c/b smoking and HTN who presents to [facility] for NCSE. He initially presented to [facility] on 01/01 after being intubated in the field following acute respiratory failure post cardiac arrest s/p resuscitation and shock x1. Per chart review, he was shoveling some dirt in his yard when he got severely short of breath and suddenly had difficulty breathing. EMS was activated by his neighbors. Upon EMS arrival, he reportedly still had a pulse, though with severe respiratory distress, and subsequently became pulseless. He was given narcan x2 without improvement. CPR and Epi x3. ROSC after 3rd round of CPR with shock x 1. CT head on arrival to [facility] without acute intracranial abnormality, and CT chest without acute pulmonary process.TTM was initiated on 01/03 with fentanyl and propofol gtts for sedation, and rewarming was initiated on 01/04 with subsequent weaning of sedation. During this period, myoclonus was clinically noted, and propofol was resumed. EEG showed NCSE, prompting the initiation of keppra and vimpat with continuation of propofol. On 01/06, there was slight improvement in EEG, with NCSE noted to be "partially treated," at which time, the patient was then loaded with depacon. EEG was improved leading to weaning of propofol with eventual return to [facility]. MRI completed 01/08 and 01/11 completed with with GoC conversation held at [facility]. Family opted to forgo comfort measures at this point and transfer to [facility] for trial of 4th AED, per chart review. He arrived to [facility] on 80 mcg/kg/min of non-titrating propofol from [facility] which was dropped to 50 mcg/kg/min, followed by the immediate addition of 25 mcg/kg/min of ketamine. EEG cap placed. "     He will be admitted to [facility] for hourly neuromonitoring and a higher level of care.    Hospital Course: 01/13: no electrographic seizures reported, triglicerides increased into low 500s, propofol decreased to 30mcg, decrease further in 4 hrs if EEG unchanged, cont ketamine at 50mcg/kg and scheduled AEDs  01/14: EEG mostly suppressed, off propofol, decrease keppra to 30mcg today with potential to turn off in am  Escalate clobazam to 20mg qday and dc depakote, cont keppra and vimpat at current doses, plan to update family today, although improvement in seizure control, no change in neuro exam  01/15/2024 Increased GPDs s/p discontinuing ketamine gtt, onfi increased to BID. Awaiting arrival of family for GOC conversation  01/16/2024 cEEG unchanged overnight, remains on ictal-interictal spectrum, d/c'd. Neuro exam unchanged, remains w/ brainstem reflexes intact, intermittent non-purposeful movements of extremities. Ongoing GOC discussions with family   01/17/2024 NAEON. Mildly hypercarbic on AM ABG, increased rate/TV for eucarbia. Starting FWF for mild hypernatremia. Ongoing GOC discussions w/ family, see ACP note for full details   01/18/2024 NAEON, hypercarbia resolved. Awaiting family decision re GOC  01/19/2024 Added propranolol and increased oxycodone for neuro storming/episodes of non-purposeful movement. Family electing to proceed w/ trach/PEG, please see ACP note for full details. Gen surgery consulted, CM/SW updated.   01/20/2024 NAEON, continued non-purposeful flailing movements of LUE/LLE. Trach/PEG next week, timing TBD  01/21/2024 Repeat routine cEEG overnight relatively unchanged from prior, slight decrease in frequency of GPDs, remains w/o evolution/seizures, no clear background, no evidence of electrographic response to stimuli. Neuro exam stably poor, slightly decreased flailing movements 2/2 increase in fent gtt. Trach/PEG/placement pending  01/22/2024 No issues overnight. Remains on fentanyl gtt d/t  continuous flailing movements. Plan for trach/PEG today. CM/SW following for LTAC placement.  01/23/2024 S/p trach/PEG yesterday. Fentanyl gtt weaned off, scheduled oxy increased. Still w/ apparent stimulation induced coughing episodes, unrelieved by PRN fentanyl/valium. Plans for DC to LTAC.  01/24/2024 Haldol given overnight, significantly improved coughing/flailing episodes. Passed SBT today, placed on trach collar. Stable for discharge to LTAC.  1/25/24: awaiting LTAC placement    Interval History:stable for discharge to LTAC    Review of Systems   Reason unable to perform ROS: decrease LOC.      Unable to obtain a complete ROS due to level of consciousness.  Objective:     Vitals:  Temp: 99 °F (37.2 °C)  Pulse: 86  Rhythm: normal sinus rhythm  BP: 112/64  MAP (mmHg): 80  Resp: (!) 24  SpO2: 99 %  Oxygen Concentration (%): 40  Vent Mode: Stand-by  Pressure Support: 5 cmH20  PEEP/CPAP: 5 cmH20  Peak Airway Pressure: 9.6 cmH20  Mean Airway Pressure: 7.2 cmH20  Plateau Pressure: 13 cmH20    Temp  Min: 98.5 °F (36.9 °C)  Max: 100.6 °F (38.1 °C)  Pulse  Min: 75  Max: 100  BP  Min: 105/55  Max: 165/94  MAP (mmHg)  Min: 71  Max: 124  Resp  Min: 11  Max: 37  SpO2  Min: 97 %  Max: 100 %  Oxygen Concentration (%)  Min: 30  Max: 40    01/24 0701 - 01/25 0700  In: 1150 [I.V.:20]  Out: 680 [Urine:680]   Unmeasured Output  Urine Occurrence: 2  Stool Occurrence: 0  Pad Count: 3        Physical Exam  Vitals and nursing note reviewed.   Constitutional:       Appearance: He is ill-appearing.   HENT:      Head: Normocephalic.      Nose: Nose normal.      Mouth/Throat:      Mouth: Mucous membranes are moist.      Pharynx: Oropharynx is clear.   Eyes:      Pupils: Pupils are equal, round, and reactive to light.   Cardiovascular:      Rate and Rhythm: Normal rate and regular rhythm.      Pulses: Normal pulses.      Heart sounds: Normal heart sounds.   Pulmonary:      Effort: Pulmonary effort is normal.      Breath sounds: Normal  breath sounds.   Abdominal:      General: Bowel sounds are normal.      Palpations: Abdomen is soft.   Musculoskeletal:         General: Normal range of motion.   Skin:     General: Skin is warm and dry.      Capillary Refill: Capillary refill takes 2 to 3 seconds.   Neurological:      Comments: -E4 V1T M3  -PERRL  -Intermittent flexion w/ RUE to painful stimuli  -Intermittent spontaneous movement w/ LUE/LLE; does not appear purposeful  -TF w/ BLE  -Corneals/cough/gag intact        Unable to test orientation, language, memory, judgment, insight, fund of knowledge, hearing, shoulder shrug, tongue protrusion, coordination, gait due to level of consciousness.       Medications:  Continuous ScheduledbisacodyL, 10 mg, Daily  budesonide, 0.25 mg, Q12H  enoxparin, 40 mg, Q24H (prophylaxis, 1700)  famotidine, 20 mg, BID  miconazole nitrate 2%, , BID  montelukast, 10 mg, Daily  oxyCODONE, 10 mg, Q8H  polyethylene glycol, 17 g, Daily  propranoloL, 20 mg, TID  risperidone 1 mg/ml, 0.5 mg, BID  senna-docusate 8.6-50 mg, 2 tablet, BID  silodosin, 4 mg, Daily    PRNacetaminophen, 325 mg, Q6H PRN  albuterol-ipratropium, 3 mL, Q6H PRN  benzocaine, , QID PRN  bisacodyL, 10 mg, Daily PRN  hydrALAZINE, 10 mg, Q4H PRN  labetalol, 10 mg, Q4H PRN  magnesium oxide, 800 mg, PRN  magnesium oxide, 800 mg, PRN  potassium bicarbonate, 35 mEq, PRN  potassium bicarbonate, 50 mEq, PRN  potassium bicarbonate, 60 mEq, PRN  potassium, sodium phosphates, 2 packet, PRN  potassium, sodium phosphates, 2 packet, PRN  potassium, sodium phosphates, 2 packet, PRN      Today I personally reviewed pertinent medications, lines/drains/airways, imaging, cardiology results, laboratory results, microbiology results, notably:    Diet  Diet NPO  Diet NPO        Assessment/Plan:     Neuro  * Anoxic brain injury  54 y/o M transferred to [facility] 12 days post respiratory failure c/b cardiac arrest (Epi x 3, CPR x 3, Shock x 1) with RSE in setting of propofol wean despite  continued keppra, vimpat, depacon administration.     -Cardiac arrest on 1/1 requiring 3 rounds of CPR, Epi x 3, and 1 shock to obtain ROSC; likely respiratory etiology of arrest 2/2 asthma exacerbation   -S/p TTM, maintain normothermia  -MRI x 2 concerning for anoxic injury  -Long term prognosis poor given imaging findings, persistent malignant patterns on cEEG>2 wks out from arrest  -Admitted to [facility]  -VS q1h  -Neuro checks q4h  -Concern for neuro storming d/t spontaneous flailing movements w/o epileptiform correlate   -Continue propranolol  -Coughing/flailing significantly improved w/ admin of haldol/risperidone  -Scheduled oxy decreased  -Fentanyl PRN  -Stable for discharge to LTAC    Abnormal movement  -Non-purposeful movement of L side  -No EEG correlate  -Likely 2/2 anoxic injury  -Significant improvement seen w/ admin of haldol/risperidone  -Scheduled oxy decreased      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 He arrived to [facility] on 80 mcg/kg/min of non-titrating propofol from [facility] which was dropped to 50 mcg/kg/min, followed by the immediate addition of 25 mcg/kg/min of ketamine. EEG cap placed and epilepsy notified.    -No seizures on cEEG  -AEDs discontinued  -Stable for discharge to LTAC    Seizure disorder, nonconvulsive, with status epilepticus  -2/2 cardiorespiratory arrest  -No seizures on cEEG; AEDs discontinued  -See Anoxic brain injury      Pulmonary  COPD (chronic obstructive pulmonary disease)  -Known history of smoking  -Duonebs PRN  -Trach collar    Severe asthma  -Known hx of asthma with acute onset SOB/hypoxia while shoveling dirt leading to respiratory/cardiac arrest  -Continue home montelukast/budesonide/mucomyst   -Duonebs PRN    Acute respiratory failure with hypoxia and hypercarbia  -S/p trach 1/22  -Passed SBT today; placed on trach collar    Cardiac/Vascular  Essential hypertension  -SBP goal  < 180  -PRN labetalol/hydralazine    Cardiorespiratory arrest  -Cardiac arrest on 1/1 following asthma attack   -See Anoxic brain injury     Palliative Care  ACP (advance care planning)  -Patient made DNR at [facility]  -See ACP notes dated 1/17 and 1/19  -Family decided to pursue trach/PEG - done 1/22  -LTAC pending    Other  Smoker  Per chart review   Cessation counseling not appropriate at this time given mental status            The patient is being Prophylaxed for:  Venous Thromboembolism with: Mechanical or Chemical  Stress Ulcer with: H2B  Ventilator Pneumonia with: chlorhexidine oral care    Activity Orders            Diet NPO: NPO starting at 01/22 0001    Elevate HOB Elevate (30-45 degrees) Elevate HOB to 30 - 45 degrees during feeding unless otherwise stated starting at 01/12 1011    Turn patient starting at 01/12 0724    Elevate HOB starting at 01/12 0008          Full Code    Candi Moore NP  Neurocritical Care  [facility] Neuro Critical [facility]

## 2024-01-25 NOTE — PLAN OF CARE
Discharge orders to [facility] per physician. All patient teaching demonstrated and patient's family verbalized understanding with teachback method. All care plans and education resolved per adequate discharge. All peripheral IVs/LDAs remain in place for LTAC at this time. EMS at bedside to successfully discharge patient to LTAC

## 2024-01-25 NOTE — SUBJECTIVE & OBJECTIVE
Interval History:stable for discharge to LTAC    Review of Systems   Reason unable to perform ROS: decrease LOC.      Unable to obtain a complete ROS due to level of consciousness.  Objective:     Vitals:  Temp: 99 °F (37.2 °C)  Pulse: 86  Rhythm: normal sinus rhythm  BP: 112/64  MAP (mmHg): 80  Resp: (!) 24  SpO2: 99 %  Oxygen Concentration (%): 40  Vent Mode: Stand-by  Pressure Support: 5 cmH20  PEEP/CPAP: 5 cmH20  Peak Airway Pressure: 9.6 cmH20  Mean Airway Pressure: 7.2 cmH20  Plateau Pressure: 13 cmH20    Temp  Min: 98.5 °F (36.9 °C)  Max: 100.6 °F (38.1 °C)  Pulse  Min: 75  Max: 100  BP  Min: 105/55  Max: 165/94  MAP (mmHg)  Min: 71  Max: 124  Resp  Min: 11  Max: 37  SpO2  Min: 97 %  Max: 100 %  Oxygen Concentration (%)  Min: 30  Max: 40    01/24 0701 - 01/25 0700  In: 1150 [I.V.:20]  Out: 680 [Urine:680]   Unmeasured Output  Urine Occurrence: 2  Stool Occurrence: 0  Pad Count: 3        Physical Exam  Vitals and nursing note reviewed.   Constitutional:       Appearance: He is ill-appearing.   HENT:      Head: Normocephalic.      Nose: Nose normal.      Mouth/Throat:      Mouth: Mucous membranes are moist.      Pharynx: Oropharynx is clear.   Eyes:      Pupils: Pupils are equal, round, and reactive to light.   Cardiovascular:      Rate and Rhythm: Normal rate and regular rhythm.      Pulses: Normal pulses.      Heart sounds: Normal heart sounds.   Pulmonary:      Effort: Pulmonary effort is normal.      Breath sounds: Normal breath sounds.   Abdominal:      General: Bowel sounds are normal.      Palpations: Abdomen is soft.   Musculoskeletal:         General: Normal range of motion.   Skin:     General: Skin is warm and dry.      Capillary Refill: Capillary refill takes 2 to 3 seconds.   Neurological:      Comments: -E4 V1T M3  -PERRL  -Intermittent flexion w/ RUE to painful stimuli  -Intermittent spontaneous movement w/ LUE/LLE; does not appear purposeful  -TF w/ BLE  -Corneals/cough/gag intact        Unable  to test orientation, language, memory, judgment, insight, fund of knowledge, hearing, shoulder shrug, tongue protrusion, coordination, gait due to level of consciousness.       Medications:  Continuous ScheduledbisacodyL, 10 mg, Daily  budesonide, 0.25 mg, Q12H  enoxparin, 40 mg, Q24H (prophylaxis, 1700)  famotidine, 20 mg, BID  miconazole nitrate 2%, , BID  montelukast, 10 mg, Daily  oxyCODONE, 10 mg, Q8H  polyethylene glycol, 17 g, Daily  propranoloL, 20 mg, TID  risperidone 1 mg/ml, 0.5 mg, BID  senna-docusate 8.6-50 mg, 2 tablet, BID  silodosin, 4 mg, Daily    PRNacetaminophen, 325 mg, Q6H PRN  albuterol-ipratropium, 3 mL, Q6H PRN  benzocaine, , QID PRN  bisacodyL, 10 mg, Daily PRN  hydrALAZINE, 10 mg, Q4H PRN  labetalol, 10 mg, Q4H PRN  magnesium oxide, 800 mg, PRN  magnesium oxide, 800 mg, PRN  potassium bicarbonate, 35 mEq, PRN  potassium bicarbonate, 50 mEq, PRN  potassium bicarbonate, 60 mEq, PRN  potassium, sodium phosphates, 2 packet, PRN  potassium, sodium phosphates, 2 packet, PRN  potassium, sodium phosphates, 2 packet, PRN      Today I personally reviewed pertinent medications, lines/drains/airways, imaging, cardiology results, laboratory results, microbiology results, notably:    Diet  Diet NPO  Diet NPO

## 2024-01-25 NOTE — ASSESSMENT & PLAN NOTE
54 y/o M transferred to [facility] 12 days post respiratory failure c/b cardiac arrest (Epi x 3, CPR x 3, Shock x 1) with RSE in setting of propofol wean despite continued keppra, vimpat, depacon administration.     -Cardiac arrest on 1/1 requiring 3 rounds of CPR, Epi x 3, and 1 shock to obtain ROSC; likely respiratory etiology of arrest 2/2 asthma exacerbation   -S/p TTM, maintain normothermia  -MRI x 2 concerning for anoxic injury  -Long term prognosis poor given imaging findings, persistent malignant patterns on cEEG>2 wks out from arrest  -Admitted to [facility]  -VS q1h  -Neuro checks q4h  -Concern for neuro storming d/t spontaneous flailing movements w/o epileptiform correlate   -Continue propranolol  -Coughing/flailing significantly improved w/ admin of haldol/risperidone  -Scheduled oxy decreased  -Fentanyl PRN  -Stable for discharge to LTAC

## 2024-01-25 NOTE — PLAN OF CARE
Per Jade at [facility], transport can be set up now and the number for report is [phone], Opt 1.    Discharge Plan A and Plan B have been determined by review of patient's clinical status, future medical and therapeutic needs, and coverage/benefits for post-acute care in coordination with multidisciplinary team members.      Deisy Schuster RN, CCRN-K, [facility]  Neuro-Critical Care   X [phone]

## 2024-04-15 PROBLEM — J96.02 ACUTE RESPIRATORY FAILURE WITH HYPOXIA AND HYPERCARBIA: Status: RESOLVED | Noted: 2024-01-02 | Resolved: 2024-04-15

## 2024-04-15 PROBLEM — J96.01 ACUTE RESPIRATORY FAILURE WITH HYPOXIA AND HYPERCARBIA: Status: RESOLVED | Noted: 2024-01-02 | Resolved: 2024-04-15

## 2024-04-22 ENCOUNTER — CLINICAL SUPPORT (OUTPATIENT)
Dept: REHABILITATION | Facility: HOSPITAL | Age: 54
End: 2024-04-22
Payer: MEDICAID

## 2024-04-22 DIAGNOSIS — Z91.81 RISK FOR FALLS: Primary | ICD-10-CM

## 2024-04-22 DIAGNOSIS — Z74.09 IMPAIRED FUNCTIONAL MOBILITY, BALANCE, GAIT, AND ENDURANCE: ICD-10-CM

## 2024-04-22 PROCEDURE — 97162 PT EVAL MOD COMPLEX 30 MIN: CPT | Mod: PN

## 2024-04-22 NOTE — PLAN OF CARE
[facility]  Physical Therapy Neurological Rehabilitation Initial Evaluation     Name: Aaron Pruitt  [facility] Number: [id number]    Therapy Diagnosis:   Encounter Diagnoses   Name Primary?    Risk for falls Yes    Impaired functional mobility, balance, gait, and endurance      Physician: Camille Bay MD    Physician Orders: PT Eval and Treat   Medical Diagnosis from Referral: Anoxic brain damage [G93.1]   Evaluation Date: 4/22/2024  Authorization Period Expiration: 12/31/2024  Plan of Care Expiration: 7/5/2024  Progress Note Due: 5/22/2024  Date of Surgery: N/A  Visit # / Visits authorized: 1/1  FOTO: 1/3    Precautions: Standard, Fall, and Severe Visual Impairment; History of Seziure    Time In: 11:00AM  Time Out: 12:00PM  Total Billable Time: 60 minutes (1 mod eval)    Subjective      Date of onset: 1/1/2024    History of current condition - Aaron and family report: Became severely short of breath on New Year's Day while digging a ditch and was able to call 911. Was found down by EMS and lost pulse when en route to hospital. Required CPR and epinephrine. He was admitted for severe respiratory failure and postcardiac arrest. Hospital stay complicated by seizure events. Patient underwent trach and PEG placement on 1/22/2024. Weaned from ventilator on 1/24/2024. He was admitted to [facility] 1/25/2024; non-responsive at this time. However, while admitted to LT[facility] in mid-February, he began to make recovery/became more responsive. He was decannulated on 2/19/2024. Weaned off tube feed on 2/26/2024. He was admitted to [facility] for intensive physical, speech, and occupational therapy on 3/4/2024. He was discharged in early April. He has returned to his home but his best friend Espinoza and his ex-girlfriend are now providing 24/7 assistance/supervision. Since anoxic brain injury, he has severe visual impairment, limb weakness, imbalance, memory issues, and sequencing deficits. Discharged from [facility] with  "rolling walker but comes to outpatient PT today without device.      Imaging  - CT Head (3/16/2024): 1. The gradient white junctions are still maintained. There is low density along white matter tracts extending into the posterior limbs of both internal capsules, which is of unknown clinical significance. Otherwise, no acute intracranial process is identified.   2. Diffuse involutional changes are disproportionate to patient's age and most prominent centrally with resultant mild supratentorial ventriculomegaly.     Prior Therapy: inpatient rehab  Social History: lives in his own home but best friend (Espinoza Alberto) and sometimes ex-girlfriend are present 24/7  Falls: 2 since discharge home   DME: shower chair; rolling walker  Home Environment: one story home; 3 steps to enter with handrails   Exercise Routine / History: keeping up with seated and standing exercise since discharge from [facility]   Family Present at time of Eval: Sister, father, friend Espinoza   Occupation: Construction work  Prior Level of Function: Independent and driving/working  Current Level of Function: Mod-Max A for dressing, bathing; mod I with ambulating; no longer driving/working    Pain:  Current 0/10, worst 0/10, best 0/10   Location: N/A  Description: N/A  Aggravating Factors: N/A  Easing Factors: N/A    Patient's goals: "to get better"; patient agreeable to working on balance, functional mobility, general strength program    Medical History:   Past Medical History:   Diagnosis Date    Bronchitis     Bronchitis      Surgical History:   Aaron Pruitt  has a past surgical history that includes Neck surgery; pr eeg, w/video, cont record, i&r, >12<26 hrs (1/13/2024); pr eeg, w/video, cont record, i&r, >12<26 hrs (1/14/2024); Tracheostomy (N/A, 1/22/2024); and Esophagogastroduodenoscopy w/ PEG (N/A, 1/22/2024).    Medications:   Aaron has a current medication list which includes the following prescription(s): albuterol-ipratropium, bisacodyl, budesonide, " "enoxaparin, famotidine, miconazole nitrate 2%, polyethylene glycol, propranolol, risperidone 1 mg/ml, senna-docusate 8.6-50 mg, and silodosin.    Allergies:   Review of patient's allergies indicates:   Allergen Reactions    Sulfa (sulfonamide antibiotics)       Objective      - Command followin% simple; 25% complex   - Speech: no overt articulation impairment; difficulty with tactile objective identification and memory    Mental status: alert, oriented to person, place, and time  Behavior:  calm and cooperative  Affect: Flat  Attention Span and Concentration: Decreased    Dominant hand: left     Sensation:  Light Touch: Impaired localization and lateralization            Proprioception: Impaired (bilateral ankles)    Tone: 1-  Slight increase in muscle tone, manifested by a catch and release or by minimal resistance at the end of the range of motion when the affected part(s) is moved in flexion or extension  Limbs/muscles affected: left quad    Visual/Auditory: severe visual impairments; patient describes vision as "shadowy" but can see object borders in right > left visual field; impaired tracking/smooth pursuit beyond midline to the right    Coordination:   - fine motor: impaired with opposition screen but improves with hand over hand assistance  - UE coordination: impaired amplitude of rapid alternating movement  - LE coordination: impaired amplitude of rapid alternating movement    Lower Extremity Strength   RLE LLE   Hip Flexion: 4/5 4/5   Hip Abduction: 4/5 4/5   Knee Extension: 4+/5 4/5   Knee Flexion: 4/5 4-/5   Ankle Dorsiflexion: 4+/5 4-/5     Five Time Sit to Stand: unable to complete independent sit to stand due to apparent motor planning impairment but when extensively cued can perform sit to stand with CGA; modified test performed in this manner over 36 seconds    Two-Minute Walk Test: 135 feet with rolling walker and minimal assistance for assistive device management (due to visual " impairment)    Self-Selected Walking Speed: 0.31 meters/second    Gait Assessment:   - AD used: rolling walker used for Two-Minute Walk Test but patient arrived to clinic without AD and required hand held assist to navigate gym environment safely  - Assistance: CGA for obstacle avoidance  - Distance: 2MWT  - Stairs: Not assessed today    Gait Analysis:  Deviations noted: increased flexion of trunk/hips/knees bilaterally throughout gait cycle    Impairments contributing to deviations:  general debility/weakness    Functional Mobility (Bed mobility, transfers)  Supine to sit: Min A  Sit to supine: Independent  Rolling: CGA to avoid edge of mat  Transfers to bed: CGA for guidance during ambulation approach to mat; extensive verbal cuing required when approaching mat to fully pivot safely  Sit to stand: Multiple attempts with bilateral upper extremity assist from lobby chair but able to complete mod I; from standard height chair requires CGA for appropriate weight shift    Intake Outcome Measure for FOTO Brain Injury Survey    Therapist reviewed FOTO scores for Aaron Pruitt on 4/22/2024.   FOTO report - see Media section or FOTO account episode details.    Intake Score: 53%       Treatment     Treatment not initiated today; suggestions for follow up = transfer training from various surface levels; low-mod level balance activity; low-mod level functional mobility training; hand over hand guidance as needed; reinforce safety awareness throughout    Patient Education and Home Exercises     Education provided:   - PT plan of care  - Role of physical versus speech versus occupational therapy   - [facility] as community resource    Written Home Exercises Provided: Not provided today; provide at follow up    Assessment     Aaron is a 54 y.o. male referred to outpatient Physical Therapy with a medical diagnosis of Anoxic brain damage [G93.1]. Patient presents with bilateral lower extremity weakness; decreased gait  speed; gait abnormality; impaired transfers; need for assistance with some aspects of bed mobility; history of falls; impaired limb coordination; impaired limb proprioception; impaired light touch localization of bilateral lower extremity; and severe visual impairment. He would benefit from skilled physical therapy services to decrease risk for falls; improve gait speed/quality; increase bilateral lower extremity strength; and increase independence and safety with functional mobility.    Patient prognosis is Good.   Patient will benefit from skilled outpatient Physical Therapy to address the deficits stated above and in the chart below, provide patient /family education, and to maximize patient's level of independence.     Plan of care discussed with patient: Yes  Patient's spiritual, cultural and educational needs considered and patient is agreeable to the plan of care and goals as stated below:     Anticipated Barriers for therapy: Degree of visual and proprioceptive/somatosensory impairments    Medical Necessity is demonstrated by the following  History  Co-morbidities and personal factors that may impact the plan of care [] LOW: no personal factors / co-morbidities  [] MODERATE: 1-2 personal factors / co-morbidities  [x] HIGH: 3+ personal factors / co-morbidities    Moderate / High Support Documentation:   Co-morbidities affecting plan of care: Seizure; Asthma, Bronchitis, COPD (chronic obstructive pulmonary disease) (CMS/[facility]), GERD (gastroesophageal reflux disease), Hypertension, Severe persistent asthma, poorly-controlled, Steroid-dependent asthma with acute exacerbation, and Tobacco use     Personal Factors:   no deficits     Examination  Body Structures and Functions, activity limitations and participation restrictions that may impact the plan of care [] LOW: addressing 1-2 elements  [] MODERATE: 3+ elements  [x] HIGH: 4+ elements (please support below)    Moderate / High Support Documentation: See above      Clinical Presentation [] LOW: stable  [x] MODERATE: Evolving  [] HIGH: Unstable     Decision Making/ Complexity Score: moderate       Goals:  Short Term Goals: 5 weeks   Patient will be compliant with HEP in order to maximize PT benefits  Patient will perform sit <>  one attempt modified independently from standard height chair without need for verbal cuing in order to improve independence and safety with functional mobility   Patient will walk >/= 175 feet on Two-Minute Walk Test in order to improve endurance and gait speed for home mobility     Long Term Goals: 10 weeks   Patient will score >/= 64% on FOTO survey in order to improve self-perception of functional mobility deficits  Patient will improve bilateral lower extremity MMT grades by >/=1/2 grade in order to improve strength for ADL completion  Patient will perform Five Time Sit to  </=20 seconds in order to improve bilateral lower extremity muscular power for transfers  Patient will walk >/= 700 feet on Six-Minute Walk Test in order to improve endurance and gait speed for home mobility   Patient will perform 1 floor <> stand transfer with bilateral upper extremity support in order to demonstrate safe functional mobility in case of future fall  Patient will begin some form of home/community fitness in order to sustain progress gained in PT    Plan     Plan of care Certification: 4/22/2024 to 7/5/2024.    Outpatient Physical Therapy 2 times weekly for 10 weeks to include the following interventions: Gait Training, Manual Therapy, Moist Heat/ Ice, Neuromuscular Re-ed, Patient Education, Self Care, Therapeutic Activities, and Therapeutic Exercise.     JAMES FERNANDEZ, PT    Physician's Signature: _________________________________________ Date: ________________

## 2024-04-24 ENCOUNTER — CLINICAL SUPPORT (OUTPATIENT)
Dept: REHABILITATION | Facility: HOSPITAL | Age: 54
End: 2024-04-24
Payer: MEDICAID

## 2024-04-24 DIAGNOSIS — H53.133 SUDDEN VISUAL LOSS OF BOTH EYES: ICD-10-CM

## 2024-04-24 DIAGNOSIS — Z74.1 NEED FOR ASSISTANCE WITH PERSONAL CARE: ICD-10-CM

## 2024-04-24 DIAGNOSIS — M62.81 MUSCLE WEAKNESS (GENERALIZED): Primary | ICD-10-CM

## 2024-04-24 DIAGNOSIS — R41.841 COGNITIVE COMMUNICATION DISORDER: Primary | ICD-10-CM

## 2024-04-24 DIAGNOSIS — R47.01 APHASIA: ICD-10-CM

## 2024-04-24 PROCEDURE — 97167 OT EVAL HIGH COMPLEX 60 MIN: CPT | Mod: PN

## 2024-04-24 PROCEDURE — 92523 SPEECH SOUND LANG COMPREHEN: CPT | Mod: PN

## 2024-04-24 NOTE — PLAN OF CARE
[facility]   Occupational Therapy Initial Neurological Evaluation     Name: Aaron Pruitt  [facility] Number: [id number]    Therapy Diagnosis:   Encounter Diagnoses   Name Primary?    Muscle weakness (generalized) Yes    Sudden visual loss of both eyes     Need for assistance with personal care      Physician: Camille Bay MD    Physician Orders: Eval and Treat  Medical Diagnosis: G93.1 (ICD-10-CM) - Anoxic brain damage  Evaluation Date: 4/24/2024  Insurance Authorization Period Expiration: 4/1/2025  Plan of Care Certification Period: 07/5/2024  Visit # / Visits authorized: 1 / 1  FOTO: 1 / 3    Precautions:  Standard, Fall, and vision impaired, history of seizures     Time In: 02:35  Time Out: 03:15  Total Billable Time: 40 minutes    Subjective     Date of Onset: 1/1/2024    History of Current Condition: Aaron and family report: Became severely short of breath on New Year's Day while digging a ditch and was able to call 911. Was found down by EMS and lost pulse when en route to hospital. Required CPR and epinephrine. He was admitted for severe respiratory failure and postcardiac arrest. Hospital stay complicated by seizure events. Patient underwent trach and PEG placement on 1/22/2024. Weaned from ventilator on 1/24/2024. He was admitted to LTAC 1/25/2024; non-responsive at this time. However, while admitted to LTAC in mid-February, he began to make recovery/became more responsive. He was decannulated on 2/19/2024. Weaned off tube feed on 2/26/2024. He was admitted to [facility] for intensive physical, speech, and occupational therapy on 3/4/2024. He was discharged in early April. He has returned to his home but his best friend Espinoza and his ex-girlfriend are now providing 24/7 assistance/supervision. Since anoxic brain injury, he has severe visual impairment, limb weakness, imbalance, memory issues, and sequencing deficits. Discharged from [facility] with rolling walker and arrived to Occupational  "Therapy evaluation using it. He required assistance to navigate due to visual impairments. He reports he is able to assist with ADL but has assistance to complete.     Involved Side: right   Dominant Side: Left    Imaging:  CT Head (3/16/2024): 1. The gradient white junctions are still maintained. There is low density along white matter tracts extending into the posterior limbs of both internal capsules, which is of unknown clinical significance. Otherwise, no acute intracranial process is identified.   2. Diffuse involutional changes are disproportionate to patient's age and most prominent centrally with resultant mild supratentorial ventriculomegaly.     Previous Therapy: inpatient rehab     Pain:  He does not report any pain at this time     Occupation:  construction work     Functional Limitations/Social History:    Prior Level of Function: Independent with all ADLs.   Current Level of Function: mod I - SBA     Current Functional Status   Home/Living environment: lives with their girlfriend     - single story home, 3 steps to enter    - DME: Walker       Limitation of Functional Status as follows:   ADLs/IADLs:     - Feeding: mod I to set up     - Bathing: assistance to get in the shower, able to bath with SBA     - Dressing: assistance with socks and shoes   - Grooming: mod I to set up     - Home Management: total A for     - Driving: not driving     Patient's Goals for Therapy: "get better."     Past Medical History/Physical Systems Review:     Past Medical History:  Aaron Pruitt  has a past medical history of Bronchitis and Bronchitis.    Past Surgical History:  Aaron Pruitt  has a past surgical history that includes Neck surgery; pr eeg, w/video, cont record, i&r, >12<26 hrs (1/13/2024); pr eeg, w/video, cont record, i&r, >12<26 hrs (1/14/2024); Tracheostomy (N/A, 1/22/2024); and Esophagogastroduodenoscopy w/ PEG (N/A, 1/22/2024).    Current Medications:  Aaron has a current medication list which includes " the following prescription(s): albuterol-ipratropium, bisacodyl, budesonide, enoxaparin, famotidine, miconazole nitrate 2%, polyethylene glycol, propranolol, risperidone 1 mg/ml, senna-docusate 8.6-50 mg, and silodosin.    Allergies:  Review of patient's allergies indicates:   Allergen Reactions    Sulfa (sulfonamide antibiotics)         Objective     Visual/Perceptual:  Comments: severe vision loss, unable to identify objects in room    Physical Exam:  Postural examination/scapula alignment: Rounded shoulder and impaired sitting balance, posterior lean when sitting unsupported, multiple cues for upright posture  Joint integrity: Firm end feeling  Palpation: no pain with palpation     Joint Evaluation  AROM  4/24/2024 PROM   4/24/2024 MMS   4/24/2024 AROM  4/24/2024 PROM   4/24/2024 MMS   4/24/2024    Right Right Right Left Left Left   Scapular Elevation   3/5   3/5   Scapular Retraction   3/5   3/5   Shoulder Flex 0-180 87 ~110 2/5 114 ~120 2/5   Shoulder Extension 0-80 60  2+/5 66  2+/5   Shoulder Abd 0-180 75 ~105 2/5 91 ~110 2/5   Shoulder ER 0-90   2/5   2/5   Shoulder IR 0-90 PSIS   2/5 PSIS  2/5   Elbow Flexion 0-150 150  3/5 150  3/5   Elbow Extension -22 -15 2/5 -31 -10 2/5   Wrist Flex 0-80   2+/5   2+/5   Wrist Ext 0-70   2+/5   2+/5   Supination 0-80   2/5 60  2/5   Pronation 0-80   2/5   2/5   *WNL - Within Normal Limits  *NT = Not Tested    Fist: joint stiffness limiting full fist on bilateral hands   3 cm from IF to palm right hand      Strength: (MAG Dynamometer in lbs.) Average 3 trials, Position II:     4/24/2024 4/24/2024   Rung II Right Left   Trial 1 12# 20#     Normal  Average Values  Female Right Left Male: Right Left   20-29 66 lbs 62 lbs         94 lbs 86 lbs   30-39 68 lbs 64 lbs 90 lbs 82 lbs   40-49 64 lbs 62 lbs 80 lbs 74 lbs   50-59 62 lbs 57 lbs 72 lbs 65 lbs   60-69 53 lbs 51 lbs 63 lbs 56 lbs   70+ 44 lbs 42 lbs 54 lbs 48 lbs   SD = +/- 19lbs       Pinch Strength (Measured in  lbs)     4/24/2024 4/24/2024    Right Left   Key Pinch 5 # 9 #   3pt Pinch unable # unable #   2pt Pinch unable # unable #       Fine/Gross Motor Coordination    Assessment  Right   4/24/2024 Left  4/24/2024   VIRY (Rapid Alternating Movements)    impaired - severe   impaired - severe   Finger to Nose (5 times)  impaired - severe impaired - severe   Finger Flicks (coordination moving from digit flexion to digit extension)  impaired - severe impaired - severe   *WNL - Within Normal Limits  *NT = Not Tested    Sensation:  Aaron  reports no change in sensation, difficulty with formal assessment due to cognitive impairments.      Sensation Intact  Impaired Comments    [place]  Deep Touch (6.65) [] []     Protective Sensation (4.56) [] []     Light Touch (3.61) [] []           Other Kinesthesia  [] [x]     Temperature [] []     Stereognosis  [] []        Balance:   Static Sit - FAIR: Maintains without assist, but unable to take any challenges   Dynamic sit- POOR+: Needs MINIMAL assist to maintain  Static Stand - POOR: Needs MODERATE assist to maintain  Dynamic stand - POOR: Needs MODERATE assist to maintain    Endurance Deficit: severe                Intake Outcome Measure for FOTO Survey    Therapist reviewed FOTO scores for Aaron Pruitt on 4/24/2024.   FOTO report - see Media section or FOTO account episode details.    Intake Score: 43%         Home Exercises and Patient Education Provided     Education provided:   -role of OT, goals for OT, scheduling/cancellations, insurance limitations with patient.  -Additional Education provided: life with loss of vision    Assessment     Aaron Pruitt is a 54 y.o. male referred to outpatient occupational therapy and presents with a medical diagnosis of G93.1 (ICD-10-CM) - Anoxic brain damage. During evaluation, he exhibits significant proprioceptive deficits, as evident of difficulty sitting edge of mat unsupported and impaired coordination with bilateral upper extremities  during rapid movement and difficulty with motor planning. In addition, his joint mobility was limited for all planes in bilateral upper extremities due to muscle tightness and postural deficits. He was unable to complete fine motor coordination assessments due to vision impairments but noted decreased gross grasp and pinch strength. Due to impairments, he is required increased assistance with activities of daily living and is unable to return to prior level of function.  Following medical record review it is determined that pt will benefit from occupational therapy services in order to maximize pain free and/or functional use of bilateral upper extremities. The following goals were discussed with the patient and patient is in agreement with them as to be addressed in the treatment plan. The patient's rehab potential is Good.     Anticipated barriers to occupational therapy: severe vision impairments     Plan of care discussed with patient: Yes  Patient's spiritual, cultural and educational needs considered and patient is agreeable to the plan of care and goals as stated below:     Medical Necessity is demonstrated by the following  Occupational Profile/History  Co-morbidities and personal factors that may impact the plan of care [] LOW: Brief chart review  [] MODERATE: Expanded chart review   [x] HIGH: Extensive chart review     Examination  Performance deficits relating to physical, cognitive or psychosocial skills that result in activity limitations and/or participation restrictions  [] LOW: addressing 1-3 Performance deficits  [] MODERATE: 3-5 Performance deficits  [x] HIGH: 5+ Performance deficits (please support below)    Moderate / High Support Documentation:    Physical:  Joint Mobility  Muscle Power/Strength  Muscle Endurance  Control of Voluntary Movement   Strength  Pinch Strength  Gross Motor Coordination  Fine Motor Coordination  Visual Functions  Proprioception Functions  Postural  Control    Cognitive:  Attention  Communication  Memory  Safety Awareness/Insight to Disability    Psychosocial:    Social Interaction  Habits  Routines  Rituals     Treatment Options [] LOW: Limited options  [] MODERATE: Several options  [x] HIGH: Multiple options      Decision Making/ Complexity Score: high       The following goals were discussed with the patient and patient is in agreement with them as to be addressed in the treatment plan.     Goals:  Short Term Goals: 5 weeks   - Pt will report 50% compliance with home exercise program in order to maintain progress attained during therapy.   - Pt will improve active range of motion of right shoulder flexion/abduction to 115 degrees in order to increase ease with bathing and dressing tasks.   - Pt will improve active range of motion of left  shoulder flexion/abduction to 130/115 degrees in order to increase ease with bathing and dressing tasks.   - Pt will increase bilateral  strength by 10# in order to improve ease with dressing, bathing and grooming .   - Pt will identify vision loss strategies to assist with navigating his environment to prevent falls or injury.       Long Term Goals: 10 weeks   - Pt will report independence with home exercise program in order to maintain progress attained during therapy.   - Pt will increase MMT for bilateral shoulder flexion to 3+/5 to increase upper body strength needed for home management tasks, high level IADLs and work related activities.   - Pt will increase bilateral  strength to 40# in order to improve ease with instrumental activities of daily living.   - Pt will improve FOTO score to 63% for improved self perception of functional performance with daily activities.   - Pt will demonstrate correct use of adaptive equipment and compensatory strategies to assist with feeding, bathing, and dressing due to vision loss.  - Pt will perform standing bilateral task for 10 minutes, with distant supervision, to  improve bimanual task performance and standing tolerance needed for safety during homemaking/kitchen tasks and showering.   - Pt and caregiver will report increased participation with activities of daily living to improve independence.     Plan   Certification Period/Plan of care expiration: 4/24/2024 to 07/05/2024.    Outpatient Occupational Therapy 2 times weekly for 10 weeks to include the following interventions: Fluidotherapy, Manual Therapy, Moist Heat/ Ice, Neuromuscular Re-ed, Paraffin, Patient Education, Self Care, Therapeutic Activities, and Therapeutic Exercise.    Corinne Rapier, OT  4/24/2024      Physician's Signature: _________________________________________ Date: ________________

## 2024-04-25 PROBLEM — H53.133 SUDDEN VISUAL LOSS OF BOTH EYES: Status: ACTIVE | Noted: 2024-04-25

## 2024-04-25 PROBLEM — R41.841 COGNITIVE COMMUNICATION DISORDER: Status: ACTIVE | Noted: 2024-04-25

## 2024-04-25 PROBLEM — Z74.1 NEED FOR ASSISTANCE WITH PERSONAL CARE: Status: ACTIVE | Noted: 2024-04-25

## 2024-04-25 PROBLEM — M62.81 MUSCLE WEAKNESS (GENERALIZED): Status: ACTIVE | Noted: 2024-04-25

## 2024-04-25 PROBLEM — R47.01 APHASIA: Status: ACTIVE | Noted: 2024-04-25

## 2024-04-25 NOTE — PLAN OF CARE
[facility]  Speech Therapy Evaluation -Neurological Rehabilitation    Date: 4/24/2024     Name: Aaron Pruitt   MRN: [id number]    Therapy Diagnosis:   Encounter Diagnoses   Name Primary?    Cognitive communication disorder Yes    Aphasia     Physician: Camille Bay MD  Physician Orders: Ambulatory Referral to Speech Therapy   Medical Diagnosis: G93.1 (ICD-10-CM) - Anoxic brain damage    Visit # / Visits Authorized:  1/1  Date of Evaluation:  4/24/2024   Insurance Authorization Period: 4/9/24 to 4/9/25  Plan of Care Certification:    4/24/2024 to 7/4/2024     Time In: 1517   Time Out: 1602  Total time: 45 minutes    Procedure   Speech Language Evaluation      Precautions: Fall and Cognition  Subjective   Date of Onset: 1/1/24  History of Current Condition:  Aaron Pruitt is a 54 y.o. male who presents to [facility] Speech Therapy for evaluation secondary to anoxic brain injury. Patient was referred to therapy by Camille Bay MD , which is the patient's pain medicine physician. Patient reports difficulties with word finding and short-term memory. He previously had a tracheotomy and percutaneous endoscopic gastrostomy, since removed.  Patient was accompanied to the evaluation by Mrs. Gandhi and Mr. Arnold, his parents.   Past Medical History: Aaron Pruitt  has a past medical history of Bronchitis and Bronchitis.  Aaron Pruitt  has a past surgical history that includes Neck surgery; pr eeg, w/video, cont record, i&r, >12<26 hrs (1/13/2024); pr eeg, w/video, cont record, i&r, >12<26 hrs (1/14/2024); Tracheostomy (N/A, 1/22/2024); and Esophagogastroduodenoscopy w/ PEG (N/A, 1/22/2024).  Medical Hx and Allergies: Aaron has a current medication list which includes the following prescription(s): albuterol-ipratropium, bisacodyl, budesonide, enoxaparin, famotidine, miconazole nitrate 2%, polyethylene glycol, propranolol, risperidone 1 mg/ml, senna-docusate 8.6-50 mg, and  "silodosin.   Review of patient's allergies indicates:   Allergen Reactions    Sulfa (sulfonamide antibiotics)    Imaging:   MRI Brain 1/11/24: Redemonstration T2 FLAIR hyperintense signal at the bilateral basal ganglia, similar to slightly less conspicuous from comparison MRI.  Prior differential etiologies again to be considered.  No significant interval detrimental change.  Prior Therapy:  acute care, LTAC, inpatient rehab   Social History:  Patient lives with his girlfriend in [place]. Has a sitter during the day. He is well-known for his community involvement, cooking meals for holidays, taking care of children after school, and hosting a large Easter egg hunt each year. He loves to  and was an excellent cook. Patient is not currently driving.  Occupation:  Prior , full-time  Prior Level of Function: 100% independent   Current Level of Function: memory and word finding  Pain Scale: 0/10 on a Visual Analog Scale currently.  Pain Location: n/a  Patient's Therapy Goals:  "to get better"  Objective   Formal Assessment:  Auditory Comprehension:    Simple y/n Questions: 100% accuracy independently  Complex y/n Questions: 75% accuracy independently   Identification: Did not assess due to visual impairments   1 Step Directions: 100% accuracy independently   2 Step Directions: 75% accuracy independently     Verbal Expression:   Naming  Divergent: 2 foods in 1 minute, 5 animals in 1 minute   Convergent: 25% independently   Confrontational: 1/7 (tactile use); unable to see objects; perseverated on pen which was not one of the objects presented  Automatic Speech: 1-10, 6/7 days of the week requiring initiation cue for Sunday to start, 9/12 months of the year requiring initiation cue for January to start   Sentence Completion: 100% accuracy independently   Responsive Speech: 57% accuracy independently  Repetition: 80% accuracy independently     Reading Comprehension: Did not assess due to visual " impairments      Written Expression: Did not assess due to visual impairments     Cognition:   Patient is alert and cooperative throughout evaluation, was oriented to self, city and situation independently. Patient demonstrated adequate topic maintenance and reasoning skills throughout evaluation.   Behavioral Observations: alert and cooperative  Memory:  Immediate: 80% accuracy with 5-item word list (related items)  Short Term: recalled 2/5 words (40%) of 5-item word list independently  Long Term: 100% accuracy  Attention: sustained attention is good for purposes of this evaluation; some staring off to the ceiling observed with patient unable to provide rationale other than he was looking up  Problem Solving: did not test  Insight Awareness: patient with fair insight to deficits  Sequencing: did not test  Pragmatics: WNL    Treatment   Total Treatment Time Separate from Evaluation: not applicable   No treatment performed secondary to time to complete evaluation.     Education provided:   -role of Speech Therapy, goals/plan of care, scheduling/cancellations, insurance limitations with patient  - use of [place] devices for orientation information, weather, and current news topics    Patient and family members expressed understanding.     Home Program: to be initiated  Assessment     Aaron presents to [facility] status post medical diagnosis of anoxic brain injury. His chief complaint today is difficulty with short-term memory primarily.     Interpretation of objective assessment: An evaluation using informal probes was completed today due to low and limited vision. The patient, despite stating he can see blurred images, utilized tactile sensation to attempt to name tangible objects. His relative strengths are with answering basic yes/no questions, following 1 step verbal commands, and participating in open-ended questions during structured conversation. The patient has impairments in the following  areas: multi-step simple commands, object naming, categorization tasks, automatic speech tasks, and delayed recall.  He presents with aphasia and cognitive-communicative impairments as characterized by impairments listed above. Compared to receptive language skills, patient has more difficulty with expressive language and cognitive functions which limit his independence in daily life.    Demonstrates impairments including limitations as described in the problem list.     Positive prognostic factors: interest, family support  Negative prognostic factors: chronic impairment (6+ months post injury)  Barriers to therapy: No barriers to therapy identified.     Patient's spiritual, cultural, and educational needs considered and patient agreeable to plan of care and goals.    Patient will benefit from skilled therapy.    Rehab Potential: good    Short Term Goals: (4 weeks) Current Progress:   The patient will follow 2 step simple commands with 90% accuracy independently to utilize in daily living environments.    Progressing/ Not Met 4/24/2024   Established this date   2. The patient will complete categorization tasks with 80% accuracy and 1 prompt or cue in order to enhance his lexical fluidity.     Progressing/ Not Met 4/24/2024   Established this date   3. The patient will participate in recall tasks, both immediate and delayed, with 75% accuracy in order to enhance his memory for daily tasks.     Progressing/ Not Met 4/24/2024   Established this date    4. The patient will participate in semantic feature analysis to describe tangible objects for increased identification with 80% accuracy and 1 prompt or cue.     Progressing/ Not Met 4/24/2024   Established this date      Long Term Goals: (10 weeks) Current Progress:   Using compensatory strategies for memory, patient will recall a 10 item list with a 5 minute delay independently.  Established this date     2. The patient will participate in conversational tasks, both  structured and spontaneous, with greater than a 5 word utterance to enhance communication participation. Established this date         Plan     Recommended Treatment Plan:  Patient will participate in the [facility] rehabilitation program for speech therapy 2 times per week for 10 weeks to address his Communication deficits, to educate patient and their family, and to participate in a home exercise program.    Therapist's Name:   Josette May MS, CCC-SLP, CBIS  Speech-Language Pathologist  Certified Brain Injury Specialist  4/25/2024

## 2024-04-26 ENCOUNTER — CLINICAL SUPPORT (OUTPATIENT)
Dept: REHABILITATION | Facility: HOSPITAL | Age: 54
End: 2024-04-26
Payer: MEDICAID

## 2024-04-26 DIAGNOSIS — R47.01 APHASIA: ICD-10-CM

## 2024-04-26 DIAGNOSIS — R41.841 COGNITIVE COMMUNICATION DISORDER: Primary | ICD-10-CM

## 2024-04-26 PROCEDURE — 92507 TX SP LANG VOICE COMM INDIV: CPT | Mod: PN

## 2024-04-26 NOTE — PROGRESS NOTES
[facility]  Speech Therapy Treatment Note- Neurological Rehabilitation  Date: 4/26/2024     Name: Aaron Pruitt   MRN: [id number]   Therapy Diagnosis:   Encounter Diagnoses   Name Primary?    Cognitive communication disorder Yes    Aphasia    Physician: Camille Bay MD  Physician Orders: Ambulatory Referral to Speech Therapy   Medical Diagnosis: G93.1 (ICD-10-CM) - Anoxic brain damage    Visit #/ Visits Authorized: 1/4  Date of Evaluation:  4/24/24  Insurance Authorization Period: 4/26/24-5/26/24  Plan of Care Expiration Date:    7/4/24  Extended Plan of Care:  n/a   Progress Note: 5/24/24     Time In:  1056  Time Out:  1143  Total Billable Time: 47 minutes     Precautions: Fall, Cognition, and Communication  Subjective:   Patient reports: doing well since last visit; still looking to get a device for the home (mike)  He was not compliant to home exercise program. Not initiated yet  Response to previous treatment: good  Pain Scale: 0/10 on a Visual Analog Scale currently.  Pain Location: n/a  Objective:   UNTIMED  Procedure   Speech- Language- Voice Therapy     Short Term Goals: (4 weeks) Current Progress:   The patient will follow 2 step simple commands with 90% accuracy independently to utilize in daily living environments.     Progressing/ Not Met 4/26/2024   67% accuracy independently (4/6 trials)    Instructed patient to repeat the instructions aloud immediately following presentation, then think, then complete. Impulsivity noted   2. The patient will complete categorization tasks with 80% accuracy and 1 prompt or cue in order to enhance his lexical fluidity.      Progressing/ Not Met 4/26/2024  Divergent categorization: named 5 independently out of 15 requested for 33% accuracy without time constraint; 10 with semantic cueing provided    Convergent categorization: 40% accuracy independently (2/5 trials); increasing to 100% with semantic cueing and sentence completion   3. The patient will  "participate in recall tasks, both immediate and delayed, with 75% accuracy in order to enhance his memory for daily tasks.      Progressing/ Not Met 2024 Immediate recall 5 item list: 80%  Reviewed 2nd time and associated them/chunked them: 100%  5 minute delay: 60% accuracy independently   4. The patient will participate in semantic feature analysis (SFA) to describe tangible objects for increased identification with 80% accuracy and 1 prompt or cue.      Progressing/ Not Met 2024   SFA completed for apple together with cues and prompts to elicit responses to open-ended questions.  He then described "ball" with 3 features, increasing to 5 with one prompt      Long Term Goals: (10 weeks) Current Progress:   Using compensatory strategies for memory, patient will recall a 10 item list with a 5 minute delay independently.      2. The patient will participate in conversational tasks, both structured and spontaneous, with greater than a 5 word utterance to enhance communication participation.       Patient Education/Response:   Patient educated regarding the followin. Compensatory strategies for memory - association and chunking, picture it  2. Home devices for keeping up with orientation information (echo dot, mike)    Home program established: yes-continue conversational speech to prompt memory and word finding  Patient verbalized understanding to all above education provided.     See Electronic Medical Record under Patient Instructions for exercises provided throughout therapy.  Assessment:   Initial treatment session focused on various receptive and expressive language tasks including following two-step commands, answering open-ended questions, categorization tasks, as well as memory based tasks with immediate and delayed recall. The patient performed well today with prompting questions giving him increased attempts to use his problem solving skills. With following commands, some impulsivity was " noted. He has excellent family support with his neighbor, sister, and niece, as well as his father in the clinic today with him. He benefits from direct assistance for getting into and out of the clinic chair for safety purposes.    Aaron is progressing well towards his goals. Current goals remain appropriate. Goals to be updated as necessary.     Patient prognosis is Good. Patient will continue to benefit from skilled outpatient speech and language therapy to address the deficits listed in the problem list on initial evaluation, provide patient/family education and to maximize patient's level of independence in the home and community environment.   Medical necessity is demonstrated by the following IMPAIRMENTS:  Aphasia: Patient with few true words in all situations severely limiting functional communication with both familiar and unfamiliar communication partners.   Cognition: Deficits in executive functioning, attention, and memory prevent the pt from relaying medically and safety relevant information in a timely manner in a state of emergency.   Barriers to Therapy: none  Patient's spiritual, cultural and educational needs considered and patient agreeable to plan of care and goals.  Plan:   Continue Plan of Care with focus on rehabilitation and compensation for cognitive-linguistic and aphasia impairments following anoxic brain injury    Josette May MS, CCC-SLP, CBIS  Speech-Language Pathologist  Certified Brain Injury Specialist  4/26/2024

## 2024-05-06 ENCOUNTER — CLINICAL SUPPORT (OUTPATIENT)
Dept: REHABILITATION | Facility: HOSPITAL | Age: 54
End: 2024-05-06
Payer: MEDICAID

## 2024-05-06 DIAGNOSIS — M62.81 MUSCLE WEAKNESS (GENERALIZED): Primary | ICD-10-CM

## 2024-05-06 DIAGNOSIS — H53.133 SUDDEN VISUAL LOSS OF BOTH EYES: ICD-10-CM

## 2024-05-06 DIAGNOSIS — Z74.1 NEED FOR ASSISTANCE WITH PERSONAL CARE: ICD-10-CM

## 2024-05-06 DIAGNOSIS — R47.01 APHASIA: ICD-10-CM

## 2024-05-06 DIAGNOSIS — R41.841 COGNITIVE COMMUNICATION DISORDER: Primary | ICD-10-CM

## 2024-05-06 PROCEDURE — 97530 THERAPEUTIC ACTIVITIES: CPT | Mod: PN,59

## 2024-05-06 PROCEDURE — 92507 TX SP LANG VOICE COMM INDIV: CPT | Mod: PN

## 2024-05-06 NOTE — PROGRESS NOTES
[facility]  Occupational Therapy Treatment Note     Date: 5/6/2024  Name: Aaron Pruitt  [facility] Number: [id number]    Therapy Diagnosis:   Encounter Diagnoses   Name Primary?    Muscle weakness (generalized) Yes    Sudden visual loss of both eyes     Need for assistance with personal care      Physician: Camille Bay MD  Physician Orders: Eval and Treat  Medical Diagnosis: G93.1 (ICD-10-CM) - Anoxic brain damage  Evaluation Date: 4/24/2024  Insurance Authorization Period Expiration: 4/1/2025  Plan of Care Certification Period: 07/5/2024  Visit # / Visits authorized: 1 / 12  FOTO: 1 / 3     Precautions:  Standard, Fall, and vision impaired, history of seizures      Time In: 11:06 am  Time Out: 11:53 am  Total Billable Time: 47 minutes      Subjective     Patient reports: "I am good. I had an asthma attack and went to the hospital"  He was compliant with home exercise program given last session.   Response to previous treatment:first since eval   Functional change: first since eval     Pain: 0/10  Location: no pain     Objective     Visual/Perceptual:  Comments: severe vision loss, unable to identify objects in room     Physical Exam:  Postural examination/scapula alignment: Rounded shoulder and impaired sitting balance, posterior lean when sitting unsupported, multiple cues for upright posture  Joint integrity: Firm end feeling  Palpation: no pain with palpation      Joint Evaluation  AROM  4/24/2024 PROM   4/24/2024 MMS   4/24/2024 AROM  4/24/2024 PROM   4/24/2024 MMS   4/24/2024     Right Right Right Left Left Left   Scapular Elevation     3/5     3/5   Scapular Retraction     3/5     3/5   Shoulder Flex 0-180 87 ~110 2/5 114 ~120 2/5   Shoulder Extension 0-80 60   2+/5 66   2+/5   Shoulder Abd 0-180 75 ~105 2/5 91 ~110 2/5   Shoulder ER 0-90     2/5     2/5   Shoulder IR 0-90 PSIS    2/5 PSIS   2/5   Elbow Flexion 0-150 150   3/5 150   3/5   Elbow Extension -22 -15 2/5 -31 -10 2/5 "   Wrist Flex 0-80     2+/5     2+/5   Wrist Ext 0-70     2+/5     2+/5   Supination 0-80     2/5 60   2/5   Pronation 0-80     2/5     2/5   *WNL - Within Normal Limits  *NT = Not Tested     Fist: joint stiffness limiting full fist on bilateral hands   3 cm from IF to palm right hand       Strength: (MAG Dynamometer in lbs.) Average 3 trials, Position II:       4/24/2024 4/24/2024   Rung II Right Left   Trial 1 12# 20#      Normal  Average Values  Female Right Left Male: Right Left   20-29 66 lbs 62 lbs         94 lbs 86 lbs   30-39 68 lbs 64 lbs 90 lbs 82 lbs   40-49 64 lbs 62 lbs 80 lbs 74 lbs   50-59 62 lbs 57 lbs 72 lbs 65 lbs   60-69 53 lbs 51 lbs 63 lbs 56 lbs   70+ 44 lbs 42 lbs 54 lbs 48 lbs   SD = +/- 19lbs         Pinch Strength (Measured in lbs)       4/24/2024 4/24/2024     Right Left   Key Pinch 5 # 9 #   3pt Pinch unable # unable #   2pt Pinch unable # unable #         Fine/Gross Motor Coordination     Assessment   Right   4/24/2024 Left  4/24/2024   VIRY (Rapid Alternating Movements)      impaired - severe    impaired - severe   Finger to Nose (5 times)   impaired - severe impaired - severe   Finger Flicks (coordination moving from digit flexion to digit extension)   impaired - severe impaired - severe   *WNL - Within Normal Limits  *NT = Not Tested     Sensation:  Aaron  reports no change in sensation, difficulty with formal assessment due to cognitive impairments.        Sensation Intact  Impaired Comments    Bonner Anuradha  Deep Touch (6.65) []  []       Protective Sensation (4.56) []  []       Light Touch (3.61) []  []                  Other Kinesthesia  []  [x]       Temperature []  []       Stereognosis  []  []            Balance:   Static Sit - FAIR: Maintains without assist, but unable to take any challenges   Dynamic sit- POOR+: Needs MINIMAL assist to maintain  Static Stand - POOR: Needs MODERATE assist to maintain  Dynamic stand - POOR: Needs MODERATE assist to maintain     Endurance  Deficit: severe    Treatment     Aaron received the treatments listed below:      neuromuscular re-education activities to improve: Coordination, Kinesthetic, Sense, Proprioception, and Posture for 47 minutes. The following activities were included:  - Upper Body Ergometer (UBE) L2 for 5 minutes at 15 RPM to provide proprioceptive awareness, postural stability, strength, activity tolerance, and reciprocal patterns with bimanual coordination completed to improve use of bilateral upper extremities in order to prepare for therapeutic exercises/activities. Cues provided as needed for postural stability/positioning  - supine 2# dowel 2x10    - chest press    - shoulder flexion/extension   - supine horizontal abduction/adduction red band x10   - seated ball squeeze with medium therapy ball on lap 2x10   - seated shoulder extension weightbearing on therapy ball 2x10   - seated shoulder adduction bilateral upper extremities on therapy ball 2x10   - seated 2# dowel trunk stabilization with resistance 2x10 push/pull   - clockwise/counter clockwise circles 2x10 2# dowel   - seated shoulder and trunk flexion/extension on therapy ball 2x10   - functional reach alternating upper extremities to orange and red cones     Patient Education and Home Exercises     Education provided:   - Progress towards goals     Written Home Exercises Provided: Patient instructed to cont prior HEP.  Exercises were reviewed and Aaron was able to demonstrate them prior to the end of the session.  Aaron demonstrated good  understanding of the home exercise program provided. See electronic medical record under Patient Instructions for exercises provided during therapy sessions.       Assessment     Aaron tolerated today's session well. Interventions focused on providing proprioceptive input to trunk and upper extremities in order to improve gross motor coordination and fine motor coordination. Therapist provided external cues to improve safety in the  environment and maximize patient's success. He was temo to visually identify contrast colors with dee and equipment. Difficulty with multi step directions and sequencing, specifically with reaching to cones      Aaron is progressing well towards his goals and there are no updates to goals at this time. Pt prognosis is Good.     Patient will continue to benefit from skilled outpatient occupational therapy to address the deficits listed in the problem list on initial evaluation provide patient/family education and to maximize patient's level of independence in the home and community environment.     Patient's spiritual, cultural and educational needs considered and patient agreeable to plan of care and goals.    Anticipated barriers to occupational therapy: severe vision impairments     Goals:  Short Term Goals: 5 weeks   - Pt will report 50% compliance with home exercise program in order to maintain progress attained during therapy. Not met, progressing  - Pt will improve active range of motion of right shoulder flexion/abduction to 115 degrees in order to increase ease with bathing and dressing tasks. Not met, progressing  - Pt will improve active range of motion of left  shoulder flexion/abduction to 130/115 degrees in order to increase ease with bathing and dressing tasks. Not met, progressing  - Pt will increase bilateral  strength by 10# in order to improve ease with dressing, bathing and grooming . Not met, progressing  - Pt will identify vision loss strategies to assist with navigating his environment to prevent falls or injury. Not met, progressing        Long Term Goals: 10 weeks   - Pt will report independence with home exercise program in order to maintain progress attained during therapy. Not met, progressing  - Pt will increase MMT for bilateral shoulder flexion to 3+/5 to increase upper body strength needed for home management tasks, high level IADLs and work related activities. Not met,  progressing  - Pt will increase bilateral  strength to 40# in order to improve ease with instrumental activities of daily living. Not met, progressing  - Pt will improve FOTO score to 63% for improved self perception of functional performance with daily activities. Not met, progressing  - Pt will demonstrate correct use of adaptive equipment and compensatory strategies to assist with feeding, bathing, and dressing due to vision loss. Not met, progressing  - Pt will perform standing bilateral task for 10 minutes, with distant supervision, to improve bimanual task performance and standing tolerance needed for safety during homemaking/kitchen tasks and showering. Not met, progressing  - Pt and caregiver will report increased participation with activities of daily living to improve independence. Not met, progressing    Plan     Updates/Grading for next session: continue with plan of care     Corinne Rapier, OT   5/6/2024

## 2024-05-06 NOTE — PROGRESS NOTES
[facility]  Speech Therapy Treatment Note- Neurological Rehabilitation  Date: 5/6/2024     Name: Aaron Pruitt   MRN: [id number]   Therapy Diagnosis:   Encounter Diagnoses   Name Primary?    Cognitive communication disorder Yes    Aphasia    Physician: Camille Bay MD  Physician Orders: Ambulatory Referral to Speech Therapy   Medical Diagnosis: G93.1 (ICD-10-CM) - Anoxic brain damage    Visit #/ Visits Authorized: 2/4  Date of Evaluation:  4/24/24  Insurance Authorization Period: 4/26/24-5/26/24  Plan of Care Expiration Date:    7/4/24  Extended Plan of Care:  n/a   Progress Note: 5/24/24     Time In:  1153  Time Out:  1230  Total Billable Time:  37 minutes     Precautions: Fall, Cognition, and Communication  Subjective:   Patient reports: hospitalized recently for asthma attack - received inhaler to use as needed; ended session a few minutes early due to fatigue  He was not compliant to home exercise program. Not initiated yet  Response to previous treatment: good  Pain Scale: 0/10 on a Visual Analog Scale currently.  Pain Location: n/a  Objective:   UNTIMED  Procedure   Speech- Language- Voice Therapy     Short Term Goals: (4 weeks) Current Progress:   The patient will follow 2 step simple commands with 90% accuracy independently to utilize in daily living environments.     Progressing/ Not Met 5/6/2024   50% accuracy independently (5/10 trials), previously 67% accuracy independently (4/6 trials)  Some quick attempts, noted to complete 1st direction and forgetting the 2nd direction   2. The patient will complete categorization tasks with 80% accuracy and 1 prompt or cue in order to enhance his lexical fluidity.      Progressing/ Not Met 5/6/2024  Divergent categorization: named 5 foods independently out of 10 (50%) requested for - same as previous session; 12 total with semantic cueing  Named 4 animals independently out (40%) of 10 requested; 7 total with semantic cueing     Convergent  categorization: 50% accuracy independently (5/10 trials), previously 40% accuracy independently (2/5 trials)   3. The patient will participate in recall tasks, both immediate and delayed, with 75% accuracy in order to enhance his memory for daily tasks.      Progressing/ Not Met 2024 Immediate recall 5 item list: 80%, stable from previous  Reviewed 2nd time: 100% accuracy  5 minute delay: 0% accuracy independently increasing to 20% with verbal prompting/cueing - required maximum cueing, previously 60% accuracy independently   4. The patient will participate in semantic feature analysis (SFA) to describe tangible objects for increased identification with 80% accuracy and 1 prompt or cue.      Progressing/ Not Met 2024   SFA ball, pen, and apple today - of the 3, patient described the objects with needing more than 2-3 prompts per object      Long Term Goals: (10 weeks) Current Progress:   Using compensatory strategies for memory, patient will recall a 10 item list with a 5 minute delay independently.      2. The patient will participate in conversational tasks, both structured and spontaneous, with greater than a 5 word utterance to enhance communication participation.       Patient Education/Response:   Patient educated regarding the followin. Compensatory strategies for memory - association and chunking, picture it  2. Home devices for keeping up with orientation information (echo dot, mike)    Home program established: yes-continue conversational speech to prompt memory and word finding  Patient verbalized understanding to all above education provided.     See Electronic Medical Record under Patient Instructions for exercises provided throughout therapy.  Assessment:   Treatment session focused on receptive and expressive language tasks including following two-step commands, answering open-ended questions, categorization tasks, as well as memory based tasks with immediate and delayed recall. He  continues to have some difficulty following multi-step commands, requiring repetition of the single parts before there is adequate completion. His categorization tasks improved since previous visit with additional time required to complete successfully. He continues to benefit from assistance for ambulation due to visual deficits, as well as for safety when transferring in and out of the chair.     Aaron is progressing well towards his goals. Current goals remain appropriate. Goals to be updated as necessary.     Patient prognosis is Good. Patient will continue to benefit from skilled outpatient speech and language therapy to address the deficits listed in the problem list on initial evaluation, provide patient/family education and to maximize patient's level of independence in the home and community environment.   Medical necessity is demonstrated by the following IMPAIRMENTS:  Aphasia: Patient with few true words in all situations severely limiting functional communication with both familiar and unfamiliar communication partners.   Cognition: Deficits in executive functioning, attention, and memory prevent the pt from relaying medically and safety relevant information in a timely manner in a state of emergency.   Barriers to Therapy: none  Patient's spiritual, cultural and educational needs considered and patient agreeable to plan of care and goals.  Plan:   Continue Plan of Care with focus on rehabilitation and compensation for cognitive-linguistic and aphasia impairments following anoxic brain injury    Josette May MS, CCC-SLP, CBIS  Speech-Language Pathologist  Certified Brain Injury Specialist  5/6/2024

## 2024-05-09 ENCOUNTER — CLINICAL SUPPORT (OUTPATIENT)
Dept: REHABILITATION | Facility: HOSPITAL | Age: 54
End: 2024-05-09
Payer: MEDICAID

## 2024-05-09 DIAGNOSIS — R47.01 APHASIA: ICD-10-CM

## 2024-05-09 DIAGNOSIS — Z91.81 RISK FOR FALLS: Primary | ICD-10-CM

## 2024-05-09 DIAGNOSIS — R41.841 COGNITIVE COMMUNICATION DISORDER: Primary | ICD-10-CM

## 2024-05-09 DIAGNOSIS — Z74.09 IMPAIRED FUNCTIONAL MOBILITY, BALANCE, GAIT, AND ENDURANCE: ICD-10-CM

## 2024-05-09 PROCEDURE — 97110 THERAPEUTIC EXERCISES: CPT | Mod: PN,CQ

## 2024-05-09 PROCEDURE — 92507 TX SP LANG VOICE COMM INDIV: CPT | Mod: PN

## 2024-05-09 NOTE — PROGRESS NOTES
[facility]   Physical Therapy Treatment Note      Name: Aaron Pruitt  Clinic Number: [id number]    Therapy Diagnosis:   Encounter Diagnoses   Name Primary?    Risk for falls Yes    Impaired functional mobility, balance, gait, and endurance      Physician: Camille Bay MD    Visit Date: 5/9/2024    Physician Orders: PT Eval and Treat   Medical Diagnosis from Referral: Anoxic brain damage [G93.1]   Evaluation Date: 4/22/2024  Authorization Period Expiration: 12/31/2024  Plan of Care Expiration: 7/5/2024  Progress Note Due: 5/22/2024  Date of Surgery: N/A  Visit # / Visits authorized: 1/14 (+eval)  FOTO: 1/3     Precautions: Standard, Fall, and Severe Visual Impairment; History of Seziure     Time In: 12:00 PM  Time Out: 12:55 PM  Total Billable Time: 55 minutes (4 TE)    PTA Visit #: 1/5     Subjective     Patient reports: no complaints at this time.  Thinks his parents reached out to [facility] for more information.  He  will be  compliant with home exercise program.  Response to previous treatment: initial eval  Functional change: ongoing    Pain: 0/10  Location: NA      Objective      Objective Measures updated at progress report unless specified.     Treatment     Aaron received the treatments listed below:      therapeutic exercises to develop strength, endurance, ROM, and posture for 15 minutes including:  Calf raises: 2x20  Squat with chair tap: 2x10  Hip 3 way with 2# ankle weights     neuromuscular re-education activities to improve: Balance, Coordination, Kinesthetic, Sense, and Proprioception for 10 minutes. The following activities were included:  Nustep level 3.0 for 8 minutes  Lateral steps at / bar 6 lengths x 10 feet  Sit<>stand heavy black chair: x10 throughout treatment     therapeutic activities to improve functional performance for 30  minutes, including:  Overground ambulation: 1 trials of 150 feet without AD and HHA and next 4 trials of 150 feet  with rolling walker  Obstacle avoidance with chairs and tall bolsters used 4 lengths x 45 feet  Lateral steps at / bar: 6 lengths x 10 feet  Sit<>stand from elevated mat: 2x10  Standing transfer and walk around bolster 6 ft away with return to chair x 5 *heavy verbal cuing for sequencing to sit, keeping walker in front, backing up to chair, reaching for armrests    gait training to improve functional mobility and safety for 00  minutes, including:  Not completed today      Patient Education and Home Exercises       Education provided:   - most appropriate AD option  - safety awareness, especially with sitting  - need for a walking stick    Written Home Exercises Provided: Patient instructed to cont prior HEP. Exercises were reviewed and Aaron was able to demonstrate them prior to the end of the session.  Aaron demonstrated good  understanding of the education provided. See Electronic Medical Record under Patient Instructions for exercises provided during therapy sessions    Assessment     Aaron arrived to session without complaints and agreeable to treatment.  Good tolerance of session today consisting mainly of gait and transfer training with some general hip strengthening reviewed at the end.  He arrived without AD and after a HHA gait trial it was determined rolling walker was best option.  His posture and gait mechanics improved with rolling walker, specifically upright posture, increased stride length, and heel strike.  CGA for all ambulation and transfers due to decreased safety awareness.  He often attempts to sit once he feels the chair regardless of how he is positioned in front of it.  He responds very well to verbal cuing.  He is able to see shadows and outlines and was able to successfully avoid obstacles during ambulation trials with cuing to scan surroundings.  Walking stick would be the best option so follow up with family with contacting [facility] for additional resources.  He would  benefit from continued PT services to improve safe functional mobility.      Aaron Is progressing well towards his goals.   Patient prognosis is Good.     Patient will continue to benefit from skilled outpatient physical therapy to address the deficits listed in the problem list box on initial evaluation, provide pt/family education and to maximize pt's level of independence in the home and community environment.     Patient's spiritual, cultural and educational needs considered and pt agreeable to plan of care and goals.     Anticipated barriers to physical therapy: Degree of visual and proprioceptive/somatosensory impairments     Goals:   Short Term Goals: 5 weeks   Patient will be compliant with HEP in order to maximize PT benefits  Patient will perform sit <>  one attempt modified independently from standard height chair without need for verbal cuing in order to improve independence and safety with functional mobility   Patient will walk >/= 175 feet on Two-Minute Walk Test in order to improve endurance and gait speed for home mobility      Long Term Goals: 10 weeks   Patient will score >/= 64% on FOTO survey in order to improve self-perception of functional mobility deficits  Patient will improve bilateral lower extremity MMT grades by >/=1/2 grade in order to improve strength for ADL completion  Patient will perform Five Time Sit to  </=20 seconds in order to improve bilateral lower extremity muscular power for transfers  Patient will walk >/= 700 feet on Six-Minute Walk Test in order to improve endurance and gait speed for home mobility   Patient will perform 1 floor <> stand transfer with bilateral upper extremity support in order to demonstrate safe functional mobility in case of future fall  Patient will begin some form of home/community fitness in order to sustain progress gained in PT    Plan     Plan to cont with progression of PT goals per POC.    Vickie Brock, PTA

## 2024-05-09 NOTE — PROGRESS NOTES
[facility]  Speech Therapy Treatment Note- Neurological Rehabilitation  Date: 5/9/2024     Name: Aaron Pruitt   MRN: [id number]   Therapy Diagnosis:   Encounter Diagnoses   Name Primary?    Cognitive communication disorder Yes    Aphasia    Physician: Camille Bay MD  Physician Orders: Ambulatory Referral to Speech Therapy   Medical Diagnosis: G93.1 (ICD-10-CM) - Anoxic brain damage    Visit #/ Visits Authorized: 3/4  Date of Evaluation:  4/24/24  Insurance Authorization Period: 4/26/24-5/26/24  Plan of Care Expiration Date:    7/4/24  Extended Plan of Care:  n/a   Progress Note: 5/24/24     Time In:  1:03  Time Out:  1:43  Total Billable Time:  40 minutes     Precautions: Fall, Cognition, and Communication  Subjective:   Patient reports: I'm waiting on my eyes to get better. It's dim.  He was not compliant to home exercise program. Not initiated yet  Response to previous treatment: good  Pain Scale: 0/10 on a Visual Analog Scale currently.  Pain Location: n/a  Objective:   UNTIMED  Procedure   Speech- Language- Voice Therapy     Short Term Goals: (4 weeks) Current Progress:   The patient will follow 2 step simple commands with 90% accuracy independently to utilize in daily living environments.     Progressing/ Not Met 5/9/2024   80% accuracy independently (8/10 trials), previously 50% accuracy independently (5/10 trials)    I.e. smile and wave.  Point to the ceiling and say your name.  Knock on the table and count to 3.    2. The patient will complete categorization tasks with 80% accuracy and 1 prompt or cue in order to enhance his lexical fluidity.      Progressing/ Not Met 5/9/2024  Divergent categorization: named 9 foods independently; previously named 5  Named 9 animals independently ; previously named 4     Convergent categorization: 70% accuracy independently 90% acc min A, previously 50% accuracy independently    3. The patient will participate in recall tasks, both immediate and  delayed, with 75% accuracy in order to enhance his memory for daily tasks.      Progressing/ Not Met 2024 Immediate recall 5 item list: 80%, stable from previous  Reviewed 2nd time: 100% accuracy    1 minute delay: 100% acc Independently     5 minute delay with distraction: 60% accuracy independently increasing to 100% with initial syllable cue. Previously 0% Independently; 20% with cues.    10 minute delay with distraction: 0% acc Independently; 40% acc verbal cues   4. The patient will participate in semantic feature analysis (SFA) to describe tangible objects for increased identification with 80% accuracy and 1 prompt or cue.      Progressing/ Not Met 2024   SFA football, shoe, phone today - of the 3, patient described the objects with needing more than 2-3 prompts per object      Long Term Goals: (10 weeks) Current Progress:   Using compensatory strategies for memory, patient will recall a 10 item list with a 5 minute delay independently.   Progressing, not met 2024    2. The patient will participate in conversational tasks, both structured and spontaneous, with greater than a 5 word utterance to enhance communication participation.  Progressing, not met 2024      Patient Education/Response:   Patient educated regarding the followin. Compensatory strategies for memory - association and chunking, picture it  2. Home devices for keeping up with orientation information (echo dot, mike)    Home program established: yes-continue conversational speech to prompt memory and word finding  Patient verbalized understanding to all above education provided.     See Electronic Medical Record under Patient Instructions for exercises provided throughout therapy.  Assessment:   Treatment session focused on receptive and expressive language tasks including following two-step commands, answering open-ended questions, categorization tasks, as well as memory based tasks with immediate and delayed recall.  Improvement noted following multi-step commands and during categorization tasks since previous visit. He continues to benefit from assistance for ambulation due to visual deficits, as well as for safety when transferring in and out of the chair.     Aaron is progressing well towards his goals. Current goals remain appropriate. Goals to be updated as necessary.     Patient prognosis is Good. Patient will continue to benefit from skilled outpatient speech and language therapy to address the deficits listed in the problem list on initial evaluation, provide patient/family education and to maximize patient's level of independence in the home and community environment.   Medical necessity is demonstrated by the following IMPAIRMENTS:  Aphasia: Patient with few true words in all situations severely limiting functional communication with both familiar and unfamiliar communication partners.   Cognition: Deficits in executive functioning, attention, and memory prevent the pt from relaying medically and safety relevant information in a timely manner in a state of emergency.   Barriers to Therapy: none  Patient's spiritual, cultural and educational needs considered and patient agreeable to plan of care and goals.  Plan:   Continue Plan of Care with focus on rehabilitation and compensation for cognitive-linguistic and aphasia impairments following anoxic brain injury    Aby Najera MS, CCC-SLP  Speech-Language Pathologist  5/9/2024

## 2024-05-14 ENCOUNTER — CLINICAL SUPPORT (OUTPATIENT)
Dept: REHABILITATION | Facility: HOSPITAL | Age: 54
End: 2024-05-14
Payer: MEDICAID

## 2024-05-14 DIAGNOSIS — Z91.81 RISK FOR FALLS: Primary | ICD-10-CM

## 2024-05-14 DIAGNOSIS — Z74.09 IMPAIRED FUNCTIONAL MOBILITY, BALANCE, GAIT, AND ENDURANCE: ICD-10-CM

## 2024-05-14 DIAGNOSIS — R47.01 APHASIA: ICD-10-CM

## 2024-05-14 DIAGNOSIS — R41.841 COGNITIVE COMMUNICATION DISORDER: Primary | ICD-10-CM

## 2024-05-14 PROCEDURE — 92507 TX SP LANG VOICE COMM INDIV: CPT | Mod: PN

## 2024-05-14 PROCEDURE — 97110 THERAPEUTIC EXERCISES: CPT | Mod: PN,CQ

## 2024-05-14 NOTE — PROGRESS NOTES
[facility]   Physical Therapy Treatment Note      Name: Aaron Pruitt  Clinic Number: [id number]    Therapy Diagnosis:   Encounter Diagnoses   Name Primary?    Risk for falls Yes    Impaired functional mobility, balance, gait, and endurance      Physician: Camille Bay MD    Visit Date: 5/14/2024    Physician Orders: PT Eval and Treat   Medical Diagnosis from Referral: Anoxic brain damage [G93.1]   Evaluation Date: 4/22/2024  Authorization Period Expiration: 12/31/2024  Plan of Care Expiration: 7/5/2024  Progress Note Due: 5/22/2024  Date of Surgery: N/A  Visit # / Visits authorized: 2/14 (+eval)  FOTO: 1/3     Precautions: Standard, Fall, and Severe Visual Impairment; History of Seziure     Time In: 1:45 PM  Time Out: 2:30 PM  Total Billable Time: 45 minutes (3 TE)    PTA Visit #: 2/5     Subjective     Patient reports: no complaints at this time.  Thinks his parents reached out to [facility] for more information.  He  will be  compliant with home exercise program.  Response to previous treatment: initial eval  Functional change: ongoing    Pain: 0/10  Location: NA      Objective      Objective Measures updated at progress report unless specified.     Treatment     Aaron received the treatments listed below:      therapeutic exercises to develop strength, endurance, ROM, and posture for 15 minutes including:  Calf raises: 2x20  Squat with chair tap: 2x10  Hip 3 way with 2# ankle weights     neuromuscular re-education activities to improve: Balance, Coordination, Kinesthetic, Sense, and Proprioception for 10 minutes. The following activities were included:  Nustep level 3.0 for 8 minutes  Lateral steps at / bar 6 lengths x 10 feet  Sit<>stand heavy black chair: x10 throughout treatment     therapeutic activities to improve functional performance for 20  minutes, including:  Overground ambulation: 1 trials of 150 feet without AD and HHA and next 4 trials of 150 feet  with rolling walker  Obstacle avoidance with chairs and tall bolsters used 4 lengths x 45 feet  Lateral steps at / bar: 6 lengths x 10 feet  Sit<>stand from elevated mat: 2x10  Standing transfer and walk around bolster 6 ft away with return to chair x 5 *heavy verbal cuing for sequencing to sit, keeping walker in front, backing up to chair, reaching for armrests    gait training to improve functional mobility and safety for 00  minutes, including:  Not completed today      Patient Education and Home Exercises       Education provided:   - most appropriate AD option  - safety awareness, especially with sitting  - need for a walking stick    Written Home Exercises Provided: Patient instructed to cont prior HEP. Exercises were reviewed and Aaron was able to demonstrate them prior to the end of the session.  Aaron demonstrated good  understanding of the education provided. See Electronic Medical Record under Patient Instructions for exercises provided during therapy sessions    Assessment     Aaron arrived to session without complaints and agreeable to treatment.  Good tolerance of session today consisting mainly of gait and transfer training with some general hip strengthening reviewed at the end.  He arrived without AD and after a HHA gait trial it was determined rolling walker was best option.  His posture and gait mechanics improved with rolling walker, specifically upright posture, increased stride length, and heel strike.  CGA for all ambulation and transfers due to decreased safety awareness.  He often attempts to sit once he feels the chair regardless of how he is positioned in front of it.  He responds very well to verbal cuing.  He is able to see shadows and outlines and was able to successfully avoid obstacles during ambulation trials with cuing to scan surroundings.  Walking stick would be the best option so follow up with family with contacting [facility] for additional resources.  He would  benefit from continued PT services to improve safe functional mobility.      Aaron Is progressing well towards his goals.   Patient prognosis is Good.     Patient will continue to benefit from skilled outpatient physical therapy to address the deficits listed in the problem list box on initial evaluation, provide pt/family education and to maximize pt's level of independence in the home and community environment.     Patient's spiritual, cultural and educational needs considered and pt agreeable to plan of care and goals.     Anticipated barriers to physical therapy: Degree of visual and proprioceptive/somatosensory impairments     Goals:   Short Term Goals: 5 weeks   Patient will be compliant with HEP in order to maximize PT benefits  Patient will perform sit <>  one attempt modified independently from standard height chair without need for verbal cuing in order to improve independence and safety with functional mobility   Patient will walk >/= 175 feet on Two-Minute Walk Test in order to improve endurance and gait speed for home mobility      Long Term Goals: 10 weeks   Patient will score >/= 64% on FOTO survey in order to improve self-perception of functional mobility deficits  Patient will improve bilateral lower extremity MMT grades by >/=1/2 grade in order to improve strength for ADL completion  Patient will perform Five Time Sit to  </=20 seconds in order to improve bilateral lower extremity muscular power for transfers  Patient will walk >/= 700 feet on Six-Minute Walk Test in order to improve endurance and gait speed for home mobility   Patient will perform 1 floor <> stand transfer with bilateral upper extremity support in order to demonstrate safe functional mobility in case of future fall  Patient will begin some form of home/community fitness in order to sustain progress gained in PT    Plan     Plan to cont with progression of PT goals per POC.    Vickie Brock, PTA

## 2024-05-14 NOTE — PROGRESS NOTES
[facility]  Speech Therapy Treatment Note- Neurological Rehabilitation  Date: 5/14/2024     Name: Aaron Pruitt   MRN: [id number]   Therapy Diagnosis:   Encounter Diagnoses   Name Primary?    Cognitive communication disorder Yes    Aphasia      Physician: Camille Bay MD  Physician Orders: Ambulatory Referral to Speech Therapy   Medical Diagnosis: G93.1 (ICD-10-CM) - Anoxic brain damage    Visit #/ Visits Authorized: 4/4  Date of Evaluation:  4/24/24  Insurance Authorization Period: 4/26/24-5/26/24  Plan of Care Expiration Date:    7/4/24  Extended Plan of Care:  n/a   Progress Note: 5/24/24     Time In:  1:03  Time Out:  1:45  Total Billable Time:  42 minutes     Precautions: Fall, Cognition, and Communication  Subjective:   Patient reports: No pain. My eyes are blurry.   He was compliant to home exercise program.   Response to previous treatment: good  Pain Scale: 0/10 on a Visual Analog Scale currently.  Pain Location: n/a  Objective:   UNTIMED  Procedure   Speech- Language- Voice Therapy     Short Term Goals: (4 weeks) Current Progress:   The patient will follow 2 step simple commands with 90% accuracy independently to utilize in daily living environments.     Progressing/ Not Met 5/14/2024   80% accuracy independently (9/10 trials), previously 80% accuracy independently (8/10 trials)    I.e. smile and wave.  Point to the ceiling and say your name.  Knock on the table and count to 3.    2. The patient will complete categorization tasks with 80% accuracy and 1 prompt or cue in order to enhance his lexical fluidity.      Progressing/ Not Met 5/14/2024  Divergent categorization: named 10 foods independently with increased time to complete; previously named 9  Fish, salon, cheese, macaroni, turkey, ham,   Named 5 animals Independently; 10 animals with mod cues ; previously named 9 Independently ; perseverating on cats, dogs, horses, pigs today.  Named 4 states Independently; plus 6 mod verbal  cues     Convergent categorization: 70% accuracy independently 90% acc min A, previously 50% accuracy independently    3. The patient will participate in recall tasks, both immediate and delayed, with 75% accuracy in order to enhance his memory for daily tasks.      Progressing/ Not Met 2024 Immediate recall 5 item list: 80%, stable from previous  Reviewed 2nd time: 100% accuracy  Tree, ball, bike, flowers, grass    1 minute delay: 100% acc Independently     5 minute delay with distraction: 0% accuracy independently      4. The patient will participate in semantic feature analysis (SFA) to describe tangible objects for increased identification with 80% accuracy and 1 prompt or cue.      Progressing/ Not Met 2024   SFA telephone, chair, apple today - of the 3, patient described the objects with needing more than 2 prompts per object      Long Term Goals: (10 weeks) Current Progress:   Using compensatory strategies for memory, patient will recall a 10 item list with a 5 minute delay independently.   Progressing, not met 2024    2. The patient will participate in conversational tasks, both structured and spontaneous, with greater than a 5 word utterance to enhance communication participation.  Progressing, not met 2024      Patient Education/Response:   Patient educated regarding the followin. Compensatory strategies for memory - association and chunking, picture it  2. Home devices for keeping up with orientation information (echo dot, mike)    Home program established: yes-continue conversational speech to prompt memory and word finding  Patient verbalized understanding to all above education provided.     See Electronic Medical Record under Patient Instructions for exercises provided throughout therapy.  Assessment:   Treatment session focused on receptive and expressive language tasks including following two-step commands, answering open-ended questions, categorization tasks, as well as  memory based tasks with immediate and delayed recall. Improvement noted following multi-step commands. Good performance on immediate recall.  Decline in delayed recall. He continues to benefit from assistance for ambulation due to visual deficits, as well as for safety when transferring in and out of the chair.     Aaron is progressing well towards his goals. Current goals remain appropriate. Goals to be updated as necessary.     Patient prognosis is Good. Patient will continue to benefit from skilled outpatient speech and language therapy to address the deficits listed in the problem list on initial evaluation, provide patient/family education and to maximize patient's level of independence in the home and community environment.   Medical necessity is demonstrated by the following IMPAIRMENTS:  Aphasia: Patient with few true words in all situations severely limiting functional communication with both familiar and unfamiliar communication partners.   Cognition: Deficits in executive functioning, attention, and memory prevent the pt from relaying medically and safety relevant information in a timely manner in a state of emergency.   Barriers to Therapy: none  Patient's spiritual, cultural and educational needs considered and patient agreeable to plan of care and goals.  Plan:   Continue Plan of Care with focus on rehabilitation and compensation for cognitive-linguistic and aphasia impairments following anoxic brain injury.    Aby Najera MS, CCC-SLP  Speech-Language Pathologist  5/14/2024

## 2024-05-17 ENCOUNTER — CLINICAL SUPPORT (OUTPATIENT)
Dept: REHABILITATION | Facility: HOSPITAL | Age: 54
End: 2024-05-17
Payer: MEDICAID

## 2024-05-17 DIAGNOSIS — Z74.09 IMPAIRED FUNCTIONAL MOBILITY, BALANCE, GAIT, AND ENDURANCE: ICD-10-CM

## 2024-05-17 DIAGNOSIS — Z91.81 RISK FOR FALLS: Primary | ICD-10-CM

## 2024-05-17 PROCEDURE — 97110 THERAPEUTIC EXERCISES: CPT | Mod: PN,CQ

## 2024-05-17 NOTE — PROGRESS NOTES
[facility]   Physical Therapy Treatment Note      Name: Aaron Pruitt  Clinic Number: [id number]    Therapy Diagnosis:   Encounter Diagnoses   Name Primary?    Risk for falls Yes    Impaired functional mobility, balance, gait, and endurance      Physician: Camille Bay MD    Visit Date: 5/17/2024    Physician Orders: PT Eval and Treat   Medical Diagnosis from Referral: Anoxic brain damage [G93.1]   Evaluation Date: 4/22/2024  Authorization Period Expiration: 12/31/2024  Plan of Care Expiration: 7/5/2024  Progress Note Due: 5/22/2024  Date of Surgery: N/A  Visit # / Visits authorized: 3/14 (+eval)  FOTO: 1/3     Precautions: Standard, Fall, and Severe Visual Impairment; History of Seziure     Time In: 11:20 AM  Time Out: 12:00 PM  Total Billable Time: 40 minutes (MEDICAID - 3 TE)    PTA Visit #: 3/5     Subjective     Patient reports: no complaints at this time.  Continues to deny any falls since eval.   He  will be  compliant with home exercise program.  Response to previous treatment: no adverse response  Functional change: ongoing    Pain: 0/10  Location: NA      Objective      Objective Measures updated at progress report unless specified.     Treatment     Aaron received the treatments listed below:      therapeutic exercises to develop strength, endurance, ROM, and posture for 15 minutes including:  Calf raises: 2x20  Squat with chair tap: 2x10  Hip 3 way with 2# ankle weights     neuromuscular re-education activities to improve: Balance, Coordination, Kinesthetic, Sense, and Proprioception for 10 minutes. The following activities were included:  Nustep level 3.0 for 8 minutes  Lateral steps at / bar 6 lengths x 10 feet  Sit<>stand heavy black chair: x10 throughout treatment     therapeutic activities to improve functional performance for 20  minutes, including:  Overground ambulation: 400 feet with rolling walker and Min Assistance for AD management to avoid obstacles    Obstacle avoidance with chairs and tall bolsters used 4 lengths x 45 feet  Lateral steps at / bar: 6 lengths x 10 feet  Sit<>stand from elevated mat: 2x10  Standing transfer and walk around bolster 6 ft away with return to chair x 5 *heavy verbal cuing for sequencing to sit, keeping walker in front, backing up to chair, reaching for armrests  Chair to chair transfers: x6 * heavy verbal cuing for sequencing and safety awareness    gait training to improve functional mobility and safety for 00  minutes, including:  Not completed today      Patient Education and Home Exercises       Education provided:   - most appropriate AD option  - safety awareness, especially with sitting  - need for a walking stick    Written Home Exercises Provided: Patient instructed to cont prior HEP. Exercises were reviewed and Aaron was able to demonstrate them prior to the end of the session.  Aaron demonstrated good  understanding of the education provided. See Electronic Medical Record under Patient Instructions for exercises provided during therapy sessions    Assessment     Aaron arrived to session without complaints and agreeable to treatment.  He continues to arrive without any assistive device and denies any falls since evaluation though he exhibits significantly decreased safety awareness with ambulation and transfers.  Clinic rolling walker was used throughout treatment with patient observed walking with improved posture and gait mechanics as opposed to hand held guidance.  Again focused on seated transfers this session with no carryover with sequencing observed.  He as he still attempts to sit once he feels the chair regardless of how he is positioned in front of it.  He would benefit from continued PT services to improve safe functional mobility.      Aaron Is progressing well towards his goals.   Patient prognosis is Good.     Patient will continue to benefit from skilled outpatient physical therapy to address the deficits  listed in the problem list box on initial evaluation, provide pt/family education and to maximize pt's level of independence in the home and community environment.     Patient's spiritual, cultural and educational needs considered and pt agreeable to plan of care and goals.     Anticipated barriers to physical therapy: Degree of visual and proprioceptive/somatosensory impairments     Goals:   Short Term Goals: 5 weeks   Patient will be compliant with HEP in order to maximize PT benefits  Patient will perform sit <>  one attempt modified independently from standard height chair without need for verbal cuing in order to improve independence and safety with functional mobility   Patient will walk >/= 175 feet on Two-Minute Walk Test in order to improve endurance and gait speed for home mobility      Long Term Goals: 10 weeks   Patient will score >/= 64% on FOTO survey in order to improve self-perception of functional mobility deficits  Patient will improve bilateral lower extremity MMT grades by >/=1/2 grade in order to improve strength for ADL completion  Patient will perform Five Time Sit to  </=20 seconds in order to improve bilateral lower extremity muscular power for transfers  Patient will walk >/= 700 feet on Six-Minute Walk Test in order to improve endurance and gait speed for home mobility   Patient will perform 1 floor <> stand transfer with bilateral upper extremity support in order to demonstrate safe functional mobility in case of future fall  Patient will begin some form of home/community fitness in order to sustain progress gained in PT    Plan     Plan to cont with progression of PT goals per POC.    Vickie Brock, PTA

## 2024-05-22 ENCOUNTER — CLINICAL SUPPORT (OUTPATIENT)
Dept: REHABILITATION | Facility: HOSPITAL | Age: 54
End: 2024-05-22
Payer: MEDICAID

## 2024-05-22 DIAGNOSIS — H53.133 SUDDEN VISUAL LOSS OF BOTH EYES: ICD-10-CM

## 2024-05-22 DIAGNOSIS — R41.841 COGNITIVE COMMUNICATION DISORDER: Primary | ICD-10-CM

## 2024-05-22 DIAGNOSIS — Z74.09 IMPAIRED FUNCTIONAL MOBILITY, BALANCE, GAIT, AND ENDURANCE: ICD-10-CM

## 2024-05-22 DIAGNOSIS — R47.01 APHASIA: ICD-10-CM

## 2024-05-22 DIAGNOSIS — Z74.1 NEED FOR ASSISTANCE WITH PERSONAL CARE: ICD-10-CM

## 2024-05-22 DIAGNOSIS — Z91.81 RISK FOR FALLS: Primary | ICD-10-CM

## 2024-05-22 DIAGNOSIS — M62.81 MUSCLE WEAKNESS (GENERALIZED): Primary | ICD-10-CM

## 2024-05-22 PROCEDURE — 97110 THERAPEUTIC EXERCISES: CPT | Mod: PN

## 2024-05-22 PROCEDURE — 97530 THERAPEUTIC ACTIVITIES: CPT | Mod: PN

## 2024-05-22 PROCEDURE — 92507 TX SP LANG VOICE COMM INDIV: CPT | Mod: PN

## 2024-05-22 NOTE — PROGRESS NOTES
[facility]  Speech Therapy PROGRESS Note- Neurological Rehabilitation  Date: 5/22/2024     Name: Aaron Pruitt   MRN: [id number]   Therapy Diagnosis:   Encounter Diagnoses   Name Primary?    Cognitive communication disorder Yes    Aphasia      Physician: Camille Bay MD  Physician Orders: Ambulatory Referral to Speech Therapy   Medical Diagnosis: G93.1 (ICD-10-CM) - Anoxic brain damage    Visit #/ Visits Authorized: 5/4  Date of Evaluation:  4/24/24  Insurance Authorization Period: 4/26/24-5/26/24  Plan of Care Expiration Date:    7/4/24  Extended Plan of Care:  n/a   Progress Note: 5/24/24     Time In:  1345  Time Out:  1430  Total Billable Time:  45 minutes     Precautions: Fall, Cognition, and Communication  Subjective:   Patient reports: doing well since the last visit  He was compliant to home exercise program.   Response to previous treatment: good  Pain Scale: 0/10 on a Visual Analog Scale currently.  Pain Location: n/a  Objective:   UNTIMED  Procedure   Speech- Language- Voice Therapy     Short Term Goals: (4 weeks) Current Progress:   The patient will follow 2 step simple commands with 90% accuracy independently to utilize in daily living environments.   MET 5/22/24 Patient followed 2 step simple body commands with 100% accuracy independently.   2. The patient will complete categorization tasks with 80% accuracy and 1 prompt or cue in order to enhance his lexical fluidity.      Progressing/ Not Met 5/22/2024  Divergent categorization  Foods: 6 with 1 prompt, required 10 (60%)  Animals: 7 with 1 prompt, required 10 (70%)   Things in a kitchen: 5 with 1 prompt, required 10 (50%)    Convergent categorization: 50% accuracy with 1 prompt   3. The patient will participate in recall tasks, both immediate and delayed, with 75% accuracy in order to enhance his memory for daily tasks.      Progressing/ Not Met 5/22/2024 Immediate recall 5 item list: 80%, stable from previous 2 sessions    Immediate recall goal met for the last few sessions    Towel, chair, soap, fork, table    1 minute delay: 60% accuracy independently, 80% accuracy with semantic cueing    5 minute delay with distraction: 20% accuracy independently; 80% with sentence completion or responsive naming; 100% with maximum assistance   4. The patient will participate in semantic feature analysis (SFA) to describe tangible objects for increased identification with 80% accuracy and 1 prompt or cue.      Progressing/ Not Met 2024   Table - 2 descriptors independently, 1 prompt increased to 3 descriptor    Hammer - 2 descriptors independently, 1 prompt did not increase    Challenging to the patient      Long Term Goals: (10 weeks) Current Progress:   Using compensatory strategies for memory, patient will recall a 10 item list with a 5 minute delay independently.   Progressing, not met 2024    2. The patient will participate in conversational tasks, both structured and spontaneous, with greater than a 5 word utterance to enhance communication participation.  Progressing, not met 2024      Patient Education/Response:   Patient educated regarding the followin. Compensatory strategies for memory - association and chunking, picture it  2. Home devices for keeping up with orientation information (echo dot, mike)    Home program established: yes-continue conversational speech to prompt memory and word finding  Patient verbalized understanding to all above education provided.     See Electronic Medical Record under Patient Instructions for exercises provided throughout therapy.  Assessment:   Reassessment was completed today focusing on current goals, in which patient continues to progress towards all of his goals. He has shown improvements with following simple two-step commands presented verbally, meeting this goal today. He demonstrates improvements in immediate recall of a five-item list, partially meeting his short-term  "goal for recall. Areas where he continues to have difficulty include delayed recall of a five-item list, describing objects verbally on command, and generating items in categories. He is motivated to improve, though mentions his personal goal is to "get my eyes fixed." He was able to differentiate between thin and thick black lines today, but unable to see a Lummi drawn in black nor a largely written word. He continues to require frequent and consistent cueing for transfers into the chair to prevent any falls.      Aaron is progressing well towards his goals. Current goals remain appropriate. Goals to be updated as necessary.     Patient prognosis is Good. Patient will continue to benefit from skilled outpatient speech and language therapy to address the deficits listed in the problem list on initial evaluation, provide patient/family education and to maximize patient's level of independence in the home and community environment.   Medical necessity is demonstrated by the following IMPAIRMENTS:  Aphasia: Patient with few true words in all situations severely limiting functional communication with both familiar and unfamiliar communication partners.   Cognition: Deficits in executive functioning, attention, and memory prevent the pt from relaying medically and safety relevant information in a timely manner in a state of emergency.   Barriers to Therapy: none  Patient's spiritual, cultural and educational needs considered and patient agreeable to plan of care and goals.  Plan:   Continue Plan of Care with focus on rehabilitation and compensation for cognitive-linguistic and aphasia impairments following anoxic brain injury.    Josette May M.S., L-SLP,CCC-SLP, CBIS  Speech-Language Pathologist  Certified Brain Injury Specialist  5/22/2024    "

## 2024-05-22 NOTE — PROGRESS NOTES
[facility]  Occupational Therapy Treatment Note     Date: 5/22/2024  Name: Aaron Pruitt  [facility] Number: [id number]    Therapy Diagnosis:   Encounter Diagnoses   Name Primary?    Muscle weakness (generalized) Yes    Sudden visual loss of both eyes     Need for assistance with personal care      Physician: Camille Bay MD  Physician Orders: Eval and Treat  Medical Diagnosis: G93.1 (ICD-10-CM) - Anoxic brain damage  Evaluation Date: 4/24/2024  Insurance Authorization Period Expiration: 4/1/2025  Plan of Care Certification Period: 07/5/2024  Visit # / Visits authorized: 2 / 12  FOTO: 1 / 3     Precautions:  Standard, Fall, and vision impaired, history of seizures      Time In: 1:00 pm  Time Out: 1:45 pm  Total Billable Time: 45 minutes      Subjective     Patient reports: "I am good. I am trying to use my arms more"  He was compliant with home exercise program given last session.   Response to previous treatment: good   Functional change: using upper extremities more     Pain: 0/10  Location: no pain     Objective     Visual/Perceptual:  Comments: severe vision loss, unable to identify objects in room     Physical Exam:  Postural examination/scapula alignment: Rounded shoulder and impaired sitting balance, posterior lean when sitting unsupported, multiple cues for upright posture  Joint integrity: Firm end feeling  Palpation: no pain with palpation      Joint Evaluation  AROM  4/24/2024 PROM   4/24/2024 MMS   4/24/2024 AROM  4/24/2024 PROM   4/24/2024 MMS   4/24/2024     Right Right Right Left Left Left   Scapular Elevation     3/5     3/5   Scapular Retraction     3/5     3/5   Shoulder Flex 0-180 87 ~110 2/5 114 ~120 2/5   Shoulder Extension 0-80 60   2+/5 66   2+/5   Shoulder Abd 0-180 75 ~105 2/5 91 ~110 2/5   Shoulder ER 0-90     2/5     2/5   Shoulder IR 0-90 PSIS    2/5 PSIS   2/5   Elbow Flexion 0-150 150   3/5 150   3/5   Elbow Extension -22 -15 2/5 -31 -10 2/5   Wrist Flex 0-80 "     2+/5     2+/5   Wrist Ext 0-70     2+/5     2+/5   Supination 0-80     2/5 60   2/5   Pronation 0-80     2/5     2/5   *WNL - Within Normal Limits  *NT = Not Tested     Fist: joint stiffness limiting full fist on bilateral hands   3 cm from IF to palm right hand       Strength: (MAG Dynamometer in lbs.) Average 3 trials, Position II:       4/24/2024 4/24/2024   Rung II Right Left   Trial 1 12# 20#      Normal  Average Values  Female Right Left Male: Right Left   20-29 66 lbs 62 lbs         94 lbs 86 lbs   30-39 68 lbs 64 lbs 90 lbs 82 lbs   40-49 64 lbs 62 lbs 80 lbs 74 lbs   50-59 62 lbs 57 lbs 72 lbs 65 lbs   60-69 53 lbs 51 lbs 63 lbs 56 lbs   70+ 44 lbs 42 lbs 54 lbs 48 lbs   SD = +/- 19lbs         Pinch Strength (Measured in lbs)       4/24/2024 4/24/2024     Right Left   Key Pinch 5 # 9 #   3pt Pinch unable # unable #   2pt Pinch unable # unable #         Fine/Gross Motor Coordination     Assessment   Right   4/24/2024 Left  4/24/2024   VIRY (Rapid Alternating Movements)      impaired - severe    impaired - severe   Finger to Nose (5 times)   impaired - severe impaired - severe   Finger Flicks (coordination moving from digit flexion to digit extension)   impaired - severe impaired - severe   *WNL - Within Normal Limits  *NT = Not Tested     Sensation:  Aaron  reports no change in sensation, difficulty with formal assessment due to cognitive impairments.        Sensation Intact  Impaired Comments    Remlap Anuradha  Deep Touch (6.65) []  []       Protective Sensation (4.56) []  []       Light Touch (3.61) []  []                  Other Kinesthesia  []  [x]       Temperature []  []       Stereognosis  []  []            Balance:   Static Sit - FAIR: Maintains without assist, but unable to take any challenges   Dynamic sit- POOR+: Needs MINIMAL assist to maintain  Static Stand - POOR: Needs MODERATE assist to maintain  Dynamic stand - POOR: Needs MODERATE assist to maintain     Endurance Deficit:  severe    Treatment     Aaron received the treatments listed below:      neuromuscular re-education activities to improve: Coordination, Kinesthetic, Sense, Proprioception, and Posture for 30 minutes. The following activities were included:  - Upper Body Ergometer (UBE) L2 for 8 minutes at 15 RPM to provide proprioceptive awareness, postural stability, strength, activity tolerance, and reciprocal patterns with bimanual coordination completed to improve use of bilateral upper extremities in order to prepare for therapeutic exercises/activities. Cues provided as needed for postural stability/positioning  - seated ball squeeze with medium therapy ball on lap 2x10   - seated shoulder extension weightbearing on therapy ball 2x10   - seated shoulder adduction bilateral upper extremities on therapy ball 2x10   - supine 2# dowel 2x10    - chest press    - shoulder flexion/extension     Therapeutic activities to improve functional performance for 15 minutes, including:  - cones alternating upper extremities 1# wrist weights on each hand various levels     Patient Education and Home Exercises     Education provided:   - Progress towards goals     Written Home Exercises Provided: Patient instructed to cont prior HEP.  Exercises were reviewed and Aaron was able to demonstrate them prior to the end of the session.  Aaron demonstrated good  understanding of the home exercise program provided. See electronic medical record under Patient Instructions for exercises provided during therapy sessions.       Assessment     Aaron tolerated today's session well. Increased time for all tasks due to visual impairments. Tactile cues provided for biomechanics during supine exercises - noted weakness with right triceps due to difficulty extending elbow. He exhibited improved postural stability sitting edge of mat compared to eval, requiring decreased cues for posterior lean. He was able to reach for cones using 1# wrist weights on bilateral  upper extremities for proprioceptive input; therapist instructed on using tactile sensation to compensate for vision loss.     Aaron is progressing well towards his goals and there are no updates to goals at this time. Pt prognosis is Good.     Patient will continue to benefit from skilled outpatient occupational therapy to address the deficits listed in the problem list on initial evaluation provide patient/family education and to maximize patient's level of independence in the home and community environment.     Patient's spiritual, cultural and educational needs considered and patient agreeable to plan of care and goals.    Anticipated barriers to occupational therapy: severe vision impairments     Goals:  Short Term Goals: 5 weeks   - Pt will report 50% compliance with home exercise program in order to maintain progress attained during therapy. Not met, progressing  - Pt will improve active range of motion of right shoulder flexion/abduction to 115 degrees in order to increase ease with bathing and dressing tasks. Not met, progressing  - Pt will improve active range of motion of left  shoulder flexion/abduction to 130/115 degrees in order to increase ease with bathing and dressing tasks. Not met, progressing  - Pt will increase bilateral  strength by 10# in order to improve ease with dressing, bathing and grooming . Not met, progressing  - Pt will identify vision loss strategies to assist with navigating his environment to prevent falls or injury. Not met, progressing        Long Term Goals: 10 weeks   - Pt will report independence with home exercise program in order to maintain progress attained during therapy. Not met, progressing  - Pt will increase MMT for bilateral shoulder flexion to 3+/5 to increase upper body strength needed for home management tasks, high level IADLs and work related activities. Not met, progressing  - Pt will increase bilateral  strength to 40# in order to improve ease with  instrumental activities of daily living. Not met, progressing  - Pt will improve FOTO score to 63% for improved self perception of functional performance with daily activities. Not met, progressing  - Pt will demonstrate correct use of adaptive equipment and compensatory strategies to assist with feeding, bathing, and dressing due to vision loss. Not met, progressing  - Pt will perform standing bilateral task for 10 minutes, with distant supervision, to improve bimanual task performance and standing tolerance needed for safety during homemaking/kitchen tasks and showering. Not met, progressing  - Pt and caregiver will report increased participation with activities of daily living to improve independence. Not met, progressing    Plan     Updates/Grading for next session: continue with plan of care     Corinne Rapier, OT   5/22/2024

## 2024-05-22 NOTE — PROGRESS NOTES
[facility]   Physical Therapy Treatment Note      Name: Aaron Pruitt  Clinic Number: [id number]    Therapy Diagnosis:   Encounter Diagnoses   Name Primary?    Risk for falls Yes    Impaired functional mobility, balance, gait, and endurance      Physician: Camille Bay MD    Visit Date: 5/22/2024    Physician Orders: PT Eval and Treat   Medical Diagnosis from Referral: Anoxic brain damage [G93.1]   Evaluation Date: 4/22/2024  Authorization Period Expiration: 12/31/2024  Plan of Care Expiration: 7/5/2024  Progress Note Due: last assessed 5/22/2024  Date of Surgery: N/A  Visit # / Visits authorized: 4/14 (+eval)  FOTO: next when proxy present      Precautions: Standard, Fall, and Severe Visual Impairment; History of Seziure     Time In: 2:35PM  Time Out: 3:15PM  Total Billable Time: 40 minutes (MEDICAID - 3 TE)    PTA Visit #: 0/5     Subjective     Patient reports: Believes he is getting a little stronger since first starting PT.  He  will be  compliant with home exercise program.  Response to previous treatment: no adverse response  Functional change: ongoing    Pain: 0/10  Location: NA      Objective      Objective Measures updated at progress report unless specified.     Two-Minute Walk Test: 190 feet with rolling walker and min A from PT for obstacle avoidance    Five Time Sit to Stand: 50.7 seconds with verbal cuing for hand placement and weight shift; poor eccentric control to sit    Treatment     Aaron received the treatments listed below:      therapeutic exercises to develop strength, endurance, ROM, and posture for 00 minutes including:    Not performed today:  Squat with chair tap: 2x10  Hip 3 way with 2# ankle weights     neuromuscular re-education activities to improve: Balance, Coordination, Kinesthetic, Sense, and Proprioception for 15 minutes. The following activities were included:  Nustep level 4.0 single peak for 8 minutes  Calf raises: 2x20    therapeutic  activities to improve functional performance for 25 minutes, including:  Goal reassessment (see above)  Chair to chair transfers (Sit <> stand > walk 45' feet with rolling walker > sit x 4 rounds   Lateral steps at / bar: 8 lengths x 10 feet    gait training to improve functional mobility and safety for 00  minutes, including:  Not completed today    Patient Education and Home Exercises       Education provided:   - most appropriate AD option  - safety awareness, especially with sitting  - need for a walking stick    Written Home Exercises Provided: Patient instructed to cont prior HEP. Exercises were reviewed and Aaron was able to demonstrate them prior to the end of the session.  Aaron demonstrated good  understanding of the education provided. See Electronic Medical Record under Patient Instructions for exercises provided during therapy sessions    Assessment     Aaron arrived from SLP session without new complaints. Other therapists report he continues to be challenged with spatial awareness when performing sit to stand transfers from ambulation approach, so PT continued to emphasize this during session. Clinic rolling walker utilized throughout visit due to continued dynamic postural control deficits. Gait speed has however improved since initial evaluation. He is now able to perform most sit to stand transfers from standard height chair without physical assistance, but he continues to display occasional posterior hooking of bilateral lower extremity and poor eccentric control to sit. Verbal cuing still required for sequencing of sit to stands in >50% of attempts. He would benefit from continued PT services to continue decreasing fall risk and improve bilateral lower extremity strength.    Aaron Is progressing well towards his goals.   Patient prognosis is Good.     Patient will continue to benefit from skilled outpatient physical therapy to address the deficits listed in the problem list box on initial  evaluation, provide pt/family education and to maximize pt's level of independence in the home and community environment.     Patient's spiritual, cultural and educational needs considered and pt agreeable to plan of care and goals.     Anticipated barriers to physical therapy: Degree of visual and proprioceptive/somatosensory impairments     Goals:   Short Term Goals: 5 weeks   Patient will be compliant with HEP in order to maximize PT benefits  Patient will perform sit <>  one attempt modified independently from standard height chair without need for verbal cuing in order to improve independence and safety with functional mobility   Patient will walk >/= 175 feet on Two-Minute Walk Test in order to improve endurance and gait speed for home mobility (met)     Long Term Goals: 10 weeks   Patient will score >/= 64% on FOTO survey in order to improve self-perception of functional mobility deficits  Patient will improve bilateral lower extremity MMT grades by >/=1/2 grade in order to improve strength for ADL completion  Patient will perform Five Time Sit to  </=20 seconds in order to improve bilateral lower extremity muscular power for transfers  Patient will walk >/= 700 feet on Six-Minute Walk Test in order to improve endurance and gait speed for home mobility   Patient will perform 1 floor <> stand transfer with bilateral upper extremity support in order to demonstrate safe functional mobility in case of future fall  Patient will begin some form of home/community fitness in order to sustain progress gained in PT    Plan     Plan to cont with progression of PT goals per POC.    JAMES FERNANDEZ, PT

## 2024-05-24 ENCOUNTER — CLINICAL SUPPORT (OUTPATIENT)
Dept: REHABILITATION | Facility: HOSPITAL | Age: 54
End: 2024-05-24
Payer: MEDICAID

## 2024-05-24 DIAGNOSIS — Z91.81 RISK FOR FALLS: Primary | ICD-10-CM

## 2024-05-24 DIAGNOSIS — R47.01 APHASIA: ICD-10-CM

## 2024-05-24 DIAGNOSIS — R41.841 COGNITIVE COMMUNICATION DISORDER: Primary | ICD-10-CM

## 2024-05-24 DIAGNOSIS — Z74.09 IMPAIRED FUNCTIONAL MOBILITY, BALANCE, GAIT, AND ENDURANCE: ICD-10-CM

## 2024-05-24 PROCEDURE — 97110 THERAPEUTIC EXERCISES: CPT | Mod: PN,59

## 2024-05-24 PROCEDURE — 92507 TX SP LANG VOICE COMM INDIV: CPT | Mod: PN

## 2024-05-24 NOTE — PROGRESS NOTES
[facility]  Speech Therapy Treatment Note- Neurological Rehabilitation  Date: 5/24/2024     Name: Aaron Pruitt   MRN: [id number]   Therapy Diagnosis:   Encounter Diagnoses   Name Primary?    Cognitive communication disorder Yes    Aphasia      Physician: Camille Bay MD  Physician Orders: Ambulatory Referral to Speech Therapy   Medical Diagnosis: G93.1 (ICD-10-CM) - Anoxic brain damage    Visit #/ Visits Authorized: 6/15  Date of Evaluation:  4/24/24  Insurance Authorization Period: 4/26/24-5/26/24  Plan of Care Expiration Date:    7/4/24  Extended Plan of Care:  n/a   Progress Note: 6/22/24     Time In:  1104  Time Out:  1145  Total Billable Time:   41 minutes     Precautions: Fall, Cognition, and Communication  Subjective:   Patient reports: got glasses last week; still seeing silhouette  He was compliant to home exercise program.   Response to previous treatment: good  Pain Scale: 0/10 on a Visual Analog Scale currently.  Pain Location: n/a  Objective:   UNTIMED  Procedure   Speech- Language- Voice Therapy     Short Term Goals: (4 weeks) Current Progress:   The patient will follow 2 step simple commands with 90% accuracy independently to utilize in daily living environments.   MET 5/22/24    2. The patient will complete categorization tasks with 80% accuracy and 1 prompt or cue in order to enhance his lexical fluidity.      Progressing/ Not Met 5/24/2024  Divergent categorization  Foods: 5 independently, 8 with 1 prompt, required 10 (80%)  Animals: 6 independently, 8 with 1 prompt, required 10 (80%)  Things in a kitchen: 4 independently, 7 with 1 prompt, required 10 (70%)   3. The patient will participate in recall tasks, both immediate and delayed, with 75% accuracy in order to enhance his memory for daily tasks.      Progressing/ Not Met 5/24/2024 Immediate recall 3 item list: 91% (10/11 trials)  Immediate recall goal met    Mental manipulation task of 2-3 items completed with 0% accuracy  independently. Required maximum assistance to complete.   4. The patient will participate in semantic feature analysis (SFA) to describe tangible objects for increased identification with 80% accuracy and 1 prompt or cue.      Progressing/ Not Met 2024   Shoe - 2 descriptors independently, 1 prompt did not increase; maximum assistance to generate 2 more descriptors  Hamburger - 2 descriptors independently, prompt did not increase  Attention to task was poor, leading himself into a divergent categorization task      Long Term Goals: (10 weeks) Current Progress:   Using compensatory strategies for memory, patient will recall a 10 item list with a 5 minute delay independently.   Progressing, not met 2024    2. The patient will participate in conversational tasks, both structured and spontaneous, with greater than a 5 word utterance to enhance communication participation.  Progressing, not met 2024      Patient Education/Response:   Patient educated regarding the followin. Compensatory strategies for memory - association and chunking, picture it  2. Home devices for keeping up with orientation information (echo dot, mike)    Home program established: yes-continue conversational speech to prompt memory and word finding  Patient verbalized understanding to all above education provided.     See Electronic Medical Record under Patient Instructions for exercises provided throughout therapy.  Assessment:   Treatment session focused on various cognitive and speech-language tasks to enhance his current abilities. He participated in immediate recall tasks with good abilities, meeting this portion of his recall goal today. Mental manipulation tasks were probed today for the first time which proved difficult for him without assistance. Additionally, he completed descriptive language tasks to assist with his word finding. Sustained attention deficits hinder his accurate completion of divergent naming and  descriptive language tasks, requiring consistent and frequent assistance from speech-language pathologist. He continues to require frequent and consistent cueing for transfers into the chair to prevent any falls.      Aaron is progressing well towards his goals. Current goals remain appropriate. Goals to be updated as necessary.     Patient prognosis is Good. Patient will continue to benefit from skilled outpatient speech and language therapy to address the deficits listed in the problem list on initial evaluation, provide patient/family education and to maximize patient's level of independence in the home and community environment.   Medical necessity is demonstrated by the following IMPAIRMENTS:  Aphasia: Patient with few true words in all situations severely limiting functional communication with both familiar and unfamiliar communication partners.   Cognition: Deficits in executive functioning, attention, and memory prevent the pt from relaying medically and safety relevant information in a timely manner in a state of emergency.   Barriers to Therapy: none  Patient's spiritual, cultural and educational needs considered and patient agreeable to plan of care and goals.  Plan:   Continue Plan of Care with focus on rehabilitation and compensation for cognitive-linguistic and aphasia impairments following anoxic brain injury.    Josette May M.S., L-SLP,CCC-SLP, CBIS  Speech-Language Pathologist  Certified Brain Injury Specialist  5/24/2024

## 2024-05-24 NOTE — PROGRESS NOTES
[facility]   Physical Therapy Treatment Note      Name: Aaron Pruitt  Clinic Number: [id number]    Therapy Diagnosis:   Encounter Diagnoses   Name Primary?    Risk for falls Yes    Impaired functional mobility, balance, gait, and endurance      Physician: Camille Bay MD    Visit Date: 5/24/2024    Physician Orders: PT Eval and Treat   Medical Diagnosis from Referral: Anoxic brain damage [G93.1]   Evaluation Date: 4/22/2024  Authorization Period Expiration: 12/31/2024  Plan of Care Expiration: 7/5/2024  Progress Note Due: last assessed 5/22/2024  Date of Surgery: N/A  Visit # / Visits authorized: 5/14 (+eval)  FOTO: next when proxy present      Precautions: Standard, Fall, and Severe Visual Impairment; History of Seziure     Time In: 10:15AM  Time Out: 11:00AM  Total Billable Time: 45 minutes (MEDICAID - 3 TE)    PTA Visit #: 0/5     Subjective     Patient reports:  Still going to gym with his friend 2x/week with his friend and using recumbent bike.  He  was  compliant with home exercise program.  Response to previous treatment: no adverse response  Functional change: ongoing    Pain: 0/10  Location: NA      Objective      Objective Measures updated at progress report unless specified.     Treatment     Aaron received the treatments listed below:      therapeutic exercises to develop strength, endurance, ROM, and posture for 00 minutes including:    Not performed today:  Squat with chair tap: 2x10  Hip 3 way with 2# ankle weights   Calf raises: 2x20  Chair to chair transfers (Sit <> stand > walk 45' feet with rolling walker > sit x 4 rounds   Lateral steps at / bar: 8 lengths x 10 feet    neuromuscular re-education activities to improve: Balance, Coordination, Kinesthetic, Sense, and Proprioception for 30 minutes. The following activities were included:  Narrow base of support + red med ball chest press x10 CGA  Narrow base of support + red med ball trunk twist/press x10 each  "direction CGA  Nustep level 4.0 single peak for 8 minutes  Normal stance on incline board with upper extremity support required x60" cues required for body awareness  Normal base of support on foam x60" without upper extremity support  Normal base of support on foam + vertical head turns x30" without upper extremity support  Normal base of support on foam + horizontal head turns x30" without upper extremity support    therapeutic activities to improve functional performance for 15 minutes, including:  Sit to stand from elevated mat 2x10  Foam roll weaving with rolling walker; min A for obstacle avoidance (tends more assistance with turning right versus turning left); 3 vertical foam rolls spaced ~8 feet apart x 12 lengths  Gym lap end of session x200' with rolling walker and min A for obstacle avoidance    gait training to improve functional mobility and safety for 00  minutes, including:  Not completed today    Patient Education and Home Exercises       Education provided:   - most appropriate AD option  - safety awareness, especially with sitting  - need for a walking stick    Written Home Exercises Provided: Patient instructed to cont prior HEP. Exercises were reviewed and Aaron was able to demonstrate them prior to the end of the session.  Aaron demonstrated good  understanding of the education provided. See Electronic Medical Record under Patient Instructions for exercises provided during therapy sessions    Assessment     Aaron arrived to PT session without new complaints and agreeable to PT. Continues to be challenged by obstacle avoidance but gradually improving with incidence of actually striking objects in path. Impairments in ankle proprioception apparent in small inclines traversed to get in/out of parallel bars as well as stance on incline board, both of which required heavy bilateral upper extremity support so he would not lose balance. Appropriate challenge noted on compliant surface and better " utilization of ankle strategy here. He would benefit from continued PT services to achieve functional goals.    Aaron Is progressing well towards his goals.   Patient prognosis is Good.     Patient will continue to benefit from skilled outpatient physical therapy to address the deficits listed in the problem list box on initial evaluation, provide pt/family education and to maximize pt's level of independence in the home and community environment.     Patient's spiritual, cultural and educational needs considered and pt agreeable to plan of care and goals.     Anticipated barriers to physical therapy: Degree of visual and proprioceptive/somatosensory impairments     Goals:   Short Term Goals: 5 weeks   Patient will be compliant with HEP in order to maximize PT benefits  Patient will perform sit <>  one attempt modified independently from standard height chair without need for verbal cuing in order to improve independence and safety with functional mobility   Patient will walk >/= 175 feet on Two-Minute Walk Test in order to improve endurance and gait speed for home mobility (met)     Long Term Goals: 10 weeks   Patient will score >/= 64% on FOTO survey in order to improve self-perception of functional mobility deficits  Patient will improve bilateral lower extremity MMT grades by >/=1/2 grade in order to improve strength for ADL completion  Patient will perform Five Time Sit to  </=20 seconds in order to improve bilateral lower extremity muscular power for transfers  Patient will walk >/= 700 feet on Six-Minute Walk Test in order to improve endurance and gait speed for home mobility   Patient will perform 1 floor <> stand transfer with bilateral upper extremity support in order to demonstrate safe functional mobility in case of future fall  Patient will begin some form of home/community fitness in order to sustain progress gained in PT    Plan     Plan to cont with progression of PT goals per  POC.    JAMES FERNANDEZ, PT

## 2024-05-27 ENCOUNTER — CLINICAL SUPPORT (OUTPATIENT)
Dept: REHABILITATION | Facility: HOSPITAL | Age: 54
End: 2024-05-27
Payer: MEDICAID

## 2024-05-27 DIAGNOSIS — Z74.09 IMPAIRED FUNCTIONAL MOBILITY, BALANCE, GAIT, AND ENDURANCE: ICD-10-CM

## 2024-05-27 DIAGNOSIS — Z91.81 RISK FOR FALLS: Primary | ICD-10-CM

## 2024-05-27 PROCEDURE — 97110 THERAPEUTIC EXERCISES: CPT | Mod: PN

## 2024-05-27 NOTE — PROGRESS NOTES
[facility]   Physical Therapy Treatment Note      Name: Aaron Pruitt  Clinic Number: [id number]    Therapy Diagnosis:   No diagnosis found.    Physician: Camille Bay MD    Visit Date: 5/27/2024    Physician Orders: PT Eval and Treat   Medical Diagnosis from Referral: Anoxic brain damage [G93.1]   Evaluation Date: 4/22/2024  Authorization Period Expiration: 12/31/2024  Plan of Care Expiration: 7/5/2024  Progress Note Due: last assessed 5/22/2024  Date of Surgery: N/A  Visit # / Visits authorized: 5/14 (+eval)  FOTO: next when proxy present      Precautions: Standard, Fall, and Severe Visual Impairment; History of Seziure     Time In: 10:15AM  Time Out: 11:00AM  Total Billable Time: 45 minutes (MEDICAID - 3 TE)    PTA Visit #: 0/5     Subjective     Patient reports:  Still going to gym with his friend 2x/week with his friend and using recumbent bike.  He  was  compliant with home exercise program.  Response to previous treatment: no adverse response  Functional change: ongoing    Pain: 0/10  Location: NA      Objective      Objective Measures updated at progress report unless specified.     Treatment     Aaron received the treatments listed below:      therapeutic exercises to develop strength, endurance, ROM, and posture for 00 minutes including:    Not performed today:  Squat with chair tap: 2x10  Hip 3 way with 2# ankle weights   Calf raises: 2x20  Chair to chair transfers (Sit <> stand > walk 45' feet with rolling walker > sit x 4 rounds   Lateral steps at / bar: 8 lengths x 10 feet    neuromuscular re-education activities to improve: Balance, Coordination, Kinesthetic, Sense, and Proprioception for 30 minutes. The following activities were included:  Narrow base of support + red med ball chest press x10 CGA  Narrow base of support + red med ball trunk twist/press x10 each direction CGA  Nustep level 4.0 single peak for 8 minutes  Normal stance on incline board with upper  "extremity support required x60" cues required for body awareness  Normal base of support on foam x60" without upper extremity support  Normal base of support on foam + vertical head turns x30" without upper extremity support  Normal base of support on foam + horizontal head turns x30" without upper extremity support    therapeutic activities to improve functional performance for 15 minutes, including:  Sit to stand from elevated mat 2x10  Foam roll weaving with rolling walker; min A for obstacle avoidance (tends more assistance with turning right versus turning left); 3 vertical foam rolls spaced ~8 feet apart x 12 lengths  Gym lap end of session x200' with rolling walker and min A for obstacle avoidance    gait training to improve functional mobility and safety for 00  minutes, including:  Not completed today    Patient Education and Home Exercises       Education provided:   - most appropriate AD option  - safety awareness, especially with sitting  - need for a walking stick    Written Home Exercises Provided: Patient instructed to cont prior HEP. Exercises were reviewed and Aaron was able to demonstrate them prior to the end of the session.  Aaron demonstrated good  understanding of the education provided. See Electronic Medical Record under Patient Instructions for exercises provided during therapy sessions    Assessment     Aaron arrived to PT session without new complaints and agreeable to PT. Continues to be challenged by obstacle avoidance but gradually improving with incidence of actually striking objects in path. Impairments in ankle proprioception apparent in small inclines traversed to get in/out of parallel bars as well as stance on incline board, both of which required heavy bilateral upper extremity support so he would not lose balance. Appropriate challenge noted on compliant surface and better utilization of ankle strategy here. He would benefit from continued PT services to achieve functional " goals.    Aaron Is progressing well towards his goals.   Patient prognosis is Good.     Patient will continue to benefit from skilled outpatient physical therapy to address the deficits listed in the problem list box on initial evaluation, provide pt/family education and to maximize pt's level of independence in the home and community environment.     Patient's spiritual, cultural and educational needs considered and pt agreeable to plan of care and goals.     Anticipated barriers to physical therapy: Degree of visual and proprioceptive/somatosensory impairments     Goals:   Short Term Goals: 5 weeks   Patient will be compliant with HEP in order to maximize PT benefits  Patient will perform sit <>  one attempt modified independently from standard height chair without need for verbal cuing in order to improve independence and safety with functional mobility   Patient will walk >/= 175 feet on Two-Minute Walk Test in order to improve endurance and gait speed for home mobility (met)     Long Term Goals: 10 weeks   Patient will score >/= 64% on FOTO survey in order to improve self-perception of functional mobility deficits  Patient will improve bilateral lower extremity MMT grades by >/=1/2 grade in order to improve strength for ADL completion  Patient will perform Five Time Sit to  </=20 seconds in order to improve bilateral lower extremity muscular power for transfers  Patient will walk >/= 700 feet on Six-Minute Walk Test in order to improve endurance and gait speed for home mobility   Patient will perform 1 floor <> stand transfer with bilateral upper extremity support in order to demonstrate safe functional mobility in case of future fall  Patient will begin some form of home/community fitness in order to sustain progress gained in PT    Plan     Plan to cont with progression of PT goals per POC.    Lori Sanders, PT

## 2024-05-27 NOTE — PROGRESS NOTES
"  [facility]   Physical Therapy Treatment Note      Name: Aaron Pruitt  Clinic Number: [id number]    Therapy Diagnosis:   Encounter Diagnoses   Name Primary?    Risk for falls Yes    Impaired functional mobility, balance, gait, and endurance      Physician: Camille Bay MD    Visit Date: 5/27/2024    Physician Orders: PT Eval and Treat   Medical Diagnosis from Referral: Anoxic brain damage [G93.1]   Evaluation Date: 4/22/2024  Authorization Period Expiration: 12/31/2024  Plan of Care Expiration: 7/5/2024  Progress Note Due: last assessed 5/22/2024  Date of Surgery: N/A  Visit # / Visits authorized: 6/14 (+eval)  FOTO: next when proxy present      Precautions: Standard, Fall, and Severe Visual Impairment; History of Seziure     Time In: 11:24AM  Time Out: 12:07AM  Total Billable Time: 38 minutes (MEDICAID - 3 TE)    PTA Visit #: 0/5     Subjective     Patient reports: No changes since last seen in PT.  He  was  compliant with home exercise program.  Response to previous treatment: no adverse response  Functional change: ongoing    Pain: 0/10  Location: NA      Objective      Objective Measures updated at progress report unless specified.     Treatment     Aaron received the treatments listed below:      therapeutic exercises to develop strength, endurance, ROM, and posture for 8 minutes including:  Squat with chair tap: x10  Calf raises: 2x15  Lateral steps at / bar: 8 lengths x 10 feet    neuromuscular re-education activities to improve: Balance, Coordination, Kinesthetic, Sense, and Proprioception for 20 minutes. The following activities were included:  Nustep level 4.0 double peak for 8 minutes cues to increase limb movement amplitude  Normal stance on incline board (Rung 2) without upper extremity support x60" occasional cues required for body awareness  Normal stance on incline board (Rung 2) without upper extremity support + Squigz pulls/placements x10 each hand  occasional cues " "required for body awareness  Normal base of support on foam x30" without upper extremity support  Normal base of support on foam + vertical head turns x30" without upper extremity support  Normal base of support on foam + horizontal head turns x30" without upper extremity support  Narrow base of support on foam + vertical head turns x30" without upper extremity support  Narrow base of support on foam + horizontal head turns x30" without upper extremity support    therapeutic activities to improve functional performance for 10 minutes, including:  Foam roll weaving with rolling walker; min A for obstacle avoidance (tends more assistance with turning right versus turning left); 3 vertical foam rolls spaced ~8 feet apart x 6 lengths  Gym lap end of session x200' with rolling walker and min A for obstacle avoidance    gait training to improve functional mobility and safety for 00  minutes, including:  Not completed today    Patient Education and Home Exercises       Education provided:   - most appropriate AD option  - safety awareness, especially with sitting  - need for a walking stick    Written Home Exercises Provided: Patient instructed to cont prior HEP. Exercises were reviewed and Aaron was able to demonstrate them prior to the end of the session.  Aaron demonstrated good  understanding of the education provided. See Electronic Medical Record under Patient Instructions for exercises provided during therapy sessions    Assessment     Aaron arrived to PT session without new complaints and agreeable to PT. He demonstrated improved righting reactions on inclined surfaces today. Still requiring ample cues for safe stand to sit transfers to avoid posterior loss of balance. He would benefit from continued PT services to achieve functional goals.    Aaron Is progressing well towards his goals.   Patient prognosis is Good.     Patient will continue to benefit from skilled outpatient physical therapy to address the " deficits listed in the problem list box on initial evaluation, provide pt/family education and to maximize pt's level of independence in the home and community environment.     Patient's spiritual, cultural and educational needs considered and pt agreeable to plan of care and goals.     Anticipated barriers to physical therapy: Degree of visual and proprioceptive/somatosensory impairments     Goals:   Short Term Goals: 5 weeks   Patient will be compliant with HEP in order to maximize PT benefits  Patient will perform sit <>  one attempt modified independently from standard height chair without need for verbal cuing in order to improve independence and safety with functional mobility   Patient will walk >/= 175 feet on Two-Minute Walk Test in order to improve endurance and gait speed for home mobility (met)     Long Term Goals: 10 weeks   Patient will score >/= 64% on FOTO survey in order to improve self-perception of functional mobility deficits  Patient will improve bilateral lower extremity MMT grades by >/=1/2 grade in order to improve strength for ADL completion  Patient will perform Five Time Sit to  </=20 seconds in order to improve bilateral lower extremity muscular power for transfers  Patient will walk >/= 700 feet on Six-Minute Walk Test in order to improve endurance and gait speed for home mobility   Patient will perform 1 floor <> stand transfer with bilateral upper extremity support in order to demonstrate safe functional mobility in case of future fall  Patient will begin some form of home/community fitness in order to sustain progress gained in PT    Plan     Plan to cont with progression of PT goals per POC.    JAMES FERNANDEZ, PT

## 2024-05-29 ENCOUNTER — CLINICAL SUPPORT (OUTPATIENT)
Dept: REHABILITATION | Facility: HOSPITAL | Age: 54
End: 2024-05-29
Payer: MEDICAID

## 2024-05-29 DIAGNOSIS — R47.01 APHASIA: ICD-10-CM

## 2024-05-29 DIAGNOSIS — Z91.81 RISK FOR FALLS: Primary | ICD-10-CM

## 2024-05-29 DIAGNOSIS — R41.841 COGNITIVE COMMUNICATION DISORDER: Primary | ICD-10-CM

## 2024-05-29 DIAGNOSIS — M62.81 MUSCLE WEAKNESS (GENERALIZED): Primary | ICD-10-CM

## 2024-05-29 DIAGNOSIS — Z74.1 NEED FOR ASSISTANCE WITH PERSONAL CARE: ICD-10-CM

## 2024-05-29 DIAGNOSIS — Z74.09 IMPAIRED FUNCTIONAL MOBILITY, BALANCE, GAIT, AND ENDURANCE: ICD-10-CM

## 2024-05-29 DIAGNOSIS — H53.133 SUDDEN VISUAL LOSS OF BOTH EYES: ICD-10-CM

## 2024-05-29 PROCEDURE — 97110 THERAPEUTIC EXERCISES: CPT | Mod: PN,CQ

## 2024-05-29 PROCEDURE — 92507 TX SP LANG VOICE COMM INDIV: CPT | Mod: PN

## 2024-05-29 PROCEDURE — 97530 THERAPEUTIC ACTIVITIES: CPT | Mod: PN

## 2024-05-29 NOTE — PROGRESS NOTES
"[facility]  Occupational Therapy Treatment Note     Date: 5/29/2024  Name: Aaron Pruitt  [facility] Number: [id number]    Therapy Diagnosis:   Encounter Diagnoses   Name Primary?    Muscle weakness (generalized) Yes    Sudden visual loss of both eyes     Need for assistance with personal care      Physician: Camille Bay MD  Physician Orders: Eval and Treat  Medical Diagnosis: G93.1 (ICD-10-CM) - Anoxic brain damage  Evaluation Date: 4/24/2024  Insurance Authorization Period Expiration: 4/1/2025  Plan of Care Certification Period: 07/5/2024  Visit # / Visits authorized: 3 / 12  FOTO: 1 / 3     Precautions:  Standard, Fall, and vision impaired, history of seizures      Time In: 11:15 am  Time Out: 12:00 pm  Total Billable Time: 45 minutes      Subjective     Patient reports: "I am good."  He was compliant with home exercise program given last session.   Response to previous treatment: good   Functional change: no change to report     Pain: 0/10  Location: no pain     Objective     Visual/Perceptual:  Comments: severe vision loss, unable to identify objects in room     Physical Exam:  Postural examination/scapula alignment: Rounded shoulder and impaired sitting balance, posterior lean when sitting unsupported, multiple cues for upright posture  Joint integrity: Firm end feeling  Palpation: no pain with palpation      Joint Evaluation  AROM  4/24/2024 PROM   4/24/2024 MMS   4/24/2024 AROM  4/24/2024 PROM   4/24/2024 MMS   4/24/2024     Right Right Right Left Left Left   Scapular Elevation     3/5     3/5   Scapular Retraction     3/5     3/5   Shoulder Flex 0-180 87 ~110 2/5 114 ~120 2/5   Shoulder Extension 0-80 60   2+/5 66   2+/5   Shoulder Abd 0-180 75 ~105 2/5 91 ~110 2/5   Shoulder ER 0-90     2/5     2/5   Shoulder IR 0-90 PSIS    2/5 PSIS   2/5   Elbow Flexion 0-150 150   3/5 150   3/5   Elbow Extension -22 -15 2/5 -31 -10 2/5   Wrist Flex 0-80     2+/5     2+/5   Wrist Ext 0-70     " 2+/5     2+/5   Supination 0-80     2/5 60   2/5   Pronation 0-80     2/5     2/5   *WNL - Within Normal Limits  *NT = Not Tested     Fist: joint stiffness limiting full fist on bilateral hands   3 cm from IF to palm right hand       Strength: (MAG Dynamometer in lbs.) Average 3 trials, Position II:       4/24/2024 4/24/2024   Rung II Right Left   Trial 1 12# 20#      Normal  Average Values  Female Right Left Male: Right Left   20-29 66 lbs 62 lbs         94 lbs 86 lbs   30-39 68 lbs 64 lbs 90 lbs 82 lbs   40-49 64 lbs 62 lbs 80 lbs 74 lbs   50-59 62 lbs 57 lbs 72 lbs 65 lbs   60-69 53 lbs 51 lbs 63 lbs 56 lbs   70+ 44 lbs 42 lbs 54 lbs 48 lbs   SD = +/- 19lbs         Pinch Strength (Measured in lbs)       4/24/2024 4/24/2024     Right Left   Key Pinch 5 # 9 #   3pt Pinch unable # unable #   2pt Pinch unable # unable #         Fine/Gross Motor Coordination     Assessment   Right   4/24/2024 Left  4/24/2024   VIRY (Rapid Alternating Movements)      impaired - severe    impaired - severe   Finger to Nose (5 times)   impaired - severe impaired - severe   Finger Flicks (coordination moving from digit flexion to digit extension)   impaired - severe impaired - severe   *WNL - Within Normal Limits  *NT = Not Tested     Sensation:  Aaron  reports no change in sensation, difficulty with formal assessment due to cognitive impairments.        Sensation Intact  Impaired Comments    [place] Anuradha  Deep Touch (6.65) []  []       Protective Sensation (4.56) []  []       Light Touch (3.61) []  []                  Other Kinesthesia  []  [x]       Temperature []  []       Stereognosis  []  []            Balance:   Static Sit - FAIR: Maintains without assist, but unable to take any challenges   Dynamic sit- POOR+: Needs MINIMAL assist to maintain  Static Stand - POOR: Needs MODERATE assist to maintain  Dynamic stand - POOR: Needs MODERATE assist to maintain     Endurance Deficit: severe    Treatment     Aaron received the  treatments listed below:      neuromuscular re-education activities to improve: Coordination, Kinesthetic, Sense, Proprioception, and Posture for 8 minutes. The following activities were included:  - Upper Body Ergometer (UBE) L2.5 for 8 minutes at 15 RPM to provide proprioceptive awareness, postural stability, strength, activity tolerance, and reciprocal patterns with bimanual coordination completed to improve use of bilateral upper extremities in order to prepare for therapeutic exercises/activities. Cues provided as needed for postural stability/positioning    Therapeutic activities to improve functional performance for 37 minutes, including:  - ring arch 1# wrist weight bilateral upper extremities crossing midline   - squigs on table - color contrast and using touch for locating   - pegs on peg board     Patient Education and Home Exercises     Education provided:   - Progress towards goals     Written Home Exercises Provided: Patient instructed to cont prior HEP.  Exercises were reviewed and Aaron was able to demonstrate them prior to the end of the session.  Aaron demonstrated good  understanding of the home exercise program provided. See electronic medical record under Patient Instructions for exercises provided during therapy sessions.       Assessment     Aaron tolerated today's session well. Interventions focused on bimanual coordination, sensory retraining and fine motor coordination to improve safety with activities of daily living. He required increased time for all tasks and tactile/verbal cues for task completion. Noted compensatory movements with left upper extremity due to weakness in right. No pain reported.     Aaron is progressing well towards his goals and there are no updates to goals at this time. Pt prognosis is Good.     Patient will continue to benefit from skilled outpatient occupational therapy to address the deficits listed in the problem list on initial evaluation provide patient/family  education and to maximize patient's level of independence in the home and community environment.     Patient's spiritual, cultural and educational needs considered and patient agreeable to plan of care and goals.    Anticipated barriers to occupational therapy: severe vision impairments     Goals:  Short Term Goals: 5 weeks   - Pt will report 50% compliance with home exercise program in order to maintain progress attained during therapy. Not met, progressing  - Pt will improve active range of motion of right shoulder flexion/abduction to 115 degrees in order to increase ease with bathing and dressing tasks. Not met, progressing  - Pt will improve active range of motion of left  shoulder flexion/abduction to 130/115 degrees in order to increase ease with bathing and dressing tasks. Not met, progressing  - Pt will increase bilateral  strength by 10# in order to improve ease with dressing, bathing and grooming . Not met, progressing  - Pt will identify vision loss strategies to assist with navigating his environment to prevent falls or injury. Not met, progressing        Long Term Goals: 10 weeks   - Pt will report independence with home exercise program in order to maintain progress attained during therapy. Not met, progressing  - Pt will increase MMT for bilateral shoulder flexion to 3+/5 to increase upper body strength needed for home management tasks, high level IADLs and work related activities. Not met, progressing  - Pt will increase bilateral  strength to 40# in order to improve ease with instrumental activities of daily living. Not met, progressing  - Pt will improve FOTO score to 63% for improved self perception of functional performance with daily activities. Not met, progressing  - Pt will demonstrate correct use of adaptive equipment and compensatory strategies to assist with feeding, bathing, and dressing due to vision loss. Not met, progressing  - Pt will perform standing bilateral task for 10  minutes, with distant supervision, to improve bimanual task performance and standing tolerance needed for safety during homemaking/kitchen tasks and showering. Not met, progressing  - Pt and caregiver will report increased participation with activities of daily living to improve independence. Not met, progressing    Plan     Updates/Grading for next session: continue with plan of care     Corinne Rapier, OT   5/29/2024

## 2024-05-29 NOTE — PROGRESS NOTES
"  [facility]   Physical Therapy Treatment Note      Name: Aaron Pruitt  Clinic Number: [id number]    Therapy Diagnosis:   Encounter Diagnoses   Name Primary?    Risk for falls Yes    Impaired functional mobility, balance, gait, and endurance      Physician: Camille Bay MD    Visit Date: 5/29/2024    Physician Orders: PT Eval and Treat   Medical Diagnosis from Referral: Anoxic brain damage [G93.1]   Evaluation Date: 4/22/2024  Authorization Period Expiration: 12/31/2024  Plan of Care Expiration: 7/5/2024  Progress Note Due: last assessed 5/22/2024  Date of Surgery: N/A  Visit # / Visits authorized: 7/14 (+eval)  FOTO: next when proxy present      Precautions: Standard, Fall, and Severe Visual Impairment; History of Seziure     Time In: 10:30AM  Time Out: 11:15AM  Total Billable Time: 45 minutes (MEDICAID - 3 TE)    PTA Visit #: 1/5     Subjective     Patient reports: No changes since last seen in PT.  He  was  compliant with home exercise program.  Response to previous treatment: no adverse response  Functional change: ongoing    Pain: 0/10  Location: NA      Objective      Objective Measures updated at progress report unless specified.     Treatment     Aaron received the treatments listed below:      therapeutic exercises to develop strength, endurance, ROM, and posture for 8 minutes including:  Squat with chair tap: x10  Calf raises: 2x15  Lateral steps at / bar: 8 lengths x 10 feet    neuromuscular re-education activities to improve: Balance, Coordination, Kinesthetic, Sense, and Proprioception for 20 minutes. The following activities were included:  Nustep level 4.0 double peak for 8 minutes cues to increase limb movement amplitude  Normal stance on incline board (Rung 2) without upper extremity support x60" occasional cues required for body awareness  Normal stance on incline board (Rung 2) without upper extremity support + Squigz pulls/placements x10 each hand  occasional cues " "required for body awareness  Normal base of support on foam x30" without upper extremity support  Normal base of support on foam + vertical head turns x30" without upper extremity support  Normal base of support on foam + horizontal head turns x30" without upper extremity support  Narrow base of support on foam + vertical head turns x30" without upper extremity support  Narrow base of support on foam + horizontal head turns x30" without upper extremity support    therapeutic activities to improve functional performance for 10 minutes, including:  Foam roll weaving with rolling walker; min A for obstacle avoidance (tends more assistance with turning right versus turning left); 3 vertical foam rolls spaced ~8 feet apart x 6 lengths  Gym lap end of session x300' with rolling walker and min A for obstacle avoidance  Chair to chair transfers x 6 with min A    gait training to improve functional mobility and safety for 00  minutes, including:  Not completed today    Patient Education and Home Exercises       Education provided:   - most appropriate AD option  - safety awareness, especially with sitting  - need for a walking stick    Written Home Exercises Provided: Patient instructed to cont prior HEP. Exercises were reviewed and Aaron was able to demonstrate them prior to the end of the session.  Aaron demonstrated good  understanding of the education provided. See Electronic Medical Record under Patient Instructions for exercises provided during therapy sessions    Assessment     Aaron arrived to session without any complaints and agreeable to treatment.  Good tolerance of static balance this session with less assistance required for positional awareness.  No loss of balance this session either.  He is still challenged with seated transfers, specifically sitting before he is positioned in front of the chair, despite max cuing.  Continues to show improvement with obstacle avoidance requiring minimal cues with in clinic " ambulation which was completed safely. He would benefit from continued PT services to achieve functional goals.    Aaron Is progressing well towards his goals.   Patient prognosis is Good.     Patient will continue to benefit from skilled outpatient physical therapy to address the deficits listed in the problem list box on initial evaluation, provide pt/family education and to maximize pt's level of independence in the home and community environment.     Patient's spiritual, cultural and educational needs considered and pt agreeable to plan of care and goals.     Anticipated barriers to physical therapy: Degree of visual and proprioceptive/somatosensory impairments     Goals:   Short Term Goals: 5 weeks   Patient will be compliant with HEP in order to maximize PT benefits  Patient will perform sit <>  one attempt modified independently from standard height chair without need for verbal cuing in order to improve independence and safety with functional mobility   Patient will walk >/= 175 feet on Two-Minute Walk Test in order to improve endurance and gait speed for home mobility (met)     Long Term Goals: 10 weeks   Patient will score >/= 64% on FOTO survey in order to improve self-perception of functional mobility deficits  Patient will improve bilateral lower extremity MMT grades by >/=1/2 grade in order to improve strength for ADL completion  Patient will perform Five Time Sit to  </=20 seconds in order to improve bilateral lower extremity muscular power for transfers  Patient will walk >/= 700 feet on Six-Minute Walk Test in order to improve endurance and gait speed for home mobility   Patient will perform 1 floor <> stand transfer with bilateral upper extremity support in order to demonstrate safe functional mobility in case of future fall  Patient will begin some form of home/community fitness in order to sustain progress gained in PT    Plan     Plan to cont with progression of PT goals per  POC.    Vickie Brock, PTA

## 2024-05-29 NOTE — PROGRESS NOTES
[facility]  Speech Therapy Treatment Note- Neurological Rehabilitation  Date: 5/29/2024     Name: Aaron Pruitt   MRN: [id number]   Therapy Diagnosis:   Encounter Diagnoses   Name Primary?    Cognitive communication disorder Yes    Aphasia      Physician: Camille Bay MD  Physician Orders: Ambulatory Referral to Speech Therapy   Medical Diagnosis: G93.1 (ICD-10-CM) - Anoxic brain damage    Visit #/ Visits Authorized: 7/15  Date of Evaluation:  4/24/24  Insurance Authorization Period: 4/26/24-5/26/24  Plan of Care Expiration Date:    7/4/24  Extended Plan of Care:  n/a   Progress Note: 6/22/24     Time In:  1305  Time Out:  1345  Total Billable Time:   40 minutes     Precautions: Fall, Cognition, and Communication  Subjective:   Patient reports: had a good weekend at a relative's birthday party  He was compliant to home exercise program.   Response to previous treatment: good  Pain Scale: 0/10 on a Visual Analog Scale currently.  Pain Location: n/a  Objective:   UNTIMED  Procedure   Speech- Language- Voice Therapy     Short Term Goals: (4 weeks) Current Progress:   The patient will follow 2 step simple commands with 90% accuracy independently to utilize in daily living environments.   MET 5/22/24    2. The patient will complete categorization tasks with 80% accuracy and 1 prompt or cue in order to enhance his lexical fluidity.      Progressing/ Not Met 5/30/2024  Divergent categorization  Foods: 5 independently, 0 with 1 prompt, required 50 (50%)  Things in a bedroom: 0 independently, 3 with 1 prompt, required 10 (30%)  >50-75% cueing required to generate additional items   3. The patient will participate in recall tasks, both immediate and delayed, with 75% accuracy in order to enhance his memory for daily tasks.      Progressing/ Not Met 5/30/2024 Immediate recall goal met in previous session  Did not address delayed recall today   4. The patient will participate in semantic feature analysis  (SFA) to describe tangible objects for increased identification with 80% accuracy and 1 prompt or cue.      Progressing/ Not Met 2024   Hammer: 2 descriptors independently, no further with prompt  Eraser: 0 descriptors independently, + 1 with prompt  Ball: 1 descriptor independently; +2 with prompting     5. The patient will name objects with 90% accuracy independently for 2 consecutive sessions to enhance expressive language skills.  New Goal 24 60% accuracy independently (6/10 trials) - benefited from both semantic and phonemic cueing, as well as cues based on tactile feedback     Long Term Goals: (10 weeks) Current Progress:   Using compensatory strategies for memory, patient will recall a 10 item list with a 5 minute delay independently.   Progressing, not met 2024    2. The patient will participate in conversational tasks, both structured and spontaneous, with greater than a 5 word utterance to enhance communication participation.  Progressing, not met 2024      Patient Education/Response:   Patient educated regarding the followin. Compensatory strategies for memory - association and chunking, picture it  2. Home devices for keeping up with orientation information (echo dot, mike)    Home program established: yes-continue conversational speech to prompt memory and word finding  Patient verbalized understanding to all above education provided.     See Electronic Medical Record under Patient Instructions for exercises provided throughout therapy.  Assessment:   Treatment session focused on various speech-language tasks to enhance his current abilities focusing on expressive language primarily. He performed rather well in his ability to name common objects using both tactile and verbal feedback. He had more difficulty using descriptive language to describe objects, as well as to generate items in categories. Aaron is progressing well towards his goals. Current goals remain appropriate.  Goals to be updated as necessary.     Patient prognosis is Good. Patient will continue to benefit from skilled outpatient speech and language therapy to address the deficits listed in the problem list on initial evaluation, provide patient/family education and to maximize patient's level of independence in the home and community environment.   Medical necessity is demonstrated by the following IMPAIRMENTS:  Aphasia: Patient with few true words in all situations severely limiting functional communication with both familiar and unfamiliar communication partners.   Cognition: Deficits in executive functioning, attention, and memory prevent the pt from relaying medically and safety relevant information in a timely manner in a state of emergency.   Barriers to Therapy: none  Patient's spiritual, cultural and educational needs considered and patient agreeable to plan of care and goals.  Plan:   Continue Plan of Care with focus on rehabilitation and compensation for cognitive-linguistic and aphasia impairments following anoxic brain injury.    Josette May M.S., L-SLP,CCC-SLP, CBIS  Speech-Language Pathologist  Certified Brain Injury Specialist  5/30/2024

## 2024-05-31 ENCOUNTER — CLINICAL SUPPORT (OUTPATIENT)
Dept: REHABILITATION | Facility: HOSPITAL | Age: 54
End: 2024-05-31
Payer: MEDICAID

## 2024-05-31 DIAGNOSIS — Z91.81 RISK FOR FALLS: Primary | ICD-10-CM

## 2024-05-31 DIAGNOSIS — H53.133 SUDDEN VISUAL LOSS OF BOTH EYES: ICD-10-CM

## 2024-05-31 DIAGNOSIS — R47.01 APHASIA: ICD-10-CM

## 2024-05-31 DIAGNOSIS — M62.81 MUSCLE WEAKNESS (GENERALIZED): Primary | ICD-10-CM

## 2024-05-31 DIAGNOSIS — R41.841 COGNITIVE COMMUNICATION DISORDER: Primary | ICD-10-CM

## 2024-05-31 DIAGNOSIS — Z74.1 NEED FOR ASSISTANCE WITH PERSONAL CARE: ICD-10-CM

## 2024-05-31 DIAGNOSIS — Z74.09 IMPAIRED FUNCTIONAL MOBILITY, BALANCE, GAIT, AND ENDURANCE: ICD-10-CM

## 2024-05-31 PROCEDURE — 97530 THERAPEUTIC ACTIVITIES: CPT | Mod: PN,59

## 2024-05-31 PROCEDURE — 97110 THERAPEUTIC EXERCISES: CPT | Mod: PN

## 2024-05-31 PROCEDURE — 92507 TX SP LANG VOICE COMM INDIV: CPT | Mod: PN

## 2024-05-31 NOTE — PATIENT INSTRUCTIONS
Rules with the putty:   Do not leave it in the sun/car  Do not get it on fabric/clothes, it will not come out  Do not let children/pets play with it because it can be toxic

## 2024-05-31 NOTE — PROGRESS NOTES
[facility]  Speech Therapy Treatment Note- Neurological Rehabilitation  Date: 5/31/2024     Name: Aaron Pruitt   MRN: [id number]   Therapy Diagnosis:   Encounter Diagnoses   Name Primary?    Cognitive communication disorder Yes    Aphasia      Physician: Camille Bay MD  Physician Orders: Ambulatory Referral to Speech Therapy   Medical Diagnosis: G93.1 (ICD-10-CM) - Anoxic brain damage    Visit #/ Visits Authorized: 8/15  Date of Evaluation:  4/24/24  Insurance Authorization Period: 4/26/24-5/26/24  Plan of Care Expiration Date:    7/4/24  Extended Plan of Care:  n/a   Progress Note: 6/22/24     Time In:  1030  Time Out:  1100  Total Billable Time:   30 minutes     Precautions: Fall, Cognition, and Communication  Subjective:   Patient reports: doing well since last visit with nothing new to report  He was compliant to home exercise program.   Response to previous treatment: good  Pain Scale: 0/10 on a Visual Analog Scale currently.  Pain Location: n/a  Objective:   UNTIMED  Procedure   Speech- Language- Voice Therapy     Short Term Goals: (4 weeks) Current Progress:   The patient will follow 2 step simple commands with 90% accuracy independently to utilize in daily living environments.   MET 5/22/24    2. The patient will complete categorization tasks with 80% accuracy and 1 prompt or cue in order to enhance his lexical fluidity.      Progressing/ Not Met 5/31/2024  Convergent categorization: 80% with 1 prompt provided for 2 of the 10 trials    Divergent categorization:  States in the [place]: 4 independently, + 1 with 1 prompt (required 10)  Foods: 5 independently,   Drinks: 5 independently   3. The patient will participate in recall tasks, both immediate and delayed, with 75% accuracy in order to enhance his memory for daily tasks.      Progressing/ Not Met 5/31/2024 Immediate recall goal met in previous session    Delayed recall 2 minutes (3 item list):   0% independently, maximum assistance for  recalling 3 words  Delayed recall of next therapy appointment with 2 minute delay (Monday @ 1030): unable to recall     4. The patient will participate in semantic feature analysis (SFA) to describe tangible objects for increased identification with 80% accuracy and 1 prompt or cue.      Progressing/ Not Met 2024   Cup -3 independently, + 2 with semantic based questions  Towel - 2 independently + 2 with semantic based questions  Hammer - 2 independently, + 2 with semantic based questions  Glasses - 1 independently, + 1 with semantic based questions  Car - 1 independently, + 2 with semantic based questions   5. The patient will name objects with 90% accuracy independently for 2 consecutive sessions to enhance expressive language skills.  New Goal 24 50% accuracy independently (3/6 trials), previously 60%, benefited from semantic cueing     Long Term Goals: (10 weeks) Current Progress:   Using compensatory strategies for memory, patient will recall a 10 item list with a 5 minute delay independently.   Progressing, not met 2024    2. The patient will participate in conversational tasks, both structured and spontaneous, with greater than a 5 word utterance to enhance communication participation.  Progressing, not met 2024      Patient Education/Response:   Patient educated regarding the followin. Compensatory strategies for memory - association and chunking, picture it  2. Home devices for keeping up with orientation information (echo dot, mike)    Home program established: yes-continue conversational speech to prompt memory and word finding  Patient verbalized understanding to all above education provided.     See Electronic Medical Record under Patient Instructions for exercises provided throughout therapy.  Assessment:   Treatment session focused on various speech-language tasks to enhance his current abilities focusing on expressive language primarily. Patient showed stable performance in  his naming abilities, and there was notable improvements in his convergent naming skills. He does continue to have difficulty with delayed recall of information, even without any distractions. He is self-aware of this difficulty. Aaron is progressing well towards his goals. Current goals remain appropriate. Goals to be updated as necessary.     Patient prognosis is Good. Patient will continue to benefit from skilled outpatient speech and language therapy to address the deficits listed in the problem list on initial evaluation, provide patient/family education and to maximize patient's level of independence in the home and community environment.   Medical necessity is demonstrated by the following IMPAIRMENTS:  Aphasia: Patient with few true words in all situations severely limiting functional communication with both familiar and unfamiliar communication partners.   Cognition: Deficits in executive functioning, attention, and memory prevent the pt from relaying medically and safety relevant information in a timely manner in a state of emergency.   Barriers to Therapy: none  Patient's spiritual, cultural and educational needs considered and patient agreeable to plan of care and goals.  Plan:   Continue Plan of Care with focus on rehabilitation and compensation for cognitive-linguistic and aphasia impairments following anoxic brain injury.    Josette May M.S., L-SLP,CCC-SLP, CBIS  Speech-Language Pathologist  Certified Brain Injury Specialist  5/31/2024

## 2024-05-31 NOTE — PROGRESS NOTES
"[facility]  Occupational Therapy Treatment Note     Date: 5/31/2024  Name: Aaron Pruitt  [facility] Number: [id number]    Therapy Diagnosis:   Encounter Diagnoses   Name Primary?    Muscle weakness (generalized) Yes    Sudden visual loss of both eyes     Need for assistance with personal care        Physician: Camille Bay MD  Physician Orders: Eval and Treat  Medical Diagnosis: G93.1 (ICD-10-CM) - Anoxic brain damage  Evaluation Date: 4/24/2024  Insurance Authorization Period Expiration: 4/1/2025  Plan of Care Certification Period: 07/5/2024  Visit # / Visits authorized: 4 / 12  FOTO: 1 / 3     Precautions:  Standard, Fall, and vision impaired, history of seizures      Time In: 11:00 am  Time Out: 11:58 am  Total Billable Time: 58 minutes      Subjective     Patient reports: "I am good. I feel good"   He was compliant with home exercise program given last session.   Response to previous treatment: good   Functional change: no change to report     Pain: 0/10  Location: no pain     Objective     Visual/Perceptual:  Comments: severe vision loss, unable to identify objects in room     Physical Exam:  Postural examination/scapula alignment: Rounded shoulder and impaired sitting balance, posterior lean when sitting unsupported, multiple cues for upright posture  Joint integrity: Firm end feeling  Palpation: no pain with palpation      Joint Evaluation  AROM  4/24/2024 PROM   4/24/2024 MMS   4/24/2024 AROM  4/24/2024 PROM   4/24/2024 MMS   4/24/2024 AROM  5/31/2024 MMS   5/31/2024 AROM  5/31/2024 MMS   5/31/2024     Right Right Right Left Left Left Right  Right  Left  Left    Scapular Elevation     3/5     3/5  4/5  4/5   Scapular Retraction     3/5     3/5  4/5  4/5   Shoulder Flex 0-180 87 ~110 2/5 114 ~120 2/5 103 4/5 130 4/5   Shoulder Extension 0-80 60   2+/5 66   2+/5 70 4/5 65 4/5   Shoulder Abd 0-180 75 ~105 2/5 91 ~110 2/5 115 4/5 121 4/5   Shoulder ER 0-90     2/5     2/5  4/5  4/5 "   Shoulder IR 0-90 PSIS    2/5 PSIS   2/5 L2 4/5 L2 4/5   Elbow Flexion 0-150 150   3/5 150   3/5  4/5  4/5   Elbow Extension -22 -15 2/5 -31 -10 2/5 -15*   Noted pocket of fluid on right elbow - pt reported no pain, he states he fell a few weeks ago getting out of his chair 4/5 -10 4/5   Wrist Flex 0-80     2+/5     2+/5  4/5  4/5   Wrist Ext 0-70     2+/5     2+/5  4/5  4/5   Supination 0-80     2/5 60   2/5  4/5  4/5   Pronation 0-80     2/5     2/5  4/5  4/5   *WNL - Within Normal Limits  *NT = Not Tested     Fist: joint stiffness limiting full fist on bilateral hands   3 cm from IF to palm right hand       Strength: (MAG Dynamometer in lbs.) Average 3 trials, Position II:       4/24/2024 4/24/2024 5/31/2024 5/31/2024   Rung II Right Left Right  Left    Trial 1 12# 20# 26# 35#       Normal  Average Values  Female Right Left Male: Right Left   20-29 66 lbs 62 lbs         94 lbs 86 lbs   30-39 68 lbs 64 lbs 90 lbs 82 lbs   40-49 64 lbs 62 lbs 80 lbs 74 lbs   50-59 62 lbs 57 lbs 72 lbs 65 lbs   60-69 53 lbs 51 lbs 63 lbs 56 lbs   70+ 44 lbs 42 lbs 54 lbs 48 lbs   SD = +/- 19lbs         Pinch Strength (Measured in lbs)       4/24/2024 4/24/2024 5/31/2024 5/31/2024     Right Left Right  Left    Key Pinch 5 # 9 # 14# 13#   3pt Pinch unable # unable # 6# 7#   2pt Pinch unable # unable # 7# 8#         Fine/Gross Motor Coordination     Assessment   Right   4/24/2024 Left  4/24/2024   VIRY (Rapid Alternating Movements)      impaired - severe    impaired - severe   Finger to Nose (5 times)   impaired - severe impaired - severe   Finger Flicks (coordination moving from digit flexion to digit extension)   impaired - severe impaired - severe   *WNL - Within Normal Limits  *NT = Not Tested     Sensation:  Aaron  reports no change in sensation, difficulty with formal assessment due to cognitive impairments.        Sensation Intact  Impaired Comments    Wyalusing Anuradha  Deep Touch (6.65) []  []       Protective Sensation  (4.56) []  []       Light Touch (3.61) []  []                  Other Kinesthesia  []  [x]       Temperature []  []       Stereognosis  []  []          Treatment     Aaron received the treatments listed below:      neuromuscular re-education activities to improve: Coordination, Kinesthetic, Sense, Proprioception, and Posture for 38 minutes. The following activities were included:  - Upper Body Ergometer (UBE) L2.5 for 8 minutes at 15 RPM to provide proprioceptive awareness, postural stability, strength, activity tolerance, and reciprocal patterns with bimanual coordination completed to improve use of bilateral upper extremities in order to prepare for therapeutic exercises/activities. Cues provided as needed for postural stability/positioning  - putty exercises - gross grasp, pinching x10 each hand  - issued home exercise program with gross grasp exercises   - reassessment     Therapeutic activities to improve functional performance for 20 minutes, including:  - 9 hole peg assessment for fine motor coordination - 4:10   - locating marbles in putty bilateral hands   - rolling out putty - cookie cutter - bimanual coordination     Patient Education and Home Exercises     Education provided:   - green putty issued   - Progress towards goals     Written Home Exercises Provided: yes.  Exercises were reviewed and Aaron was able to demonstrate them prior to the end of the session.  Aaron demonstrated good  understanding of the home exercise program provided. See electronic medical record under Patient Instructions for exercises provided during therapy sessions.       Assessment     Aaron tolerated today's session fair. Reassessment performed and he exhibits improved active range of motion, strength and gross motor coordination since eval. He is limited with fine motor coordination due to visual deficits but is able to  and manipulate objects with bilateral upper extremities well. While gross motor coordination has  improved he continues to present with motor planning deficits, requiring max verbal cues for task completion/assessments. Home exercise program issued with saurabh Walker is progressing well towards his goals and there are no updates to goals at this time. Pt prognosis is Good.     Patient will continue to benefit from skilled outpatient occupational therapy to address the deficits listed in the problem list on initial evaluation provide patient/family education and to maximize patient's level of independence in the home and community environment.     Patient's spiritual, cultural and educational needs considered and patient agreeable to plan of care and goals.    Anticipated barriers to occupational therapy: severe vision impairments     Goals:  Short Term Goals: 5 weeks   - Pt will report 50% compliance with home exercise program in order to maintain progress attained during therapy. Not met, progressing  - Pt will improve active range of motion of right shoulder flexion/abduction to 115 degrees in order to increase ease with bathing and dressing tasks. Not met, progressing  - Pt will improve active range of motion of left  shoulder flexion/abduction to 130/115 degrees in order to increase ease with bathing and dressing tasks. Met 5/31/2024  - Pt will increase bilateral  strength by 10# in order to improve ease with dressing, bathing and grooming . Met 5/31/2024  - Pt will identify vision loss strategies to assist with navigating his environment to prevent falls or injury. Not met, progressing        Long Term Goals: 10 weeks   - Pt will report independence with home exercise program in order to maintain progress attained during therapy. Not met, progressing  - Pt will increase MMT for bilateral shoulder flexion to 3+/5 to increase upper body strength needed for home management tasks, high level IADLs and work related activities. Met 5/31/2024  - Pt will increase bilateral  strength to 40# in order to  improve ease with instrumental activities of daily living. Not met, progressing  - Pt will improve FOTO score to 63% for improved self perception of functional performance with daily activities. Not met, progressing  - Pt will demonstrate correct use of adaptive equipment and compensatory strategies to assist with feeding, bathing, and dressing due to vision loss. Not met, progressing  - Pt will perform standing bilateral task for 10 minutes, with distant supervision, to improve bimanual task performance and standing tolerance needed for safety during homemaking/kitchen tasks and showering. Not met, progressing  - Pt and caregiver will report increased participation with activities of daily living to improve independence. Not met, progressing    Plan     Updates/Grading for next session: continue with plan of care     Corinne Rapier, OT   5/31/2024

## 2024-05-31 NOTE — PROGRESS NOTES
"  [facility]   Physical Therapy Treatment Note      Name: Aaron Pruitt  Clinic Number: [id number]    Therapy Diagnosis:   Encounter Diagnoses   Name Primary?    Risk for falls Yes    Impaired functional mobility, balance, gait, and endurance      Physician: Camille Bay MD    Visit Date: 5/31/2024    Physician Orders: PT Eval and Treat   Medical Diagnosis from Referral: Anoxic brain damage [G93.1]   Evaluation Date: 4/22/2024  Authorization Period Expiration: 12/31/2024  Plan of Care Expiration: 7/5/2024  Progress Note Due: last assessed 5/22/2024  Date of Surgery: N/A  Visit # / Visits authorized: 8/14 (+eval)  FOTO: next when proxy present      Precautions: Standard, Fall, and Severe Visual Impairment; History of Seziure     Time In: 12:00PM  Time Out: 12:45PM  Total Billable Time: 45 minutes (MEDICAID - 3 TE)    PTA Visit #: 1/5     Subjective     Patient reports: No changes since last seen in PT.  He  was  compliant with home exercise program.  Response to previous treatment: no adverse response  Functional change: ongoing    Pain: 0/10  Location: NA      Objective      Objective Measures updated at progress report unless specified.     Treatment     Aaron received the treatments listed below:      therapeutic exercises to develop strength, endurance, ROM, and posture for 15 minutes including:  Squat with chair tap: 2x10  Calf raises: 2x20  Lateral yellow theraband steps at / bar: 6 lengths x 10 feet    neuromuscular re-education activities to improve: Balance, Coordination, Kinesthetic, Sense, and Proprioception for 30 minutes. The following activities were included:  Nustep level 5.0 double peak for 8 minutes cues to increase limb movement amplitude  Reciprocal step taps to 6" step with 3# ankle weights 2x60"  Reciprocal step ups to 6" step x10B with hand over leg guidance for foot placement; bilateral upper extremity support   Foam stance, narrow base of support x60" SBA  Foam " "stance, narrow base of support + vertical head turns x60" occasional CGA  Foam stance, narrow base of support + horizontal head turns x60" occasional CGA  Overground walking throughout gym with obstacle avoidance performed throughout visit; ~200' feet completed today with CGA/verbal cuing from PT    therapeutic activities to improve functional performance for 00 minutes, including:    gait training to improve functional mobility and safety for 00  minutes, including:  Not completed today    Patient Education and Home Exercises       Education provided:   - most appropriate AD option  - safety awareness, especially with sitting  - need for a walking stick    Written Home Exercises Provided: Patient instructed to cont prior HEP. Exercises were reviewed and Aarno was able to demonstrate them prior to the end of the session.  Aaron demonstrated good  understanding of the education provided. See Electronic Medical Record under Patient Instructions for exercises provided during therapy sessions    Assessment     Aaron arrived to session without any complaints and agreeable to treatment. Stand to sit transfers remain status quo: although he is doing better following commands, he still needs ample cuing to isolate a pivot to sit from actual action of sitting. Close supervision and occasional physical assistance still recommended as a result. Still, he is ambulating with > gait speed compared to initial evaluation and improved gait stability. His vision and somatosensory/proprioceptive awareness are still the largest limiting factors in independent ambulation. He did well with step ups today from a postural control standpoint but did need heavy cuing for sequencing of activity. He would benefit from continued PT services to achieve functional goals.    Aaron Is progressing well towards his goals.   Patient prognosis is Good.     Patient will continue to benefit from skilled outpatient physical therapy to address the deficits " listed in the problem list box on initial evaluation, provide pt/family education and to maximize pt's level of independence in the home and community environment.     Patient's spiritual, cultural and educational needs considered and pt agreeable to plan of care and goals.     Anticipated barriers to physical therapy: Degree of visual and proprioceptive/somatosensory impairments     Goals:   Short Term Goals: 5 weeks   Patient will be compliant with HEP in order to maximize PT benefits  Patient will perform sit <>  one attempt modified independently from standard height chair without need for verbal cuing in order to improve independence and safety with functional mobility   Patient will walk >/= 175 feet on Two-Minute Walk Test in order to improve endurance and gait speed for home mobility (met)     Long Term Goals: 10 weeks   Patient will score >/= 64% on FOTO survey in order to improve self-perception of functional mobility deficits  Patient will improve bilateral lower extremity MMT grades by >/=1/2 grade in order to improve strength for ADL completion  Patient will perform Five Time Sit to  </=20 seconds in order to improve bilateral lower extremity muscular power for transfers  Patient will walk >/= 700 feet on Six-Minute Walk Test in order to improve endurance and gait speed for home mobility   Patient will perform 1 floor <> stand transfer with bilateral upper extremity support in order to demonstrate safe functional mobility in case of future fall  Patient will begin some form of home/community fitness in order to sustain progress gained in PT    Plan     Plan to cont with progression of PT goals per POC.    JAMES FERNANDEZ, PT

## 2024-06-03 ENCOUNTER — CLINICAL SUPPORT (OUTPATIENT)
Dept: REHABILITATION | Facility: HOSPITAL | Age: 54
End: 2024-06-03
Payer: MEDICAID

## 2024-06-03 DIAGNOSIS — M62.81 MUSCLE WEAKNESS (GENERALIZED): Primary | ICD-10-CM

## 2024-06-03 DIAGNOSIS — Z74.09 IMPAIRED FUNCTIONAL MOBILITY, BALANCE, GAIT, AND ENDURANCE: ICD-10-CM

## 2024-06-03 DIAGNOSIS — Z91.81 RISK FOR FALLS: Primary | ICD-10-CM

## 2024-06-03 DIAGNOSIS — Z74.1 NEED FOR ASSISTANCE WITH PERSONAL CARE: ICD-10-CM

## 2024-06-03 DIAGNOSIS — H53.133 SUDDEN VISUAL LOSS OF BOTH EYES: ICD-10-CM

## 2024-06-03 PROCEDURE — 97530 THERAPEUTIC ACTIVITIES: CPT | Mod: PN

## 2024-06-03 PROCEDURE — 97110 THERAPEUTIC EXERCISES: CPT | Mod: PN

## 2024-06-03 NOTE — PROGRESS NOTES
"  [facility]   Physical Therapy Treatment Note      Name: Aaron Pruitt  Clinic Number: [id number]    Therapy Diagnosis:   Encounter Diagnoses   Name Primary?    Risk for falls Yes    Impaired functional mobility, balance, gait, and endurance      Physician: Camille Bay MD    Visit Date: 6/3/2024    Physician Orders: PT Eval and Treat   Medical Diagnosis from Referral: Anoxic brain damage [G93.1]   Evaluation Date: 4/22/2024  Authorization Period Expiration: 12/31/2024  Plan of Care Expiration: 7/5/2024  Progress Note Due: last assessed 5/22/2024  Date of Surgery: N/A  Visit # / Visits authorized: 8/14 (+eval)  FOTO: next when proxy present      Precautions: Standard, Fall, and Severe Visual Impairment; History of Seziure     Time In: 11:15AM  Time Out: 12:00PM  Total Billable Time: 45 minutes (MEDICAID - 3 TE)    PTA Visit #: 0/5     Subjective     Patient reports: Feeling dizzy mid visit.  He  was  compliant with home exercise program.  Response to previous treatment: no adverse response  Functional change: ongoing    Pain: 0/10  Location: NA      Objective      Objective Measures updated at progress report unless specified.     Blood Pressure / Heart Rate (mid session when symptomatic): 100/79mmHg / 82 bpm    Treatment     Aaron received the treatments listed below:      therapeutic exercises to develop strength, endurance, ROM, and posture for 00 minutes including:    neuromuscular re-education activities to improve: Balance, Coordination, Kinesthetic, Sense, and Proprioception for 20 minutes. The following activities were included:  Semi-tandem on blue foam discs 2x60"  Nustep level 5.0 double peak for 8 minutes cues to increase limb movement amplitude  Squat with chair tap: x10  Calf raises: x20    therapeutic activities to improve functional performance for 25 minutes, including:  Sit to stands from elevated mat with focus on eccentric control to sit x10  Forward march walks x " "4 lengths x 10 feet each; bilateral upper extremity assist  Retro walks x 4 lengths x 10 feet each; bilateral upper extremity assist  Step up and overs forward in // bars; one 4" step and one 6" step x 4 lengths x 10 feet each; bilateral upper extremity assist  Medical monitoring (see above)  Overground walking throughout gym with obstacle avoidance performed throughout visit; ~200' feet completed today with CGA/verbal cuing from PT    gait training to improve functional mobility and safety for 00  minutes, including:  Not completed today    Patient Education and Home Exercises       Education provided:   - most appropriate AD option  - safety awareness, especially with sitting  - need for a walking stick    Written Home Exercises Provided: Patient instructed to cont prior HEP. Exercises were reviewed and Aaron was able to demonstrate them prior to the end of the session.  Aaron demonstrated good  understanding of the education provided. See Electronic Medical Record under Patient Instructions for exercises provided during therapy sessions    Assessment     Aaron arrived to session without any complaints and agreeable to treatment. He did however begin to complain about dizziness mid session. Vitals assessed and patient hypotensive. Improved symptoms after extended seated rest and was able to continue exercise safely. Vision still limiting factor in safety with functional mobility, but patient able to negotiate steps of multiple heights safely today; bilateral upper extremity support still required for most dynamic tasks. He would benefit from continued PT services to achieve functional goals.    Aaron Is progressing well towards his goals.   Patient prognosis is Good.     Patient will continue to benefit from skilled outpatient physical therapy to address the deficits listed in the problem list box on initial evaluation, provide pt/family education and to maximize pt's level of independence in the home and " community environment.     Patient's spiritual, cultural and educational needs considered and pt agreeable to plan of care and goals.     Anticipated barriers to physical therapy: Degree of visual and proprioceptive/somatosensory impairments     Goals:   Short Term Goals: 5 weeks   Patient will be compliant with HEP in order to maximize PT benefits  Patient will perform sit <>  one attempt modified independently from standard height chair without need for verbal cuing in order to improve independence and safety with functional mobility   Patient will walk >/= 175 feet on Two-Minute Walk Test in order to improve endurance and gait speed for home mobility (met)     Long Term Goals: 10 weeks   Patient will score >/= 64% on FOTO survey in order to improve self-perception of functional mobility deficits  Patient will improve bilateral lower extremity MMT grades by >/=1/2 grade in order to improve strength for ADL completion  Patient will perform Five Time Sit to  </=20 seconds in order to improve bilateral lower extremity muscular power for transfers  Patient will walk >/= 700 feet on Six-Minute Walk Test in order to improve endurance and gait speed for home mobility   Patient will perform 1 floor <> stand transfer with bilateral upper extremity support in order to demonstrate safe functional mobility in case of future fall  Patient will begin some form of home/community fitness in order to sustain progress gained in PT    Plan     Plan to cont with progression of PT goals per POC.    JAMES FERNANDEZ, PT

## 2024-06-03 NOTE — PROGRESS NOTES
[facility]  Occupational Therapy Treatment Note     Date: 6/3/2024  Name: Aaron Pruitt  [facility] Number: [id number]    Therapy Diagnosis:   Encounter Diagnoses   Name Primary?    Muscle weakness (generalized) Yes    Sudden visual loss of both eyes     Need for assistance with personal care        Physician: Camille Bay MD  Physician Orders: Eval and Treat  Medical Diagnosis: G93.1 (ICD-10-CM) - Anoxic brain damage  Evaluation Date: 4/24/2024  Insurance Authorization Period Expiration: 4/1/2025  Plan of Care Certification Period: 07/5/2024  Visit # / Visits authorized: 5 / 12  FOTO: 1 / 3     Precautions:  Standard, Fall, and vision impaired, history of seizures      Time In: 10:40 am  Time Out: 11:15 am  Total Billable Time: 35 minutes      Subjective     Patient reports: "Everything is good" pt arrived late to session   He was compliant with home exercise program given last session.   Response to previous treatment: good   Functional change: no change to report     Pain: 0/10  Location: no pain     Objective     Visual/Perceptual:  Comments: severe vision loss, unable to identify objects in room     Physical Exam:  Postural examination/scapula alignment: Rounded shoulder and impaired sitting balance, posterior lean when sitting unsupported, multiple cues for upright posture  Joint integrity: Firm end feeling  Palpation: no pain with palpation      Joint Evaluation  AROM  4/24/2024 PROM   4/24/2024 MMS   4/24/2024 AROM  4/24/2024 PROM   4/24/2024 MMS   4/24/2024 AROM  5/31/2024 MMS   5/31/2024 AROM  5/31/2024 MMS   5/31/2024     Right Right Right Left Left Left Right  Right  Left  Left    Scapular Elevation     3/5     3/5  4/5  4/5   Scapular Retraction     3/5     3/5  4/5  4/5   Shoulder Flex 0-180 87 ~110 2/5 114 ~120 2/5 103 4/5 130 4/5   Shoulder Extension 0-80 60   2+/5 66   2+/5 70 4/5 65 4/5   Shoulder Abd 0-180 75 ~105 2/5 91 ~110 2/5 115 4/5 121 4/5   Shoulder ER 0-90     " 2/5     2/5  4/5  4/5   Shoulder IR 0-90 PSIS    2/5 PSIS   2/5 L2 4/5 L2 4/5   Elbow Flexion 0-150 150   3/5 150   3/5  4/5  4/5   Elbow Extension -22 -15 2/5 -31 -10 2/5 -15*   Noted pocket of fluid on right elbow - pt reported no pain, he states he fell a few weeks ago getting out of his chair 4/5 -10 4/5   Wrist Flex 0-80     2+/5     2+/5  4/5  4/5   Wrist Ext 0-70     2+/5     2+/5  4/5  4/5   Supination 0-80     2/5 60   2/5  4/5  4/5   Pronation 0-80     2/5     2/5  4/5  4/5   *WNL - Within Normal Limits  *NT = Not Tested     Fist: joint stiffness limiting full fist on bilateral hands   3 cm from IF to palm right hand       Strength: (MAG Dynamometer in lbs.) Average 3 trials, Position II:       4/24/2024 4/24/2024 5/31/2024 5/31/2024   Rung II Right Left Right  Left    Trial 1 12# 20# 26# 35#       Normal  Average Values  Female Right Left Male: Right Left   20-29 66 lbs 62 lbs         94 lbs 86 lbs   30-39 68 lbs 64 lbs 90 lbs 82 lbs   40-49 64 lbs 62 lbs 80 lbs 74 lbs   50-59 62 lbs 57 lbs 72 lbs 65 lbs   60-69 53 lbs 51 lbs 63 lbs 56 lbs   70+ 44 lbs 42 lbs 54 lbs 48 lbs   SD = +/- 19lbs         Pinch Strength (Measured in lbs)       4/24/2024 4/24/2024 5/31/2024 5/31/2024     Right Left Right  Left    Key Pinch 5 # 9 # 14# 13#   3pt Pinch unable # unable # 6# 7#   2pt Pinch unable # unable # 7# 8#         Fine/Gross Motor Coordination     Assessment   Right   4/24/2024 Left  4/24/2024   VIRY (Rapid Alternating Movements)      impaired - severe    impaired - severe   Finger to Nose (5 times)   impaired - severe impaired - severe   Finger Flicks (coordination moving from digit flexion to digit extension)   impaired - severe impaired - severe   *WNL - Within Normal Limits  *NT = Not Tested     Sensation:  Aaron  reports no change in sensation, difficulty with formal assessment due to cognitive impairments.        Sensation Intact  Impaired Comments    [place] Anur[place]ha  Deep Touch (6.65) []  []        Protective Sensation (4.56) []  []       Light Touch (3.61) []  []                  Other Kinesthesia  []  [x]       Temperature []  []       Stereognosis  []  []          Treatment     Aaron received the treatments listed below:      neuromuscular re-education activities to improve: Coordination, Kinesthetic, Sense, Proprioception, and Posture for 30 minutes. The following activities were included:  - Upper Body Ergometer (UBE) L2.5 for 8 minutes at 15 RPM to provide proprioceptive awareness, postural stability, strength, activity tolerance, and reciprocal patterns with bimanual coordination completed to improve use of bilateral upper extremities in order to prepare for therapeutic exercises/activities. Cues provided as needed for postural stability/positioning  - seated shoulder flexion/extension 2x10 2# dowel   - seated chest press 2x10 2# dowel   - biceps flexion/extension 2x10 2# dowel     Therapeutic activities to improve functional performance for 15 minutes, including:  - standing table top activities for  strengthening and stereognosis  - stood for 15 minutes     Patient Education and Home Exercises     Education provided:   - green putty issued   - Progress towards goals     Written Home Exercises Provided: yes.  Exercises were reviewed and Aaron was able to demonstrate them prior to the end of the session.  Aaron demonstrated good  understanding of the home exercise program provided. See electronic medical record under Patient Instructions for exercises provided during therapy sessions.       Assessment     Aaron tolerated today's session well. Interventions focused on upper extremity strength, active range of motion, standing balance and sensory awareness to improve safety at home. Physical assistance provided for full range of motion with seated exercises, noted improved balance/trunk control. He was able to stand for 15 minutes with SBA before requiring rest break due to fatigue. He had difficulty  with identifying objects and using abstract thought to describe what he was feeling.     Aaron is progressing well towards his goals and there are no updates to goals at this time. Pt prognosis is Good.     Patient will continue to benefit from skilled outpatient occupational therapy to address the deficits listed in the problem list on initial evaluation provide patient/family education and to maximize patient's level of independence in the home and community environment.     Patient's spiritual, cultural and educational needs considered and patient agreeable to plan of care and goals.    Anticipated barriers to occupational therapy: severe vision impairments     Goals:  Short Term Goals: 5 weeks   - Pt will report 50% compliance with home exercise program in order to maintain progress attained during therapy. Not met, progressing  - Pt will improve active range of motion of right shoulder flexion/abduction to 115 degrees in order to increase ease with bathing and dressing tasks. Not met, progressing  - Pt will improve active range of motion of left  shoulder flexion/abduction to 130/115 degrees in order to increase ease with bathing and dressing tasks. Met 5/31/2024  - Pt will increase bilateral  strength by 10# in order to improve ease with dressing, bathing and grooming . Met 5/31/2024  - Pt will identify vision loss strategies to assist with navigating his environment to prevent falls or injury. Not met, progressing        Long Term Goals: 10 weeks   - Pt will report independence with home exercise program in order to maintain progress attained during therapy. Not met, progressing  - Pt will increase MMT for bilateral shoulder flexion to 3+/5 to increase upper body strength needed for home management tasks, high level IADLs and work related activities. Met 5/31/2024  - Pt will increase bilateral  strength to 40# in order to improve ease with instrumental activities of daily living. Not met,  progressing  - Pt will improve FOTO score to 63% for improved self perception of functional performance with daily activities. Not met, progressing  - Pt will demonstrate correct use of adaptive equipment and compensatory strategies to assist with feeding, bathing, and dressing due to vision loss. Not met, progressing  - Pt will perform standing bilateral task for 10 minutes, with distant supervision, to improve bimanual task performance and standing tolerance needed for safety during homemaking/kitchen tasks and showering. Not met, progressing  - Pt and caregiver will report increased participation with activities of daily living to improve independence. Not met, progressing    Plan     Updates/Grading for next session: continue with plan of care     Corinne Rapier, OT   6/3/2024

## 2024-06-05 ENCOUNTER — CLINICAL SUPPORT (OUTPATIENT)
Dept: REHABILITATION | Facility: HOSPITAL | Age: 54
End: 2024-06-05
Payer: MEDICAID

## 2024-06-05 DIAGNOSIS — Z91.81 RISK FOR FALLS: Primary | ICD-10-CM

## 2024-06-05 DIAGNOSIS — R41.841 COGNITIVE COMMUNICATION DISORDER: Primary | ICD-10-CM

## 2024-06-05 DIAGNOSIS — Z74.1 NEED FOR ASSISTANCE WITH PERSONAL CARE: ICD-10-CM

## 2024-06-05 DIAGNOSIS — H53.133 SUDDEN VISUAL LOSS OF BOTH EYES: ICD-10-CM

## 2024-06-05 DIAGNOSIS — M62.81 MUSCLE WEAKNESS (GENERALIZED): Primary | ICD-10-CM

## 2024-06-05 DIAGNOSIS — R47.01 APHASIA: ICD-10-CM

## 2024-06-05 DIAGNOSIS — Z74.09 IMPAIRED FUNCTIONAL MOBILITY, BALANCE, GAIT, AND ENDURANCE: ICD-10-CM

## 2024-06-05 PROCEDURE — 97110 THERAPEUTIC EXERCISES: CPT | Mod: PN,CQ

## 2024-06-05 PROCEDURE — 97530 THERAPEUTIC ACTIVITIES: CPT | Mod: PN

## 2024-06-05 PROCEDURE — 92507 TX SP LANG VOICE COMM INDIV: CPT | Mod: PN

## 2024-06-05 NOTE — PROGRESS NOTES
"[facility]  Occupational Therapy Treatment Note     Date: 6/5/2024  Name: Aaron Pruitt  [facility] Number: [id number]    Therapy Diagnosis:   Encounter Diagnoses   Name Primary?    Muscle weakness (generalized) Yes    Sudden visual loss of both eyes     Need for assistance with personal care        Physician: Camille Bay MD  Physician Orders: Eval and Treat  Medical Diagnosis: G93.1 (ICD-10-CM) - Anoxic brain damage  Evaluation Date: 4/24/2024  Insurance Authorization Period Expiration: 4/1/2025  Plan of Care Certification Period: 07/5/2024  Visit # / Visits authorized: 6 / 12  FOTO: 1 / 3     Precautions:  Standard, Fall, and vision impaired, history of seizures      Time In: 10:40 am  Time Out: 11:15 am  Total Billable Time: 35 minutes      Subjective     Patient reports: "I am ok. It is getting better [referring to activities of daily living]" "Getting in the tub is the hardest." pt arrived late to session   He was compliant with home exercise program given last session.   Response to previous treatment: good   Functional change: no change to report     Pain: 0/10  Location: no pain     Objective     Visual/Perceptual:  Comments: severe vision loss, unable to identify objects in room     Physical Exam:  Postural examination/scapula alignment: Rounded shoulder and impaired sitting balance, posterior lean when sitting unsupported, multiple cues for upright posture  Joint integrity: Firm end feeling  Palpation: no pain with palpation      Joint Evaluation  AROM  4/24/2024 PROM   4/24/2024 MMS   4/24/2024 AROM  4/24/2024 PROM   4/24/2024 MMS   4/24/2024 AROM  5/31/2024 MMS   5/31/2024 AROM  5/31/2024 MMS   5/31/2024     Right Right Right Left Left Left Right  Right  Left  Left    Scapular Elevation     3/5     3/5  4/5  4/5   Scapular Retraction     3/5     3/5  4/5  4/5   Shoulder Flex 0-180 87 ~110 2/5 114 ~120 2/5 103 4/5 130 4/5   Shoulder Extension 0-80 60   2+/5 66   2+/5 70 4/5 65 " 4/5   Shoulder Abd 0-180 75 ~105 2/5 91 ~110 2/5 115 4/5 121 4/5   Shoulder ER 0-90     2/5     2/5  4/5  4/5   Shoulder IR 0-90 PSIS    2/5 PSIS   2/5 L2 4/5 L2 4/5   Elbow Flexion 0-150 150   3/5 150   3/5  4/5  4/5   Elbow Extension -22 -15 2/5 -31 -10 2/5 -15*   Noted pocket of fluid on right elbow - pt reported no pain, he states he fell a few weeks ago getting out of his chair 4/5 -10 4/5   Wrist Flex 0-80     2+/5     2+/5  4/5  4/5   Wrist Ext 0-70     2+/5     2+/5  4/5  4/5   Supination 0-80     2/5 60   2/5  4/5  4/5   Pronation 0-80     2/5     2/5  4/5  4/5   *WNL - Within Normal Limits  *NT = Not Tested     Fist: joint stiffness limiting full fist on bilateral hands   3 cm from IF to palm right hand       Strength: (MAG Dynamometer in lbs.) Average 3 trials, Position II:       4/24/2024 4/24/2024 5/31/2024 5/31/2024   Rung II Right Left Right  Left    Trial 1 12# 20# 26# 35#       Normal  Average Values  Female Right Left Male: Right Left   20-29 66 lbs 62 lbs         94 lbs 86 lbs   30-39 68 lbs 64 lbs 90 lbs 82 lbs   40-49 64 lbs 62 lbs 80 lbs 74 lbs   50-59 62 lbs 57 lbs 72 lbs 65 lbs   60-69 53 lbs 51 lbs 63 lbs 56 lbs   70+ 44 lbs 42 lbs 54 lbs 48 lbs   SD = +/- 19lbs         Pinch Strength (Measured in lbs)       4/24/2024 4/24/2024 5/31/2024 5/31/2024     Right Left Right  Left    Key Pinch 5 # 9 # 14# 13#   3pt Pinch unable # unable # 6# 7#   2pt Pinch unable # unable # 7# 8#         Fine/Gross Motor Coordination     Assessment   Right   4/24/2024 Left  4/24/2024   VIRY (Rapid Alternating Movements)      impaired - severe    impaired - severe   Finger to Nose (5 times)   impaired - severe impaired - severe   Finger Flicks (coordination moving from digit flexion to digit extension)   impaired - severe impaired - severe   *WNL - Within Normal Limits  *NT = Not Tested     Sensation:  Aaron  reports no change in sensation, difficulty with formal assessment due to cognitive impairments.         Sensation Intact  Impaired Comments    [place] Anuradha  Deep Touch (6.65) []  []       Protective Sensation (4.56) []  []       Light Touch (3.61) []  []                  Other Kinesthesia  []  [x]       Temperature []  []       Stereognosis  []  []          Treatment     Aaron received the treatments listed below:      neuromuscular re-education activities to improve: Coordination, Kinesthetic, Sense, Proprioception, and Posture for 8 minutes. The following activities were included:  - Upper Body Ergometer (UBE) L2.5 for 8 minutes at 15 RPM to provide proprioceptive awareness, postural stability, strength, activity tolerance, and reciprocal patterns with bimanual coordination completed to improve use of bilateral upper extremities in order to prepare for therapeutic exercises/activities. Cues provided as needed for postural stability/positioning    Therapeutic activities to improve functional performance for 27 minutes, including:  - red vest - buttons and zippers using tactile input   - attempted tying shoes     Patient Education and Home Exercises     Education provided:   - green putty issued   - Progress towards goals     Written Home Exercises Provided: yes.  Exercises were reviewed and Aaron was able to demonstrate them prior to the end of the session.  Aaron demonstrated good  understanding of the home exercise program provided. See electronic medical record under Patient Instructions for exercises provided during therapy sessions.       Assessment     Aaron tolerated today's session fair. He continues to present with significant decreased proprioceptive awareness and requires max verbal cues to safely sit in a chair. Interventions focused on functional activities using different senses such as tactile sensation. He was able to manage snap buttons with verbal cues for problem solving and locating buttons. Hand over hand for zipper to connect, able to pull up/down with mod I. Attempted tying shoes - 1st step  in shoe tying - but Aaron kept perseverating on the fact that he couldn't see, however, he was able to identify a friend who was in the gym for her physical therapy session. Extensive education provided on adapting activities to achieve independence with dressing. He stated he was able to get his shirt on and his pants without seeing but could not generalize to understand learning new tasks.     Aaron is progressing well towards his goals and there are no updates to goals at this time. Pt prognosis is Good.     Patient will continue to benefit from skilled outpatient occupational therapy to address the deficits listed in the problem list on initial evaluation provide patient/family education and to maximize patient's level of independence in the home and community environment.     Patient's spiritual, cultural and educational needs considered and patient agreeable to plan of care and goals.    Anticipated barriers to occupational therapy: severe vision impairments     Goals:  Short Term Goals: 5 weeks   - Pt will report 50% compliance with home exercise program in order to maintain progress attained during therapy. Not met, progressing  - Pt will improve active range of motion of right shoulder flexion/abduction to 115 degrees in order to increase ease with bathing and dressing tasks. Not met, progressing  - Pt will improve active range of motion of left  shoulder flexion/abduction to 130/115 degrees in order to increase ease with bathing and dressing tasks. Met 5/31/2024  - Pt will increase bilateral  strength by 10# in order to improve ease with dressing, bathing and grooming . Met 5/31/2024  - Pt will identify vision loss strategies to assist with navigating his environment to prevent falls or injury. Not met, progressing        Long Term Goals: 10 weeks   - Pt will report independence with home exercise program in order to maintain progress attained during therapy. Not met, progressing  - Pt will increase  MMT for bilateral shoulder flexion to 3+/5 to increase upper body strength needed for home management tasks, high level IADLs and work related activities. Met 5/31/2024  - Pt will increase bilateral  strength to 40# in order to improve ease with instrumental activities of daily living. Not met, progressing  - Pt will improve FOTO score to 63% for improved self perception of functional performance with daily activities. Not met, progressing  - Pt will demonstrate correct use of adaptive equipment and compensatory strategies to assist with feeding, bathing, and dressing due to vision loss. Not met, progressing  - Pt will perform standing bilateral task for 10 minutes, with distant supervision, to improve bimanual task performance and standing tolerance needed for safety during homemaking/kitchen tasks and showering. Not met, progressing  - Pt and caregiver will report increased participation with activities of daily living to improve independence. Not met, progressing    Plan     Updates/Grading for next session: continue with plan of care     Corinne Rapier, OT   6/5/2024

## 2024-06-05 NOTE — PROGRESS NOTES
"  [facility]   Physical Therapy Treatment Note      Name: Aaron Pruitt  Clinic Number: [id number]    Therapy Diagnosis:   Encounter Diagnoses   Name Primary?    Risk for falls Yes    Impaired functional mobility, balance, gait, and endurance      Physician: Camille Bay MD    Visit Date: 6/5/2024    Physician Orders: PT Eval and Treat   Medical Diagnosis from Referral: Anoxic brain damage [G93.1]   Evaluation Date: 4/22/2024  Authorization Period Expiration: 12/31/2024  Plan of Care Expiration: 7/5/2024  Progress Note Due: last assessed 5/22/2024  Date of Surgery: N/A  Visit # / Visits authorized: 10/14 (+eval)  FOTO: next when proxy present      Precautions: Standard, Fall, and Severe Visual Impairment; History of Seziure     Time In: 1:00 PM  Time Out: 1:45 PM  Total Billable Time: 45 minutes (MEDICAID - 3 TE)    PTA Visit #: 1/5     Subjective     Patient reports: Tired from being at clinic for last 3 hours.  He  was  compliant with home exercise program.  Response to previous treatment: no adverse response  Functional change: ongoing    Pain: 0/10  Location: NA      Objective      Objective Measures updated at progress report unless specified.     Blood Pressure / Heart Rate (mid session when symptomatic): 100/79mmHg / 82 bpm    Treatment     Aaron received the treatments listed below:      therapeutic exercises to develop strength, endurance, ROM, and posture for 00 minutes including:    neuromuscular re-education activities to improve: Balance, Coordination, Kinesthetic, Sense, and Proprioception for 20 minutes. The following activities were included:  Semi-tandem on blue foam discs 2x60"  Nustep level 5.0 double peak for 8 minutes cues to increase limb movement amplitude  Squat with chair tap: x10  Calf raises: x20    therapeutic activities to improve functional performance for 25 minutes, including:  Sit to stands from elevated mat with focus on eccentric control to sit " "x10  Forward march walks x 4 lengths x 10 feet each; bilateral upper extremity assist  Retro walks x 4 lengths x 10 feet each; bilateral upper extremity assist  Step up and overs forward in // bars; one 4" step and one 6" step x 4 lengths x 10 feet each; bilateral upper extremity assist  Medical monitoring (see above)  Overground walking throughout gym with obstacle avoidance performed throughout visit; ~200' feet completed today with CGA/verbal cuing from PT    gait training to improve functional mobility and safety for 00  minutes, including:  Not completed today    Patient Education and Home Exercises       Education provided:   - most appropriate AD option  - safety awareness, especially with sitting  - need for a walking stick    Written Home Exercises Provided: Patient instructed to cont prior HEP. Exercises were reviewed and Aaron was able to demonstrate them prior to the end of the session.  Aaron demonstrated good  understanding of the education provided. See Electronic Medical Record under Patient Instructions for exercises provided during therapy sessions    Assessment     Aaron arrived to session without any complaints and agreeable to treatment. Denied any dizziness to start session and confirmed the absence of dizziness throughout session.  Despite preferring to walk without any assistive device, he completed in clinic ambulation and obstacle avoidance task much safer with rolling walker.  Responded well to cuing to rely on his other senses with overground ambulation. Less assistance required for seated transfers and no loss of balance this session that required recovery assistance from PTA. He would benefit from continued PT services to achieve functional goals.    Aaron Is progressing well towards his goals.   Patient prognosis is Good.     Patient will continue to benefit from skilled outpatient physical therapy to address the deficits listed in the problem list box on initial evaluation, provide " pt/family education and to maximize pt's level of independence in the home and community environment.     Patient's spiritual, cultural and educational needs considered and pt agreeable to plan of care and goals.     Anticipated barriers to physical therapy: Degree of visual and proprioceptive/somatosensory impairments     Goals:   Short Term Goals: 5 weeks   Patient will be compliant with HEP in order to maximize PT benefits  Patient will perform sit <>  one attempt modified independently from standard height chair without need for verbal cuing in order to improve independence and safety with functional mobility   Patient will walk >/= 175 feet on Two-Minute Walk Test in order to improve endurance and gait speed for home mobility (met)     Long Term Goals: 10 weeks   Patient will score >/= 64% on FOTO survey in order to improve self-perception of functional mobility deficits  Patient will improve bilateral lower extremity MMT grades by >/=1/2 grade in order to improve strength for ADL completion  Patient will perform Five Time Sit to  </=20 seconds in order to improve bilateral lower extremity muscular power for transfers  Patient will walk >/= 700 feet on Six-Minute Walk Test in order to improve endurance and gait speed for home mobility   Patient will perform 1 floor <> stand transfer with bilateral upper extremity support in order to demonstrate safe functional mobility in case of future fall  Patient will begin some form of home/community fitness in order to sustain progress gained in PT    Plan     Plan to cont with progression of PT goals per POC.    Vickie Brock, PTA

## 2024-06-05 NOTE — PROGRESS NOTES
[facility]  Speech Therapy Treatment Note- Neurological Rehabilitation  Date: 6/5/2024     Name: Aaron Pruitt   MRN: [id number]   Therapy Diagnosis:   Encounter Diagnoses   Name Primary?    Cognitive communication disorder Yes    Aphasia      Physician: Camille Bay MD  Physician Orders: Ambulatory Referral to Speech Therapy   Medical Diagnosis: G93.1 (ICD-10-CM) - Anoxic brain damage    Visit #/ Visits Authorized: 9/15  Date of Evaluation:  4/24/24  Insurance Authorization Period: 4/26/24-5/26/24  Plan of Care Expiration Date:    7/4/24  Extended Plan of Care:  n/a   Progress Note: 6/22/24     Time In:  1202  Time Out:  1245  Total Billable Time:   43 minutes     Precautions: Fall, Cognition, and Communication  Subjective:   Patient reports: having a good day, had some eye issues mid session; came with his sister  He was compliant to home exercise program.   Response to previous treatment: good  Pain Scale: 0/10 on a Visual Analog Scale currently.  Pain Location: n/a  Objective:   UNTIMED  Procedure   Speech- Language- Voice Therapy     Short Term Goals: (4 weeks) Current Progress:   The patient will follow 2 step simple commands with 90% accuracy independently to utilize in daily living environments.   MET 5/22/24    2. The patient will complete categorization tasks with 80% accuracy and 1 prompt or cue in order to enhance his lexical fluidity.      Progressing/ Not Met 6/6/2024  Divergent categorization:  Things in a kitchen: 5 independently, 0 with 1 prompt   >50-75% cueing required to generate additional items.  Food: 7 independently, 4 with 1 prompt, >50-75% cueing required to generate additional items; listing was sporatic     Convergent categorization:   80% accuracy independently    3. The patient will participate in recall tasks, both immediate and delayed, with 75% accuracy in order to enhance his memory for daily tasks.      Progressing/ Not Met 6/6/2024 Delayed recall:   After 2  minute patient recalled list verbally presented with 60% accuracy independently (3/5 trails). 1/5 trials patient was asked to name days of the week and count to 20 before recalling list.     Immediate recall goal met   4. The patient will participate in semantic feature analysis (SFA) to describe tangible objects for increased identification with 80% accuracy and 1 prompt or cue.      Progressing/ Not Met 2024   Not targeted today   5. The patient will name objects with 90% accuracy independently for 2 consecutive sessions to enhance expressive language skills.  New Goal 24 Not targeted today     Long Term Goals: (10 weeks) Current Progress:   Using compensatory strategies for memory, patient will recall a 10 item list with a 5 minute delay independently.   Progressing, not met 2024    2. The patient will participate in conversational tasks, both structured and spontaneous, with greater than a 5 word utterance to enhance communication participation.  Progressing, not met 2024      Patient Education/Response:   Patient educated regarding the followin. Compensatory strategies for memory - association and chunking, picture it  2. Home devices for keeping up with orientation information (echo dot, mike)    Home program established: yes-continue conversational speech to prompt memory and word finding  Patient verbalized understanding to all above education provided.     See Electronic Medical Record under Patient Instructions for exercises provided throughout therapy.  Assessment:   Treatment session focused on various speech-language tasks to enhance his current abilities focusing on expressive language primarily. He performed rather well in  categorization tasks. He had more difficulty  generating items in categories when given maximum assistance.  Aaron is progressing well towards his goals. Current goals remain appropriate. Goals to be updated as necessary.     Patient prognosis is Good.  Patient will continue to benefit from skilled outpatient speech and language therapy to address the deficits listed in the problem list on initial evaluation, provide patient/family education and to maximize patient's level of independence in the home and community environment.   Medical necessity is demonstrated by the following IMPAIRMENTS:  Aphasia: Patient with few true words in all situations severely limiting functional communication with both familiar and unfamiliar communication partners.   Cognition: Deficits in executive functioning, attention, and memory prevent the pt from relaying medically and safety relevant information in a timely manner in a state of emergency.   Barriers to Therapy: none  Patient's spiritual, cultural and educational needs considered and patient agreeable to plan of care and goals.  Plan:   Continue Plan of Care with focus on rehabilitation and compensation for cognitive-linguistic and aphasia impairments following anoxic brain injury.    COLLINS Gale.A   Gradate Student Clinician     I certify that I was present in the room directing the student in service delivery and guiding them using my skilled judgment. As the co-signing therapist I have reviewed the students documentation and am responsible for the treatment, assessment, and plan.     Josette May M.S., L-SLP,CCC-SLP, CBIS  Speech-Language Pathologist  Certified Brain Injury Specialist  6/6/2024

## 2024-06-07 ENCOUNTER — CLINICAL SUPPORT (OUTPATIENT)
Dept: REHABILITATION | Facility: HOSPITAL | Age: 54
End: 2024-06-07
Payer: MEDICAID

## 2024-06-07 DIAGNOSIS — M62.81 MUSCLE WEAKNESS (GENERALIZED): Primary | ICD-10-CM

## 2024-06-07 DIAGNOSIS — Z74.09 IMPAIRED FUNCTIONAL MOBILITY, BALANCE, GAIT, AND ENDURANCE: ICD-10-CM

## 2024-06-07 DIAGNOSIS — Z91.81 RISK FOR FALLS: Primary | ICD-10-CM

## 2024-06-07 DIAGNOSIS — H53.133 SUDDEN VISUAL LOSS OF BOTH EYES: ICD-10-CM

## 2024-06-07 DIAGNOSIS — Z74.1 NEED FOR ASSISTANCE WITH PERSONAL CARE: ICD-10-CM

## 2024-06-07 PROCEDURE — 97110 THERAPEUTIC EXERCISES: CPT | Mod: PN

## 2024-06-07 PROCEDURE — 97530 THERAPEUTIC ACTIVITIES: CPT | Mod: PN

## 2024-06-07 NOTE — PROGRESS NOTES
"  [facility]   Physical Therapy Treatment Note      Name: Aaron Pruitt  Clinic Number: [id number]    Therapy Diagnosis:   Encounter Diagnoses   Name Primary?    Risk for falls Yes    Impaired functional mobility, balance, gait, and endurance      Physician: Camille Bay MD    Visit Date: 6/7/2024    Physician Orders: PT Eval and Treat   Medical Diagnosis from Referral: Anoxic brain damage [G93.1]   Evaluation Date: 4/22/2024  Authorization Period Expiration: 12/31/2024  Plan of Care Expiration: 7/5/2024  Progress Note Due: last assessed 5/22/2024  Date of Surgery: N/A  Visit # / Visits authorized: 11/14 (+eval)  FOTO: next when proxy present      Precautions: Standard, Fall, and Severe Visual Impairment; History of Seziure     Time In: 10:40 AM (late arrival)  Time Out: 11:15 PM  Total Billable Time: 35 minutes (MEDICAID - 2 TE)    PTA Visit #: 0/5     Subjective     Patient reports: No changes since last seen in PT.  He  was  compliant with home exercise program.  Response to previous treatment: no adverse response  Functional change: ongoing    Pain: 0/10  Location: NA      Objective      Objective Measures updated at progress report unless specified.     Treatment     Aaron received the treatments listed below:      therapeutic exercises to develop strength, endurance, ROM, and posture for 10 minutes including:  Bridges with 5" holds x 2 minutes total   Short arc quads 2x60" each leg with 3# ankle weights    neuromuscular re-education activities to improve: Balance, Coordination, Kinesthetic, Sense, and Proprioception for 20 minutes. The following activities were included:    Not performed today  Semi-tandem on blue foam discs 2x60"  Nustep level 5.0 double peak for 8 minutes cues to increase limb movement amplitude  Squat with chair tap: x10  Calf raises: x20    therapeutic activities to improve functional performance for 25 minutes, including:  Sit to stands from elevated mat " with focus on eccentric control to sit 2x12  Lateral steps at / bar x 6 lengths x 10 feet each with bilateral upper extremity support  Narrow base of support on solid + volleyball chest press 2x15  Overground walking throughout gym with obstacle avoidance performed throughout visit; ~200' feet completed today with CGA/verbal cuing from PT    gait training to improve functional mobility and safety for 00  minutes, including:  Not completed today    Patient Education and Home Exercises       Education provided:   - most appropriate AD option  - safety awareness, especially with sitting  - need for a walking stick    Written Home Exercises Provided: Patient instructed to cont prior HEP. Exercises were reviewed and Aaron was able to demonstrate them prior to the end of the session.  Aaron demonstrated good  understanding of the education provided. See Electronic Medical Record under Patient Instructions for exercises provided during therapy sessions    Assessment     Aaron arrived to session without any complaints and agreeable to treatment. He still has low carryover for eccentric control to sit. Good conceptualization of short arc quad strengthening but could not coordinate straight leg raise correctly even with maximal cuing. No loss of balance with overground walking but again, PT recommends some form of assistive device such as identifier cane considering visual deficits.    Aaron Is progressing well towards his goals.   Patient prognosis is Good.     Patient will continue to benefit from skilled outpatient physical therapy to address the deficits listed in the problem list box on initial evaluation, provide pt/family education and to maximize pt's level of independence in the home and community environment.     Patient's spiritual, cultural and educational needs considered and pt agreeable to plan of care and goals.     Anticipated barriers to physical therapy: Degree of visual and proprioceptive/somatosensory  impairments     Goals:   Short Term Goals: 5 weeks   Patient will be compliant with HEP in order to maximize PT benefits  Patient will perform sit <>  one attempt modified independently from standard height chair without need for verbal cuing in order to improve independence and safety with functional mobility   Patient will walk >/= 175 feet on Two-Minute Walk Test in order to improve endurance and gait speed for home mobility (met)     Long Term Goals: 10 weeks   Patient will score >/= 64% on FOTO survey in order to improve self-perception of functional mobility deficits  Patient will improve bilateral lower extremity MMT grades by >/=1/2 grade in order to improve strength for ADL completion  Patient will perform Five Time Sit to  </=20 seconds in order to improve bilateral lower extremity muscular power for transfers  Patient will walk >/= 700 feet on Six-Minute Walk Test in order to improve endurance and gait speed for home mobility   Patient will perform 1 floor <> stand transfer with bilateral upper extremity support in order to demonstrate safe functional mobility in case of future fall  Patient will begin some form of home/community fitness in order to sustain progress gained in PT    Plan     Plan to cont with progression of PT goals per POC.    JAMES FERNANDEZ, PT

## 2024-06-07 NOTE — PROGRESS NOTES
"[facility]  Occupational Therapy Treatment Note     Date: 6/7/2024  Name: Aaron Pruitt  [facility] Number: [id number]    Therapy Diagnosis:   Encounter Diagnoses   Name Primary?    Muscle weakness (generalized) Yes    Sudden visual loss of both eyes     Need for assistance with personal care        Physician: Camille Bay MD  Physician Orders: Eval and Treat  Medical Diagnosis: G93.1 (ICD-10-CM) - Anoxic brain damage  Evaluation Date: 4/24/2024  Insurance Authorization Period Expiration: 4/1/2025  Plan of Care Certification Period: 07/5/2024  Visit # / Visits authorized: 7 / 12  FOTO: 1 / 3     Precautions:  Standard, Fall, and vision impaired, history of seizures      Time In: 11:45 am  Time Out: 12:00 pm  Total Billable Time: 45 minute        Subjective     Patient reports: "I am good."   He was compliant with home exercise program given last session.   Response to previous treatment: good   Functional change: no change to report     Pain: 0/10  Location: no pain     Objective     Visual/Perceptual:  Comments: severe vision loss, unable to identify objects in room     Physical Exam:  Postural examination/scapula alignment: Rounded shoulder and impaired sitting balance, posterior lean when sitting unsupported, multiple cues for upright posture  Joint integrity: Firm end feeling  Palpation: no pain with palpation      Joint Evaluation  AROM  4/24/2024 PROM   4/24/2024 MMS   4/24/2024 AROM  4/24/2024 PROM   4/24/2024 MMS   4/24/2024 AROM  5/31/2024 MMS   5/31/2024 AROM  5/31/2024 MMS   5/31/2024     Right Right Right Left Left Left Right  Right  Left  Left    Scapular Elevation     3/5     3/5  4/5  4/5   Scapular Retraction     3/5     3/5  4/5  4/5   Shoulder Flex 0-180 87 ~110 2/5 114 ~120 2/5 103 4/5 130 4/5   Shoulder Extension 0-80 60   2+/5 66   2+/5 70 4/5 65 4/5   Shoulder Abd 0-180 75 ~105 2/5 91 ~110 2/5 115 4/5 121 4/5   Shoulder ER 0-90     2/5     2/5  4/5  4/5   Shoulder IR " 0-90 PSIS    2/5 PSIS   2/5 L2 4/5 L2 4/5   Elbow Flexion 0-150 150   3/5 150   3/5  4/5  4/5   Elbow Extension -22 -15 2/5 -31 -10 2/5 -15*   Noted pocket of fluid on right elbow - pt reported no pain, he states he fell a few weeks ago getting out of his chair 4/5 -10 4/5   Wrist Flex 0-80     2+/5     2+/5  4/5  4/5   Wrist Ext 0-70     2+/5     2+/5  4/5  4/5   Supination 0-80     2/5 60   2/5  4/5  4/5   Pronation 0-80     2/5     2/5  4/5  4/5   *WNL - Within Normal Limits  *NT = Not Tested     Fist: joint stiffness limiting full fist on bilateral hands   3 cm from IF to palm right hand       Strength: (MAG Dynamometer in lbs.) Average 3 trials, Position II:       4/24/2024 4/24/2024 5/31/2024 5/31/2024   Rung II Right Left Right  Left    Trial 1 12# 20# 26# 35#       Normal  Average Values  Female Right Left Male: Right Left   20-29 66 lbs 62 lbs         94 lbs 86 lbs   30-39 68 lbs 64 lbs 90 lbs 82 lbs   40-49 64 lbs 62 lbs 80 lbs 74 lbs   50-59 62 lbs 57 lbs 72 lbs 65 lbs   60-69 53 lbs 51 lbs 63 lbs 56 lbs   70+ 44 lbs 42 lbs 54 lbs 48 lbs   SD = +/- 19lbs         Pinch Strength (Measured in lbs)       4/24/2024 4/24/2024 5/31/2024 5/31/2024     Right Left Right  Left    Key Pinch 5 # 9 # 14# 13#   3pt Pinch unable # unable # 6# 7#   2pt Pinch unable # unable # 7# 8#         Fine/Gross Motor Coordination     Assessment   Right   4/24/2024 Left  4/24/2024   VIRY (Rapid Alternating Movements)      impaired - severe    impaired - severe   Finger to Nose (5 times)   impaired - severe impaired - severe   Finger Flicks (coordination moving from digit flexion to digit extension)   impaired - severe impaired - severe   *WNL - Within Normal Limits  *NT = Not Tested     Sensation:  Aaron  reports no change in sensation, difficulty with formal assessment due to cognitive impairments.        Sensation Intact  Impaired Comments    Donnelly Anuradha  Deep Touch (6.65) []  []       Protective Sensation (4.56) []  []    "    Light Touch (3.61) []  []                  Other Kinesthesia  []  [x]       Temperature []  []       Stereognosis  []  []          Treatment     Aaron received the treatments listed below:      neuromuscular re-education activities to improve: Coordination, Kinesthetic, Sense, Proprioception, and Posture for 8 minutes. The following activities were included:  - Upper Body Ergometer (UBE) L2.5 for 8 minutes at 15 RPM to provide proprioceptive awareness, postural stability, strength, activity tolerance, and reciprocal patterns with bimanual coordination completed to improve use of bilateral upper extremities in order to prepare for therapeutic exercises/activities. Cues provided as needed for postural stability/positioning    Therapeutic activities to improve functional performance for 37 minutes, including:  - ring arch bilateral upper extremities crossing midline   - connect 4 alternating hands using touch to complete task   - stringing beads   - sit <> stand transfer practice x5 CGA-min A     Patient Education and Home Exercises     Education provided:   - green putty issued   - Progress towards goals     Written Home Exercises Provided: yes.  Exercises were reviewed and Aaron was able to demonstrate them prior to the end of the session.  Aaron demonstrated good  understanding of the home exercise program provided. See electronic medical record under Patient Instructions for exercises provided during therapy sessions.       Assessment     Aaron tolerated today's session well. He was able to complete all tasks with compensatory strategies such as touch to locate items and manipulate objects. No noted challenges with dexterity. Aaron would benefit from specialized low vision therapy to navigate his environment safely. Sit <> stand practice due to LOB and increased assistance for transfers. Max cues provided for functional transfers, hand placement and using "nose over toes' technique.     Aaron is progressing " well towards his goals and there are no updates to goals at this time. Pt prognosis is Good.     Patient will continue to benefit from skilled outpatient occupational therapy to address the deficits listed in the problem list on initial evaluation provide patient/family education and to maximize patient's level of independence in the home and community environment.     Patient's spiritual, cultural and educational needs considered and patient agreeable to plan of care and goals.    Anticipated barriers to occupational therapy: severe vision impairments     Goals:  Short Term Goals: 5 weeks   - Pt will report 50% compliance with home exercise program in order to maintain progress attained during therapy. Not met, progressing  - Pt will improve active range of motion of right shoulder flexion/abduction to 115 degrees in order to increase ease with bathing and dressing tasks. Not met, progressing  - Pt will improve active range of motion of left  shoulder flexion/abduction to 130/115 degrees in order to increase ease with bathing and dressing tasks. Met 5/31/2024  - Pt will increase bilateral  strength by 10# in order to improve ease with dressing, bathing and grooming . Met 5/31/2024  - Pt will identify vision loss strategies to assist with navigating his environment to prevent falls or injury. Not met, progressing        Long Term Goals: 10 weeks   - Pt will report independence with home exercise program in order to maintain progress attained during therapy. Not met, progressing  - Pt will increase MMT for bilateral shoulder flexion to 3+/5 to increase upper body strength needed for home management tasks, high level IADLs and work related activities. Met 5/31/2024  - Pt will increase bilateral  strength to 40# in order to improve ease with instrumental activities of daily living. Not met, progressing  - Pt will improve FOTO score to 63% for improved self perception of functional performance with daily  activities. Not met, progressing  - Pt will demonstrate correct use of adaptive equipment and compensatory strategies to assist with feeding, bathing, and dressing due to vision loss. Not met, progressing  - Pt will perform standing bilateral task for 10 minutes, with distant supervision, to improve bimanual task performance and standing tolerance needed for safety during homemaking/kitchen tasks and showering. Not met, progressing  - Pt and caregiver will report increased participation with activities of daily living to improve independence. Not met, progressing    Plan     Updates/Grading for next session: continue with plan of care     Corinne Rapier, OT   6/7/2024

## 2024-06-10 ENCOUNTER — CLINICAL SUPPORT (OUTPATIENT)
Dept: REHABILITATION | Facility: HOSPITAL | Age: 54
End: 2024-06-10
Payer: MEDICAID

## 2024-06-10 DIAGNOSIS — R47.01 APHASIA: ICD-10-CM

## 2024-06-10 DIAGNOSIS — R41.841 COGNITIVE COMMUNICATION DISORDER: Primary | ICD-10-CM

## 2024-06-10 DIAGNOSIS — Z74.09 IMPAIRED FUNCTIONAL MOBILITY, BALANCE, GAIT, AND ENDURANCE: ICD-10-CM

## 2024-06-10 DIAGNOSIS — Z91.81 RISK FOR FALLS: Primary | ICD-10-CM

## 2024-06-10 PROCEDURE — 97110 THERAPEUTIC EXERCISES: CPT | Mod: PN

## 2024-06-10 PROCEDURE — 92507 TX SP LANG VOICE COMM INDIV: CPT | Mod: PN

## 2024-06-10 NOTE — PROGRESS NOTES
[facility]  Speech Therapy Treatment Note- Neurological Rehabilitation  Date: 6/10/2024     Name: Aaron Pruitt   MRN: [id number]   Therapy Diagnosis:   Encounter Diagnoses   Name Primary?    Cognitive communication disorder Yes    Aphasia      Physician: Camille Bay MD  Physician Orders: Ambulatory Referral to Speech Therapy   Medical Diagnosis: G93.1 (ICD-10-CM) - Anoxic brain damage    Visit #/ Visits Authorized: 10/15  Date of Evaluation:  4/24/24  Insurance Authorization Period: 4/26/24-5/26/24  Plan of Care Expiration Date:    7/4/24  Extended Plan of Care:  n/a   Progress Note: 6/22/24     Time In:  1205  Time Out:  1245  Total Billable Time:   40 minutes     Precautions: Fall, Cognition, and Communication  Subjective:   Patient reports: doing well, nothing new to report since last visit  He was compliant to home exercise program.   Response to previous treatment: good  Pain Scale: 0/10 on a Visual Analog Scale currently.  Pain Location: n/a  Objective:   UNTIMED  Procedure   Speech- Language- Voice Therapy     Short Term Goals: (4 weeks) Current Progress:   The patient will follow 2 step simple commands with 90% accuracy independently to utilize in daily living environments.   MET 5/22/24    2. The patient will complete categorization tasks with 80% accuracy and 1 prompt or cue in order to enhance his lexical fluidity.      Progressing/ Not Met 6/11/2024  Divergent categorization:  States in the [place]: 100% accuracy independently (required 10)  Animals: 60% accuracy independently, 1 prompt provided which did not increase accuracy    Convergent categorization: 80% accuracy    3. The patient will participate in recall tasks, both immediate and delayed, with 75% accuracy in order to enhance his memory for daily tasks.      Progressing/ Not Met 6/11/2024 Delayed recall: 3 item lists, categorized by patient, were recalled after 2 minutes with 100% accuracy independently, previously 60% accuracy  independently (3/5 trials).     Will completed for an additional session     Immediate recall goal met   4. The patient will participate in semantic feature analysis (SFA) to describe tangible objects for increased identification with 80% accuracy and 1 prompt or cue.      Progressing/ Not Met 2024   83% accuracy independently (5/6 trials)  Will complete for one additional session   5. The patient will name objects with 90% accuracy independently for 2 consecutive sessions to enhance expressive language skills.  Progressing/Not Met 2024 80% accuracy independently with tangible objects (4/5 trials)     Long Term Goals: (10 weeks) Current Progress:   Using compensatory strategies for memory, patient will recall a 10 item list with a 5 minute delay independently.   Progressing, not met 6/10/2024    2. The patient will participate in conversational tasks, both structured and spontaneous, with greater than a 5 word utterance to enhance communication participation.  Progressing, not met 6/10/2024      Patient Education/Response:   Patient educated regarding the followin. Compensatory strategies for memory - association and chunking, picture it  2. Home devices for keeping up with orientation information (echo dot, mike)    Home program established: yes-continue conversational speech to prompt memory and word finding  Patient verbalized understanding to all above education provided.     See Electronic Medical Record under Patient Instructions for exercises provided throughout therapy.  Assessment:   Treatment session focused on various speech-language tasks to enhance his current abilities focusing on expressive language primarily. He performed rather well in  categorization tasks as compared to previous session. Additionally, his ability to name objects through tactile input and to describe these objects had also improved since the last session. Aaron is progressing well towards his goals. Current goals  remain appropriate. Goals to be updated as necessary.     Patient prognosis is Good. Patient will continue to benefit from skilled outpatient speech and language therapy to address the deficits listed in the problem list on initial evaluation, provide patient/family education and to maximize patient's level of independence in the home and community environment.   Medical necessity is demonstrated by the following IMPAIRMENTS:  Aphasia: Patient with few true words in all situations severely limiting functional communication with both familiar and unfamiliar communication partners.   Cognition: Deficits in executive functioning, attention, and memory prevent the pt from relaying medically and safety relevant information in a timely manner in a state of emergency.   Barriers to Therapy: none  Patient's spiritual, cultural and educational needs considered and patient agreeable to plan of care and goals.  Plan:   Continue Plan of Care with focus on rehabilitation and compensation for cognitive-linguistic and aphasia impairments following anoxic brain injury.    Josette May M.S., L-SLP,CCC-SLP, CBIS  Speech-Language Pathologist  Certified Brain Injury Specialist  6/11/2024

## 2024-06-10 NOTE — PROGRESS NOTES
[facility]   Physical Therapy Treatment Note      Name: Aaron Pruitt  Clinic Number: [id number]    Therapy Diagnosis:   Encounter Diagnoses   Name Primary?    Risk for falls Yes    Impaired functional mobility, balance, gait, and endurance      Physician: Camille Bay MD    Visit Date: 6/10/2024    Physician Orders: PT Eval and Treat   Medical Diagnosis from Referral: Anoxic brain damage [G93.1]   Evaluation Date: 4/22/2024  Authorization Period Expiration: 12/31/2024  Plan of Care Expiration: 7/5/2024  Progress Note Due: last assessed 6/10/2024  Date of Surgery: N/A  Visit # / Visits authorized: 12/14 (+eval)  FOTO: next when proxy present      Precautions: Standard, Fall, and Severe Visual Impairment; History of Seziure     Time In: 11:15 AM   Time Out: 12:00 PM  Total Billable Time: 45 minutes (MEDICAID - 3 TE)    PTA Visit #: 0/5     Subjective     Patient reports: No new complaints; patient's friend says he brought patient's walker today.  He  was  compliant with home exercise program.  Response to previous treatment: no adverse response  Functional change: ongoing    Pain: 0/10  Location: NA      Objective      Objective Measures updated at progress report unless specified.     Lower Extremity Strength (updated values in parentheses)    RLE LLE   Hip Flexion: 4/5 (4+/5) 4/5 (4+/5)   Hip Abduction: 4/5 (4/5) 4/5 (4/5)   Knee Extension: 4+/5 (5/5) 4/5 (5/5)   Knee Flexion: 4/5 (4/5) 4-/5 (4/5)   Ankle Dorsiflexion: 4+/5 (5/5) 4-/5 (4+/5)      Five Time Sit to Stand: unable to complete safely due to inconsistent posterior hooking of bilateral lower extremity    Single Sit to Stand: mod I with heavy verbal cuing for arm placement and weight shift strategy     Six-Minute Walk Test: 450 feet with rolling walker and minimal assistance for assistive device management (due to visual impairment)    Treatment     Aaron received the treatments listed below:      therapeutic exercises to  "develop strength, endurance, ROM, and posture for 00 minutes including:    neuromuscular re-education activities to improve: Balance, Coordination, Kinesthetic, Sense, and Proprioception for 20 minutes. The following activities were included:    Not performed today  Semi-tandem on blue foam discs 2x60"  Nustep level 5.0 double peak for 8 minutes cues to increase limb movement amplitude  Squat with chair tap: x10  Calf raises: x20    therapeutic activities to improve functional performance for 45 minutes, including:  Reassessment (see above)  Lateral steps at / bar x 8 lengths x 10 feet each with bilateral upper extremity support; 3# anklle weights  Targeted stepping to cone (hip flexion) x12B with 3# ankle weights  Targeted stepping to cone (hip abduction) x12B with 3# ankle weights  Standing Squiz pulls from mirror with cuing for laterality and spatial awareness x 5 minutes total; single upper extremity support    gait training to improve functional mobility and safety for 00  minutes, including:  Not completed today    Patient Education and Home Exercises       Education provided:   - most appropriate AD option  - safety awareness, especially with sitting  - need for a walking stick    Written Home Exercises Provided: Patient instructed to cont prior HEP. Exercises were reviewed and Aaron was able to demonstrate them prior to the end of the session.  Aaron demonstrated good  understanding of the education provided. See Electronic Medical Record under Patient Instructions for exercises provided during therapy sessions    Assessment     Today's session focused on goal reassessment and targeted stepping and reaching activities considering patient will have to rely on tactile/proprioceptive feedback moving forward due to visual impairment. Continued difficulty with body awareness and right/left differentiation; perceptual deficits do appear to be layered on top of visual ones. Presented to clinic with personal rolling " walker, and gait stability is superior with assistive device than without. Slight improvement noted in bilateral lower extremity strength but sit to stands remain inconsistent due to patients low safety awareness and visual-perceptual deficits. Due to plateau in gait speed/assistance level and transfer status (even with rolling walker) he will likely be ready for discharge within the next couple of weeks. PT recommends follow up at [facility] considering continued visual impairments; family has been made aware of this recommendation multiple times.    Aaron Is progressing well towards his goals.   Patient prognosis is Good.     Patient will continue to benefit from skilled outpatient physical therapy to address the deficits listed in the problem list box on initial evaluation, provide pt/family education and to maximize pt's level of independence in the home and community environment.     Patient's spiritual, cultural and educational needs considered and pt agreeable to plan of care and goals.     Anticipated barriers to physical therapy: Degree of visual and proprioceptive/somatosensory impairments     Goals:   Short Term Goals: 5 weeks   Patient will be compliant with HEP in order to maximize PT benefits  Patient will perform sit <>  one attempt modified independently from standard height chair without need for verbal cuing in order to improve independence and safety with functional mobility (met)  Patient will walk >/= 175 feet on Two-Minute Walk Test in order to improve endurance and gait speed for home mobility (met)     Long Term Goals: 10 weeks   Patient will score >/= 64% on FOTO survey in order to improve self-perception of functional mobility deficits  Patient will improve bilateral lower extremity MMT grades by >/=1/2 grade in order to improve strength for ADL completion  Patient will perform Five Time Sit to  </=20 seconds in order to improve bilateral lower extremity  muscular power for transfers  Patient will walk >/= 700 feet on Six-Minute Walk Test in order to improve endurance and gait speed for home mobility   Patient will perform 1 floor <> stand transfer with bilateral upper extremity support in order to demonstrate safe functional mobility in case of future fall  Patient will begin some form of home/community fitness in order to sustain progress gained in PT    Plan     Plan to cont with progression of PT goals per POC.    JAMES FERNANDEZ, PT

## 2024-06-12 ENCOUNTER — CLINICAL SUPPORT (OUTPATIENT)
Dept: REHABILITATION | Facility: HOSPITAL | Age: 54
End: 2024-06-12
Payer: MEDICAID

## 2024-06-12 DIAGNOSIS — H53.133 SUDDEN VISUAL LOSS OF BOTH EYES: ICD-10-CM

## 2024-06-12 DIAGNOSIS — M62.81 MUSCLE WEAKNESS (GENERALIZED): Primary | ICD-10-CM

## 2024-06-12 DIAGNOSIS — R47.01 APHASIA: ICD-10-CM

## 2024-06-12 DIAGNOSIS — Z74.09 IMPAIRED FUNCTIONAL MOBILITY, BALANCE, GAIT, AND ENDURANCE: ICD-10-CM

## 2024-06-12 DIAGNOSIS — Z91.81 RISK FOR FALLS: Primary | ICD-10-CM

## 2024-06-12 DIAGNOSIS — Z74.1 NEED FOR ASSISTANCE WITH PERSONAL CARE: ICD-10-CM

## 2024-06-12 DIAGNOSIS — R41.841 COGNITIVE COMMUNICATION DISORDER: Primary | ICD-10-CM

## 2024-06-12 PROCEDURE — 97110 THERAPEUTIC EXERCISES: CPT | Mod: PN,CQ

## 2024-06-12 PROCEDURE — 97530 THERAPEUTIC ACTIVITIES: CPT | Mod: PN

## 2024-06-12 PROCEDURE — 92507 TX SP LANG VOICE COMM INDIV: CPT | Mod: PN

## 2024-06-12 NOTE — PROGRESS NOTES
[facility]  Occupational Therapy Treatment Note     Date: 6/12/2024  Name: Aaron Pruitt  [facility] Number: [id number]    Therapy Diagnosis:   Encounter Diagnoses   Name Primary?    Muscle weakness (generalized) Yes    Sudden visual loss of both eyes     Need for assistance with personal care        Physician: Camille Bay MD  Physician Orders: Eval and Treat  Medical Diagnosis: G93.1 (ICD-10-CM) - Anoxic brain damage  Evaluation Date: 4/24/2024  Insurance Authorization Period Expiration: 4/1/2025  Plan of Care Certification Period: 07/5/2024  Visit # / Visits authorized: 8 / 12  FOTO: 1 / 3     Precautions:  Standard, Fall, and vision impaired, history of seizures      Time In: 10:37 am  Time Out: 11:15 am  Total Billable Time: 38 minute        Subjective     Patient reports: "I am good." Pt arrived with rolling walker to today's session   He was compliant with home exercise program given last session.   Response to previous treatment: good   Functional change: no change to report     Pain: 0/10  Location: no pain     Objective     Visual/Perceptual:  Comments: severe vision loss, unable to identify objects in room     Physical Exam:  Postural examination/scapula alignment: Rounded shoulder and impaired sitting balance, posterior lean when sitting unsupported, multiple cues for upright posture  Joint integrity: Firm end feeling  Palpation: no pain with palpation      Joint Evaluation  AROM  4/24/2024 PROM   4/24/2024 MMS   4/24/2024 AROM  4/24/2024 PROM   4/24/2024 MMS   4/24/2024 AROM  5/31/2024 MMS   5/31/2024 AROM  5/31/2024 MMS   5/31/2024     Right Right Right Left Left Left Right  Right  Left  Left    Scapular Elevation     3/5     3/5  4/5  4/5   Scapular Retraction     3/5     3/5  4/5  4/5   Shoulder Flex 0-180 87 ~110 2/5 114 ~120 2/5 103 4/5 130 4/5   Shoulder Extension 0-80 60   2+/5 66   2+/5 70 4/5 65 4/5   Shoulder Abd 0-180 75 ~105 2/5 91 ~110 2/5 115 4/5 121 4/5 "   Shoulder ER 0-90     2/5     2/5  4/5  4/5   Shoulder IR 0-90 PSIS    2/5 PSIS   2/5 L2 4/5 L2 4/5   Elbow Flexion 0-150 150   3/5 150   3/5  4/5  4/5   Elbow Extension -22 -15 2/5 -31 -10 2/5 -15*   Noted pocket of fluid on right elbow - pt reported no pain, he states he fell a few weeks ago getting out of his chair 4/5 -10 4/5   Wrist Flex 0-80     2+/5     2+/5  4/5  4/5   Wrist Ext 0-70     2+/5     2+/5  4/5  4/5   Supination 0-80     2/5 60   2/5  4/5  4/5   Pronation 0-80     2/5     2/5  4/5  4/5   *WNL - Within Normal Limits  *NT = Not Tested     Fist: joint stiffness limiting full fist on bilateral hands   3 cm from IF to palm right hand       Strength: (MAG Dynamometer in lbs.) Average 3 trials, Position II:       4/24/2024 4/24/2024 5/31/2024 5/31/2024   Rung II Right Left Right  Left    Trial 1 12# 20# 26# 35#       Normal  Average Values  Female Right Left Male: Right Left   20-29 66 lbs 62 lbs         94 lbs 86 lbs   30-39 68 lbs 64 lbs 90 lbs 82 lbs   40-49 64 lbs 62 lbs 80 lbs 74 lbs   50-59 62 lbs 57 lbs 72 lbs 65 lbs   60-69 53 lbs 51 lbs 63 lbs 56 lbs   70+ 44 lbs 42 lbs 54 lbs 48 lbs   SD = +/- 19lbs         Pinch Strength (Measured in lbs)       4/24/2024 4/24/2024 5/31/2024 5/31/2024     Right Left Right  Left    Key Pinch 5 # 9 # 14# 13#   3pt Pinch unable # unable # 6# 7#   2pt Pinch unable # unable # 7# 8#         Fine/Gross Motor Coordination     Assessment   Right   4/24/2024 Left  4/24/2024   VIRY (Rapid Alternating Movements)      impaired - severe    impaired - severe   Finger to Nose (5 times)   impaired - severe impaired - severe   Finger Flicks (coordination moving from digit flexion to digit extension)   impaired - severe impaired - severe   *WNL - Within Normal Limits  *NT = Not Tested     Sensation:  Aaron  reports no change in sensation, difficulty with formal assessment due to cognitive impairments.        Sensation Intact  Impaired Comments    [place] Anur[facility]  Deep  Touch (6.65) []  []       Protective Sensation (4.56) []  []       Light Touch (3.61) []  []                  Other Kinesthesia  []  [x]       Temperature []  []       Stereognosis  []  []          Treatment     Aaron received the treatments listed below:      neuromuscular re-education activities to improve: Coordination, Kinesthetic, Sense, Proprioception, and Posture for 15 minutes. The following activities were included:  - Upper Body Ergometer (UBE) L3 for 8 minutes at 15 RPM to provide proprioceptive awareness, postural stability, strength, activity tolerance, and reciprocal patterns with bimanual coordination completed to improve use of bilateral upper extremities in order to prepare for therapeutic exercises/activities. Cues provided as needed for postural stability/positioning  - flexbar pronation/supination/twists 2x10 to provide proprioceptive input and increase coordination     Therapeutic activities to improve functional performance for 23 minutes, including:  - sorting various items with alternating upper extremity     Patient Education and Home Exercises     Education provided:   - green putty issued   - Progress towards goals     Written Home Exercises Provided: yes.  Exercises were reviewed and Aaron was able to demonstrate them prior to the end of the session.  Aaron demonstrated good  understanding of the home exercise program provided. See electronic medical record under Patient Instructions for exercises provided during therapy sessions.       Assessment     Aaron tolerated today's session fair. He required increased time for all tasks but was able to see items/colors and accurately identified objects. Cues provided for safety with transfers, utilizing right upper extremity, crossing midline, and visual scanning on table - noted left side inattention with scanning. Max education on safety with walker and fall prevention.     Aaron is progressing well towards his goals and there are no updates to  goals at this time. Pt prognosis is Good.     Patient will continue to benefit from skilled outpatient occupational therapy to address the deficits listed in the problem list on initial evaluation provide patient/family education and to maximize patient's level of independence in the home and community environment.     Patient's spiritual, cultural and educational needs considered and patient agreeable to plan of care and goals.    Anticipated barriers to occupational therapy: severe vision impairments     Goals:  Short Term Goals: 5 weeks   - Pt will report 50% compliance with home exercise program in order to maintain progress attained during therapy. Not met, progressing  - Pt will improve active range of motion of right shoulder flexion/abduction to 115 degrees in order to increase ease with bathing and dressing tasks. Not met, progressing  - Pt will improve active range of motion of left  shoulder flexion/abduction to 130/115 degrees in order to increase ease with bathing and dressing tasks. Met 5/31/2024  - Pt will increase bilateral  strength by 10# in order to improve ease with dressing, bathing and grooming . Met 5/31/2024  - Pt will identify vision loss strategies to assist with navigating his environment to prevent falls or injury. Not met, progressing        Long Term Goals: 10 weeks   - Pt will report independence with home exercise program in order to maintain progress attained during therapy. Not met, progressing  - Pt will increase MMT for bilateral shoulder flexion to 3+/5 to increase upper body strength needed for home management tasks, high level IADLs and work related activities. Met 5/31/2024  - Pt will increase bilateral  strength to 40# in order to improve ease with instrumental activities of daily living. Not met, progressing  - Pt will improve FOTO score to 63% for improved self perception of functional performance with daily activities. Not met, progressing  - Pt will demonstrate  correct use of adaptive equipment and compensatory strategies to assist with feeding, bathing, and dressing due to vision loss. Not met, progressing  - Pt will perform standing bilateral task for 10 minutes, with distant supervision, to improve bimanual task performance and standing tolerance needed for safety during homemaking/kitchen tasks and showering. Not met, progressing  - Pt and caregiver will report increased participation with activities of daily living to improve independence. Not met, progressing    Plan     Updates/Grading for next session: continue with plan of care     Corinne Rapier, OT   6/12/2024

## 2024-06-12 NOTE — PROGRESS NOTES
"  [facility]   Physical Therapy Treatment Note      Name: Aaron Pruitt  Clinic Number: [id number]    Therapy Diagnosis:   Encounter Diagnoses   Name Primary?    Risk for falls Yes    Impaired functional mobility, balance, gait, and endurance      Physician: Camille Bay MD    Visit Date: 6/12/2024    Physician Orders: PT Eval and Treat   Medical Diagnosis from Referral: Anoxic brain damage [G93.1]   Evaluation Date: 4/22/2024  Authorization Period Expiration: 12/31/2024  Plan of Care Expiration: 7/5/2024  Progress Note Due: last assessed 6/10/2024  Date of Surgery: N/A  Visit # / Visits authorized: 15/14 (+eval)  FOTO: next when proxy present      Precautions: Standard, Fall, and Severe Visual Impairment; History of Seziure     Time In: 1:00 PM   Time Out: 1:45 PM  Total Billable Time: 45 minutes (MEDICAID - 3 TE)    PTA Visit #: 1/5     Subjective     Patient reports: No new complaints; brought his walker with him.  He  was  compliant with home exercise program.  Response to previous treatment: no adverse response  Functional change: ongoing    Pain: 0/10  Location: NA      Objective      Objective Measures updated at progress report unless specified.     Treatment     Aaron received the treatments listed below:      therapeutic exercises to develop strength, endurance, ROM, and posture for 00 minutes including:    neuromuscular re-education activities to improve: Balance, Coordination, Kinesthetic, Sense, and Proprioception for 20 minutes. The following activities were included:  Nustep level 5.0 double peak for 8 minutes cues to increase limb movement amplitude  Squat with chair tap: x10  Calf raises: x20  Semi-tandem on blue foam discs 2x60"    therapeutic activities to improve functional performance for 25 minutes, including:  Lateral steps at / bar x 8 lengths x 10 feet each with bilateral upper extremity support; 3# ankle weights  Targeted stepping to cone (hip flexion) x12B " with 3# ankle weights  Targeted stepping to cone (hip abduction) x12B with 3# ankle weights    Not completed today:  Standing Squiz pulls from mirror with cuing for laterality and spatial awareness x 5 minutes total; single upper extremity support    gait training to improve functional mobility and safety for 00  minutes, including:  Not completed today    Patient Education and Home Exercises       Education provided:   - most appropriate AD option  - safety awareness, especially with sitting  - need for a walking stick    Written Home Exercises Provided: Patient instructed to cont prior HEP. Exercises were reviewed and Aaron was able to demonstrate them prior to the end of the session.  Aaron demonstrated good  understanding of the education provided. See Electronic Medical Record under Patient Instructions for exercises provided during therapy sessions    Assessment     Aaron arrived to session without any complaints and agreeable to treatment. He required assistance to stand from waiting room and with AD management to maneuver around chairs.  Rolling walker used throughout treatment and from station to station with a few instances of striking nearby mats or chairs.  Continued to practice safe sitting transfers and obstacle avoidance with minimal improvement observed following cuing.  Visual impairments biggest limiting factor with safe ambulation and transfers at this time.  He would benefit from continued PT services to achieve remaining goals prior to discharge.      Aaron Is progressing well towards his goals.   Patient prognosis is Good.     Patient will continue to benefit from skilled outpatient physical therapy to address the deficits listed in the problem list box on initial evaluation, provide pt/family education and to maximize pt's level of independence in the home and community environment.     Patient's spiritual, cultural and educational needs considered and pt agreeable to plan of care and goals.      Anticipated barriers to physical therapy: Degree of visual and proprioceptive/somatosensory impairments     Goals:   Short Term Goals: 5 weeks   Patient will be compliant with HEP in order to maximize PT benefits  Patient will perform sit <>  one attempt modified independently from standard height chair without need for verbal cuing in order to improve independence and safety with functional mobility (met)  Patient will walk >/= 175 feet on Two-Minute Walk Test in order to improve endurance and gait speed for home mobility (met)     Long Term Goals: 10 weeks   Patient will score >/= 64% on FOTO survey in order to improve self-perception of functional mobility deficits  Patient will improve bilateral lower extremity MMT grades by >/=1/2 grade in order to improve strength for ADL completion  Patient will perform Five Time Sit to  </=20 seconds in order to improve bilateral lower extremity muscular power for transfers  Patient will walk >/= 700 feet on Six-Minute Walk Test in order to improve endurance and gait speed for home mobility   Patient will perform 1 floor <> stand transfer with bilateral upper extremity support in order to demonstrate safe functional mobility in case of future fall  Patient will begin some form of home/community fitness in order to sustain progress gained in PT    Plan     Plan to cont with progression of PT goals per POC.    Vickie Brock, PTA

## 2024-06-12 NOTE — PROGRESS NOTES
[facility]  Speech Therapy Treatment Note- Neurological Rehabilitation  Date: 6/12/2024     Name: Aaron Pruitt   MRN: [id number]   Therapy Diagnosis:   Encounter Diagnoses   Name Primary?    Cognitive communication disorder Yes    Aphasia      Physician: Camille Bay MD  Physician Orders: Ambulatory Referral to Speech Therapy   Medical Diagnosis: G93.1 (ICD-10-CM) - Anoxic brain damage    Visit #/ Visits Authorized: 11/15  Date of Evaluation:  4/24/24  Insurance Authorization Period: 4/26/24-5/26/24  Plan of Care Expiration Date:    7/4/24  Extended Plan of Care:  n/a   Progress Note: 6/22/24     Time In:  11:15  Time Out:  12:00  Total Billable Time:  45  minutes     Precautions: Fall, Cognition, and Communication  Subjective:   Patient reports: good weekend, nothing new to report   He was compliant to home exercise program.   Response to previous treatment: good  Pain Scale: 0/10 on a Visual Analog Scale currently.  Pain Location: n/a  Objective:   UNTIMED  Procedure   Speech- Language- Voice Therapy     Short Term Goals: (4 weeks) Current Progress:   The patient will follow 2 step simple commands with 90% accuracy independently to utilize in daily living environments.   MET 5/22/24    2. The patient will complete categorization tasks with 80% accuracy and 1 prompt or cue in order to enhance his lexical fluidity.      Progressing/ Not Met 6/12/2024  Divergent categorization:   Body parts: 40% accuracy independently (4/10 trials), 1 cue provided increased to 50% accuracy (5/10 trials)  Animals: 50% accuracy independently (5/10 trials), 1 cue provided which did not increase accuracy.  States: 60% accuracy independently (6/10 trials) maximum cueing did not increase accuracy.    Convergent categorization: 30% accuracy independently (3/10 trials), 1 cue provided increased to 50% accuracy (5/10 trials); previously 80% accuracy independently    3. The patient will participate in recall tasks, both  immediate and delayed, with 75% accuracy in order to enhance his memory for daily tasks.        MET 24 Delayed recall: 3 item lists, categorized by the patient, were recalled after 2 minutes with 100% accuracy independently (5/5 trials), previously 100% accuracy independently (5/5 trials)         4. The patient will participate in semantic feature analysis (SFA) to describe tangible objects for increased identification with 80% accuracy and 1 prompt or cue.      MET  24  83% accuracy independently (5/6 trials); with cues 100% accuracy (6/6 trials), previously 83% accuracy independently      5. The patient will name objects with 90% accuracy independently for 2 consecutive sessions to enhance expressive language skills.  Progressing/Not Met 2024 66% accuracy independently with tangible objects (4/6 trials); previously 80% accuracy independently (4/5 trials)       Long Term Goals: (10 weeks) Current Progress:   Using compensatory strategies for memory, patient will recall a 10 item list with a 5 minute delay independently.   Progressing, not met 2024    2. The patient will participate in conversational tasks, both structured and spontaneous, with greater than a 5 word utterance to enhance communication participation.  Progressing, not met 2024      Patient Education/Response:   Patient educated regarding the followin. Compensatory strategies for memory - association and chunking, picture it  2. Home devices for keeping up with orientation information (echo dot, mike)    Home program established: yes-continue conversational speech to prompt memory and word finding  Patient verbalized understanding to all above education provided.     See Electronic Medical Record under Patient Instructions for exercises provided throughout therapy.  Assessment:   Treatment session focused on various speech-language tasks to enhance his current abilities focusing on expressive language primarily. He  performed rather well in his ability to name objects through tactile input, describing objects, and both delayed and immediate recalls tasks. He demonstrated some difficulties in both convergent and divergent naming as compared to previous sessions. Aaron is progressing well towards his goals. Current goals remain appropriate. Goals to be updated as necessary.     Patient prognosis is Good. Patient will continue to benefit from skilled outpatient speech and language therapy to address the deficits listed in the problem list on initial evaluation, provide patient/family education and to maximize patient's level of independence in the home and community environment.   Medical necessity is demonstrated by the following IMPAIRMENTS:  Aphasia: Patient with few true words in all situations severely limiting functional communication with both familiar and unfamiliar communication partners.   Cognition: Deficits in executive functioning, attention, and memory prevent the pt from relaying medically and safety relevant information in a timely manner in a state of emergency.   Barriers to Therapy: none  Patient's spiritual, cultural and educational needs considered and patient agreeable to plan of care and goals.  Plan:   Continue Plan of Care with focus on rehabilitation and compensation for cognitive-linguistic and aphasia impairments following anoxic brain injury.    SUHAS GaleA   Gradate Student Clinician     Josette May M.S., L-SLP,CCC-SLP, CBIS  Speech-Language Pathologist  Certified Brain Injury Specialist  6/12/2024

## 2024-06-13 NOTE — PROGRESS NOTES
"[facility]  Speech Therapy Treatment Note- Neurological Rehabilitation  Date: 6/14/2024     Name: Aaron Pruitt   MRN: [id number]   Therapy Diagnosis:   Encounter Diagnoses   Name Primary?    Cognitive communication disorder Yes    Aphasia      Physician: Camille Bay MD  Physician Orders: Ambulatory Referral to Speech Therapy   Medical Diagnosis: G93.1 (ICD-10-CM) - Anoxic brain damage    Visit #/ Visits Authorized: 12/15  Date of Evaluation:  4/24/24  Insurance Authorization Period: 4/26/24-5/26/24  Plan of Care Expiration Date:    7/4/24  Extended Plan of Care:  n/a   Progress Note: 6/22/24     Time In:  1345  Time Out:  1430  Total Billable Time:  45  minutes     Precautions: Fall, Cognition, and Communication  Subjective:   Patient reports: doing well; frustrated with the time gaps that he has been having for therapy lately  He was compliant to home exercise program.   Response to previous treatment: good  Pain Scale: 0/10 on a Visual Analog Scale currently.  Pain Location: n/a  Objective:   UNTIMED  Procedure   Speech- Language- Voice Therapy     Short Term Goals: (4 weeks) Current Progress:   The patient will follow 2 step simple commands with 90% accuracy independently to utilize in daily living environments.   MET 5/22/24    2. The patient will complete categorization tasks with 80% accuracy and 1 prompt or cue in order to enhance his lexical fluidity.      Progressing/ Not Met 6/14/2024  Divergent categorization:   States (required 10): 5 independently; 1 prompt for "[place]" - no additional states; engaged patient in discussion of states and capitals for increased responses, previously 60% accuracy independently (6/10 trials) maximum cueing did not increase accuracy.  Body Parts (required 10): 50% independently, with semantic cueing, patient increased to 12 body parts; previously 50% accuracy with 1 cue provided  Team names: 20% independently, 10 with semantic cues from " speech-language pathologist     Convergent categorization: 90% accuracy independently, previously 50% accuracy with 1 prompt   3. The patient will participate in recall tasks, both immediate and delayed, with 75% accuracy in order to enhance his memory for daily tasks.   MET 24        4. The patient will participate in semantic feature analysis (SFA) to describe tangible objects for increased identification with 80% accuracy and 1 prompt or cue.   MET  24       5. The patient will name objects with 90% accuracy independently for 2 consecutive sessions to enhance expressive language skills.  Progressing/Not Met 2024 Did not address today per patient's request; previously 66% accuracy independently with tangible objects (4/6 trials)       Long Term Goals: (10 weeks) Current Progress:   Using compensatory strategies for memory, patient will recall a 10 item list with a 5 minute delay independently.   Progressing, not met 2024    2. The patient will participate in conversational tasks, both structured and spontaneous, with greater than a 5 word utterance to enhance communication participation.  Progressing, not met 2024      Patient Education/Response:   Patient educated regarding the followin. Compensatory strategies for memory - association and chunking, picture it  2. Home devices for keeping up with orientation information (echo dot, mike)    Home program established: yes-continue conversational speech to prompt memory and word finding  Patient verbalized understanding to all above education provided.     See Electronic Medical Record under Patient Instructions for exercises provided throughout therapy.  Assessment:   Treatment session focused on various speech-language tasks to enhance his current abilities focusing on expressive language primarily. With divergent categorization tasks, he was most engaged when speech-language pathologist was completing the task with him, including all  of the [employer] football team names. As compared to previous sessions,he demonstrated rather stable performance with both divergent and convergent categorization tasks. He politely deferred object naming today due to his visual impairments. Today, he was frustrated with both his visual limitations as well as the wait time for therapy. Aaron is progressing well towards his goals. Current goals remain appropriate. Goals to be updated as necessary.     Patient prognosis is Good. Patient will continue to benefit from skilled outpatient speech and language therapy to address the deficits listed in the problem list on initial evaluation, provide patient/family education and to maximize patient's level of independence in the home and community environment.   Medical necessity is demonstrated by the following IMPAIRMENTS:  Aphasia: Patient with few true words in all situations severely limiting functional communication with both familiar and unfamiliar communication partners.   Cognition: Deficits in executive functioning, attention, and memory prevent the pt from relaying medically and safety relevant information in a timely manner in a state of emergency.   Barriers to Therapy: none  Patient's spiritual, cultural and educational needs considered and patient agreeable to plan of care and goals.  Plan:   Continue Plan of Care with focus on rehabilitation and compensation for cognitive-linguistic and aphasia impairments following anoxic brain injury.    Josette May M.S., L-SLP,CCC-SLP, CBIS  Speech-Language Pathologist  Certified Brain Injury Specialist  6/14/2024

## 2024-06-14 ENCOUNTER — CLINICAL SUPPORT (OUTPATIENT)
Dept: REHABILITATION | Facility: HOSPITAL | Age: 54
End: 2024-06-14
Payer: MEDICAID

## 2024-06-14 DIAGNOSIS — R47.01 APHASIA: ICD-10-CM

## 2024-06-14 DIAGNOSIS — Z91.81 RISK FOR FALLS: Primary | ICD-10-CM

## 2024-06-14 DIAGNOSIS — Z74.09 IMPAIRED FUNCTIONAL MOBILITY, BALANCE, GAIT, AND ENDURANCE: ICD-10-CM

## 2024-06-14 DIAGNOSIS — R41.841 COGNITIVE COMMUNICATION DISORDER: Primary | ICD-10-CM

## 2024-06-14 PROCEDURE — 97530 THERAPEUTIC ACTIVITIES: CPT | Mod: PN

## 2024-06-14 PROCEDURE — 97110 THERAPEUTIC EXERCISES: CPT | Mod: PN

## 2024-06-14 PROCEDURE — 92507 TX SP LANG VOICE COMM INDIV: CPT | Mod: PN

## 2024-06-14 NOTE — PROGRESS NOTES
"  [facility]   Physical Therapy Treatment Note      Name: Aaron Pruitt  Clinic Number: [id number]    Therapy Diagnosis:   Encounter Diagnoses   Name Primary?    Risk for falls Yes    Impaired functional mobility, balance, gait, and endurance      Physician: Camille Bay MD    Visit Date: 6/14/2024    Physician Orders: PT Eval and Treat   Medical Diagnosis from Referral: Anoxic brain damage [G93.1]   Evaluation Date: 4/22/2024  Authorization Period Expiration: 12/31/2024  Plan of Care Expiration: 7/5/2024  Progress Note Due: last assessed 6/10/2024  Date of Surgery: N/A  Visit # / Visits authorized: 14/14 (+eval)  FOTO: next when proxy present      Precautions: Standard, Fall, and Severe Visual Impairment; History of Seziure     Time In: 12:00 PM   Time Out: 12:45 PM  Total Billable Time: 45 minutes (MEDICAID - 3 TE)    PTA Visit #: 0/5     Subjective     Patient reports: No new complaints today. Confirms that he will go to [facility] soon. Reports understanding that discharge likely next week.  He  was  compliant with home exercise program.  Response to previous treatment: no adverse response  Functional change: ongoing    Pain: 0/10  Location: NA      Objective      Objective Measures updated at progress report unless specified.     Treatment     Aaron received the treatments listed below:      therapeutic exercises to develop strength, endurance, ROM, and posture for 00 minutes including:    neuromuscular re-education activities to improve: Balance, Coordination, Kinesthetic, Sense, and Proprioception for 20 minutes. The following activities were included:  Nustep level 4.0 double peak for 8 minutes cues to increase limb movement amplitude  Sit to stand from elevated mat 2x10 with cues for eccentric control  Standing reciprocal hip flexion marches with 3# ankle weights 3x30"    therapeutic activities to improve functional performance for 25 minutes, " including:  Obstracle avoidance drills with vertical bolsters x 4 lengths x 20 feet   Lateral steps at / bar x 8 lengths x 10 feet each with bilateral upper extremity support; 3# ankle weights  Targeted stepping to cone (hip flexion) x12B with 3# ankle weights  Targeted stepping to cone (hip abduction) x12B with 3# ankle weights    gait training to improve functional mobility and safety for 00  minutes, including:  Not completed today    Patient Education and Home Exercises       Education provided:   - most appropriate AD option  - safety awareness, especially with sitting  - need for a walking stick    Written Home Exercises Provided: Patient instructed to cont prior HEP. Exercises were reviewed and Aaron was able to demonstrate them prior to the end of the session.  Aaron demonstrated good  understanding of the education provided. See Electronic Medical Record under Patient Instructions for exercises provided during therapy sessions    Assessment     Aaron arrived to session following occupational therapy visit without new complaints. Sensory limitations in vision and proprioception persist, but patient was more accurate with repetitive targeted stepping activity today. Tolerated greater volume of activity in prolonged standing today. Eccentric control of transfers not improving, even with maximal cues from PT. Still requiring min A for obstacle avoidance, but gait stability much better with assistive device than without. He will likely be ready for discharge next week; patient's visual limitations likely require further intervention by low vision specialist. Again encouraged contact with [facility] as PT has since evaluation.     Aaron Is progressing well towards his goals.   Patient prognosis is Good.     Patient will continue to benefit from skilled outpatient physical therapy to address the deficits listed in the problem list box on initial evaluation, provide pt/family education and to maximize  pt's level of independence in the home and community environment.     Patient's spiritual, cultural and educational needs considered and pt agreeable to plan of care and goals.     Anticipated barriers to physical therapy: Degree of visual and proprioceptive/somatosensory impairments     Goals:   Short Term Goals: 5 weeks   Patient will be compliant with HEP in order to maximize PT benefits  Patient will perform sit <>  one attempt modified independently from standard height chair without need for verbal cuing in order to improve independence and safety with functional mobility (met)  Patient will walk >/= 175 feet on Two-Minute Walk Test in order to improve endurance and gait speed for home mobility (met)     Long Term Goals: 10 weeks   Patient will score >/= 64% on FOTO survey in order to improve self-perception of functional mobility deficits  Patient will improve bilateral lower extremity MMT grades by >/=1/2 grade in order to improve strength for ADL completion  Patient will perform Five Time Sit to  </=20 seconds in order to improve bilateral lower extremity muscular power for transfers  Patient will walk >/= 700 feet on Six-Minute Walk Test in order to improve endurance and gait speed for home mobility   Patient will perform 1 floor <> stand transfer with bilateral upper extremity support in order to demonstrate safe functional mobility in case of future fall  Patient will begin some form of home/community fitness in order to sustain progress gained in PT    Plan     Plan to cont with progression of PT goals per POC. Discharge next week.    JAMES FERNANDEZ, PT

## 2024-06-14 NOTE — PROGRESS NOTES
"[facility]  Occupational Therapy Treatment Note     Date: 6/14/2024  Name: Aaron Pruitt  Clinic Number: [id number]    Therapy Diagnosis:   Encounter Diagnoses   Name Primary?    Risk for falls Yes    Impaired functional mobility, balance, gait, and endurance        Physician: Camille Bay MD  Physician Orders: Eval and Treat  Medical Diagnosis: G93.1 (ICD-10-CM) - Anoxic brain damage  Evaluation Date: 4/24/2024  Insurance Authorization Period Expiration: 4/1/2025  Plan of Care Certification Period: 07/5/2024  Visit # / Visits authorized: 9 / 12  FOTO: 1 / 3     Precautions:  Standard, Fall, and vision impaired, history of seizures      Time In: 11:10 am  Time Out: 11:48 am  Total Billable Time: 38 minute    Subjective     Patient reports: "I am good." Pt arrived without rolling walker; educated on safety with DME. Spoke with mother start of session, see below for conversation   He was compliant with home exercise program given last session.   Response to previous treatment: good   Functional change: no change to report     Pain: 0/10  Location: no pain     Objective     Visual/Perceptual:  Comments: severe vision loss, unable to identify objects in room     Physical Exam:  Postural examination/scapula alignment: Rounded shoulder and impaired sitting balance, posterior lean when sitting unsupported, multiple cues for upright posture  Joint integrity: Firm end feeling  Palpation: no pain with palpation      Joint Evaluation  AROM  4/24/2024 PROM   4/24/2024 MMS   4/24/2024 AROM  4/24/2024 PROM   4/24/2024 MMS   4/24/2024 AROM  5/31/2024 MMS   5/31/2024 AROM  5/31/2024 MMS   5/31/2024     Right Right Right Left Left Left Right  Right  Left  Left    Scapular Elevation     3/5     3/5  4/5  4/5   Scapular Retraction     3/5     3/5  4/5  4/5   Shoulder Flex 0-180 87 ~110 2/5 114 ~120 2/5 103 4/5 130 4/5   Shoulder Extension 0-80 60   2+/5 66   2+/5 70 4/5 65 4/5   Shoulder Abd 0-180 75 " ~105 2/5 91 ~110 2/5 115 4/5 121 4/5   Shoulder ER 0-90     2/5     2/5  4/5  4/5   Shoulder IR 0-90 PSIS    2/5 PSIS   2/5 L2 4/5 L2 4/5   Elbow Flexion 0-150 150   3/5 150   3/5  4/5  4/5   Elbow Extension -22 -15 2/5 -31 -10 2/5 -15*   Noted pocket of fluid on right elbow - pt reported no pain, he states he fell a few weeks ago getting out of his chair 4/5 -10 4/5   Wrist Flex 0-80     2+/5     2+/5  4/5  4/5   Wrist Ext 0-70     2+/5     2+/5  4/5  4/5   Supination 0-80     2/5 60   2/5  4/5  4/5   Pronation 0-80     2/5     2/5  4/5  4/5   *WNL - Within Normal Limits  *NT = Not Tested     Fist: joint stiffness limiting full fist on bilateral hands   3 cm from IF to palm right hand       Strength: (MAG Dynamometer in lbs.) Average 3 trials, Position II:       4/24/2024 4/24/2024 5/31/2024 5/31/2024   Rung II Right Left Right  Left    Trial 1 12# 20# 26# 35#       Normal  Average Values  Female Right Left Male: Right Left   20-29 66 lbs 62 lbs         94 lbs 86 lbs   30-39 68 lbs 64 lbs 90 lbs 82 lbs   40-49 64 lbs 62 lbs 80 lbs 74 lbs   50-59 62 lbs 57 lbs 72 lbs 65 lbs   60-69 53 lbs 51 lbs 63 lbs 56 lbs   70+ 44 lbs 42 lbs 54 lbs 48 lbs   SD = +/- 19lbs         Pinch Strength (Measured in lbs)       4/24/2024 4/24/2024 5/31/2024 5/31/2024     Right Left Right  Left    Key Pinch 5 # 9 # 14# 13#   3pt Pinch unable # unable # 6# 7#   2pt Pinch unable # unable # 7# 8#         Fine/Gross Motor Coordination     Assessment   Right   4/24/2024 Left  4/24/2024   VIRY (Rapid Alternating Movements)      impaired - severe    impaired - severe   Finger to Nose (5 times)   impaired - severe impaired - severe   Finger Flicks (coordination moving from digit flexion to digit extension)   impaired - severe impaired - severe   *WNL - Within Normal Limits  *NT = Not Tested     Sensation:  Aaron  reports no change in sensation, difficulty with formal assessment due to cognitive impairments.        Sensation Intact  Impaired  Comments    [place] Anuradha  Deep Touch (6.65) []  []       Protective Sensation (4.56) []  []       Light Touch (3.61) []  []                  Other Kinesthesia  []  [x]       Temperature []  []       Stereognosis  []  []          Treatment     Aaron received the treatments listed below:      neuromuscular re-education activities to improve: Coordination, Kinesthetic, Sense, Proprioception, and Posture for 6 minutes. The following activities were included:  - Upper Body Ergometer (UBE) L3 for 6 minutes at 15 RPM to provide proprioceptive awareness, postural stability, strength, activity tolerance, and reciprocal patterns with bimanual coordination completed to improve use of bilateral upper extremities in order to prepare for therapeutic exercises/activities. Cues provided as needed for postural stability/positioning    Therapeutic activities to improve functional performance for 32 minutes, including:  - functional reach bilateral upper extremities with rings full shoulder flexion   - dressing training with low vision strategies for orienting clothing     Patient Education and Home Exercises     Education provided:   - green putty issued   - Progress towards goals     Written Home Exercises Provided: Patient instructed to cont prior HEP.  Exercises were reviewed and Aaron was able to demonstrate them prior to the end of the session.  Aaron demonstrated good  understanding of the home exercise program provided. See electronic medical record under Patient Instructions for exercises provided during therapy sessions.       Assessment     Aaron tolerated today's session fair. He arrived late to session without RW. Educated pt and mother about DME for safety. Also discussed with mother about his progress and limitations to therapy - vision. Highly recommended calling [facility] to work with low vision specialist. She verbalized understanding and stated she would call them Monday. Aaron was able to accurately place  rings on pole with full shoulder flexion in bilateral upper extremities. Cues for tactile input to locate target and he was able to implement with bilateral upper extremities. Also noted improved left sided attention after 2 cues. He had difficulty with orienting clothing for UB dressing. Education on locating collar/tag and feeling for identifying qualities such as buttons will help with low vision. He verbalized understanding. Increased time for all tasks.     Aaron is progressing well towards his goals and there are no updates to goals at this time. Pt prognosis is Good.     Patient will continue to benefit from skilled outpatient occupational therapy to address the deficits listed in the problem list on initial evaluation provide patient/family education and to maximize patient's level of independence in the home and community environment.     Patient's spiritual, cultural and educational needs considered and patient agreeable to plan of care and goals.    Anticipated barriers to occupational therapy: severe vision impairments     Goals:  Short Term Goals: 5 weeks   - Pt will report 50% compliance with home exercise program in order to maintain progress attained during therapy. Not met, progressing  - Pt will improve active range of motion of right shoulder flexion/abduction to 115 degrees in order to increase ease with bathing and dressing tasks. Not met, progressing  - Pt will improve active range of motion of left  shoulder flexion/abduction to 130/115 degrees in order to increase ease with bathing and dressing tasks. Met 5/31/2024  - Pt will increase bilateral  strength by 10# in order to improve ease with dressing, bathing and grooming . Met 5/31/2024  - Pt will identify vision loss strategies to assist with navigating his environment to prevent falls or injury. Not met, progressing        Long Term Goals: 10 weeks   - Pt will report independence with home exercise program in order to maintain progress  attained during therapy. Not met, progressing  - Pt will increase MMT for bilateral shoulder flexion to 3+/5 to increase upper body strength needed for home management tasks, high level IADLs and work related activities. Met 5/31/2024  - Pt will increase bilateral  strength to 40# in order to improve ease with instrumental activities of daily living. Not met, progressing  - Pt will improve FOTO score to 63% for improved self perception of functional performance with daily activities. Not met, progressing  - Pt will demonstrate correct use of adaptive equipment and compensatory strategies to assist with feeding, bathing, and dressing due to vision loss. Not met, progressing  - Pt will perform standing bilateral task for 10 minutes, with distant supervision, to improve bimanual task performance and standing tolerance needed for safety during homemaking/kitchen tasks and showering. Not met, progressing  - Pt and caregiver will report increased participation with activities of daily living to improve independence. Not met, progressing    Plan     Updates/Grading for next session: continue with plan of care     Corinne Rapier, OT   6/14/2024

## 2024-06-15 NOTE — ASSESSMENT & PLAN NOTE
Known history of smoking  Resume steroids, brina  Daily CXR, ABG while intubated  O2 Sat goal 88%-92%   oral oral

## 2024-06-17 ENCOUNTER — CLINICAL SUPPORT (OUTPATIENT)
Dept: REHABILITATION | Facility: HOSPITAL | Age: 54
End: 2024-06-17
Payer: MEDICAID

## 2024-06-17 DIAGNOSIS — Z74.09 IMPAIRED FUNCTIONAL MOBILITY, BALANCE, GAIT, AND ENDURANCE: ICD-10-CM

## 2024-06-17 DIAGNOSIS — M62.81 MUSCLE WEAKNESS (GENERALIZED): Primary | ICD-10-CM

## 2024-06-17 DIAGNOSIS — H53.133 SUDDEN VISUAL LOSS OF BOTH EYES: ICD-10-CM

## 2024-06-17 DIAGNOSIS — Z91.81 RISK FOR FALLS: Primary | ICD-10-CM

## 2024-06-17 DIAGNOSIS — Z74.1 NEED FOR ASSISTANCE WITH PERSONAL CARE: ICD-10-CM

## 2024-06-17 PROCEDURE — 97530 THERAPEUTIC ACTIVITIES: CPT | Mod: PN

## 2024-06-17 PROCEDURE — 97110 THERAPEUTIC EXERCISES: CPT | Mod: PN

## 2024-06-17 NOTE — PROGRESS NOTES
"  [facility]   Physical Therapy Treatment Note      Name: Aaron Pruitt  Clinic Number: [id number]    Therapy Diagnosis:   Encounter Diagnoses   Name Primary?    Risk for falls Yes    Impaired functional mobility, balance, gait, and endurance      Physician: Camille Bay MD    Visit Date: 6/17/2024    Physician Orders: PT Eval and Treat   Medical Diagnosis from Referral: Anoxic brain damage [G93.1]   Evaluation Date: 4/22/2024  Authorization Period Expiration: 12/31/2024  Plan of Care Expiration: 7/5/2024  Progress Note Due: last assessed 6/10/2024  Date of Surgery: N/A  Visit # / Visits authorized: 15/14 (+eval)  FOTO: next when proxy present      Precautions: Standard, Fall, and Severe Visual Impairment; History of Seziure     Time In: 10:32AM   Time Out: 11:15AM  Total Billable Time: 43 minutes (MEDICAID - 3 TE)    PTA Visit #: 0/5     Subjective     Patient reports: Understands next PT visit will be likely discharge.  He  was  compliant with home exercise program.  Response to previous treatment: no adverse response  Functional change: ongoing    Pain: 0/10  Location: NA      Objective      Objective Measures updated at progress report unless specified.     Six-Minute Walk Test: 625 feet with rolling walker    Treatment     Aaron received the treatments listed below:      therapeutic exercises to develop strength, endurance, ROM, and posture for 00 minutes including:    neuromuscular re-education activities to improve: Balance, Coordination, Kinesthetic, Sense, and Proprioception for 20 minutes. The following activities were included:  Nustep level 4.0 double peak for 6 minutes cues to increase limb movement amplitude  Sit to stand from standard height chair x8 with cues for eccentric control  Standing reciprocal hip flexion marches with 3# ankle weights 3x30"    therapeutic activities to improve functional performance for 23 minutes, including:  Lateral steps at / bar x 8 lengths " "x 10 feet each with bilateral upper extremity support; 3# ankle weights  Targeted stepping to 4" step (hip flexion) 2x30" with 3# ankle weights; performed reciprocally  Targeted stepping to 4" step (hip abduction) 2x30" each leg with 3# ankle weights  Reciprocal step ups to 4" step x10 each leg leading with cuing for sequencing x10B    gait training to improve functional mobility and safety for 00  minutes, including:  Not completed today    Patient Education and Home Exercises       Education provided:   - most appropriate AD option  - safety awareness, especially with sitting  - need for a walking stick    Written Home Exercises Provided: Patient instructed to cont prior HEP. Exercises were reviewed and Aaron was able to demonstrate them prior to the end of the session.  Aaron demonstrated good  understanding of the education provided. See Electronic Medical Record under Patient Instructions for exercises provided during therapy sessions    Assessment     Aaron arrived to session with more energy compared to recent visits and agreeable to PT. Min A required for obstacle avoidance due to visual deficits but no loss of balance during Six-Minute Walk Test with rolling walker. Gait stability has improved since initial evaluation but speed is at plateau. Therapy team continues to recommend low vision resources, and patient likely approaching maximal therapeutic benefit at this time. He will be ready for discharge next visit.      Aaron Is progressing well towards his goals.   Patient prognosis is Good.     Patient will continue to benefit from skilled outpatient physical therapy to address the deficits listed in the problem list box on initial evaluation, provide pt/family education and to maximize pt's level of independence in the home and community environment.     Patient's spiritual, cultural and educational needs considered and pt agreeable to plan of care and goals.     Anticipated barriers to physical therapy: " Degree of visual and proprioceptive/somatosensory impairments     Goals:   Short Term Goals: 5 weeks   Patient will be compliant with HEP in order to maximize PT benefits  Patient will perform sit <>  one attempt modified independently from standard height chair without need for verbal cuing in order to improve independence and safety with functional mobility (met)  Patient will walk >/= 175 feet on Two-Minute Walk Test in order to improve endurance and gait speed for home mobility (met)     Long Term Goals: 10 weeks   Patient will score >/= 64% on FOTO survey in order to improve self-perception of functional mobility deficits  Patient will improve bilateral lower extremity MMT grades by >/=1/2 grade in order to improve strength for ADL completion  Patient will perform Five Time Sit to  </=20 seconds in order to improve bilateral lower extremity muscular power for transfers  Patient will walk >/= 700 feet on Six-Minute Walk Test in order to improve endurance and gait speed for home mobility (progressing, not met)  Patient will perform 1 floor <> stand transfer with bilateral upper extremity support in order to demonstrate safe functional mobility in case of future fall  Patient will begin some form of home/community fitness in order to sustain progress gained in PT    Plan     Discharge next.    JAMES FERNANDEZ, PT

## 2024-06-17 NOTE — PROGRESS NOTES
"[facility]  Occupational Therapy Treatment Note     Date: 6/17/2024  Name: Aaron Pruitt  [facility] Number: [id number]    Therapy Diagnosis:   Encounter Diagnoses   Name Primary?    Muscle weakness (generalized) Yes    Sudden visual loss of both eyes     Need for assistance with personal care        Physician: Camille Bay MD  Physician Orders: Eval and Treat  Medical Diagnosis: G93.1 (ICD-10-CM) - Anoxic brain damage  Evaluation Date: 4/24/2024  Insurance Authorization Period Expiration: 4/1/2025  Plan of Care Certification Period: 07/5/2024  Visit # / Visits authorized: 10 / 12  FOTO: 1 / 3     Precautions:  Standard, Fall, and vision impaired, history of seizures      Time In: 11:15 am  Time Out: 11:57 am  Total Billable Time: 42 minute    Subjective     Patient reports: "I am good."   He was compliant with home exercise program given last session.   Response to previous treatment: good   Functional change: no change to report     Pain: 0/10  Location: no pain     Objective     Visual/Perceptual:  Comments: severe vision loss, unable to identify objects in room     Physical Exam:  Postural examination/scapula alignment: Rounded shoulder and impaired sitting balance, posterior lean when sitting unsupported, multiple cues for upright posture  Joint integrity: Firm end feeling  Palpation: no pain with palpation      Joint Evaluation  AROM  4/24/2024 PROM   4/24/2024 MMS   4/24/2024 AROM  4/24/2024 PROM   4/24/2024 MMS   4/24/2024 AROM  5/31/2024 MMS   5/31/2024 AROM  5/31/2024 MMS   5/31/2024     Right Right Right Left Left Left Right  Right  Left  Left    Scapular Elevation     3/5     3/5  4/5  4/5   Scapular Retraction     3/5     3/5  4/5  4/5   Shoulder Flex 0-180 87 ~110 2/5 114 ~120 2/5 103 4/5 130 4/5   Shoulder Extension 0-80 60   2+/5 66   2+/5 70 4/5 65 4/5   Shoulder Abd 0-180 75 ~105 2/5 91 ~110 2/5 115 4/5 121 4/5   Shoulder ER 0-90     2/5     2/5  4/5  4/5   Shoulder IR " 0-90 PSIS    2/5 PSIS   2/5 L2 4/5 L2 4/5   Elbow Flexion 0-150 150   3/5 150   3/5  4/5  4/5   Elbow Extension -22 -15 2/5 -31 -10 2/5 -15*   Noted pocket of fluid on right elbow - pt reported no pain, he states he fell a few weeks ago getting out of his chair 4/5 -10 4/5   Wrist Flex 0-80     2+/5     2+/5  4/5  4/5   Wrist Ext 0-70     2+/5     2+/5  4/5  4/5   Supination 0-80     2/5 60   2/5  4/5  4/5   Pronation 0-80     2/5     2/5  4/5  4/5   *WNL - Within Normal Limits  *NT = Not Tested     Fist: joint stiffness limiting full fist on bilateral hands   3 cm from IF to palm right hand       Strength: (MAG Dynamometer in lbs.) Average 3 trials, Position II:       4/24/2024 4/24/2024 5/31/2024 5/31/2024   Rung II Right Left Right  Left    Trial 1 12# 20# 26# 35#       Normal  Average Values  Female Right Left Male: Right Left   20-29 66 lbs 62 lbs         94 lbs 86 lbs   30-39 68 lbs 64 lbs 90 lbs 82 lbs   40-49 64 lbs 62 lbs 80 lbs 74 lbs   50-59 62 lbs 57 lbs 72 lbs 65 lbs   60-69 53 lbs 51 lbs 63 lbs 56 lbs   70+ 44 lbs 42 lbs 54 lbs 48 lbs   SD = +/- 19lbs         Pinch Strength (Measured in lbs)       4/24/2024 4/24/2024 5/31/2024 5/31/2024     Right Left Right  Left    Key Pinch 5 # 9 # 14# 13#   3pt Pinch unable # unable # 6# 7#   2pt Pinch unable # unable # 7# 8#         Fine/Gross Motor Coordination     Assessment   Right   4/24/2024 Left  4/24/2024   VIRY (Rapid Alternating Movements)      impaired - severe    impaired - severe   Finger to Nose (5 times)   impaired - severe impaired - severe   Finger Flicks (coordination moving from digit flexion to digit extension)   impaired - severe impaired - severe   *WNL - Within Normal Limits  *NT = Not Tested     Sensation:  Aaron  reports no change in sensation, difficulty with formal assessment due to cognitive impairments.        Sensation Intact  Impaired Comments    [place] Anuradha  Deep Touch (6.65) []  []       Protective Sensation (4.56) []  []        Light Touch (3.61) []  []                  Other Kinesthesia  []  [x]       Temperature []  []       Stereognosis  []  []          Treatment     Aaron received the treatments listed below:      neuromuscular re-education activities to improve: Coordination, Kinesthetic, Sense, Proprioception, and Posture for 8 minutes. The following activities were included:  - Upper Body Ergometer (UBE) L3 for 8 minutes at 15 RPM to provide proprioceptive awareness, postural stability, strength, activity tolerance, and reciprocal patterns with bimanual coordination completed to improve use of bilateral upper extremities in order to prepare for therapeutic exercises/activities. Cues provided as needed for postural stability/positioning      Therapeutic activities to improve functional performance for 34 minutes, including:  - locating marbles in blue putty   - squigs on mirror reaching in various planes standing for 15 minutes, 1# wrist weights on bilateral upper extremities   - sit <> stand x5     Patient Education and Home Exercises     Education provided:   - green putty issued   - Progress towards goals     Written Home Exercises Provided: Patient instructed to cont prior HEP.  Exercises were reviewed and Aaron was able to demonstrate them prior to the end of the session.  Aaron demonstrated good  understanding of the home exercise program provided. See electronic medical record under Patient Instructions for exercises provided during therapy sessions.       Assessment     Aaron tolerated today's session well. He was able to participate in table top and standing activities using bilateral upper extremities. Standing activity with squigs to increase reaching, scanning, dynamic standing balance and mental flexibility to increase safety at home. He was able to locate items on mirror, requiring mod cues for accuracy with right vs left upper extremity and recalling color therapist instructed on. He maintained stance for 15  minutes before requiring rest break. Continued education on navigating environment and safety with sit <> stands. Discussed [facility] recommendation with father and encouraged him to call today or tomorrow to learn about resources. Also discussed current plan of care and upcoming discharge from Occupational Therapy.     Aaron is progressing well towards his goals and there are no updates to goals at this time. Pt prognosis is Good.     Patient will continue to benefit from skilled outpatient occupational therapy to address the deficits listed in the problem list on initial evaluation provide patient/family education and to maximize patient's level of independence in the home and community environment.     Patient's spiritual, cultural and educational needs considered and patient agreeable to plan of care and goals.    Anticipated barriers to occupational therapy: severe vision impairments     Goals:  Short Term Goals: 5 weeks   - Pt will report 50% compliance with home exercise program in order to maintain progress attained during therapy. Not met, progressing  - Pt will improve active range of motion of right shoulder flexion/abduction to 115 degrees in order to increase ease with bathing and dressing tasks. Not met, progressing  - Pt will improve active range of motion of left  shoulder flexion/abduction to 130/115 degrees in order to increase ease with bathing and dressing tasks. Met 5/31/2024  - Pt will increase bilateral  strength by 10# in order to improve ease with dressing, bathing and grooming . Met 5/31/2024  - Pt will identify vision loss strategies to assist with navigating his environment to prevent falls or injury. Not met, progressing        Long Term Goals: 10 weeks   - Pt will report independence with home exercise program in order to maintain progress attained during therapy. Not met, progressing  - Pt will increase MMT for bilateral shoulder flexion to 3+/5 to increase upper body strength  needed for home management tasks, high level IADLs and work related activities. Met 5/31/2024  - Pt will increase bilateral  strength to 40# in order to improve ease with instrumental activities of daily living. Not met, progressing  - Pt will improve FOTO score to 63% for improved self perception of functional performance with daily activities. Not met, progressing  - Pt will demonstrate correct use of adaptive equipment and compensatory strategies to assist with feeding, bathing, and dressing due to vision loss. Met 6/17/2024  - Pt will perform standing bilateral task for 10 minutes, with distant supervision, to improve bimanual task performance and standing tolerance needed for safety during homemaking/kitchen tasks and showering. Met 6/17/2024  - Pt and caregiver will report increased participation with activities of daily living to improve independence. Not met, progressing    Plan     Updates/Grading for next session: continue with plan of care     Corinne Rapier, OT   6/17/2024

## 2025-01-04 ENCOUNTER — OFFICE VISIT (OUTPATIENT)
Dept: URGENT CARE | Facility: CLINIC | Age: 55
End: 2025-01-04
Payer: MEDICAID

## 2025-01-04 VITALS
SYSTOLIC BLOOD PRESSURE: 128 MMHG | TEMPERATURE: 98 F | RESPIRATION RATE: 20 BRPM | WEIGHT: 158 LBS | BODY MASS INDEX: 20.94 KG/M2 | HEART RATE: 68 BPM | OXYGEN SATURATION: 97 % | DIASTOLIC BLOOD PRESSURE: 88 MMHG | HEIGHT: 73 IN

## 2025-01-04 DIAGNOSIS — R09.81 SINUS CONGESTION: ICD-10-CM

## 2025-01-04 DIAGNOSIS — J06.9 UPPER RESPIRATORY TRACT INFECTION, UNSPECIFIED TYPE: ICD-10-CM

## 2025-01-04 DIAGNOSIS — J06.9 UPPER RESPIRATORY TRACT INFECTION, UNSPECIFIED TYPE: Primary | ICD-10-CM

## 2025-01-04 DIAGNOSIS — R50.9 FEVER, UNSPECIFIED FEVER CAUSE: ICD-10-CM

## 2025-01-04 DIAGNOSIS — R05.9 COUGH, UNSPECIFIED TYPE: ICD-10-CM

## 2025-01-04 LAB
CTP QC/QA: YES
CTP QC/QA: YES
POC MOLECULAR INFLUENZA A AGN: NEGATIVE
POC MOLECULAR INFLUENZA B AGN: NEGATIVE
SARS-COV-2 AG RESP QL IA.RAPID: NEGATIVE

## 2025-01-04 PROCEDURE — 87502 INFLUENZA DNA AMP PROBE: CPT | Mod: QW,S$GLB,, | Performed by: NURSE PRACTITIONER

## 2025-01-04 PROCEDURE — 87811 SARS-COV-2 COVID19 W/OPTIC: CPT | Mod: QW,S$GLB,, | Performed by: NURSE PRACTITIONER

## 2025-01-04 PROCEDURE — 99203 OFFICE O/P NEW LOW 30 MIN: CPT | Mod: S$GLB,,, | Performed by: NURSE PRACTITIONER

## 2025-01-04 RX ORDER — FLUTICASONE PROPIONATE 50 MCG
2 SPRAY, SUSPENSION (ML) NASAL DAILY
Qty: 11.1 ML | Refills: 0 | Status: SHIPPED | OUTPATIENT
Start: 2025-01-04 | End: 2025-02-03

## 2025-01-04 RX ORDER — LEVOCETIRIZINE DIHYDROCHLORIDE 5 MG/1
5 TABLET, FILM COATED ORAL NIGHTLY
Qty: 30 TABLET | Refills: 0 | Status: SHIPPED | OUTPATIENT
Start: 2025-01-04 | End: 2025-02-03

## 2025-01-04 RX ORDER — BENZONATATE 100 MG/1
100 CAPSULE ORAL 3 TIMES DAILY PRN
Qty: 30 CAPSULE | Refills: 0 | Status: SHIPPED | OUTPATIENT
Start: 2025-01-04 | End: 2025-01-14

## 2025-01-04 NOTE — PROGRESS NOTES
"Subjective:      Patient ID: Aaron Pruitt is a 54 y.o. male.    Vitals:  height is 6' 1" (1.854 m) and weight is 71.7 kg (158 lb). His oral temperature is 98 °F (36.7 °C). His blood pressure is 128/88 and his pulse is 68. His respiration is 20 and oxygen saturation is 97%.     Chief Complaint: Cough and Fever    53yo male pt presents with family member.  Reports sinus congestion and dry cough worsening over the past 2 days.  Reports tactile fever/chills yesterday.  Denies n/v/d, denies abd pain.  Denies chest pain, wheezing, or SOB.  Denies known sick contacts.  Denies attempting any medications or treatments for current symptoms.  Chart review shows no COVID or flu vaccinations this season.    Cough  This is a new problem. The current episode started in the past 7 days. The problem has been gradually worsening. The problem occurs constantly. Associated symptoms include chills, a fever, headaches, nasal congestion, postnasal drip and sweats. Pertinent negatives include no chest pain, ear pain, sore throat, shortness of breath or wheezing.       Constitution: Positive for chills and fever. Negative for fatigue.   HENT:  Positive for congestion, postnasal drip and sinus pressure. Negative for ear pain and sore throat.    Cardiovascular:  Negative for chest pain.   Respiratory:  Positive for cough. Negative for chest tightness, sputum production, shortness of breath and wheezing.    Gastrointestinal:  Negative for abdominal pain, nausea, vomiting and diarrhea.   Neurological:  Positive for headaches.      Objective:     Physical Exam   Constitutional: He is oriented to person, place, and time. He appears well-developed. He is cooperative.  Non-toxic appearance. He does not appear ill. No distress.   HENT:   Head: Normocephalic and atraumatic.   Ears:   Right Ear: Hearing, external ear and ear canal normal. Tympanic membrane is bulging. Tympanic membrane is not erythematous and not retracted. A middle ear effusion " (clear fluid) is present.   Left Ear: Hearing, external ear and ear canal normal. Tympanic membrane is bulging. Tympanic membrane is not erythematous and not retracted. A middle ear effusion (clear fluid) is present.   Nose: Mucosal edema (erythema and swelling to BL turbinates) and rhinorrhea (clear to BL nares) present. No purulent discharge or nasal deformity. No epistaxis. Right sinus exhibits maxillary sinus tenderness. Right sinus exhibits no frontal sinus tenderness. Left sinus exhibits no maxillary sinus tenderness and no frontal sinus tenderness.   Mouth/Throat: Uvula is midline and mucous membranes are normal. No trismus in the jaw. Normal dentition. No uvula swelling. Oropharyngeal exudate (large amount of clear postnasal drip), posterior oropharyngeal erythema (mild) and cobblestoning present. No posterior oropharyngeal edema. Tonsils are 1+ on the right. Tonsils are 1+ on the left. No tonsillar exudate.   Eyes: Conjunctivae and lids are normal. No scleral icterus.   Neck: Trachea normal and phonation normal. Neck supple. No edema present. No erythema present. No neck rigidity present.   Cardiovascular: Normal rate, regular rhythm, normal heart sounds and normal pulses.   Pulmonary/Chest: Effort normal and breath sounds normal. No accessory muscle usage or stridor. No tachypnea. No respiratory distress. He has no decreased breath sounds. He has no wheezes. He has no rhonchi. He has no rales.   Abdominal: Normal appearance.   Musculoskeletal: Normal range of motion.         General: No deformity. Normal range of motion.   Lymphadenopathy:        Head (right side): No submandibular adenopathy present.        Head (left side): No submandibular adenopathy present.     He has no cervical adenopathy.   Neurological: He is alert and oriented to person, place, and time. He exhibits normal muscle tone. Coordination normal.   Skin: Skin is warm, dry, intact, not diaphoretic and not pale.   Psychiatric: His speech  is normal and behavior is normal. Judgment and thought content normal.   Nursing note and vitals reviewed.      Results for orders placed or performed in visit on 01/04/25   SARS Coronavirus 2 Antigen, POCT Manual Read    Collection Time: 01/04/25  5:43 PM   Result Value Ref Range    SARS Coronavirus 2 Antigen Negative Negative     Acceptable Yes    POCT Influenza A/B MOLECULAR    Collection Time: 01/04/25  5:44 PM   Result Value Ref Range    POC Molecular Influenza A Ag Negative Negative    POC Molecular Influenza B Ag Negative Negative     Acceptable Yes          Assessment:     1. Upper respiratory tract infection, unspecified type    2. Fever, unspecified fever cause    3. Cough, unspecified type    4. Sinus congestion        Plan:     Provided education on prescribed medications, recommended adding cool compresses to sinuses for additional symptom relief.  Recommended re-testing for COVID in 2-3 days if symptoms do not improve with treatment.  Recommended alternating Tylenol/ibuprofen for elevated temperature and pain, if needed.  Provided education on good handwashing and masking/isolation precautions.  Provided education on return/ER precautions.  Pt verbalized understanding and agreed to plan.      Upper respiratory tract infection, unspecified type  -     levocetirizine (XYZAL) 5 MG tablet; Take 1 tablet (5 mg total) by mouth every evening.  Dispense: 30 tablet; Refill: 0  -     fluticasone propionate (FLONASE) 50 mcg/actuation nasal spray; 2 sprays (100 mcg total) by Each Nostril route once daily.  Dispense: 11.1 mL; Refill: 0    Fever, unspecified fever cause  -     SARS Coronavirus 2 Antigen, POCT Manual Read  -     POCT Influenza A/B MOLECULAR    Cough, unspecified type  -     benzonatate (TESSALON) 100 MG capsule; Take 1 capsule (100 mg total) by mouth 3 (three) times daily as needed for Cough.  Dispense: 30 capsule; Refill: 0    Sinus congestion      Patient Instructions  "  If your condition worsens or fails to improve, we recommend that you receive another evaluation at the ER immediately contact your PCP to discuss your concerns, or return here.  You must understand that you've received an urgent care treatment only, and that you may be released before all your medical problems are known or treated.  You, the patient, will arrange for follow-up care as instructed.     If we discussed that I think your illness is viral, it will not respond to antibiotics and will last 10-14 days.  If we discussed "wait and see" antibiotics, and if over the next few days the symptoms worsen, start the antibiotics I have given you.     If you are female and on birth control pills and do take the antibiotics, use additional methods to prevent pregnancy while on the antibiotics and for one cycle after.     Flonase (fluticasone) is a nasal spray which is available over the counter and may help with your symptoms.  Zyrtec D, Claritin D, or Allegra D can also help with symptoms of congestion and drainage.  If you have hypertension, avoid using the "D" which is the decongestant formula.    If you just have drainage, you can take plain Zyrtec, Claritin, or Allegra.  If you just have a congested feeling, you can take pseudoephedrine (unless you have high blood pressure), which you have to sign for behind the counter.  Do not buy phenylephrine OTC, as it is not effective.    Rest and fluids are also important.  Tylenol or ibuprofen can also be used as directed for pain, unless you have an allergy to them or medical condition (such as stomach ulcers, kidney or liver disease, or use blood thinners, etc.) for which you should not be taking these type of medications.     If you are flying in the next few days, Afrin nose drops for the airplane flight upon take off and landing may help.  Other than at those times, refrain from using Afrin.     If you were prescribed a narcotic or sedating cough medicine, do not " drive or operate heavy machinery while taking these medications.

## 2025-01-05 RX ORDER — LEVOCETIRIZINE DIHYDROCHLORIDE 5 MG/1
5 TABLET, FILM COATED ORAL NIGHTLY
Qty: 90 TABLET | Refills: 0 | Status: SHIPPED | OUTPATIENT
Start: 2025-01-05

## 2025-04-01 NOTE — PLAN OF CARE
"  Care Plan    Pt remains in ICU at this time. See previous notes for overnight events. Pt unresponsive, EEG cap remains in place. SpO2 90s, (ACVC, Vt 420, RR 12, PEEP 5, FiO2 25%). NSR 80s-90s. SBP 100s-130s. Propofol gtt infusing. Fentanyl gtt initiated for approx. 2 hrs overnight.  ml.   POC reviewed with pt and family. Questions and concerns addressed. Safety and infection precautions in place. See below and flowsheets for full assessment and VS info.     Neuro:  Hydes Coma Scale  Best Eye Response: 1-->(E1) none  Best Motor Response: 1-->(M1) none  Best Verbal Response: 1-->(V1) none  Hydes Coma Scale Score: 3  Assessment Qualifiers: patient chemically sedated or paralyzed, patient intubated  Pupil PERRLA: no  24 hr Temp:  [97 °F (36.1 °C)-100.2 °F (37.9 °C)]      CV:  Rhythm: normal sinus rhythm  DVT prophylaxis: VTE Required Core Measure: (SCDs) Sequential compression device initiated/maintained, Pharmacological prophylaxis initiated/maintained    Resp:     Vent Mode: A/CMV-VC  Set Rate: 12 BPM  Oxygen Concentration (%): 25  Vt Set: 420 mL  PEEP/CPAP: 5 cmH20  Pressure Support: 5 cmH20    GI/:  GI prophylaxis: yes  Diet/Nutrition Received: tube feeding  Last Bowel Movement: 01/08/24  Voiding Characteristics: external catheter   Intake/Output Summary (Last 24 hours) at 1/10/2024 0619  Last data filed at 1/10/2024 0619  Gross per 24 hour   Intake 2024.95 ml   Output 1650 ml   Net 374.95 ml       Labs/Accuchecks:  Recent Labs   Lab 01/10/24  0432   WBC 10.66   RBC 3.64*   HGB 11.0*   HCT 33.1*         Recent Labs   Lab 01/09/24  0505 01/10/24  0432    138   K 4.1 4.6   CO2 22* 26    103   BUN 25* 26*   CREATININE 0.8 0.8   ALKPHOS 56  --    ALT 34  --    AST 44*  --    BILITOT 0.5  --     No results for input(s): "PROTIME", "INR", "APTT", "HEPANTIXA" in the last 168 hours.   Recent Labs   Lab 01/06/24  0539   *       Electrolytes: Electrolytes replaced  Accuchecks: " Normal PSA.  Previously 0.2 in 2015 Q6H    Gtts/LDAs:   fentanyl Stopped (01/10/24 0217)    propofol 100 mcg/kg/min (01/10/24 0619)       Lines/Drains/Airways       Peripherally Inserted Central Catheter Line  Duration             PICC Triple Lumen 01/03/24 1845 right basilic 6 days              Drain  Duration                  NG/OG Tube 01/01/24 1722 [place] sump 16 Fr. Left nostril 8 days    Male External Urinary Catheter 01/06/24 0155 4 days              Airway  Duration                  Airway - Non-Surgical Endotracheal Tube -- days                    Skin/Wounds  Bathing/Skin Care: bath, complete;dressed/undressed;incontinence care;linen changed (01/10/24 0330)  Wounds: No  Wound care consulted: No    Consults  Consults (From admission, onward)          Status Ordering Provider     Inpatient consult to PICC team ([facility])  Once        Provider:  (Not yet assigned)    Acknowledged LAWANDA TELLEZ     [facility]  [facility]        Provider:  (Not yet assigned)    Completed ALEX OBRIEN

## 2025-07-16 ENCOUNTER — OFFICE VISIT (OUTPATIENT)
Dept: URGENT CARE | Facility: CLINIC | Age: 55
End: 2025-07-16
Payer: MEDICAID

## 2025-07-16 VITALS
WEIGHT: 196 LBS | OXYGEN SATURATION: 96 % | BODY MASS INDEX: 26.55 KG/M2 | SYSTOLIC BLOOD PRESSURE: 129 MMHG | HEIGHT: 72 IN | RESPIRATION RATE: 18 BRPM | DIASTOLIC BLOOD PRESSURE: 89 MMHG | TEMPERATURE: 99 F | HEART RATE: 105 BPM

## 2025-07-16 DIAGNOSIS — U07.1 COVID-19: Primary | ICD-10-CM

## 2025-07-16 DIAGNOSIS — R09.82 PND (POST-NASAL DRIP): ICD-10-CM

## 2025-07-16 DIAGNOSIS — Z20.822 ENCOUNTER FOR LABORATORY TESTING FOR COVID-19 VIRUS: ICD-10-CM

## 2025-07-16 DIAGNOSIS — R09.81 CONGESTION OF NASAL SINUS: ICD-10-CM

## 2025-07-16 DIAGNOSIS — R05.1 ACUTE COUGH: ICD-10-CM

## 2025-07-16 LAB
CTP QC/QA: YES
SARS-COV+SARS-COV-2 AG RESP QL IA.RAPID: POSITIVE

## 2025-07-16 RX ORDER — FLUTICASONE PROPIONATE 50 MCG
1 SPRAY, SUSPENSION (ML) NASAL 2 TIMES DAILY
Qty: 15.8 ML | Refills: 0 | Status: SHIPPED | OUTPATIENT
Start: 2025-07-16 | End: 2025-08-15

## 2025-07-16 RX ORDER — PROMETHAZINE HYDROCHLORIDE AND DEXTROMETHORPHAN HYDROBROMIDE 6.25; 15 MG/5ML; MG/5ML
5 SYRUP ORAL NIGHTLY PRN
Qty: 180 ML | Refills: 0 | Status: SHIPPED | OUTPATIENT
Start: 2025-07-16 | End: 2025-08-21

## 2025-07-16 NOTE — PATIENT INSTRUCTIONS
COVID: Paxlovid twice daily for 5 days   Fever/Pain: Alternate Tylenol and Ibuprofen as needed every 4-6 hours  Cough: Cough syrup nightly as needed  Nasal congestion/Runny nose: Nasal spray twice daily as needed  Monitor for chest pain, shortness of breath, worsening of symptoms, or fever unresponsive to medication.   Please drink plenty of fluids.  Please get plenty of rest.  Please return here or go to the Emergency Department for any concerns or worsening of condition.  If you were prescribed antibiotics, please take them to completion.  If you were given wait & see antibiotics, please wait 5-7 days before taking them, and only take them if your symptoms have worsened or not improved.  If you do begin taking the antibiotics, please take them to completion.  If you were prescribed a narcotic medication, do not drive or operate heavy equipment or machinery while taking these medications.  If you were given a steroid shot in the clinic and have also been given a prescription for a steroid such as Prednisone or a Medrol Dose Pack, please begin taking them tomorrow.  If you do not have Hypertension or any history of palpitations, it is ok to take over the counter Sudafed or Mucinex D or Allegra-D or Claritin-D or Zyrtec-D.  If you do take one of the above, it is ok to combine that with plain over the counter Mucinex or Allegra or Claritin or Zyrtec.  If for example you are taking Zyrtec -D, you can combine that with Mucinex, but not Mucinex-D.  If you are taking Mucinex-D, you can combine that with plain Allegra or Claritin or Zyrtec.   If you do have Hypertension or palpitations, it is safe to take Coricidin HBP for relief of sinus symptoms.  If not allergic, please take over the counter Tylenol (Acetaminophen) and/or Motrin (Ibuprofen) as directed for control of pain and/or fever.  Please follow up with your primary care doctor or specialist as needed.    If you  smoke, please stop smoking.

## 2025-07-16 NOTE — PROGRESS NOTES
Subjective:      Patient ID: Aaron Rey is a 55 y.o. male.    Vitals:  height is 6' (1.829 m) and weight is 88.9 kg (196 lb). His oral temperature is 99.3 °F (37.4 °C). His blood pressure is 129/89 and his pulse is 105. His respiration is 18 and oxygen saturation is 96%.     Chief Complaint: Fever    Pt is a 55 y.o. male presenting with cough, fever, myalgia, emesis, HA, congestion. Onset of symptoms was 2 days. Denies any Abd pain, chills, diarrhea, sore throat, otalgia.  Pt reports using OTC Tylenol with mild relief. Pt is visually impaired. Family member present. Denies any use of cholesterol medication or blood thinner.    Fever   This is a new problem. The current episode started 2 days ago. The problem has been waxing and waning. He has not experienced a heat injury.His temperature was unmeasured prior to arrival. Associated symptoms include congestion, coughing, headaches, muscle aches, nausea and vomiting. Pertinent negatives include no abdominal pain, chest pain, diarrhea, ear pain, not playful when afebrile, rash, sleepiness, sore throat, urinary pain or wheezing. He has tried acetaminophen for the symptoms. The treatment provided mild relief.       Constitution: Positive for fever. Negative for activity change, appetite change and chills.   HENT:  Positive for congestion. Negative for ear pain, postnasal drip, sinus pain, sinus pressure and sore throat.    Neck: Negative for neck pain.   Cardiovascular:  Negative for chest pain and sob on exertion.   Eyes:  Negative for eye trauma, eye discharge, eye itching, eye redness, photophobia and blurred vision.   Respiratory:  Positive for cough. Negative for shortness of breath, wheezing and asthma.    Gastrointestinal:  Positive for nausea and vomiting. Negative for abdominal pain, constipation and diarrhea.   Genitourinary:  Negative for dysuria, frequency, urgency, urine decreased and hematuria.   Musculoskeletal:  Negative for pain and muscle ache.   Skin:   Negative for color change, rash and hives.   Allergic/Immunologic: Negative for seasonal allergies, asthma, hives and sneezing.   Neurological:  Positive for headaches. Negative for dizziness, light-headedness and altered mental status.   Psychiatric/Behavioral:  Negative for altered mental status and confusion.       Objective:     Physical Exam   Constitutional: He is oriented to person, place, and time. He appears well-developed. He is cooperative.  Non-toxic appearance. He does not appear ill. No distress.      Comments:Pt sitting erect on examination table. No acute respiratory distress, no use of accessory muscles, no notice of nasal flaring.        HENT:   Head: Normocephalic and atraumatic.   Ears:   Right Ear: Hearing and external ear normal. Tympanic membrane is not erythematous. A middle ear effusion is present. no impacted cerumen  Left Ear: Hearing and external ear normal. Tympanic membrane is not erythematous. A middle ear effusion is present. no impacted cerumen  Nose: Congestion present. No mucosal edema, rhinorrhea or nasal deformity. No epistaxis. Right sinus exhibits no maxillary sinus tenderness and no frontal sinus tenderness. Left sinus exhibits no maxillary sinus tenderness and no frontal sinus tenderness.   Mouth/Throat: Uvula is midline and mucous membranes are normal. No trismus in the jaw. Normal dentition. No uvula swelling. Posterior oropharyngeal erythema present. No oropharyngeal exudate or posterior oropharyngeal edema.   Eyes: Conjunctivae and lids are normal. No scleral icterus.   Neck: Trachea normal and phonation normal. Neck supple. No edema present. No erythema present. No neck rigidity present.   Cardiovascular: Normal rate, regular rhythm, normal heart sounds and normal pulses.   Pulmonary/Chest: Effort normal and breath sounds normal. No accessory muscle usage. No apnea, no tachypnea and no bradypnea. No respiratory distress. He has no decreased breath sounds. He has no  wheezes. He has no rhonchi.   Lungs clear to auscultation B/L           Comments: Lungs clear to auscultation B/L      Abdominal: Normal appearance.   Musculoskeletal: Normal range of motion.         General: No deformity. Normal range of motion.   Neurological: He is alert and oriented to person, place, and time. He exhibits normal muscle tone. Coordination normal.   Skin: Skin is warm, dry, intact, not diaphoretic and not pale.   Psychiatric: His speech is normal and behavior is normal. Judgment and thought content normal.   Nursing note and vitals reviewed.    Results for orders placed or performed in visit on 07/16/25   SARS Coronavirus 2 Antigen, POCT Manual Read    Collection Time: 07/16/25  5:47 PM   Result Value Ref Range    SARS Coronavirus 2 Antigen Positive (A) Negative, Presumptive Negative     Acceptable Yes          Assessment:     1. COVID-19    2. Encounter for laboratory testing for COVID-19 virus    3. Congestion of nasal sinus    4. PND (post-nasal drip)    5. Acute cough        Plan:   I have reviewed the patient chart and pertinent past imaging/labs.      COVID-19  -     nirmatrelvir-ritonavir 300 mg (150 mg x 2)-100 mg copackaged tablets (EUA); Take 3 tablets by mouth 2 (two) times daily for 5 days. Each dose contains 2 nirmatrelvir (pink tablets) and 1 ritonavir (white tablet). Take all 3 tablets together  Dispense: 30 tablet; Refill: 0    Encounter for laboratory testing for COVID-19 virus  -     SARS Coronavirus 2 Antigen, POCT Manual Read    Congestion of nasal sinus    PND (post-nasal drip)  -     fluticasone propionate (FLONASE) 50 mcg/actuation nasal spray; 1 spray (50 mcg total) by Each Nostril route 2 (two) times a day.  Dispense: 15.8 mL; Refill: 0    Acute cough  -     promethazine-dextromethorphan (PROMETHAZINE-DM) 6.25-15 mg/5 mL Syrp; Take 5 mLs by mouth nightly as needed.  Dispense: 180 mL; Refill: 0

## (undated) DEVICE — SUT CHROMIC 2-0 SH 27IN BRN

## (undated) DEVICE — CLOSURE SKIN STERI STRIP 1/4X4

## (undated) DEVICE — SUT PROLENE 2-0 30 SH

## (undated) DEVICE — URINARY DRAINAGE BAG

## (undated) DEVICE — BOWL STERILE LARGE 32OZ

## (undated) DEVICE — KIT PEG PULL STANDARD 20F

## (undated) DEVICE — CHLORAPREP 10.5 ML APPLICATOR

## (undated) DEVICE — ELECTRODE BLADE INSULATED 1 IN

## (undated) DEVICE — HOLDER TRACH TUBE NECKBAND

## (undated) DEVICE — LUBRICANT SURGILUBE 2 OZ

## (undated) DEVICE — GOWN SURGICAL X-LARGE

## (undated) DEVICE — TRAY MINOR GEN SURG OMC

## (undated) DEVICE — TRACH TUBE CUFF FLEX DISP 8.5

## (undated) DEVICE — ELECTRODE REM PLYHSV RETURN 9

## (undated) DEVICE — SUT 2/0 30IN SILK BLK BRAI

## (undated) DEVICE — GOWN POLY REINF BRTH SLV XL

## (undated) DEVICE — TUBING SUC UNIV W/CONN 12FT